# Patient Record
Sex: MALE | Race: ASIAN | NOT HISPANIC OR LATINO | URBAN - METROPOLITAN AREA
[De-identification: names, ages, dates, MRNs, and addresses within clinical notes are randomized per-mention and may not be internally consistent; named-entity substitution may affect disease eponyms.]

---

## 2018-03-01 ENCOUNTER — OUTPATIENT (OUTPATIENT)
Dept: OUTPATIENT SERVICES | Facility: HOSPITAL | Age: 58
LOS: 1 days | End: 2018-03-01
Payer: MEDICAID

## 2018-03-01 PROCEDURE — G9001: CPT

## 2018-03-29 DIAGNOSIS — R69 ILLNESS, UNSPECIFIED: ICD-10-CM

## 2018-08-10 ENCOUNTER — TRANSCRIPTION ENCOUNTER (OUTPATIENT)
Age: 58
End: 2018-08-10

## 2018-08-11 ENCOUNTER — RESULT REVIEW (OUTPATIENT)
Age: 58
End: 2018-08-11

## 2018-08-11 ENCOUNTER — INPATIENT (INPATIENT)
Facility: HOSPITAL | Age: 58
LOS: 26 days | Discharge: HOME | DRG: 347 | End: 2018-09-07
Attending: SURGERY | Admitting: SURGERY
Payer: MEDICAID

## 2018-08-11 VITALS
TEMPERATURE: 98 F | OXYGEN SATURATION: 96 % | HEART RATE: 100 BPM | SYSTOLIC BLOOD PRESSURE: 156 MMHG | DIASTOLIC BLOOD PRESSURE: 85 MMHG | RESPIRATION RATE: 16 BRPM

## 2018-08-11 DIAGNOSIS — R19.8 OTHER SPECIFIED SYMPTOMS AND SIGNS INVOLVING THE DIGESTIVE SYSTEM AND ABDOMEN: ICD-10-CM

## 2018-08-11 LAB
ALBUMIN SERPL ELPH-MCNC: 2.9 G/DL — LOW (ref 3.3–5)
ALP SERPL-CCNC: 89 U/L — SIGNIFICANT CHANGE UP (ref 40–120)
ALT FLD-CCNC: 20 U/L — SIGNIFICANT CHANGE UP (ref 10–45)
ANION GAP SERPL CALC-SCNC: 13 MMOL/L — SIGNIFICANT CHANGE UP (ref 5–17)
ANION GAP SERPL CALC-SCNC: 14 MMOL/L — SIGNIFICANT CHANGE UP (ref 5–17)
APPEARANCE UR: ABNORMAL
APTT BLD: 26.4 SEC — LOW (ref 27.5–37.4)
APTT BLD: 26.9 SEC — LOW (ref 27.5–37.4)
AST SERPL-CCNC: 20 U/L — SIGNIFICANT CHANGE UP (ref 10–40)
BACTERIA # UR AUTO: NEGATIVE /HPF — SIGNIFICANT CHANGE UP
BASOPHILS # BLD AUTO: 0 K/UL — SIGNIFICANT CHANGE UP (ref 0–0.2)
BILIRUB SERPL-MCNC: 0.4 MG/DL — SIGNIFICANT CHANGE UP (ref 0.2–1.2)
BILIRUB UR-MCNC: NEGATIVE — SIGNIFICANT CHANGE UP
BLD GP AB SCN SERPL QL: NEGATIVE — SIGNIFICANT CHANGE UP
BUN SERPL-MCNC: 13 MG/DL — SIGNIFICANT CHANGE UP (ref 7–23)
BUN SERPL-MCNC: 7 MG/DL — SIGNIFICANT CHANGE UP (ref 7–23)
CA-I BLD-SCNC: 1.1 MMOL/L — LOW (ref 1.12–1.3)
CALCIUM SERPL-MCNC: 7.4 MG/DL — LOW (ref 8.4–10.5)
CALCIUM SERPL-MCNC: 8.6 MG/DL — SIGNIFICANT CHANGE UP (ref 8.4–10.5)
CHLORIDE SERPL-SCNC: 94 MMOL/L — LOW (ref 96–108)
CHLORIDE SERPL-SCNC: 98 MMOL/L — SIGNIFICANT CHANGE UP (ref 96–108)
CO2 SERPL-SCNC: 19 MMOL/L — LOW (ref 22–31)
CO2 SERPL-SCNC: 25 MMOL/L — SIGNIFICANT CHANGE UP (ref 22–31)
COLOR SPEC: YELLOW — SIGNIFICANT CHANGE UP
COMMENT - URINE: SIGNIFICANT CHANGE UP
CREAT SERPL-MCNC: 0.51 MG/DL — SIGNIFICANT CHANGE UP (ref 0.5–1.3)
CREAT SERPL-MCNC: 0.69 MG/DL — SIGNIFICANT CHANGE UP (ref 0.5–1.3)
DIFF PNL FLD: NEGATIVE — SIGNIFICANT CHANGE UP
EOSINOPHIL # BLD AUTO: 0.3 K/UL — SIGNIFICANT CHANGE UP (ref 0–0.5)
EOSINOPHIL NFR BLD AUTO: 3 % — SIGNIFICANT CHANGE UP (ref 0–6)
EPI CELLS # UR: SIGNIFICANT CHANGE UP /HPF
GAS PNL BLDA: SIGNIFICANT CHANGE UP
GLUCOSE BLDC GLUCOMTR-MCNC: 98 MG/DL — SIGNIFICANT CHANGE UP (ref 70–99)
GLUCOSE SERPL-MCNC: 166 MG/DL — HIGH (ref 70–99)
GLUCOSE SERPL-MCNC: 71 MG/DL — SIGNIFICANT CHANGE UP (ref 70–99)
GLUCOSE UR QL: NEGATIVE — SIGNIFICANT CHANGE UP
HCT VFR BLD CALC: 31.5 % — LOW (ref 39–50)
HCT VFR BLD CALC: 34.3 % — LOW (ref 39–50)
HGB BLD-MCNC: 10.4 G/DL — LOW (ref 13–17)
HGB BLD-MCNC: 11.1 G/DL — LOW (ref 13–17)
INR BLD: 1.29 RATIO — HIGH (ref 0.88–1.16)
INR BLD: 1.33 RATIO — HIGH (ref 0.88–1.16)
KETONES UR-MCNC: NEGATIVE — SIGNIFICANT CHANGE UP
LACTATE BLDV-MCNC: 1.2 MMOL/L — SIGNIFICANT CHANGE UP (ref 0.7–2)
LACTATE BLDV-MCNC: 2.3 MMOL/L — HIGH (ref 0.7–2)
LEUKOCYTE ESTERASE UR-ACNC: ABNORMAL
LYMPHOCYTES # BLD AUTO: 2.8 K/UL — SIGNIFICANT CHANGE UP (ref 1–3.3)
LYMPHOCYTES # BLD AUTO: 8 % — LOW (ref 13–44)
MAGNESIUM SERPL-MCNC: 1.8 MG/DL — SIGNIFICANT CHANGE UP (ref 1.6–2.6)
MCHC RBC-ENTMCNC: 27.8 PG — SIGNIFICANT CHANGE UP (ref 27–34)
MCHC RBC-ENTMCNC: 27.9 PG — SIGNIFICANT CHANGE UP (ref 27–34)
MCHC RBC-ENTMCNC: 32.5 GM/DL — SIGNIFICANT CHANGE UP (ref 32–36)
MCHC RBC-ENTMCNC: 33 GM/DL — SIGNIFICANT CHANGE UP (ref 32–36)
MCV RBC AUTO: 84.4 FL — SIGNIFICANT CHANGE UP (ref 80–100)
MCV RBC AUTO: 85.7 FL — SIGNIFICANT CHANGE UP (ref 80–100)
MONOCYTES # BLD AUTO: 1.4 K/UL — HIGH (ref 0–0.9)
MONOCYTES NFR BLD AUTO: 7 % — SIGNIFICANT CHANGE UP (ref 2–14)
NEUTROPHILS # BLD AUTO: 20.1 K/UL — HIGH (ref 1.8–7.4)
NEUTROPHILS NFR BLD AUTO: 77 % — SIGNIFICANT CHANGE UP (ref 43–77)
NEUTS BAND # BLD: 5 % — SIGNIFICANT CHANGE UP (ref 0–8)
NITRITE UR-MCNC: NEGATIVE — SIGNIFICANT CHANGE UP
PH UR: 6.5 — SIGNIFICANT CHANGE UP (ref 5–8)
PHOSPHATE SERPL-MCNC: 3.4 MG/DL — SIGNIFICANT CHANGE UP (ref 2.5–4.5)
PLAT MORPH BLD: NORMAL — SIGNIFICANT CHANGE UP
PLATELET # BLD AUTO: 709 K/UL — HIGH (ref 150–400)
PLATELET # BLD AUTO: 712 K/UL — HIGH (ref 150–400)
POTASSIUM SERPL-MCNC: 3.7 MMOL/L — SIGNIFICANT CHANGE UP (ref 3.5–5.3)
POTASSIUM SERPL-MCNC: 3.8 MMOL/L — SIGNIFICANT CHANGE UP (ref 3.5–5.3)
POTASSIUM SERPL-SCNC: 3.7 MMOL/L — SIGNIFICANT CHANGE UP (ref 3.5–5.3)
POTASSIUM SERPL-SCNC: 3.8 MMOL/L — SIGNIFICANT CHANGE UP (ref 3.5–5.3)
PROT SERPL-MCNC: 7.4 G/DL — SIGNIFICANT CHANGE UP (ref 6–8.3)
PROT UR-MCNC: SIGNIFICANT CHANGE UP
PROTHROM AB SERPL-ACNC: 14 SEC — HIGH (ref 9.8–12.7)
PROTHROM AB SERPL-ACNC: 14.4 SEC — HIGH (ref 9.8–12.7)
RBC # BLD: 3.73 M/UL — LOW (ref 4.2–5.8)
RBC # BLD: 4.01 M/UL — LOW (ref 4.2–5.8)
RBC # FLD: 16.6 % — HIGH (ref 10.3–14.5)
RBC # FLD: 17.1 % — HIGH (ref 10.3–14.5)
RBC BLD AUTO: SIGNIFICANT CHANGE UP
RBC CASTS # UR COMP ASSIST: SIGNIFICANT CHANGE UP /HPF (ref 0–2)
RH IG SCN BLD-IMP: POSITIVE — SIGNIFICANT CHANGE UP
RH IG SCN BLD-IMP: POSITIVE — SIGNIFICANT CHANGE UP
SODIUM SERPL-SCNC: 131 MMOL/L — LOW (ref 135–145)
SODIUM SERPL-SCNC: 132 MMOL/L — LOW (ref 135–145)
SP GR SPEC: 1.01 — SIGNIFICANT CHANGE UP (ref 1.01–1.02)
UROBILINOGEN FLD QL: NEGATIVE — SIGNIFICANT CHANGE UP
WBC # BLD: 12.3 K/UL — HIGH (ref 3.8–10.5)
WBC # BLD: 24.7 K/UL — HIGH (ref 3.8–10.5)
WBC # FLD AUTO: 12.3 K/UL — HIGH (ref 3.8–10.5)
WBC # FLD AUTO: 24.7 K/UL — HIGH (ref 3.8–10.5)
WBC UR QL: ABNORMAL /HPF (ref 0–5)

## 2018-08-11 PROCEDURE — 73620 X-RAY EXAM OF FOOT: CPT | Mod: 26,RT

## 2018-08-11 PROCEDURE — 99285 EMERGENCY DEPT VISIT HI MDM: CPT

## 2018-08-11 PROCEDURE — 44160 REMOVAL OF COLON: CPT

## 2018-08-11 PROCEDURE — 49060 DRAIN OPEN RETROPERI ABSCESS: CPT | Mod: 59

## 2018-08-11 PROCEDURE — 88312 SPECIAL STAINS GROUP 1: CPT | Mod: 26

## 2018-08-11 PROCEDURE — 74177 CT ABD & PELVIS W/CONTRAST: CPT | Mod: 26

## 2018-08-11 PROCEDURE — 97605 NEG PRS WND THER DME<=50SQCM: CPT

## 2018-08-11 PROCEDURE — 50715 RELEASE OF URETER: CPT | Mod: 59

## 2018-08-11 PROCEDURE — 99291 CRITICAL CARE FIRST HOUR: CPT

## 2018-08-11 PROCEDURE — 88307 TISSUE EXAM BY PATHOLOGIST: CPT | Mod: 26

## 2018-08-11 PROCEDURE — 71045 X-RAY EXAM CHEST 1 VIEW: CPT | Mod: 26

## 2018-08-11 RX ORDER — DEXMEDETOMIDINE HYDROCHLORIDE IN 0.9% SODIUM CHLORIDE 4 UG/ML
0.2 INJECTION INTRAVENOUS
Qty: 200 | Refills: 0 | Status: DISCONTINUED | OUTPATIENT
Start: 2018-08-11 | End: 2018-08-11

## 2018-08-11 RX ORDER — FLUCONAZOLE 150 MG/1
400 TABLET ORAL ONCE
Qty: 0 | Refills: 0 | Status: COMPLETED | OUTPATIENT
Start: 2018-08-11 | End: 2018-08-11

## 2018-08-11 RX ORDER — SODIUM CHLORIDE 9 MG/ML
500 INJECTION INTRAMUSCULAR; INTRAVENOUS; SUBCUTANEOUS ONCE
Qty: 0 | Refills: 0 | Status: COMPLETED | OUTPATIENT
Start: 2018-08-11 | End: 2018-08-11

## 2018-08-11 RX ORDER — FLUCONAZOLE 150 MG/1
400 TABLET ORAL EVERY 24 HOURS
Qty: 0 | Refills: 0 | Status: DISCONTINUED | OUTPATIENT
Start: 2018-08-12 | End: 2018-08-12

## 2018-08-11 RX ORDER — FLUCONAZOLE 150 MG/1
TABLET ORAL
Qty: 0 | Refills: 0 | Status: DISCONTINUED | OUTPATIENT
Start: 2018-08-11 | End: 2018-08-12

## 2018-08-11 RX ORDER — INSULIN LISPRO 100/ML
VIAL (ML) SUBCUTANEOUS EVERY 6 HOURS
Qty: 0 | Refills: 0 | Status: DISCONTINUED | OUTPATIENT
Start: 2018-08-11 | End: 2018-08-12

## 2018-08-11 RX ORDER — PHENYLEPHRINE HYDROCHLORIDE 10 MG/ML
0.4 INJECTION INTRAVENOUS
Qty: 160 | Refills: 0 | Status: DISCONTINUED | OUTPATIENT
Start: 2018-08-11 | End: 2018-08-12

## 2018-08-11 RX ORDER — PIPERACILLIN AND TAZOBACTAM 4; .5 G/20ML; G/20ML
3.38 INJECTION, POWDER, LYOPHILIZED, FOR SOLUTION INTRAVENOUS EVERY 8 HOURS
Qty: 0 | Refills: 0 | Status: DISCONTINUED | OUTPATIENT
Start: 2018-08-11 | End: 2018-08-13

## 2018-08-11 RX ORDER — FENTANYL CITRATE 50 UG/ML
0.5 INJECTION INTRAVENOUS
Qty: 5000 | Refills: 0 | Status: DISCONTINUED | OUTPATIENT
Start: 2018-08-11 | End: 2018-08-12

## 2018-08-11 RX ORDER — FENTANYL CITRATE 50 UG/ML
1 INJECTION INTRAVENOUS
Qty: 2500 | Refills: 0 | Status: DISCONTINUED | OUTPATIENT
Start: 2018-08-11 | End: 2018-08-11

## 2018-08-11 RX ORDER — SODIUM CHLORIDE 9 MG/ML
1500 INJECTION INTRAMUSCULAR; INTRAVENOUS; SUBCUTANEOUS ONCE
Qty: 0 | Refills: 0 | Status: COMPLETED | OUTPATIENT
Start: 2018-08-11 | End: 2018-08-11

## 2018-08-11 RX ORDER — SODIUM CHLORIDE 9 MG/ML
1000 INJECTION INTRAMUSCULAR; INTRAVENOUS; SUBCUTANEOUS ONCE
Qty: 0 | Refills: 0 | Status: COMPLETED | OUTPATIENT
Start: 2018-08-11 | End: 2018-08-11

## 2018-08-11 RX ORDER — ENOXAPARIN SODIUM 100 MG/ML
40 INJECTION SUBCUTANEOUS DAILY
Qty: 0 | Refills: 0 | Status: DISCONTINUED | OUTPATIENT
Start: 2018-08-11 | End: 2018-08-13

## 2018-08-11 RX ORDER — SODIUM CHLORIDE 9 MG/ML
1000 INJECTION, SOLUTION INTRAVENOUS
Qty: 0 | Refills: 0 | Status: DISCONTINUED | OUTPATIENT
Start: 2018-08-11 | End: 2018-08-13

## 2018-08-11 RX ORDER — PROPOFOL 10 MG/ML
5 INJECTION, EMULSION INTRAVENOUS
Qty: 1000 | Refills: 0 | Status: DISCONTINUED | OUTPATIENT
Start: 2018-08-11 | End: 2018-08-11

## 2018-08-11 RX ORDER — FENTANYL CITRATE 50 UG/ML
50 INJECTION INTRAVENOUS ONCE
Qty: 0 | Refills: 0 | Status: DISCONTINUED | OUTPATIENT
Start: 2018-08-11 | End: 2018-08-11

## 2018-08-11 RX ORDER — VANCOMYCIN HCL 1 G
1000 VIAL (EA) INTRAVENOUS ONCE
Qty: 0 | Refills: 0 | Status: COMPLETED | OUTPATIENT
Start: 2018-08-11 | End: 2018-08-11

## 2018-08-11 RX ORDER — PROPOFOL 10 MG/ML
5 INJECTION, EMULSION INTRAVENOUS
Qty: 1000 | Refills: 0 | Status: DISCONTINUED | OUTPATIENT
Start: 2018-08-11 | End: 2018-08-13

## 2018-08-11 RX ORDER — VANCOMYCIN HCL 1 G
750 VIAL (EA) INTRAVENOUS EVERY 12 HOURS
Qty: 0 | Refills: 0 | Status: DISCONTINUED | OUTPATIENT
Start: 2018-08-11 | End: 2018-08-12

## 2018-08-11 RX ORDER — PANTOPRAZOLE SODIUM 20 MG/1
40 TABLET, DELAYED RELEASE ORAL DAILY
Qty: 0 | Refills: 0 | Status: DISCONTINUED | OUTPATIENT
Start: 2018-08-11 | End: 2018-08-13

## 2018-08-11 RX ORDER — PIPERACILLIN AND TAZOBACTAM 4; .5 G/20ML; G/20ML
3.38 INJECTION, POWDER, LYOPHILIZED, FOR SOLUTION INTRAVENOUS ONCE
Qty: 0 | Refills: 0 | Status: COMPLETED | OUTPATIENT
Start: 2018-08-11 | End: 2018-08-11

## 2018-08-11 RX ADMIN — SODIUM CHLORIDE 125 MILLILITER(S): 9 INJECTION, SOLUTION INTRAVENOUS at 22:27

## 2018-08-11 RX ADMIN — PIPERACILLIN AND TAZOBACTAM 25 GRAM(S): 4; .5 INJECTION, POWDER, LYOPHILIZED, FOR SOLUTION INTRAVENOUS at 22:35

## 2018-08-11 RX ADMIN — Medication 250 MILLIGRAM(S): at 17:37

## 2018-08-11 RX ADMIN — FENTANYL CITRATE 50 MICROGRAM(S): 50 INJECTION INTRAVENOUS at 22:25

## 2018-08-11 RX ADMIN — Medication 1 MILLIGRAM(S): at 22:15

## 2018-08-11 RX ADMIN — FENTANYL CITRATE 50 MICROGRAM(S): 50 INJECTION INTRAVENOUS at 22:40

## 2018-08-11 RX ADMIN — SODIUM CHLORIDE 1000 MILLILITER(S): 9 INJECTION INTRAMUSCULAR; INTRAVENOUS; SUBCUTANEOUS at 12:40

## 2018-08-11 RX ADMIN — SODIUM CHLORIDE 1500 MILLILITER(S): 9 INJECTION INTRAMUSCULAR; INTRAVENOUS; SUBCUTANEOUS at 15:33

## 2018-08-11 RX ADMIN — PROPOFOL 2.1 MICROGRAM(S)/KG/MIN: 10 INJECTION, EMULSION INTRAVENOUS at 22:27

## 2018-08-11 RX ADMIN — PIPERACILLIN AND TAZOBACTAM 200 GRAM(S): 4; .5 INJECTION, POWDER, LYOPHILIZED, FOR SOLUTION INTRAVENOUS at 15:33

## 2018-08-11 RX ADMIN — SODIUM CHLORIDE 500 MILLILITER(S): 9 INJECTION INTRAMUSCULAR; INTRAVENOUS; SUBCUTANEOUS at 17:38

## 2018-08-11 RX ADMIN — FLUCONAZOLE 100 MILLIGRAM(S): 150 TABLET ORAL at 22:35

## 2018-08-11 NOTE — H&P ADULT - HISTORY OF PRESENT ILLNESS
58M PMH DM, HTN, peripheral neuropathy, presenting to ED with abdominal pain and R leg weakness. Patient had recently been in [place] (on vacation, [place] the [place]), where he was admitted to a hospital there 2 weeks prior to presentation for fever, found during work up there to have a R abdominal collection 2/2 colonic drainage. Hospital there started pt on antibiotics, placed a drain into the collection which was apparently not very successful as it had very little output. According to the pt the doctors in [place] recommended surgery to remove part of the colon, which patient did not agree with. He left the hospital AMA and [place] back [place] the [place] for second opinion. The patient came to [facility] ED immediately after leaving the airport.    CT was obtained here which showed a large cecal/terminal ileum perforation with a large amount of stool extending into the R iliacus muscle. Labs significant   for WBC 24.7, Lactate 2.3. Patient also with grossly positive UTI.    Of not patient also endorsing inability to ambulate for the past 2 weeks (at around the same time as fever developed) due to weakness? in the RLE. Also endorsing dysuria.

## 2018-08-11 NOTE — ED PROVIDER NOTE - MUSCULOSKELETAL [+], MLM
R leg weakness for last 2 weeks (can not raise or extend), inability to ambulate due to leg weakness,

## 2018-08-11 NOTE — ED PROVIDER NOTE - CHPI ED SYMPTOMS POS
R leg weakness for last 2 weeks (can not raise or extend), inability to ambulate due to leg weakness, constipation, burning when urinating, difficulty urinating, weak, tired, lightheaded/WEAKNESS/FEVER R leg weakness for last 2 weeks (can not raise or extend), inability to ambulate due to leg weakness, Mass on R hip, constipation, burning when urinating, difficulty urinating, weak, tired, lightheaded/FEVER/WEAKNESS

## 2018-08-11 NOTE — ED PROVIDER NOTE - PROGRESS NOTE DETAILS
More information from family. Pt had psoas abscess secondary to diverticulitis. He was admitted to ICU in [place] on July 28th and left AMA on July 31st. Admitted due to abscess in abd on R iliac fossa. CT scan showed large abscess collected on right iliopsoas muscle communicating with ascending colon. Initially placed pigtail catheter to drain pus then discussed surgery R hemicolectomy. Family refused and was consulted several times, pt left AMA. On exam today, no sign of peritonitis, minimal tenderness in RLQ. No fever, elevated white count 02531. White count in [facility] was [address]129, Downtrended from 32,000 to 29,000 in 3 days. Discuss with surgery. CTS from radiology shows large perforation of bowel. Antibiotics started. Surgery consulted and aware to order preop labs.

## 2018-08-11 NOTE — ED PROVIDER NOTE - MUSCULOSKELETAL MINIMAL EXAM
R leg proximal muscle weakness inability to extend  lower leg but full plantar dorsiflexion sensation intact

## 2018-08-11 NOTE — ED ADULT NURSE REASSESSMENT NOTE - NS ED NURSE REASSESS COMMENT FT1
Pt had large soft bowel movement and reports relief. No blood noted to stool. hygiene needs provided

## 2018-08-11 NOTE — H&P ADULT - NSHPLABSRESULTS_GEN_ALL_CORE
Complete Blood Count + Automated Diff (08.11.18 @ 12:59)    WBC Count: 24.7 K/uL    RBC Count: 3.73 M/uL    Hemoglobin: 10.4 g/dL    Hematocrit: 31.5 %    Mean Cell Volume: 84.4 fl    Mean Cell Hemoglobin: 27.9 pg    Mean Cell Hemoglobin Conc: 33.0 gm/dL    Red Cell Distrib Width: 16.6 %    Platelet Count - Automated: 712 K/uL    Auto Neutrophil #: 20.1 K/uL    Auto Lymphocyte #: 2.8 K/uL    Auto Monocyte #: 1.4 K/uL    Auto Eosinophil #: 0.3 K/uL    Auto Basophil #: 0.0 K/uL    Auto Neutrophil %: 77.0: Differential percentages must be correlated with absolute numbers for  clinical significance. %    Auto Lymphocyte %: 8.0 %    Auto Monocyte %: 7.0 %    Auto Eosinophil %: 3.0 %    Comprehensive Metabolic Panel (08.11.18 @ 12:59)    Sodium, Serum: 132 mmol/L    Potassium, Serum: 3.8 mmol/L    Chloride, Serum: 94 mmol/L    Carbon Dioxide, Serum: 25 mmol/L    Anion Gap, Serum: 13 mmol/L    Blood Urea Nitrogen, Serum: 13 mg/dL    Creatinine, Serum: 0.69 mg/dL    Glucose, Serum: 71 mg/dL    Calcium, Total Serum: 8.6 mg/dL    Protein Total, Serum: 7.4 g/dL    Albumin, Serum: 2.9 g/dL    Bilirubin Total, Serum: 0.4 mg/dL    Alkaline Phosphatase, Serum: 89 U/L    Aspartate Aminotransferase (AST/SGOT): 20 U/L    Alanine Aminotransferase (ALT/SGPT): 20 U/L    eGFR if Non : 105: Interpretative comment  The units for eGFR are ml/min/1.73m2 (normalized body surface area). The  eGFR is calculated from a serum creatinine using the CKD-EPI equation.  Other variables required for calculation are race, age and sex. Among  patients with chronic kidney disease (CKD), the eGFR is useful in  determining the stage of disease according to KDOQI CKD classification.  All eGFR results are reported numerically with the following  interpretation.          GFR                    With                 Without     (ml/min/1.73 m2)    Kidney Damage       Kidney Damage        >= 90                    Stage 1                     Normal        60-89                    Stage 2                     Decreased GFR        30-59     Stage 3                     Stage 3        15-29                    Stage 4                     Stage 4        < 15                      Stage 5                     Stage 5  Each stage of CKD assumes that the associated GFR level has been in  effect for at least 3 months. Determination of stages one and two (with  eGFR > 59 ml/min/m2) requires estimation of kidney damage for at least 3  months as defined by structural or functional abnormalities.  Limitations: All estimates of GFR will be less accurate for patients at  extremes of muscle mass (including but not limited to frail elderly,  critically ill, or cancer patients), those with unusual diets, and those  with conditions associated with reduced secretion or extrarenal  elimination of creatinine. The eGFR equation is not recommended for use  in patients with unstable creatinine levels. mL/min/1.73M2    eGFR if African American: 121 mL/min/1.73M2      Urinalysis (08.11.18 @ 12:59)    Glucose Qualitative, Urine: Negative    Blood, Urine: Negative    pH Urine: 6.5    Color: Yellow    Urine Appearance: SL Turbid    Bilirubin: Negative    Ketone - Urine: Negative    Specific Gravity: 1.011    Protein, Urine: Trace    Urobilinogen: Negative    Nitrite: Negative    Leukocyte Esterase Concentration: Large

## 2018-08-11 NOTE — ED ADULT NURSE REASSESSMENT NOTE - NS ED NURSE REASSESS COMMENT FT1
Coles catheter placed under sterile technique with two RNS present per MD orders for urinary retention. 800cc of clear yellow urine drained, pt reports relief afterwards and states he feels a lot better. Hygiene needs provided, safety maintained

## 2018-08-11 NOTE — ED ADULT NURSE REASSESSMENT NOTE - NS ED NURSE REASSESS COMMENT FT1
Pt educated on how to obtain clean urine catch. Pt placed on bed pan because he states he needs to have a bowel movement but that he has louisa constipated. Safety maintained

## 2018-08-11 NOTE — ED ADULT NURSE NOTE - INTERVENTIONS DEFINITIONS
Instruct patient to call for assistance/Physically safe environment: no spills, clutter or unnecessary equipment/Review medications for side effects contributing to fall risk/[place] to call system/Stretcher in lowest position, wheels locked, appropriate side rails in place/Monitor gait and stability/Call bell, personal items and telephone within reach

## 2018-08-11 NOTE — ED PROVIDER NOTE - SKIN COLOR
Healing ulcer medial aspect of R foot no tenderness no changes no swelling, smaller healing ulcer on L medial foot, dressing removed in R hip clean intact
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
## 2018-08-11 NOTE — BRIEF OPERATIVE NOTE - PROCEDURE
<<-----Click on this checkbox to enter Procedure Exploratory laparotomy  08/12/2018  wiht ileo-cecal resection and abscess drainage and debridement of necrotizing pelvic abscess and ABThera wound vac placement  Active  EBIANCHI

## 2018-08-11 NOTE — ED PROVIDER NOTE - MEDICAL DECISION MAKING DETAILS
58 yr old M hx of DM, HTN, peripheral neuropathy presents with multiple complaints from recent travel to [place] post intraabdominal abscess with drainage 3 days ago, rescanned his belly. Also complaining of urinary symptoms. Does not meet sever sepsis criteria, however, initiate sepsis workup. Unsure of etiology of RLE weakness over 2 weeks. Hx of peripheral neuropathy secondary to DM. Weakness is new and was not addressed in [place]. Order MRI lumbar spine, CT abd.

## 2018-08-11 NOTE — H&P ADULT - NSHPPHYSICALEXAM_GEN_ALL_CORE
Gen: Laying in bed, uncomfortable appearing  Resp: Airway patent, no increased WOB  CV: RRR, no m/r/g  Abd: Soft. TTP over RLQ. No rebound or gaurding  Ext:   RLE: Palpable DP/PT. Small healing ulcer over medial foot. No visible purulence.   LLE: Palpable DP/PT. FROM
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
## 2018-08-11 NOTE — H&P ADULT - ASSESSMENT
58M PMH DM, HTN, peripheral neuropathy, presenting to ED with abdominal pain and R leg weakness with recent hospitalization in [place] for management of RLQ collection, found on CT here to have cecal perforation with stool extending into R iliacus muscle. 58M PMH DM, HTN, peripheral neuropathy, presenting to ED with abdominal pain and R leg weakness with recent hospitalization in [place] for management of RLQ collection, found on CT here to have cecal perforation with stool extending into R iliacus muscle.    - Admit to [facility]  - Plan for OR for R hemicolectomy  - Discussed with attending Dr. Ankur Salmeron PGY2  [facility]

## 2018-08-12 ENCOUNTER — TRANSCRIPTION ENCOUNTER (OUTPATIENT)
Age: 58
End: 2018-08-12

## 2018-08-12 LAB
ANION GAP SERPL CALC-SCNC: 10 MMOL/L — SIGNIFICANT CHANGE UP (ref 5–17)
BUN SERPL-MCNC: 7 MG/DL — SIGNIFICANT CHANGE UP (ref 7–23)
CALCIUM SERPL-MCNC: 7.4 MG/DL — LOW (ref 8.4–10.5)
CHLORIDE SERPL-SCNC: 103 MMOL/L — SIGNIFICANT CHANGE UP (ref 96–108)
CO2 SERPL-SCNC: 23 MMOL/L — SIGNIFICANT CHANGE UP (ref 22–31)
CREAT SERPL-MCNC: 0.63 MG/DL — SIGNIFICANT CHANGE UP (ref 0.5–1.3)
CULTURE RESULTS: SIGNIFICANT CHANGE UP
GAS PNL BLDA: SIGNIFICANT CHANGE UP
GLUCOSE BLDC GLUCOMTR-MCNC: 111 MG/DL — HIGH (ref 70–99)
GLUCOSE BLDC GLUCOMTR-MCNC: 122 MG/DL — HIGH (ref 70–99)
GLUCOSE BLDC GLUCOMTR-MCNC: 123 MG/DL — HIGH (ref 70–99)
GLUCOSE BLDC GLUCOMTR-MCNC: 203 MG/DL — HIGH (ref 70–99)
GLUCOSE BLDC GLUCOMTR-MCNC: 75 MG/DL — SIGNIFICANT CHANGE UP (ref 70–99)
GLUCOSE SERPL-MCNC: 82 MG/DL — SIGNIFICANT CHANGE UP (ref 70–99)
HBA1C BLD-MCNC: 7 % — HIGH (ref 4–5.6)
HCT VFR BLD CALC: 28.8 % — LOW (ref 39–50)
HGB BLD-MCNC: 9.2 G/DL — LOW (ref 13–17)
MAGNESIUM SERPL-MCNC: 1.8 MG/DL — SIGNIFICANT CHANGE UP (ref 1.6–2.6)
MCHC RBC-ENTMCNC: 27.3 PG — SIGNIFICANT CHANGE UP (ref 27–34)
MCHC RBC-ENTMCNC: 32 GM/DL — SIGNIFICANT CHANGE UP (ref 32–36)
MCV RBC AUTO: 85.4 FL — SIGNIFICANT CHANGE UP (ref 80–100)
PHOSPHATE SERPL-MCNC: 2 MG/DL — LOW (ref 2.5–4.5)
PLATELET # BLD AUTO: 583 K/UL — HIGH (ref 150–400)
POTASSIUM SERPL-MCNC: 3.4 MMOL/L — LOW (ref 3.5–5.3)
POTASSIUM SERPL-SCNC: 3.4 MMOL/L — LOW (ref 3.5–5.3)
RBC # BLD: 3.38 M/UL — LOW (ref 4.2–5.8)
RBC # FLD: 17.1 % — HIGH (ref 10.3–14.5)
SODIUM SERPL-SCNC: 136 MMOL/L — SIGNIFICANT CHANGE UP (ref 135–145)
SPECIMEN SOURCE: SIGNIFICANT CHANGE UP
WBC # BLD: 17.5 K/UL — HIGH (ref 3.8–10.5)
WBC # FLD AUTO: 17.5 K/UL — HIGH (ref 3.8–10.5)

## 2018-08-12 PROCEDURE — 71045 X-RAY EXAM CHEST 1 VIEW: CPT | Mod: 26

## 2018-08-12 RX ORDER — VANCOMYCIN HCL 1 G
1000 VIAL (EA) INTRAVENOUS EVERY 12 HOURS
Qty: 0 | Refills: 0 | Status: DISCONTINUED | OUTPATIENT
Start: 2018-08-12 | End: 2018-08-13

## 2018-08-12 RX ORDER — FENTANYL CITRATE 50 UG/ML
0.5 INJECTION INTRAVENOUS
Qty: 5000 | Refills: 0 | Status: DISCONTINUED | OUTPATIENT
Start: 2018-08-12 | End: 2018-08-13

## 2018-08-12 RX ORDER — VANCOMYCIN HCL 1 G
250 VIAL (EA) INTRAVENOUS ONCE
Qty: 0 | Refills: 0 | Status: COMPLETED | OUTPATIENT
Start: 2018-08-12 | End: 2018-08-12

## 2018-08-12 RX ORDER — INSULIN LISPRO 100/ML
VIAL (ML) SUBCUTANEOUS EVERY 4 HOURS
Qty: 0 | Refills: 0 | Status: DISCONTINUED | OUTPATIENT
Start: 2018-08-12 | End: 2018-08-13

## 2018-08-12 RX ORDER — FLUCONAZOLE 150 MG/1
200 TABLET ORAL EVERY 24 HOURS
Qty: 0 | Refills: 0 | Status: DISCONTINUED | OUTPATIENT
Start: 2018-08-12 | End: 2018-08-13

## 2018-08-12 RX ORDER — POTASSIUM CHLORIDE 20 MEQ
10 PACKET (EA) ORAL
Qty: 0 | Refills: 0 | Status: COMPLETED | OUTPATIENT
Start: 2018-08-12 | End: 2018-08-12

## 2018-08-12 RX ORDER — HYDROMORPHONE HYDROCHLORIDE 2 MG/ML
0.5 INJECTION INTRAMUSCULAR; INTRAVENOUS; SUBCUTANEOUS ONCE
Qty: 0 | Refills: 0 | Status: DISCONTINUED | OUTPATIENT
Start: 2018-08-12 | End: 2018-08-12

## 2018-08-12 RX ORDER — MAGNESIUM SULFATE 500 MG/ML
2 VIAL (ML) INJECTION ONCE
Qty: 0 | Refills: 0 | Status: COMPLETED | OUTPATIENT
Start: 2018-08-12 | End: 2018-08-12

## 2018-08-12 RX ORDER — CALCIUM GLUCONATE 100 MG/ML
2 VIAL (ML) INTRAVENOUS ONCE
Qty: 0 | Refills: 0 | Status: COMPLETED | OUTPATIENT
Start: 2018-08-12 | End: 2018-08-12

## 2018-08-12 RX ORDER — HYDROMORPHONE HYDROCHLORIDE 2 MG/ML
0.5 INJECTION INTRAMUSCULAR; INTRAVENOUS; SUBCUTANEOUS EVERY 4 HOURS
Qty: 0 | Refills: 0 | Status: DISCONTINUED | OUTPATIENT
Start: 2018-08-12 | End: 2018-08-13

## 2018-08-12 RX ADMIN — Medication 250 MILLIGRAM(S): at 17:24

## 2018-08-12 RX ADMIN — PIPERACILLIN AND TAZOBACTAM 25 GRAM(S): 4; .5 INJECTION, POWDER, LYOPHILIZED, FOR SOLUTION INTRAVENOUS at 05:25

## 2018-08-12 RX ADMIN — Medication 250 MILLIMOLE(S): at 05:41

## 2018-08-12 RX ADMIN — PIPERACILLIN AND TAZOBACTAM 25 GRAM(S): 4; .5 INJECTION, POWDER, LYOPHILIZED, FOR SOLUTION INTRAVENOUS at 13:06

## 2018-08-12 RX ADMIN — Medication 100 MILLIGRAM(S): at 10:11

## 2018-08-12 RX ADMIN — Medication 50 GRAM(S): at 04:28

## 2018-08-12 RX ADMIN — Medication 1 MILLIGRAM(S): at 00:16

## 2018-08-12 RX ADMIN — FLUCONAZOLE 100 MILLIGRAM(S): 150 TABLET ORAL at 21:21

## 2018-08-12 RX ADMIN — Medication 250 MILLIMOLE(S): at 08:19

## 2018-08-12 RX ADMIN — ENOXAPARIN SODIUM 40 MILLIGRAM(S): 100 INJECTION SUBCUTANEOUS at 11:21

## 2018-08-12 RX ADMIN — Medication 250 MILLIMOLE(S): at 06:37

## 2018-08-12 RX ADMIN — PIPERACILLIN AND TAZOBACTAM 25 GRAM(S): 4; .5 INJECTION, POWDER, LYOPHILIZED, FOR SOLUTION INTRAVENOUS at 21:21

## 2018-08-12 RX ADMIN — HYDROMORPHONE HYDROCHLORIDE 0.5 MILLIGRAM(S): 2 INJECTION INTRAMUSCULAR; INTRAVENOUS; SUBCUTANEOUS at 17:47

## 2018-08-12 RX ADMIN — Medication 2: at 11:27

## 2018-08-12 RX ADMIN — Medication 200 GRAM(S): at 10:10

## 2018-08-12 RX ADMIN — Medication 250 MILLIGRAM(S): at 05:29

## 2018-08-12 RX ADMIN — PANTOPRAZOLE SODIUM 40 MILLIGRAM(S): 20 TABLET, DELAYED RELEASE ORAL at 11:21

## 2018-08-12 RX ADMIN — HYDROMORPHONE HYDROCHLORIDE 0.5 MILLIGRAM(S): 2 INJECTION INTRAMUSCULAR; INTRAVENOUS; SUBCUTANEOUS at 17:32

## 2018-08-12 RX ADMIN — Medication 100 MILLIEQUIVALENT(S): at 05:58

## 2018-08-12 RX ADMIN — Medication 100 MILLIEQUIVALENT(S): at 08:15

## 2018-08-12 RX ADMIN — Medication 100 MILLIEQUIVALENT(S): at 04:28

## 2018-08-12 NOTE — PROGRESS NOTE ADULT - SUBJECTIVE AND OBJECTIVE BOX
s/p ileocecectomy w/ drainage of retroperitoneal abscess.  Pt w/intermittent agitation and pressors overnight.  Currently hypertensive    vac in place      Objective:    MEDICATIONS  (STANDING):  calcium gluconate IVPB 2 Gram(s) IV Intermittent once  enoxaparin Injectable 40 milliGRAM(s) SubCutaneous daily  fentaNYL   Infusion 0.5 MICROgram(s)/kG/Hr (1.75 mL/Hr) IV Continuous <Continuous>  fluconAZOLE IVPB 200 milliGRAM(s) IV Intermittent every 24 hours  insulin lispro (HumaLOG) corrective regimen sliding scale   SubCutaneous every 6 hours  lactated ringers. 1000 milliLiter(s) (150 mL/Hr) IV Continuous <Continuous>  pantoprazole  Injectable 40 milliGRAM(s) IV Push daily  piperacillin/tazobactam IVPB. 3.375 Gram(s) IV Intermittent every 8 hours  propofol Infusion 5 MICROgram(s)/kG/Min (2.1 mL/Hr) IV Continuous <Continuous>  vancomycin  IVPB 250 milliGRAM(s) IV Intermittent once  vancomycin  IVPB 1000 milliGRAM(s) IV Intermittent every 12 hours    MEDICATIONS  (PRN):  LORazepam   Injectable 1 milliGRAM(s) IV Push every 4 hours PRN Agitation      Vital Signs Last 24 Hrs  T(C): 38.3 (12 Aug 2018 07:00), Max: 38.3 (12 Aug 2018 03:00)  T(F): 100.9 (12 Aug 2018 07:00), Max: 101 (12 Aug 2018 03:00)  HR: 98 (12 Aug 2018 08:45) (93 - 144)  BP: 123/60 (12 Aug 2018 08:45) (112/56 - 169/115)  BP(mean): 86 (12 Aug 2018 08:45) (80 - 134)  RR: 18 (12 Aug 2018 08:45) (16 - 33)  SpO2: 100% (12 Aug 2018 08:45) (94% - 100%)    I&O's Detail    11 Aug 2018 07:01  -  12 Aug 2018 07:00  --------------------------------------------------------  IN:    fentaNYL  Infusion: 14.4 mL    IV PiggyBack: 450 mL    lactated ringers.: 1300 mL    phenylephrine   Infusion: 18.3 mL    propofol Infusion: 135.8 mL    propofol Infusion: 19.5 mL    Sodium Chloride 0.9% IV Bolus: 1000 mL    Solution: 100 mL    Solution: 250 mL  Total IN: 3288 mL    OUT:    Indwelling Catheter - Urethral: 1400 mL    Nasoenteral Tube: 100 mL    VAC (Vacuum Assisted Closure) System: 650 mL    Voided: 30 mL  Total OUT: 2180 mL    Total NET: 1108 mL      12 Aug 2018 07:01  -  12 Aug 2018 09:01  --------------------------------------------------------  IN:    fentaNYL  Infusion: 1.6 mL    IV PiggyBack: 250 mL    lactated ringers.: 150 mL    Solution: 100 mL  Total IN: 501.6 mL    OUT:    Indwelling Catheter - Urethral: 30 mL  Total OUT: 30 mL    Total NET: 471.6 mL          Daily Height in cm: 175.26 (11 Aug 2018 22:03)    Daily Weight in k.9 (12 Aug 2018 01:00)    LABS:                        9.2    17.5  )-----------( 583      ( 12 Aug 2018 02:52 )             28.8     -12    136  |  103  |  7   ----------------------------<  82  3.4<L>   |  23  |  0.63    Ca    7.4<L>      12 Aug 2018 02:52  Phos  2.0       Mg     1.8         TPro  7.4  /  Alb  2.9<L>  /  TBili  0.4  /  DBili  x   /  AST  20  /  ALT  20  /  AlkPhos  89  08-11    PT/INR - ( 11 Aug 2018 22:43 )   PT: 14.4 sec;   INR: 1.33 ratio         PTT - ( 11 Aug 2018 22:43 )  PTT:26.4 sec  Urinalysis Basic - ( 11 Aug 2018 13:01 )    Color: x / Appearance: x / SG: x / pH: x  Gluc: x / Ketone: x  / Bili: x / Urobili: x   Blood: x / Protein: x / Nitrite: x   Leuk Esterase: x / RBC: 0-2 /HPF / WBC 25-50 /HPF   Sq Epi: x / Non Sq Epi: Occasional /HPF / Bacteria: Negative /HPF        RADIOLOGY & ADDITIONAL STUDIES:

## 2018-08-12 NOTE — PROGRESS NOTE ADULT - SUBJECTIVE AND OBJECTIVE BOX
SURGERY PROGRESS NOTE:   9039 - ATP    Overnight Events:  Remains intubated and sedated with an open abdomen after open ileocecectomy.       Physical Exam  Vital Signs Last 24 Hrs  T(C): 37.6 (12 Aug 2018 11:00), Max: 38.3 (12 Aug 2018 03:00)  T(F): 99.7 (12 Aug 2018 11:00), Max: 101 (12 Aug 2018 03:00)  HR: 87 (12 Aug 2018 13:00) (87 - 144)  BP: 104/51 (12 Aug 2018 13:00) (104/51 - 169/115)  BP(mean): 74 (12 Aug 2018 13:00) (74 - 134)  RR: 17 (12 Aug 2018 13:00) (17 - 33)  SpO2: 100% (12 Aug 2018 13:00) (94% - 100%)    I&O's Detail    11 Aug 2018 07:01  -  12 Aug 2018 07:00  --------------------------------------------------------  IN:    fentaNYL  Infusion: 14.4 mL    IV PiggyBack: 450 mL    lactated ringers.: 1300 mL    phenylephrine   Infusion: 18.3 mL    propofol Infusion: 135.8 mL    propofol Infusion: 19.5 mL    Sodium Chloride 0.9% IV Bolus: 1000 mL    Solution: 100 mL    Solution: 250 mL  Total IN: 3288 mL    OUT:    Indwelling Catheter - Urethral: 1400 mL    Nasoenteral Tube: 100 mL    VAC (Vacuum Assisted Closure) System: 650 mL    Voided: 30 mL  Total OUT: 2180 mL    Total NET: 1108 mL      12 Aug 2018 07:01  -  12 Aug 2018 13:54  --------------------------------------------------------  IN:    fentaNYL  Infusion: 9.6 mL    IV PiggyBack: 375 mL    lactated ringers.: 850 mL    propofol Infusion: 82 mL    Solution: 200 mL  Total IN: 1516.6 mL    OUT:    Indwelling Catheter - Urethral: 755 mL  Total OUT: 755 mL    Total NET: 761.6 mL          Gen: NAD, intubated, sedated  HEENT: normocephalic, atraumatic, no scleral icterus  CV: S1, S2, RRR  Pulm: CTA B/L  Abd: Soft, ND, tender, abthera vac in place with serous output in canister  Ext: warm, no edema, palp dp/pt    Labs:                        9.2    17.5  )-----------( 583      ( 12 Aug 2018 02:52 )             28.8     08-12    136  |  103  |  7   ----------------------------<  82  3.4<L>   |  23  |  0.63    Ca    7.4<L>      12 Aug 2018 02:52  Phos  2.0     08-12  Mg     1.8     08-12    TPro  7.4  /  Alb  2.9<L>  /  TBili  0.4  /  DBili  x   /  AST  20  /  ALT  20  /  AlkPhos  89  08-11    PT/INR - ( 11 Aug 2018 22:43 )   PT: 14.4 sec;   INR: 1.33 ratio         PTT - ( 11 Aug 2018 22:43 )  PTT:26.4 sec  Urinalysis Basic - ( 11 Aug 2018 13:01 )    Color: x / Appearance: x / SG: x / pH: x  Gluc: x / Ketone: x  / Bili: x / Urobili: x   Blood: x / Protein: x / Nitrite: x   Leuk Esterase: x / RBC: 0-2 /HPF / WBC 25-50 /HPF   Sq Epi: x / Non Sq Epi: Occasional /HPF / Bacteria: Negative /HPF      ABG - ( 12 Aug 2018 02:43 )  pH, Arterial: 7.49  pH, Blood: x     /  pCO2: 35    /  pO2: 230   / HCO3: 26    / Base Excess: 3.0   /  SaO2: 98

## 2018-08-12 NOTE — CONSULT NOTE ADULT - ASSESSMENT
Assessment and Recommendation:   ASSESSMENT: Pelvic abscess in male with purulent fecal material, likely secondary to perforated appendicitis, also associated with a cecal and ileal perforation.  s/p Exploratory laparotomy with ileo-cecal resection and abscess drainage and debridement of necrotizing pelvic abscess and ABThera wound vac placement. Patient remains intubated in SICU.    PLAN:   NEURO  -Intubated and sedated does not follow commands, agitated at times  -continue fentanyl, lorazepam PRN, propofol drip to RASS -2    RESPIRATORY  -AC/ CMV RR (machine): 14, RR (patient): 18, TV (machine): 450, FiO2: 60, PEEP: 5, ITime: 1, MAP: 6, PIP: 12  -continue intubation for possible re-op tomorrow  - monitor respiratory rate and oxygen saturation; SpO2 > 90%    CARDIOVASCULAR  -continue phenylephrine, wean as tolerated  - monitor HR and BP    GI/NUTRITION  -NPO  -prophylaxis with pantoprazole      GENITOURINARY/RENAL  -LR @100  -eugene for strict I's and O's  - Monitor BUN and Cr    Hematologic:  - Trend H/H  - lovenox for VTE prophylaxis    Infectious Disease:  - Monitor WBC, temperature  - diflucan, zosyn, vanc continued  - pending cultures in OR    Endocrine:  - Monitor blood glucose on BMP  - ISS    Disposition: SICU

## 2018-08-12 NOTE — PROGRESS NOTE ADULT - ASSESSMENT
57yo M p/w cecal and terminal ileal perforation s/p R hemicolectomy, extensive washout/debridement necrotic tissue (8/11). Admitted to SICU for cardiac and respiratory support post-op.    PLAN:  -  likely return to OR for second look tomorrow  -  c/t intubation/sedation  -  c/w abthera wound vac  -  supportive management per SICU    [facility]  [id number]

## 2018-08-12 NOTE — PROGRESS NOTE ADULT - ASSESSMENT
59yo M with perforated R colon and large RP abscess/abdominal wall infection now s/p Open ileocecectomy in discontinuity and abdominal wall/RP debridement with open abdomen/abthera on 8/11. Pt remains intubated with an open abdomen, off pressors.     - NPO/NGT to suction   - Continue abx - Vanc/zosyn/diflucan, check vanc trough  - pain control/sedation while intubated  - Plan for return to OR 8/13 for washout and possible ileostomy   - PT seen and examined with Dr. Meghan Manuel   [id number]

## 2018-08-12 NOTE — PROGRESS NOTE ADULT - SUBJECTIVE AND OBJECTIVE BOX
[facility] RED SURGERY DAILY PROGRESS NOTE      SUBJECTIVE:    The patient was seen and examined at bedside this morning.  -  Sedated and intubated.  -  On/off pressors last night.  -  Overnight had emergent ex-lap with washout, ileocecal resection, abscess drainage, debridement of necrotic pelvic abscess, and Abthera wound vac placement      OBJECTIVE:    Vital Signs Last 24 Hrs  T(C): 37.6 (12 Aug 2018 11:00), Max: 38.3 (12 Aug 2018 03:00)  T(F): 99.7 (12 Aug 2018 11:00), Max: 101 (12 Aug 2018 03:00)  HR: 100 (12 Aug 2018 16:00) (86 - 144)  BP: 141/65 (12 Aug 2018 16:00) (100/51 - 169/115)  BP(mean): 93 (12 Aug 2018 16:00) (72 - 134)  RR: 20 (12 Aug 2018 16:00) (17 - 33)  SpO2: 100% (12 Aug 2018 16:00) (94% - 100%)    EXAM:  _____________________________________________    I&O's Detail    11 Aug 2018 07:01  -  12 Aug 2018 07:00  --------------------------------------------------------  IN:    fentaNYL  Infusion: 14.4 mL    IV PiggyBack: 450 mL    lactated ringers.: 1300 mL    phenylephrine   Infusion: 18.3 mL    propofol Infusion: 135.8 mL    propofol Infusion: 19.5 mL    Sodium Chloride 0.9% IV Bolus: 1000 mL    Solution: 100 mL    Solution: 250 mL  Total IN: 3288 mL    OUT:    Indwelling Catheter - Urethral: 1400 mL    Nasoenteral Tube: 100 mL    VAC (Vacuum Assisted Closure) System: 650 mL    Voided: 30 mL  Total OUT: 2180 mL    Total NET: 1108 mL      12 Aug 2018 07:01  -  12 Aug 2018 16:04  --------------------------------------------------------  IN:    fentaNYL  Infusion: 12.8 mL    IV PiggyBack: 425 mL    lactated ringers.: 1050 mL    propofol Infusion: 111 mL    Solution: 200 mL  Total IN: 1798.8 mL    OUT:    Indwelling Catheter - Urethral: 930 mL  Total OUT: 930 mL    Total NET: 868.8 mL          LABS:                        9.2    17.5  )-----------( 583      ( 12 Aug 2018 02:52 )             28.8     08-12    136  |  103  |  7   ----------------------------<  82  3.4<L>   |  23  |  0.63    Ca    7.4<L>      12 Aug 2018 02:52  Phos  2.0     08-12  Mg     1.8     08-12    TPro  7.4  /  Alb  2.9<L>  /  TBili  0.4  /  DBili  x   /  AST  20  /  ALT  20  /  AlkPhos  89  08-11    PT/INR - ( 11 Aug 2018 22:43 )   PT: 14.4 sec;   INR: 1.33 ratio         PTT - ( 11 Aug 2018 22:43 )  PTT:26.4 sec  Urinalysis Basic - ( 11 Aug 2018 13:01 )    Color: x / Appearance: x / SG: x / pH: x  Gluc: x / Ketone: x  / Bili: x / Urobili: x   Blood: x / Protein: x / Nitrite: x   Leuk Esterase: x / RBC: 0-2 /HPF / WBC 25-50 /HPF   Sq Epi: x / Non Sq Epi: Occasional /HPF / Bacteria: Negative /HPF      LIVER FUNCTIONS - ( 11 Aug 2018 12:59 )  Alb: 2.9 g/dL / Pro: 7.4 g/dL / ALK PHOS: 89 U/L / ALT: 20 U/L / AST: 20 U/L / GGT: x

## 2018-08-12 NOTE — PROGRESS NOTE ADULT - SUBJECTIVE AND OBJECTIVE BOX
Red Surgery Post Op Note    Procedure: Exploratory laparotomy with ileo-cecal resection and abscess drainage and debridement of necrotizing pelvic abscess     Surgeon: Neftali Pascual MD    Subjective:   Pt seen and examined at the bedside. Pt is intubated and sedated, was sedated with propofol and was put on pressor post-op for hypotension.    ICU Vital Signs Last 24 Hrs  T(C): 38.3 (12 Aug 2018 03:00), Max: 38.3 (12 Aug 2018 03:00)  T(F): 101 (12 Aug 2018 03:00), Max: 101 (12 Aug 2018 03:00)  HR: 107 (12 Aug 2018 04:35) (93 - 144)  BP: 125/57 (12 Aug 2018 04:00) (116/57 - 169/115)  BP(mean): 82 (12 Aug 2018 04:00) (82 - 134)  ABP: 91/75 (12 Aug 2018 04:00) (91/75 - 247/102)  ABP(mean): 82 (12 Aug 2018 04:00) (63 - 162)  RR: 20 (12 Aug 2018 04:00) (16 - 33)  SpO2: 100% (12 Aug 2018 04:35) (94% - 100%)      Physical Exam:  General: NAD, resting comfortably in bed  Neuro: A/O x 3, no focal deficits  Pulmonary: no respiratory distress, intubated  Cardiovascular: NSR  Abdominal: soft, no TTP, mildly distended, wound vac in place  Incision: C/D/I   Extremities: No edema        LABS:                        9.2    17.5  )-----------( 583      ( 12 Aug 2018 02:52 )             28.8     08-12    136  |  103  |  7   ----------------------------<  82  3.4<L>   |  23  |  0.63    Ca    7.4<L>      12 Aug 2018 02:52  Phos  2.0     08-12  Mg     1.8     08-12    TPro  7.4  /  Alb  2.9<L>  /  TBili  0.4  /  DBili  x   /  AST  20  /  ALT  20  /  AlkPhos  89  08-11    PT/INR - ( 11 Aug 2018 22:43 )   PT: 14.4 sec;   INR: 1.33 ratio         PTT - ( 11 Aug 2018 22:43 )  PTT:26.4 sec  CAPILLARY BLOOD GLUCOSE      POCT Blood Glucose.: 122 mg/dL (12 Aug 2018 00:50)  POCT Blood Glucose.: 98 mg/dL (11 Aug 2018 19:36)    Urinalysis Basic - ( 11 Aug 2018 13:01 )    Color: x / Appearance: x / SG: x / pH: x  Gluc: x / Ketone: x  / Bili: x / Urobili: x   Blood: x / Protein: x / Nitrite: x   Leuk Esterase: x / RBC: 0-2 /HPF / WBC 25-50 /HPF   Sq Epi: x / Non Sq Epi: Occasional /HPF / Bacteria: Negative /HPF      LIVER FUNCTIONS - ( 11 Aug 2018 12:59 )  Alb: 2.9 g/dL / Pro: 7.4 g/dL / ALK PHOS: 89 U/L / ALT: 20 U/L / AST: 20 U/L / GGT: x           ABO Interpretation: A (08-11 @ 17:51)

## 2018-08-12 NOTE — PROGRESS NOTE ADULT - ATTENDING COMMENTS
Ileocecectomy for perforated colon/ileum from presumed possible perforated appendicitis.  Pt also noted to have necrotizing soft tissue infection in retroperitoneum for prolonged abscess.  Ischemic muscle debrided intraop, but pt left with abd vac dressing to take second look at necrotic iliacus muscle  -vent per SICU  -broad spectrum abx  -plan for second look per ACS team likely tomorrow.  CRS will be present for the surgery and probable ileostomy creation depending on findings  -DVT ppx

## 2018-08-12 NOTE — PROGRESS NOTE ADULT - SUBJECTIVE AND OBJECTIVE BOX
Post-operative Check    SUBJECTIVE: Patient presented with large cecal/terminal ileum perforation with a large amount of stool extending into the R iliacus muscle.  Intraoperatively, saw pelvic abscess with purulent fecal material, likely secondary to perforated appendicitis, also associated with a cecal and ileal perforation.    Patient transferred to SICU postoperatively, remains intubated.  On fentanyl/propofol.  Requiring 0.5 mcg/kg/min ana cristina.  NGT on med-continuous suction.  Coles in place.     OBJECTIVE:  T(C): 37.5 (08-11-18 @ 23:00), Max: 37.5 (08-11-18 @ 23:00)  HR: 96 (08-12-18 @ 02:00) (93 - 144)  BP: 130/60 (08-12-18 @ 01:30) (116/57 - 169/115)  RR: 19 (08-12-18 @ 02:00) (16 - 33)  SpO2: 100% (08-12-18 @ 02:00) (94% - 100%)      08-11-18 @ 07:01  -  08-12-18 @ 02:03  --------------------------------------------------------  IN: 1852.7 mL / OUT: 1030 mL / NET: 822.7 mL        Physical Exam:   - Constitutional: Sedated, Intubated  - HEENT: NGT in place  - CV: normotensive, regular rate   - Respiratory: on ventilator PRVC setting  - Chest: midline abthera, wound vac w/ good suction 100mmHg  - Abdomen: soft, moderately distended      ASSESSMENT:   TIFFANY LEMOS is a 58y Male POD#0 from exploratory laparotomy   with ileo-cecal resection and abscess drainage and debridement of necrotizing pelvic abscess and ABThera wound vac placement.  Patient remains intubated, sedated in SICU.     PLAN:  - Pain management, fentanyl infusion  - Sedation: propofol; sbt as tolerated  - Phenylephrine; wean as tolerated  - IV Protonix  - C/w abthera wound vac  - Follow UOP, Coles in place  - care per SICU

## 2018-08-12 NOTE — PROGRESS NOTE ADULT - ASSESSMENT
Radiology and Additional Studies:    Assessment:58y Male s/p exploratory laparotomy with ileo-cecal resection and abscess drainage and debridement of necrotizing pelvic abscess     Plan:  -Pain control  -Monitor clinical exam  -Monitor VS and BP  -IVF  -NPO  -Vancomycin and Zosyn  -DVT ppx  -PPI

## 2018-08-13 LAB
ANION GAP SERPL CALC-SCNC: 13 MMOL/L — SIGNIFICANT CHANGE UP (ref 5–17)
ANION GAP SERPL CALC-SCNC: 9 MMOL/L — SIGNIFICANT CHANGE UP (ref 5–17)
APTT BLD: 24.3 SEC — LOW (ref 27.5–37.4)
BUN SERPL-MCNC: 6 MG/DL — LOW (ref 7–23)
BUN SERPL-MCNC: 7 MG/DL — SIGNIFICANT CHANGE UP (ref 7–23)
CALCIUM SERPL-MCNC: 7.2 MG/DL — LOW (ref 8.4–10.5)
CALCIUM SERPL-MCNC: 7.5 MG/DL — LOW (ref 8.4–10.5)
CHLORIDE SERPL-SCNC: 96 MMOL/L — SIGNIFICANT CHANGE UP (ref 96–108)
CHLORIDE SERPL-SCNC: 99 MMOL/L — SIGNIFICANT CHANGE UP (ref 96–108)
CO2 SERPL-SCNC: 21 MMOL/L — LOW (ref 22–31)
CO2 SERPL-SCNC: 24 MMOL/L — SIGNIFICANT CHANGE UP (ref 22–31)
CREAT SERPL-MCNC: 0.58 MG/DL — SIGNIFICANT CHANGE UP (ref 0.5–1.3)
CREAT SERPL-MCNC: 0.64 MG/DL — SIGNIFICANT CHANGE UP (ref 0.5–1.3)
GAS PNL BLDA: SIGNIFICANT CHANGE UP
GAS PNL BLDA: SIGNIFICANT CHANGE UP
GLUCOSE BLDC GLUCOMTR-MCNC: 102 MG/DL — HIGH (ref 70–99)
GLUCOSE BLDC GLUCOMTR-MCNC: 161 MG/DL — HIGH (ref 70–99)
GLUCOSE BLDC GLUCOMTR-MCNC: 186 MG/DL — HIGH (ref 70–99)
GLUCOSE BLDC GLUCOMTR-MCNC: 58 MG/DL — LOW (ref 70–99)
GLUCOSE SERPL-MCNC: 107 MG/DL — HIGH (ref 70–99)
GLUCOSE SERPL-MCNC: 66 MG/DL — LOW (ref 70–99)
HCT VFR BLD CALC: 25.1 % — LOW (ref 39–50)
HCT VFR BLD CALC: 28.3 % — LOW (ref 39–50)
HGB BLD-MCNC: 8 G/DL — LOW (ref 13–17)
HGB BLD-MCNC: 9 G/DL — LOW (ref 13–17)
INR BLD: 1.44 RATIO — HIGH (ref 0.88–1.16)
MAGNESIUM SERPL-MCNC: 1.8 MG/DL — SIGNIFICANT CHANGE UP (ref 1.6–2.6)
MAGNESIUM SERPL-MCNC: 1.8 MG/DL — SIGNIFICANT CHANGE UP (ref 1.6–2.6)
MCHC RBC-ENTMCNC: 27.2 PG — SIGNIFICANT CHANGE UP (ref 27–34)
MCHC RBC-ENTMCNC: 27.2 PG — SIGNIFICANT CHANGE UP (ref 27–34)
MCHC RBC-ENTMCNC: 31.8 GM/DL — LOW (ref 32–36)
MCHC RBC-ENTMCNC: 31.8 GM/DL — LOW (ref 32–36)
MCV RBC AUTO: 85.5 FL — SIGNIFICANT CHANGE UP (ref 80–100)
MCV RBC AUTO: 85.7 FL — SIGNIFICANT CHANGE UP (ref 80–100)
PHOSPHATE SERPL-MCNC: 3 MG/DL — SIGNIFICANT CHANGE UP (ref 2.5–4.5)
PHOSPHATE SERPL-MCNC: 3.1 MG/DL — SIGNIFICANT CHANGE UP (ref 2.5–4.5)
PLATELET # BLD AUTO: 426 K/UL — HIGH (ref 150–400)
PLATELET # BLD AUTO: 470 K/UL — HIGH (ref 150–400)
POTASSIUM SERPL-MCNC: 3.5 MMOL/L — SIGNIFICANT CHANGE UP (ref 3.5–5.3)
POTASSIUM SERPL-MCNC: 3.8 MMOL/L — SIGNIFICANT CHANGE UP (ref 3.5–5.3)
POTASSIUM SERPL-SCNC: 3.5 MMOL/L — SIGNIFICANT CHANGE UP (ref 3.5–5.3)
POTASSIUM SERPL-SCNC: 3.8 MMOL/L — SIGNIFICANT CHANGE UP (ref 3.5–5.3)
PROTHROM AB SERPL-ACNC: 15.8 SEC — HIGH (ref 9.8–12.7)
RBC # BLD: 2.94 M/UL — LOW (ref 4.2–5.8)
RBC # BLD: 3.3 M/UL — LOW (ref 4.2–5.8)
RBC # FLD: 16.8 % — HIGH (ref 10.3–14.5)
RBC # FLD: 16.9 % — HIGH (ref 10.3–14.5)
SODIUM SERPL-SCNC: 130 MMOL/L — LOW (ref 135–145)
SODIUM SERPL-SCNC: 132 MMOL/L — LOW (ref 135–145)
VANCOMYCIN TROUGH SERPL-MCNC: 8.2 UG/ML — LOW (ref 10–20)
WBC # BLD: 18.7 K/UL — HIGH (ref 3.8–10.5)
WBC # BLD: 29 K/UL — HIGH (ref 3.8–10.5)
WBC # FLD AUTO: 18.7 K/UL — HIGH (ref 3.8–10.5)
WBC # FLD AUTO: 29 K/UL — HIGH (ref 3.8–10.5)

## 2018-08-13 PROCEDURE — 99291 CRITICAL CARE FIRST HOUR: CPT | Mod: 25

## 2018-08-13 PROCEDURE — 71045 X-RAY EXAM CHEST 1 VIEW: CPT | Mod: 26

## 2018-08-13 PROCEDURE — 49900 REPAIR OF ABDOMINAL WALL: CPT

## 2018-08-13 PROCEDURE — 44310 ILEOSTOMY/JEJUNOSTOMY: CPT | Mod: 52,58

## 2018-08-13 RX ORDER — PIPERACILLIN AND TAZOBACTAM 4; .5 G/20ML; G/20ML
3.38 INJECTION, POWDER, LYOPHILIZED, FOR SOLUTION INTRAVENOUS EVERY 8 HOURS
Qty: 0 | Refills: 0 | Status: DISCONTINUED | OUTPATIENT
Start: 2018-08-13 | End: 2018-08-22

## 2018-08-13 RX ORDER — VANCOMYCIN HCL 1 G
1000 VIAL (EA) INTRAVENOUS EVERY 8 HOURS
Qty: 0 | Refills: 0 | Status: DISCONTINUED | OUTPATIENT
Start: 2018-08-13 | End: 2018-08-15

## 2018-08-13 RX ORDER — VANCOMYCIN HCL 1 G
VIAL (EA) INTRAVENOUS
Qty: 0 | Refills: 0 | Status: DISCONTINUED | OUTPATIENT
Start: 2018-08-13 | End: 2018-08-13

## 2018-08-13 RX ORDER — VANCOMYCIN HCL 1 G
1000 VIAL (EA) INTRAVENOUS ONCE
Qty: 0 | Refills: 0 | Status: COMPLETED | OUTPATIENT
Start: 2018-08-13 | End: 2018-08-13

## 2018-08-13 RX ORDER — VANCOMYCIN HCL 1 G
1000 VIAL (EA) INTRAVENOUS EVERY 8 HOURS
Qty: 0 | Refills: 0 | Status: DISCONTINUED | OUTPATIENT
Start: 2018-08-13 | End: 2018-08-13

## 2018-08-13 RX ORDER — MAGNESIUM SULFATE 500 MG/ML
2 VIAL (ML) INJECTION ONCE
Qty: 0 | Refills: 0 | Status: COMPLETED | OUTPATIENT
Start: 2018-08-13 | End: 2018-08-13

## 2018-08-13 RX ORDER — VANCOMYCIN HCL 1 G
1250 VIAL (EA) INTRAVENOUS EVERY 8 HOURS
Qty: 0 | Refills: 0 | Status: DISCONTINUED | OUTPATIENT
Start: 2018-08-13 | End: 2018-08-13

## 2018-08-13 RX ORDER — HYDROMORPHONE HYDROCHLORIDE 2 MG/ML
0.5 INJECTION INTRAMUSCULAR; INTRAVENOUS; SUBCUTANEOUS EVERY 4 HOURS
Qty: 0 | Refills: 0 | Status: DISCONTINUED | OUTPATIENT
Start: 2018-08-13 | End: 2018-08-13

## 2018-08-13 RX ORDER — POTASSIUM CHLORIDE 20 MEQ
10 PACKET (EA) ORAL
Qty: 0 | Refills: 0 | Status: COMPLETED | OUTPATIENT
Start: 2018-08-13 | End: 2018-08-13

## 2018-08-13 RX ORDER — CALCIUM GLUCONATE 100 MG/ML
1 VIAL (ML) INTRAVENOUS ONCE
Qty: 0 | Refills: 0 | Status: COMPLETED | OUTPATIENT
Start: 2018-08-13 | End: 2018-08-13

## 2018-08-13 RX ORDER — BENZOCAINE AND MENTHOL 5; 1 G/100ML; G/100ML
1 LIQUID ORAL EVERY 6 HOURS
Qty: 0 | Refills: 0 | Status: DISCONTINUED | OUTPATIENT
Start: 2018-08-13 | End: 2018-09-07

## 2018-08-13 RX ORDER — HYDROMORPHONE HYDROCHLORIDE 2 MG/ML
0.5 INJECTION INTRAMUSCULAR; INTRAVENOUS; SUBCUTANEOUS ONCE
Qty: 0 | Refills: 0 | Status: DISCONTINUED | OUTPATIENT
Start: 2018-08-13 | End: 2018-08-13

## 2018-08-13 RX ORDER — INSULIN LISPRO 100/ML
VIAL (ML) SUBCUTANEOUS EVERY 4 HOURS
Qty: 0 | Refills: 0 | Status: DISCONTINUED | OUTPATIENT
Start: 2018-08-13 | End: 2018-08-13

## 2018-08-13 RX ORDER — SODIUM CHLORIDE 9 MG/ML
1000 INJECTION, SOLUTION INTRAVENOUS
Qty: 0 | Refills: 0 | Status: DISCONTINUED | OUTPATIENT
Start: 2018-08-13 | End: 2018-08-13

## 2018-08-13 RX ORDER — HYDROMORPHONE HYDROCHLORIDE 2 MG/ML
1 INJECTION INTRAMUSCULAR; INTRAVENOUS; SUBCUTANEOUS
Qty: 0 | Refills: 0 | Status: DISCONTINUED | OUTPATIENT
Start: 2018-08-13 | End: 2018-08-14

## 2018-08-13 RX ORDER — ACETAMINOPHEN 500 MG
1000 TABLET ORAL ONCE
Qty: 0 | Refills: 0 | Status: COMPLETED | OUTPATIENT
Start: 2018-08-13 | End: 2018-08-13

## 2018-08-13 RX ORDER — INSULIN LISPRO 100/ML
VIAL (ML) SUBCUTANEOUS EVERY 6 HOURS
Qty: 0 | Refills: 0 | Status: DISCONTINUED | OUTPATIENT
Start: 2018-08-13 | End: 2018-08-21

## 2018-08-13 RX ORDER — ENOXAPARIN SODIUM 100 MG/ML
40 INJECTION SUBCUTANEOUS DAILY
Qty: 0 | Refills: 0 | Status: DISCONTINUED | OUTPATIENT
Start: 2018-08-13 | End: 2018-08-15

## 2018-08-13 RX ORDER — CHLORHEXIDINE GLUCONATE 213 G/1000ML
1 SOLUTION TOPICAL
Qty: 0 | Refills: 0 | Status: DISCONTINUED | OUTPATIENT
Start: 2018-08-13 | End: 2018-08-15

## 2018-08-13 RX ORDER — FLUCONAZOLE 150 MG/1
200 TABLET ORAL EVERY 24 HOURS
Qty: 0 | Refills: 0 | Status: DISCONTINUED | OUTPATIENT
Start: 2018-08-13 | End: 2018-08-15

## 2018-08-13 RX ORDER — SODIUM CHLORIDE 9 MG/ML
1000 INJECTION, SOLUTION INTRAVENOUS
Qty: 0 | Refills: 0 | Status: DISCONTINUED | OUTPATIENT
Start: 2018-08-13 | End: 2018-08-15

## 2018-08-13 RX ORDER — PANTOPRAZOLE SODIUM 20 MG/1
40 TABLET, DELAYED RELEASE ORAL DAILY
Qty: 0 | Refills: 0 | Status: DISCONTINUED | OUTPATIENT
Start: 2018-08-13 | End: 2018-08-13

## 2018-08-13 RX ADMIN — FLUCONAZOLE 100 MILLIGRAM(S): 150 TABLET ORAL at 21:22

## 2018-08-13 RX ADMIN — Medication 400 MILLIGRAM(S): at 23:44

## 2018-08-13 RX ADMIN — PROPOFOL 2.1 MICROGRAM(S)/KG/MIN: 10 INJECTION, EMULSION INTRAVENOUS at 09:01

## 2018-08-13 RX ADMIN — ENOXAPARIN SODIUM 40 MILLIGRAM(S): 100 INJECTION SUBCUTANEOUS at 13:29

## 2018-08-13 RX ADMIN — Medication 100 MILLIEQUIVALENT(S): at 18:09

## 2018-08-13 RX ADMIN — BENZOCAINE AND MENTHOL 1 LOZENGE: 5; 1 LIQUID ORAL at 19:43

## 2018-08-13 RX ADMIN — HYDROMORPHONE HYDROCHLORIDE 0.5 MILLIGRAM(S): 2 INJECTION INTRAMUSCULAR; INTRAVENOUS; SUBCUTANEOUS at 22:15

## 2018-08-13 RX ADMIN — HYDROMORPHONE HYDROCHLORIDE 0.5 MILLIGRAM(S): 2 INJECTION INTRAMUSCULAR; INTRAVENOUS; SUBCUTANEOUS at 18:20

## 2018-08-13 RX ADMIN — PIPERACILLIN AND TAZOBACTAM 25 GRAM(S): 4; .5 INJECTION, POWDER, LYOPHILIZED, FOR SOLUTION INTRAVENOUS at 05:05

## 2018-08-13 RX ADMIN — Medication 1: at 23:38

## 2018-08-13 RX ADMIN — Medication 1: at 16:43

## 2018-08-13 RX ADMIN — HYDROMORPHONE HYDROCHLORIDE 0.5 MILLIGRAM(S): 2 INJECTION INTRAMUSCULAR; INTRAVENOUS; SUBCUTANEOUS at 17:40

## 2018-08-13 RX ADMIN — PANTOPRAZOLE SODIUM 40 MILLIGRAM(S): 20 TABLET, DELAYED RELEASE ORAL at 14:14

## 2018-08-13 RX ADMIN — SODIUM CHLORIDE 100 MILLILITER(S): 9 INJECTION, SOLUTION INTRAVENOUS at 09:01

## 2018-08-13 RX ADMIN — HYDROMORPHONE HYDROCHLORIDE 0.5 MILLIGRAM(S): 2 INJECTION INTRAMUSCULAR; INTRAVENOUS; SUBCUTANEOUS at 22:00

## 2018-08-13 RX ADMIN — SODIUM CHLORIDE 100 MILLILITER(S): 9 INJECTION, SOLUTION INTRAVENOUS at 13:31

## 2018-08-13 RX ADMIN — Medication 100 MILLIEQUIVALENT(S): at 04:00

## 2018-08-13 RX ADMIN — Medication 1000 MILLIGRAM(S): at 23:59

## 2018-08-13 RX ADMIN — Medication 50 GRAM(S): at 16:34

## 2018-08-13 RX ADMIN — Medication 100 MILLIEQUIVALENT(S): at 05:02

## 2018-08-13 RX ADMIN — HYDROMORPHONE HYDROCHLORIDE 0.5 MILLIGRAM(S): 2 INJECTION INTRAMUSCULAR; INTRAVENOUS; SUBCUTANEOUS at 13:36

## 2018-08-13 RX ADMIN — PIPERACILLIN AND TAZOBACTAM 25 GRAM(S): 4; .5 INJECTION, POWDER, LYOPHILIZED, FOR SOLUTION INTRAVENOUS at 14:14

## 2018-08-13 RX ADMIN — Medication 250 MILLIGRAM(S): at 14:14

## 2018-08-13 RX ADMIN — Medication 250 MILLIGRAM(S): at 05:33

## 2018-08-13 RX ADMIN — PIPERACILLIN AND TAZOBACTAM 25 GRAM(S): 4; .5 INJECTION, POWDER, LYOPHILIZED, FOR SOLUTION INTRAVENOUS at 21:23

## 2018-08-13 RX ADMIN — Medication 50 GRAM(S): at 04:00

## 2018-08-13 RX ADMIN — HYDROMORPHONE HYDROCHLORIDE 0.5 MILLIGRAM(S): 2 INJECTION INTRAMUSCULAR; INTRAVENOUS; SUBCUTANEOUS at 18:00

## 2018-08-13 RX ADMIN — Medication 100 MILLIEQUIVALENT(S): at 16:34

## 2018-08-13 RX ADMIN — FENTANYL CITRATE 1.75 MICROGRAM(S)/KG/HR: 50 INJECTION INTRAVENOUS at 09:00

## 2018-08-13 RX ADMIN — HYDROMORPHONE HYDROCHLORIDE 0.5 MILLIGRAM(S): 2 INJECTION INTRAMUSCULAR; INTRAVENOUS; SUBCUTANEOUS at 18:40

## 2018-08-13 RX ADMIN — Medication 250 MILLIGRAM(S): at 21:22

## 2018-08-13 RX ADMIN — Medication 200 GRAM(S): at 05:02

## 2018-08-13 RX ADMIN — Medication 100 MILLIEQUIVALENT(S): at 06:12

## 2018-08-13 RX ADMIN — HYDROMORPHONE HYDROCHLORIDE 0.5 MILLIGRAM(S): 2 INJECTION INTRAMUSCULAR; INTRAVENOUS; SUBCUTANEOUS at 13:56

## 2018-08-13 NOTE — DIETITIAN INITIAL EVALUATION ADULT. - PERTINENT MEDS FT
IV D5+ NaCl, fentanyl, dilaudid prn, ativan, Humalog sliding scale, protonix, propofol (351ml infused in 24-hours, providing 276 lipid calories)

## 2018-08-13 NOTE — BRIEF OPERATIVE NOTE - OPERATION/FINDINGS
- Contained retrocecal large purulent, fecal necrotizing pelvic abscess   - Necrotic posterior cecal, ileal and appendiceal wall  - Abscess involving iliacus muscle  - Large abscess cavity  - Pt left in discontinuity
Abdominal washout  19 Jefferson drain placed in R pelvis  Distal end of ileostomy brought up to the abdominal wall  Closure of abdominal fascia and skin  End ileostomy matured

## 2018-08-13 NOTE — AIRWAY REMOVAL NOTE  ADULT & PEDS - ARTIFICAL AIRWAY REMOVAL COMMENTS
Written order for extubation verified.The patient was identified by full name and birth date compared to the identification band. Present during the procedure was Viviana Laguerre RN

## 2018-08-13 NOTE — DIETITIAN INITIAL EVALUATION ADULT. - NS AS NUTRI INTERV ENTERAL NUTRITION
If EN is warranted, recommend Vital 1.2, initiate at 10ml/hr,  increase as tolerated to 60ml/hr x 24 hours to provide 1440 ml formula, 1728cal/day, 108Gm protein/day, 1168ml free water; meets 25cal/Kg and 1.5 Gm protein/Kg per dosing wt 70Kg.

## 2018-08-13 NOTE — DIETITIAN INITIAL EVALUATION ADULT. - ADHERENCE
Unable to assess. Per chart, pt with history of DM; no home medications noted in chart. HbA1c 7% noted.

## 2018-08-13 NOTE — BRIEF OPERATIVE NOTE - PROCEDURE
<<-----Click on this checkbox to enter Procedure Wound closure  08/13/2018    Active  CLAM4  End ileostomy  08/13/2018    Active  CLAM4  Abdominal washout  08/13/2018    Active  CLAM4

## 2018-08-13 NOTE — DIETITIAN INITIAL EVALUATION ADULT. - PHYSICAL APPEARANCE
BMI 22.9Kg/m2. Unable to perform full Nutrition Focused Physical Assessment at this time./well nourished

## 2018-08-13 NOTE — DIETITIAN INITIAL EVALUATION ADULT. - FACTORS AFF FOOD INTAKE
Pt intubated, sedated S/P hemicolectomy, left in discontinuity. Per chart, plan for RTOR today. NGT to suction, 150ml output in 24-hours. No BM noted.

## 2018-08-13 NOTE — PROGRESS NOTE ADULT - SUBJECTIVE AND OBJECTIVE BOX
POST-OP NOTE / SURGERY PROGRESS NOTE:    Procedure: Abdominal washout, end ileostomy, abdominal closure.     Subjective: Pt doing well. Extubated. -Nausea/-vomiting. Minimal pain. C/o throat irritation now with that he's extubated.    Vital Signs Last 24 Hrs  T(C): 36.7 (13 Aug 2018 15:00), Max: 37.3 (12 Aug 2018 23:00)  T(F): 98 (13 Aug 2018 15:00), Max: 99.2 (12 Aug 2018 23:00)  HR: 111 (13 Aug 2018 17:00) (91 - 116)  BP: 161/77 (13 Aug 2018 17:00) (98/50 - 174/84)  BP(mean): 111 (13 Aug 2018 17:00) (70 - 120)  RR: 18 (13 Aug 2018 17:00) (13 - 23)  SpO2: 100% (13 Aug 2018 17:00) (98% - 100%)  I&O's Summary    12 Aug 2018 07:01  -  13 Aug 2018 07:00  --------------------------------------------------------  IN: 4489.4 mL / OUT: 2475 mL / NET: 2014.4 mL    13 Aug 2018 07:01  -  13 Aug 2018 17:52  --------------------------------------------------------  IN: 1233.2 mL / OUT: 860 mL / NET: 373.2 mL                            9.0    29.0  )-----------( 470      ( 13 Aug 2018 14:27 )             28.3     08-13    130<L>  |  96  |  6<L>  ----------------------------<  107<H>  3.8   |  21<L>  |  0.58    Ca    7.2<L>      13 Aug 2018 14:27  Phos  3.1     08-13  Mg     1.8     08-13     PT/INR - ( 13 Aug 2018 03:03 )   PT: 15.8 sec;   INR: 1.44 ratio    PTT - ( 13 Aug 2018 03:03 )  PTT:24.3 sec    PHYSICAL EXAM:  Gen: NAD, well-developed, NGT to LCS  Resp: breathing easily, no stridor, on 1.5L O2  CV: no peripheral edema/cyanosis  Abd: appropriately tender, softly distended, midline dressing with some minimal strikethrough, ileostomy pink and patent but with no output yet    ASSESSMENT/PLAN:  59yo M with perforated R colon and large RP abscess/abdominal wall infection s/p open ileocecectomy in discontinuity and abdominal wall/RP debridement with open abdomen/abthera on 8/11, now POD0 s/p abdominal washout, end ileostomy, abdominal closure on 8/13.    - NPO/IVF/NGT to suction.  - Continue abx - Vanc/zosyn/diflucan, check vanc trough.  - Pain control: dilaudid.  - SSI.  - Rest of care per SICU.    1747 POST-OP NOTE / SURGERY PROGRESS NOTE:    Procedure: Abdominal washout, end ileostomy, abdominal closure.     Subjective: Pt doing well. Extubated. -Nausea/-vomiting. Minimal pain. C/o throat irritation now with that he's extubated.    Vital Signs Last 24 Hrs  T(C): 36.7 (13 Aug 2018 15:00), Max: 37.3 (12 Aug 2018 23:00)  T(F): 98 (13 Aug 2018 15:00), Max: 99.2 (12 Aug 2018 23:00)  HR: 111 (13 Aug 2018 17:00) (91 - 116)  BP: 161/77 (13 Aug 2018 17:00) (98/50 - 174/84)  BP(mean): 111 (13 Aug 2018 17:00) (70 - 120)  RR: 18 (13 Aug 2018 17:00) (13 - 23)  SpO2: 100% (13 Aug 2018 17:00) (98% - 100%)  I&O's Summary    12 Aug 2018 07:01  -  13 Aug 2018 07:00  --------------------------------------------------------  IN: 4489.4 mL / OUT: 2475 mL / NET: 2014.4 mL    13 Aug 2018 07:01  -  13 Aug 2018 17:52  --------------------------------------------------------  IN: 1233.2 mL / OUT: 860 mL / NET: 373.2 mL                            9.0    29.0  )-----------( 470      ( 13 Aug 2018 14:27 )             28.3     08-13    130<L>  |  96  |  6<L>  ----------------------------<  107<H>  3.8   |  21<L>  |  0.58    Ca    7.2<L>      13 Aug 2018 14:27  Phos  3.1     08-13  Mg     1.8     08-13     PT/INR - ( 13 Aug 2018 03:03 )   PT: 15.8 sec;   INR: 1.44 ratio    PTT - ( 13 Aug 2018 03:03 )  PTT:24.3 sec    PHYSICAL EXAM:  Gen: NAD, well-developed, NGT to LCS  Resp: breathing easily, no stridor, on 1.5L O2  CV: no peripheral edema/cyanosis  Abd: appropriately tender, softly distended, midline dressing with some minimal strikethrough, ileostomy pink and patent but with no output yet, SUDHIR with SS output    ASSESSMENT/PLAN:  57yo M with perforated R colon and large RP abscess/abdominal wall infection s/p open ileocecectomy in discontinuity and abdominal wall/RP debridement with open abdomen/abthera on 8/11, now POD0 s/p abdominal washout, end ileostomy, abdominal closure on 8/13.    - NPO/IVF/NGT to suction.  - Continue abx - Vanc/zosyn/diflucan, check vanc trough.  - Pain control: dilaudid.  - SSI.  - Rest of care per SICU.    [id number]

## 2018-08-13 NOTE — PRE-OP CHECKLIST - BMI (KG/M2)
Post -operative # 2  Subjective  Pain is well controlled on oral medication. Bleeding is mild. Patient is providing Formula to her infant.  She is ambulating well,  tolerating a general diet, and is voiding spontaneously. Desires discharge       Objective    /66 mmHg  Pulse 84  Temp(Src) 97.7 °F (36.5 °C) (Oral)  Resp 16  Ht 5' 10\" (1.778 m)  Wt 304 lb 9.6 oz (138.166 kg)  BMI 43.71 kg/m2  SpO2 96%  LMP 2015  Breastfeeding? Yes          no erythema, no tenderness, no warmth and no induration    Uterine fundus firm, mildly tender.     Extremities: Non-tender, minimal edema      Recent Labs  Lab 18  0558   WBC 9.1   HGB 10.5*   HCT 31.6*       Impression/Plan    POD 2 repeat  section      Doing well.  Plan: Routine postpartum care.  Discharge home  Wound care instructions given  PP depression precautions given         22.9

## 2018-08-13 NOTE — PROGRESS NOTE ADULT - ASSESSMENT
Jimbo Kumar is a 58 y.o. man with no significant medical or surgical history who presented to the ED on 8/11 with a colonic perforation, likely secondary to perforated appendicitis. POD2 from Exploratory laparotomy with ileo-cecal resection, abscess drainage, and debridement of necrotizing pelvic abscess involving iliacus muscle and ABThera wound vac placement. Patient's bowel left in discontinuity following operation. Patient remains intubated in SICU.    PLAN:   NEURO  -Intubated and sedated does not follow commands, agitated at times  -continue fentanyl, lorazepam PRN, propofol drip to RASS -2    RESPIRATORY  -continue intubation for OR today   - monitor respiratory rate and oxygen saturation; SpO2 > 90%    CARDIOVASCULAR  - Stable off of pressor support  - monitor HR and BP    GI/NUTRITION  -NPO  -prophylaxis with pantoprazole      GENITOURINARY/RENAL  -LR @100  -eugene for strict I's and O's  - Monitor BUN and Cr    Hematologic:  - Trend H/H  - lovenox for VTE prophylaxis    Infectious Disease: candida in urine  - Monitor WBC, temperature  - diflucan, zosyn, vanc continued  - pending cultures in OR    Endocrine:  - Monitor blood glucose on BMP  - ISS    Disposition: SICU    Jabari Dunham, PGY2  [id number]

## 2018-08-13 NOTE — DIETITIAN INITIAL EVALUATION ADULT. - NS AS NUTRI INTERV MEALS SNACK
Pending extubation and tolerance to clear liquids, recommend Consistent Carbohydrate, Low Fiber diet/Carbohydrate - modified diet

## 2018-08-13 NOTE — PROGRESS NOTE ADULT - SUBJECTIVE AND OBJECTIVE BOX
HISTORY  58M PMH DM, HTN, peripheral neuropathy, presenting to ED with abdominal pain and R leg weakness. Patient had recently been in [place] (on vacation, [place] the [place]), where he was admitted to a hospital there 2 weeks prior to presentation for fever, found during work up there to have a R abdominal collection 2/2 colonic drainage. Hospital there started pt on antibiotics, placed a drain into the collection which was apparently not very successful as it had very little output. According to the patient, the doctors in [place] recommended surgery to remove part of the colon, which patient did not agree with. He left the hospital AMA and [place] the [place] opinion. The patient came to [facility] ED immediately after leaving the airport. CT was obtained here which showed a large cecal/terminal ileum perforation with a large amount of stool extending into the R iliacus muscle. Labs significant for WBC 24.7, Lactate 2.3. Patient also with grossly positive UTI. Patient also endorsing inability to ambulate for the past 2 weeks (at around the same time as fever developed) due to weakness in the RLE. Also endorsing dysuria.  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 Patient not extubated upon leaving the OR and transferred to SICU for hemodynamic monitoring.    24 HOUR EVENTS: Stable without need for pressor support. Remains intubated and sedated on minimal vent settings. No acute events overnight.     SUBJECTIVE/ROS:  [ ] A ten-point review of systems was otherwise negative except as noted.  [x] Due to altered mental status/intubation, subjective information were not able to be obtained from the patient. History was obtained, to the extent possible, from review of the chart and collateral sources of information.      NEURO  RASS:  -2/-1   GCS:     CAM ICU:  Exam: sedated, arousable, following commands   Meds: fentaNYL   Infusion 0.5 MICROgram(s)/kG/Hr IV Continuous <Continuous>  HYDROmorphone  Injectable 0.5 milliGRAM(s) IV Push every 4 hours PRN Severe Pain (7 - 10)  LORazepam   Injectable 1 milliGRAM(s) IV Push every 4 hours PRN Agitation  propofol Infusion 5 MICROgram(s)/kG/Min IV Continuous <Continuous>    [x] Adequacy of sedation and pain control has been assessed and adjusted      RESPIRATORY  RR: 14 (08-13-18 @ 00:00) (13 - 28)  SpO2: 100% (08-13-18 @ 00:00) (100% - 100%)  Exam: clear to auscultation bilaterally  Mechanical Ventilation: Mode: AC/ CMV (Assist Control/ Continuous Mandatory Ventilation), RR (machine): 14, RR (patient): 17, TV (machine): 450, FiO2: 30, PEEP: 5, ITime: 1, MAP: 7, PIP: 10  ABG - ( 12 Aug 2018 02:43 )  pH: 7.49  /  pCO2: 35    /  pO2: 230   / HCO3: 26    / Base Excess: 3.0   /  SaO2: 98      Lactate: x        [x] Extubation Readiness Assessed - will remain intubated in anticipation of OR today       CARDIOVASCULAR  HR: 91 (08-13-18 @ 00:00) (86 - 109)  BP: 123/59 (08-13-18 @ 00:00) (98/50 - 141/65)  BP(mean): 85 (08-13-18 @ 00:00) (70 - 93)  ABP: 96/70 (08-12-18 @ 08:45) (71/56 - 127/101)  ABP(mean): 82 (08-12-18 @ 08:45) (63 - 112)  VBG - ( 11 Aug 2018 18:20 )  pH: x     /  pCO2: x     /  pO2: x     / HCO3: x     / Base Excess: x     /  SaO2: x      Lactate: 1.2      Exam: regular rate and rhythm  Cardiac Rhythm: sinus  Perfusion     [x]Adequate   [ ]Inadequate  Mentation   [x]Normal       [ ]Reduced  Extremities  [x]Warm         [ ]Cool  Volume Status [ ]Hypervolemic [x]Euvolemic [ ]Hypovolemic      GI/NUTRITION  Exam: soft, nontender, nondistended, incision C/D/I  Diet: NPO   Meds: pantoprazole  Injectable 40 milliGRAM(s) IV Push daily      GENITOURINARY  I&O's Detail    08-11 @ 07:01  -  08-12 @ 07:00  --------------------------------------------------------  IN:    fentaNYL  Infusion: 14.4 mL    IV PiggyBack: 450 mL    lactated ringers.: 1300 mL    phenylephrine   Infusion: 18.3 mL    propofol Infusion: 135.8 mL    propofol Infusion: 19.5 mL    Sodium Chloride 0.9% IV Bolus: 1000 mL    Solution: 100 mL    Solution: 250 mL  Total IN: 3288 mL    OUT:    Indwelling Catheter - Urethral: 1400 mL    Nasoenteral Tube: 100 mL    VAC (Vacuum Assisted Closure) System: 650 mL    Voided: 30 mL  Total OUT: 2180 mL    Total NET: 1108 mL    08-12 @ 07:01 - 08-13 @ 00:58  --------------------------------------------------------  IN:    fentaNYL  Infusion: 27.2 mL    IV PiggyBack: 550 mL    lactated ringers.: 1950 mL    propofol Infusion: 246 mL    Solution: 200 mL    Solution: 325 mL  Total IN: 3298.2 mL    OUT:    Indwelling Catheter - Urethral: 1330 mL    Nasoenteral Tube: 100 mL    VAC (Vacuum Assisted Closure) System: 350 mL  Total OUT: 1780 mL    Total NET: 1518.2 mL    08-12    136  |  103  |  7   ----------------------------<  82  3.4<L>   |  23  |  0.63    Ca    7.4<L>      12 Aug 2018 02:52  Phos  2.0     08-12  Mg     1.8     08-12    TPro  7.4  /  Alb  2.9<L>  /  TBili  0.4  /  DBili  x   /  AST  20  /  ALT  20  /  AlkPhos  89  08-11    [x] Coles catheter, indication: monitoring in critical illness   Meds: lactated ringers. 1000 milliLiter(s) IV Continuous <Continuous>      HEMATOLOGIC  Meds: enoxaparin Injectable 40 milliGRAM(s) SubCutaneous daily    [x] VTE Prophylaxis                        9.2    17.5  )-----------( 583      ( 12 Aug 2018 02:52 )             28.8     PT/INR - ( 11 Aug 2018 22:43 )   PT: 14.4 sec;   INR: 1.33 ratio         PTT - ( 11 Aug 2018 22:43 )  PTT:26.4 sec  Transfusion     [ ] PRBC   [ ] Platelets   [ ] FFP   [ ] Cryoprecipitate      INFECTIOUS DISEASES  WBC Count: 17.5 K/uL (08-12 @ 02:52)    RECENT CULTURES:  Specimen Source: .Urine Clean Catch (Midstream)  Date/Time: 08-11 @ 16:59  Culture Results:   >100,000 CFU/ml Yeast-like cells, presumptively not Rubi albicans  Gram Stain: --  Organism: --  Specimen Source: .Blood Blood-Peripheral  Date/Time: 08-11 @ 14:27  Culture Results:   No growth to date.  Gram Stain: --  Organism: --    Meds: fluconAZOLE IVPB 200 milliGRAM(s) IV Intermittent every 24 hours  piperacillin/tazobactam IVPB. 3.375 Gram(s) IV Intermittent every 8 hours  vancomycin  IVPB 1000 milliGRAM(s) IV Intermittent every 12 hours      ENDOCRINE  POCT Blood Glucose.: 75 mg/dL (12 Aug 2018 23:18)  POCT Blood Glucose.: 111 mg/dL (12 Aug 2018 19:54)  POCT Blood Glucose.: 123 mg/dL (12 Aug 2018 15:13)  POCT Blood Glucose.: 203 mg/dL (12 Aug 2018 11:26)    Meds: insulin lispro (HumaLOG) corrective regimen sliding scale   SubCutaneous every 4 hours      ACCESS DEVICES:  [x] Peripheral IV  [ ] Central Venous Line	[ ] R	[ ] L	[ ] IJ	[ ] Fem	[ ] SC	Placed:   [ ] Arterial Line		[ ] R	[ ] L	[ ] Fem	[ ] Rad	[ ] Ax	Placed:   [ ] PICC:					[ ] Mediport  [x] Urinary Catheter, Date Placed: 8/11  [x] Necessity of urinary, arterial, and venous catheters discussed    CODE STATUS: Full       IMAGING:  < from: CT Abdomen and Pelvis w/ IV Cont (08.11.18 @ 14:38) >  LOWER CHEST: Within normal limits.    LIVER: Within normal limits.  BILE DUCTS: Normal caliber.  GALLBLADDER: Within normal limits.  SPLEEN: Within normal limits.  PANCREAS: Within normal limits.  ADRENALS: Within normal limits.  KIDNEYS/URETERS: Within normal limits.    BLADDER: Within normal limits.  REPRODUCTIVE ORGANS: Prostate mildly enlarged. Seminal vesicles normal.    BOWEL: Perforated distal ileum and cecum with extraluminal stool   extending into the right iliacus muscle. Appendix nonvisualized. No bowel   obstruction.  PERITONEUM: No ascites.  VESSELS:Within normal limits.  RETROPERITONEUM: No lymphadenopathy.    ABDOMINAL WALL: Within normal limits.  BONES: Within normal limits.    IMPRESSION: Large cecal and terminal ileal perforation with a large   amount of stool extending into the right iliacus muscle.    Findings are discussed with Doctor Curtis on 8/11/2018 2:45 PM with read   back.    < end of copied text >

## 2018-08-13 NOTE — BRIEF OPERATIVE NOTE - PRE-OP DX
Cecum perforation  08/12/2018    Active  Tamanna Ma
Cecum perforation  08/12/2018    Active  Tamanna Ma

## 2018-08-13 NOTE — BRIEF OPERATIVE NOTE - POST-OP DX
Cecum perforation  08/13/2018    Active  Salmeron, Cyrena  Pelvic abscess in male  08/12/2018  with purulent fecal material, likely secondary to perforated appendicitis, also associated with a cecal and ileal perforation.  Active  Anil Tamanna
Pelvic abscess in male  08/12/2018  with purulent fecal material, likely secondary to perforated appendicitis, also associated with a cecal and ileal perforation.  Active  Anil, Tamanna

## 2018-08-13 NOTE — PROGRESS NOTE ADULT - ATTENDING COMMENTS
S/p wash out anastalsis of bowel discontinuity, closure of abd wall  Good gas exchange post on SBT, will try to wean off vent  IVF, abx Zosyn, pain management

## 2018-08-13 NOTE — DIETITIAN INITIAL EVALUATION ADULT. - OTHER INFO
Nutrition Assessment warranted for length of stay in SICU. Per chart pt with no significant medical or surgical history who presented to the ED on 8/11 with a colonic perforation, likely secondary to perforated appendicitis. POD2 from Exploratory laparotomy with ileo-cecal resection, abscess drainage, and debridement of necrotizing pelvic abscess involving iliacus muscle and ABThera wound vac placement. Patient's bowel left in discontinuity following operation.

## 2018-08-14 LAB
ALBUMIN SERPL ELPH-MCNC: 2.2 G/DL — LOW (ref 3.3–5)
ALP SERPL-CCNC: 92 U/L — SIGNIFICANT CHANGE UP (ref 40–120)
ALT FLD-CCNC: 11 U/L — SIGNIFICANT CHANGE UP (ref 10–45)
ANION GAP SERPL CALC-SCNC: 10 MMOL/L — SIGNIFICANT CHANGE UP (ref 5–17)
ANION GAP SERPL CALC-SCNC: 12 MMOL/L — SIGNIFICANT CHANGE UP (ref 5–17)
APTT BLD: 26.9 SEC — LOW (ref 27.5–37.4)
AST SERPL-CCNC: 15 U/L — SIGNIFICANT CHANGE UP (ref 10–40)
BASOPHILS # BLD AUTO: 0 K/UL — SIGNIFICANT CHANGE UP (ref 0–0.2)
BASOPHILS NFR BLD AUTO: 0 % — SIGNIFICANT CHANGE UP (ref 0–2)
BILIRUB SERPL-MCNC: 1.2 MG/DL — SIGNIFICANT CHANGE UP (ref 0.2–1.2)
BUN SERPL-MCNC: 5 MG/DL — LOW (ref 7–23)
BUN SERPL-MCNC: 5 MG/DL — LOW (ref 7–23)
CALCIUM SERPL-MCNC: 7.2 MG/DL — LOW (ref 8.4–10.5)
CALCIUM SERPL-MCNC: 7.5 MG/DL — LOW (ref 8.4–10.5)
CHLORIDE SERPL-SCNC: 94 MMOL/L — LOW (ref 96–108)
CHLORIDE SERPL-SCNC: 98 MMOL/L — SIGNIFICANT CHANGE UP (ref 96–108)
CO2 SERPL-SCNC: 23 MMOL/L — SIGNIFICANT CHANGE UP (ref 22–31)
CO2 SERPL-SCNC: 24 MMOL/L — SIGNIFICANT CHANGE UP (ref 22–31)
CREAT SERPL-MCNC: 0.57 MG/DL — SIGNIFICANT CHANGE UP (ref 0.5–1.3)
CREAT SERPL-MCNC: 0.58 MG/DL — SIGNIFICANT CHANGE UP (ref 0.5–1.3)
EOSINOPHIL # BLD AUTO: 0.1 K/UL — SIGNIFICANT CHANGE UP (ref 0–0.5)
EOSINOPHIL NFR BLD AUTO: 0 % — SIGNIFICANT CHANGE UP (ref 0–6)
GAS PNL BLDA: SIGNIFICANT CHANGE UP
GLUCOSE BLDC GLUCOMTR-MCNC: 144 MG/DL — HIGH (ref 70–99)
GLUCOSE BLDC GLUCOMTR-MCNC: 156 MG/DL — HIGH (ref 70–99)
GLUCOSE BLDC GLUCOMTR-MCNC: 181 MG/DL — HIGH (ref 70–99)
GLUCOSE BLDC GLUCOMTR-MCNC: 194 MG/DL — HIGH (ref 70–99)
GLUCOSE SERPL-MCNC: 153 MG/DL — HIGH (ref 70–99)
GLUCOSE SERPL-MCNC: 203 MG/DL — HIGH (ref 70–99)
HCT VFR BLD CALC: 26.6 % — LOW (ref 39–50)
HCT VFR BLD CALC: 27.9 % — LOW (ref 39–50)
HGB BLD-MCNC: 8.4 G/DL — LOW (ref 13–17)
HGB BLD-MCNC: 9 G/DL — LOW (ref 13–17)
INR BLD: 1.39 RATIO — HIGH (ref 0.88–1.16)
LYMPHOCYTES # BLD AUTO: 1.2 K/UL — SIGNIFICANT CHANGE UP (ref 1–3.3)
LYMPHOCYTES # BLD AUTO: 5 % — LOW (ref 13–44)
MAGNESIUM SERPL-MCNC: 1.9 MG/DL — SIGNIFICANT CHANGE UP (ref 1.6–2.6)
MCHC RBC-ENTMCNC: 27 PG — SIGNIFICANT CHANGE UP (ref 27–34)
MCHC RBC-ENTMCNC: 27.6 PG — SIGNIFICANT CHANGE UP (ref 27–34)
MCHC RBC-ENTMCNC: 31.7 GM/DL — LOW (ref 32–36)
MCHC RBC-ENTMCNC: 32.2 GM/DL — SIGNIFICANT CHANGE UP (ref 32–36)
MCV RBC AUTO: 85.3 FL — SIGNIFICANT CHANGE UP (ref 80–100)
MCV RBC AUTO: 85.6 FL — SIGNIFICANT CHANGE UP (ref 80–100)
MONOCYTES # BLD AUTO: 1.2 K/UL — HIGH (ref 0–0.9)
MONOCYTES NFR BLD AUTO: 3 % — SIGNIFICANT CHANGE UP (ref 2–14)
NEUTROPHILS # BLD AUTO: 21.6 K/UL — HIGH (ref 1.8–7.4)
NEUTROPHILS NFR BLD AUTO: 89 % — HIGH (ref 43–77)
PHOSPHATE SERPL-MCNC: 2.6 MG/DL — SIGNIFICANT CHANGE UP (ref 2.5–4.5)
PLATELET # BLD AUTO: 459 K/UL — HIGH (ref 150–400)
PLATELET # BLD AUTO: 491 K/UL — HIGH (ref 150–400)
POTASSIUM SERPL-MCNC: 3.7 MMOL/L — SIGNIFICANT CHANGE UP (ref 3.5–5.3)
POTASSIUM SERPL-MCNC: 3.8 MMOL/L — SIGNIFICANT CHANGE UP (ref 3.5–5.3)
POTASSIUM SERPL-SCNC: 3.7 MMOL/L — SIGNIFICANT CHANGE UP (ref 3.5–5.3)
POTASSIUM SERPL-SCNC: 3.8 MMOL/L — SIGNIFICANT CHANGE UP (ref 3.5–5.3)
PROT SERPL-MCNC: 6.1 G/DL — SIGNIFICANT CHANGE UP (ref 6–8.3)
PROTHROM AB SERPL-ACNC: 15.1 SEC — HIGH (ref 9.8–12.7)
RBC # BLD: 3.12 M/UL — LOW (ref 4.2–5.8)
RBC # BLD: 3.26 M/UL — LOW (ref 4.2–5.8)
RBC # FLD: 16.8 % — HIGH (ref 10.3–14.5)
RBC # FLD: 16.9 % — HIGH (ref 10.3–14.5)
SODIUM SERPL-SCNC: 130 MMOL/L — LOW (ref 135–145)
SODIUM SERPL-SCNC: 131 MMOL/L — LOW (ref 135–145)
VANCOMYCIN TROUGH SERPL-MCNC: 12 UG/ML — SIGNIFICANT CHANGE UP (ref 10–20)
WBC # BLD: 21.3 K/UL — HIGH (ref 3.8–10.5)
WBC # BLD: 24.2 K/UL — HIGH (ref 3.8–10.5)
WBC # FLD AUTO: 21.3 K/UL — HIGH (ref 3.8–10.5)
WBC # FLD AUTO: 24.2 K/UL — HIGH (ref 3.8–10.5)

## 2018-08-14 PROCEDURE — 71045 X-RAY EXAM CHEST 1 VIEW: CPT | Mod: 26

## 2018-08-14 PROCEDURE — 93010 ELECTROCARDIOGRAM REPORT: CPT

## 2018-08-14 PROCEDURE — 71045 X-RAY EXAM CHEST 1 VIEW: CPT | Mod: 26,77,59

## 2018-08-14 PROCEDURE — 99291 CRITICAL CARE FIRST HOUR: CPT

## 2018-08-14 RX ORDER — ACETAMINOPHEN 500 MG
1000 TABLET ORAL ONCE
Qty: 0 | Refills: 0 | Status: COMPLETED | OUTPATIENT
Start: 2018-08-15 | End: 2018-08-15

## 2018-08-14 RX ORDER — ONDANSETRON 8 MG/1
4 TABLET, FILM COATED ORAL EVERY 6 HOURS
Qty: 0 | Refills: 0 | Status: DISCONTINUED | OUTPATIENT
Start: 2018-08-14 | End: 2018-09-07

## 2018-08-14 RX ORDER — ACETAMINOPHEN 500 MG
1000 TABLET ORAL ONCE
Qty: 0 | Refills: 0 | Status: COMPLETED | OUTPATIENT
Start: 2018-08-14 | End: 2018-08-14

## 2018-08-14 RX ORDER — POTASSIUM CHLORIDE 20 MEQ
10 PACKET (EA) ORAL
Qty: 0 | Refills: 0 | Status: COMPLETED | OUTPATIENT
Start: 2018-08-14 | End: 2018-08-14

## 2018-08-14 RX ORDER — SODIUM CHLORIDE 9 MG/ML
500 INJECTION INTRAMUSCULAR; INTRAVENOUS; SUBCUTANEOUS ONCE
Qty: 0 | Refills: 0 | Status: COMPLETED | OUTPATIENT
Start: 2018-08-14 | End: 2018-08-14

## 2018-08-14 RX ORDER — MAGNESIUM SULFATE 500 MG/ML
2 VIAL (ML) INJECTION ONCE
Qty: 0 | Refills: 0 | Status: COMPLETED | OUTPATIENT
Start: 2018-08-14 | End: 2018-08-14

## 2018-08-14 RX ORDER — NALOXONE HYDROCHLORIDE 4 MG/.1ML
0.1 SPRAY NASAL
Qty: 0 | Refills: 0 | Status: DISCONTINUED | OUTPATIENT
Start: 2018-08-14 | End: 2018-08-14

## 2018-08-14 RX ORDER — HYDROMORPHONE HYDROCHLORIDE 2 MG/ML
30 INJECTION INTRAMUSCULAR; INTRAVENOUS; SUBCUTANEOUS
Qty: 0 | Refills: 0 | Status: DISCONTINUED | OUTPATIENT
Start: 2018-08-14 | End: 2018-08-14

## 2018-08-14 RX ORDER — HYDROMORPHONE HYDROCHLORIDE 2 MG/ML
0.5 INJECTION INTRAMUSCULAR; INTRAVENOUS; SUBCUTANEOUS
Qty: 0 | Refills: 0 | Status: DISCONTINUED | OUTPATIENT
Start: 2018-08-14 | End: 2018-08-14

## 2018-08-14 RX ORDER — OLANZAPINE 15 MG/1
5 TABLET, FILM COATED ORAL
Qty: 0 | Refills: 0 | Status: DISCONTINUED | OUTPATIENT
Start: 2018-08-14 | End: 2018-09-07

## 2018-08-14 RX ORDER — HALOPERIDOL DECANOATE 100 MG/ML
2.5 INJECTION INTRAMUSCULAR ONCE
Qty: 0 | Refills: 0 | Status: COMPLETED | OUTPATIENT
Start: 2018-08-14 | End: 2018-08-14

## 2018-08-14 RX ORDER — CALCIUM GLUCONATE 100 MG/ML
2 VIAL (ML) INTRAVENOUS ONCE
Qty: 0 | Refills: 0 | Status: COMPLETED | OUTPATIENT
Start: 2018-08-14 | End: 2018-08-14

## 2018-08-14 RX ADMIN — SODIUM CHLORIDE 100 MILLILITER(S): 9 INJECTION, SOLUTION INTRAVENOUS at 01:09

## 2018-08-14 RX ADMIN — Medication 250 MILLIGRAM(S): at 13:24

## 2018-08-14 RX ADMIN — Medication 50 GRAM(S): at 06:47

## 2018-08-14 RX ADMIN — SODIUM CHLORIDE 1000 MILLILITER(S): 9 INJECTION INTRAMUSCULAR; INTRAVENOUS; SUBCUTANEOUS at 15:30

## 2018-08-14 RX ADMIN — HYDROMORPHONE HYDROCHLORIDE 1 MILLIGRAM(S): 2 INJECTION INTRAMUSCULAR; INTRAVENOUS; SUBCUTANEOUS at 05:30

## 2018-08-14 RX ADMIN — HYDROMORPHONE HYDROCHLORIDE 30 MILLILITER(S): 2 INJECTION INTRAMUSCULAR; INTRAVENOUS; SUBCUTANEOUS at 09:32

## 2018-08-14 RX ADMIN — PIPERACILLIN AND TAZOBACTAM 25 GRAM(S): 4; .5 INJECTION, POWDER, LYOPHILIZED, FOR SOLUTION INTRAVENOUS at 13:23

## 2018-08-14 RX ADMIN — Medication 400 MILLIGRAM(S): at 17:06

## 2018-08-14 RX ADMIN — Medication 1: at 23:34

## 2018-08-14 RX ADMIN — Medication 1000 MILLIGRAM(S): at 17:36

## 2018-08-14 RX ADMIN — ENOXAPARIN SODIUM 40 MILLIGRAM(S): 100 INJECTION SUBCUTANEOUS at 11:54

## 2018-08-14 RX ADMIN — Medication 250 MILLIGRAM(S): at 05:19

## 2018-08-14 RX ADMIN — SODIUM CHLORIDE 100 MILLILITER(S): 9 INJECTION, SOLUTION INTRAVENOUS at 11:54

## 2018-08-14 RX ADMIN — Medication 400 MILLIGRAM(S): at 06:00

## 2018-08-14 RX ADMIN — Medication 1000 MILLIGRAM(S): at 06:15

## 2018-08-14 RX ADMIN — CHLORHEXIDINE GLUCONATE 1 APPLICATION(S): 213 SOLUTION TOPICAL at 05:17

## 2018-08-14 RX ADMIN — HALOPERIDOL DECANOATE 2.5 MILLIGRAM(S): 100 INJECTION INTRAMUSCULAR at 16:59

## 2018-08-14 RX ADMIN — HYDROMORPHONE HYDROCHLORIDE 1 MILLIGRAM(S): 2 INJECTION INTRAMUSCULAR; INTRAVENOUS; SUBCUTANEOUS at 08:41

## 2018-08-14 RX ADMIN — HYDROMORPHONE HYDROCHLORIDE 1 MILLIGRAM(S): 2 INJECTION INTRAMUSCULAR; INTRAVENOUS; SUBCUTANEOUS at 08:21

## 2018-08-14 RX ADMIN — Medication 1: at 18:53

## 2018-08-14 RX ADMIN — Medication 200 GRAM(S): at 19:25

## 2018-08-14 RX ADMIN — HYDROMORPHONE HYDROCHLORIDE 1 MILLIGRAM(S): 2 INJECTION INTRAMUSCULAR; INTRAVENOUS; SUBCUTANEOUS at 05:15

## 2018-08-14 RX ADMIN — OLANZAPINE 5 MILLIGRAM(S): 15 TABLET, FILM COATED ORAL at 21:09

## 2018-08-14 RX ADMIN — Medication 400 MILLIGRAM(S): at 22:51

## 2018-08-14 RX ADMIN — HYDROMORPHONE HYDROCHLORIDE 1 MILLIGRAM(S): 2 INJECTION INTRAMUSCULAR; INTRAVENOUS; SUBCUTANEOUS at 01:21

## 2018-08-14 RX ADMIN — Medication 1000 MILLIGRAM(S): at 23:06

## 2018-08-14 RX ADMIN — PIPERACILLIN AND TAZOBACTAM 25 GRAM(S): 4; .5 INJECTION, POWDER, LYOPHILIZED, FOR SOLUTION INTRAVENOUS at 05:18

## 2018-08-14 RX ADMIN — Medication 100 MILLIEQUIVALENT(S): at 08:50

## 2018-08-14 RX ADMIN — Medication 100 MILLIEQUIVALENT(S): at 09:30

## 2018-08-14 RX ADMIN — Medication 62.5 MILLIMOLE(S): at 11:09

## 2018-08-14 RX ADMIN — Medication 1: at 05:34

## 2018-08-14 RX ADMIN — PIPERACILLIN AND TAZOBACTAM 25 GRAM(S): 4; .5 INJECTION, POWDER, LYOPHILIZED, FOR SOLUTION INTRAVENOUS at 21:10

## 2018-08-14 RX ADMIN — Medication 100 MILLIEQUIVALENT(S): at 07:57

## 2018-08-14 RX ADMIN — HYDROMORPHONE HYDROCHLORIDE 1 MILLIGRAM(S): 2 INJECTION INTRAMUSCULAR; INTRAVENOUS; SUBCUTANEOUS at 01:06

## 2018-08-14 RX ADMIN — Medication 250 MILLIGRAM(S): at 21:09

## 2018-08-14 RX ADMIN — FLUCONAZOLE 100 MILLIGRAM(S): 150 TABLET ORAL at 21:10

## 2018-08-14 NOTE — PHYSICAL THERAPY INITIAL EVALUATION ADULT - DID THE PATIENT HAVE SURGERY?
yes yes/Wound closure, End ileostomy, Abdominal washout  8/12 ileo-cecal resection and abscess drainage and debridement of necrotizing pelvic abscess and ABThera wound vac placement

## 2018-08-14 NOTE — PROGRESS NOTE ADULT - SUBJECTIVE AND OBJECTIVE BOX
HISTORY  58M PMH DM, HTN, peripheral neuropathy, presenting to ED with abdominal pain and R leg weakness. Patient had recently been in [place] (on vacation, [place]), where he was admitted to a hospital there 2 weeks prior to presentation for fever, found during work up there to have a R abdominal collection 2/2 colonic drainage. Hospital there started pt on antibiotics, placed a drain into the collection which was apparently not very successful as it had very little output. According to the patient, the doctors in [place] recommended surgery to remove part of the colon, which patient did not agree with. He left the hospital AMA and [place]. The patient came to [facility] ED immediately after leaving the airport. CT was obtained here which showed a large cecal/terminal ileum perforation with a large amount of stool extending into the R iliacus muscle. Labs significant for WBC 24.7, Lactate 2.3. Patient also with grossly positive UTI. Patient also endorsing inability to ambulate for the past 2 weeks (at around the same time as fever developed) due to weakness in the RLE. Also endorsing dysuria.  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 Patient not extubated upon leaving the OR and transferred to SICU for hemodynamic monitoring.    24 HOUR EVENTS:  - Pt returned to the OR for Abdominal washout; 19 Jefferson drain placed in R pelvis; Closure of abdominal fascia and skin; Ileostomy  - Pt extubated  - No acute events overnight HISTORY  58M PMH DM, HTN, peripheral neuropathy, presenting to ED with abdominal pain and R leg weakness. Patient had recently been in [place]on vacation, [place] where he was admitted to a hospital there 2 weeks prior to presentation for fever, found during work up there to have a R abdominal collection 2/2 colonic drainage. Hospital there started pt on antibiotics, placed a drain into the collection which was apparently not very successful as it had very little output. According to the patient, the doctors in [place] recommended surgery to remove part of the colon, which patient did not agree with. He left the hospital AMA and [place]. The patient came to [facility] ED immediately after leaving the airport. CT was obtained here which showed a large cecal/terminal ileum perforation with a large amount of stool extending into the R iliacus muscle. Labs significant for WBC 24.7, Lactate 2.3. Patient also with grossly positive UTI. Patient also endorsing inability to ambulate for the past 2 weeks (at around the same time as fever developed) due to weakness in the RLE. Also endorsing dysuria.  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 Patient not extubated upon leaving the OR and transferred to SICU for hemodynamic monitoring.    24 HOUR EVENTS:  - Pt returned to the OR for Abdominal washout; 19 Jefferson drain placed in R pelvis; Closure of abdominal fascia and skin; Ileostomy  - Pt extubated  - No acute events overnight    SUBJECTIVE/ROS:  [x] A ten-point review of systems was otherwise negative except as noted.  [ ] Due to altered mental status/intubation, subjective information were not able to be obtained from the patient. History was obtained, to the extent possible, from review of the chart and collateral sources of information.    NEURO  Exam: alert and oriented  Meds: HYDROmorphone  Injectable 1 milliGRAM(s) IV Push every 3 hours PRN Severe Pain (7 - 10)    [x] Adequacy of sedation and pain control has been assessed and adjusted      RESPIRATORY  RR: 11 (08-14-18 @ 04:00) (10 - 23)  SpO2: 100% (08-14-18 @ 04:00) (95% - 100%)  Wt(kg): --  Exam: unlabored, clear to auscultation bilaterally    ABG - ( 13 Aug 2018 14:25 )  pH: 7.44  /  pCO2: 36    /  pO2: 148   / HCO3: 24    / Base Excess: .7    /  SaO2: 98      Lactate: x        Meds:     CARDIOVASCULAR  HR: 99 (08-14-18 @ 04:00) (95 - 116)  BP: 163/78 (08-14-18 @ 04:00) (120/57 - 174/84)  BP(mean): 112 (08-14-18 @ 04:00) (82 - 120)  ABP: --  ABP(mean): --  Wt(kg): --  CVP(cm H2O): --      Exam:   Cardiac Rhythm:  Perfusion     [x]Adequate   [ ]Inadequate  Mentation   [x]Normal       [ ]Reduced  Extremities  [x]Warm         [ ]Cool  Volume Status [ ]Hypervolemic [x]Euvolemic [ ]Hypovolemic  Meds:       GI/NUTRITION  Exam: soft, appropriately tender, nondistended, dressings c/d/i  Diet: NPO  Meds:     GENITOURINARY  I&O's Detail    08-12 @ 07:01 - 08-13 @ 07:00  --------------------------------------------------------  IN:    dextrose 5% + lactated ringers.: 200 mL    dextrose 5% + sodium chloride 0.9%: 200 mL    fentaNYL  Infusion: 38.4 mL    IV PiggyBack: 550 mL    lactated ringers.: 2250 mL    propofol Infusion: 351 mL    Solution: 450 mL    Solution: 450 mL  Total IN: 4489.4 mL    OUT:    Indwelling Catheter - Urethral: 1575 mL    Nasoenteral Tube: 150 mL    VAC (Vacuum Assisted Closure) System: 750 mL  Total OUT: 2475 mL    Total NET: 2014.4 mL      08-13 @ 07:01  -  08-14 @ 04:39  --------------------------------------------------------  IN:    dextrose 5% + sodium chloride 0.9%: 200 mL    dextrose 5% + sodium chloride 0.9%.: 1475 mL    fentaNYL  Infusion: 3.2 mL    IV PiggyBack: 350 mL    propofol Infusion: 30 mL    Solution: 200 mL    Solution: 600 mL  Total IN: 2858.2 mL    OUT:    Bulb: 310 mL    Indwelling Catheter - Urethral: 1285 mL    Nasoenteral Tube: 20 mL    VAC (Vacuum Assisted Closure) System: 200 mL  Total OUT: 1815 mL    Total NET: 1043.2 mL        Weight (kg): 70 (08-14 @ 01:16)  08-14    131<L>  |  98  |  5<L>  ----------------------------<  153<H>  3.7   |  23  |  0.57    Ca    7.2<L>      14 Aug 2018 03:55  Phos  2.6     08-14  Mg     1.9     08-14      [x] Coles catheter, indication: I&O monitoring of critically ill patient  Meds: dextrose 5% + sodium chloride 0.9%. 1000 milliLiter(s) IV Continuous <Continuous>        HEMATOLOGIC  Meds: enoxaparin Injectable 40 milliGRAM(s) SubCutaneous daily    [x] VTE Prophylaxis                        8.4    21.3  )-----------( 459      ( 14 Aug 2018 03:55 )             26.6     PT/INR - ( 14 Aug 2018 03:55 )   PT: 15.1 sec;   INR: 1.39 ratio         PTT - ( 14 Aug 2018 03:55 )  PTT:26.9 sec  Transfusion     [ ] PRBC   [ ] Platelets   [ ] FFP   [ ] Cryoprecipitate      INFECTIOUS DISEASES  T(C): 36.6 (08-14-18 @ 03:00), Max: 37.2 (08-13-18 @ 07:00)  Wt(kg): --  WBC Count: 21.3 K/uL (08-14 @ 03:55)  WBC Count: 29.0 K/uL (08-13 @ 14:27)    Recent Cultures:  Specimen Source: .Blood Blood, 08-12 @ 05:22; Results   No growth to date.; Gram Stain: --; Organism: --  Specimen Source: .Urine Clean Catch (Midstream), 08-11 @ 16:59; Results   >100,000 CFU/ml Yeast-like cells, presumptively not Candida albicans; Gram Stain: --; Organism: --  Specimen Source: .Blood Blood-Peripheral, 08-11 @ 14:27; Results   No growth to date.; Gram Stain: --; Organism: --    Meds: fluconAZOLE IVPB 200 milliGRAM(s) IV Intermittent every 24 hours  piperacillin/tazobactam IVPB. 3.375 Gram(s) IV Intermittent every 8 hours  vancomycin  IVPB 1000 milliGRAM(s) IV Intermittent every 8 hours        ENDOCRINE  Capillary Blood Glucose    Meds: insulin lispro (HumaLOG) corrective regimen sliding scale   SubCutaneous every 6 hours        ACCESS DEVICES:  [x] Peripheral IV  [ ] Central Venous Line	[ ] R	[ ] L	[ ] IJ	        [ ] Fem	[ ] SC	Placed:   [ ] Arterial Line		        [ ] R	[ ] L	[ ] Fem	[ ] Rad	[ ] Ax	Placed:   [ ] PICC:					[ ] Mediport  [x] Urinary Catheter, Date Placed:   [x] Necessity of urinary, arterial, and venous catheters discussed    OTHER MEDICATIONS:  benzocaine 15 mG/menthol 3.6 mG Lozenge 1 Lozenge Oral every 6 hours PRN  chlorhexidine 4% Liquid 1 Application(s) Topical <User Schedule>      CODE STATUS: Full code

## 2018-08-14 NOTE — PHYSICAL THERAPY INITIAL EVALUATION ADULT - PERTINENT HX OF CURRENT PROBLEM, REHAB EVAL
58M PMH DM, HTN, peripheral neuropathy, a/w abdominal pain and R leg weakness. Pt recently on vactation in [place], where he was admitted to a hospital  2 weeks prior to presentation for fever, and found to have R abdominal collection 2/2 colonic drainage. Pt started pt on antibiotics, and drain placed. Sx recommended however pt left hospital AMA 58M PMH DM, HTN, peripheral neuropathy, a/w abdominal pain and R leg weakness. Pt recently on vacation in [place], where he was admitted to a hospital  2 weeks prior to presentation for fever, and found to have R abdominal collection 2/2 colonic drainage. Pt started pt on antibiotics, and drain placed. Sx recommended however pt left hospital AMA

## 2018-08-14 NOTE — PHYSICAL THERAPY INITIAL EVALUATION ADULT - BALANCE TRAINING, PT EVAL
GOAL: Pt will demonstrate improved static/dynamic standing balance to good, in order to improve stability, decrease fall risk and increase independence with transfers and ambulaton within 2 weeks.

## 2018-08-14 NOTE — PROGRESS NOTE ADULT - ASSESSMENT
59yo M with perforated R colon and large RP abscess/abdominal wall infection s/p open ileocecectomy in discontinuity and abdominal wall/RP debridement with open abdomen/abthera on 8/11, now s/p abdominal washout, end ileostomy, abdominal closure on 8/13.    - NPO/IVF/NGT to suction.  - Continue abx - Vanc/zosyn/diflucan, check vanc trough.  - Pain control: dilaudid, tylenol IV.  - SSI.  - F/u tachycardia.  - Rest of care per SICU.    [id number]

## 2018-08-14 NOTE — PROGRESS NOTE ADULT - ATTENDING COMMENTS
Pt seen and examined on 8/14, agree with above. Pt alert and awake, with mild tachycardia. Ileostomy with bowel sweat. Awaiting ostomy function. Continue antibiotics for intra-abdominal abscess.

## 2018-08-14 NOTE — PHYSICAL THERAPY INITIAL EVALUATION ADULT - REFERRING PHYSICIAN, REHAB EVAL
ileo-cecal resection and abscess drainage and debridement of necrotizing pelvic abscess and ABThera wound vac placement

## 2018-08-14 NOTE — PROGRESS NOTE ADULT - ASSESSMENT
Jimbo Kumar is a 58 y.o. man with no significant medical or surgical history who presented to the ED on 8/11 with a colonic perforation, likely secondary to perforated appendicitis. POD2 from Exploratory laparotomy with ileo-cecal resection, abscess drainage, and debridement of necrotizing pelvic abscess involving iliacus muscle and ABThera wound vac placement. Patient's bowel left in discontinuity following operation. Patient remains intubated in SICU.    PLAN:   NEURO  - alert and oriented x 3  - PRN dilaudid 1mg for severe (7-10 out of 10) pain    RESPIRATORY  - monitor respiratory rate and oxygen saturation; SpO2 > 90%    CARDIOVASCULAR  - Stable off of pressor support  - monitor HR and BP    GI/NUTRITION  - NPO  - ARBF  - Prophylaxis with pantoprazole    GENITOURINARY/RENAL  - LR @100  - Coles for strict I's and O's; consider TOV  - Monitor BUN and Cr    Hematologic:  - Trend H/H  - lovenox for VTE prophylaxis    Infectious Disease: candida in urine  - Monitor WBC, temperature  - diflucan, zosyn, vanc continued    Endocrine:  - Monitor blood glucose on BMP  - ISS    Disposition: Stable consider transferring to a surgical floor    JACKI Wade, PGY-2  [id number]

## 2018-08-14 NOTE — PHYSICAL THERAPY INITIAL EVALUATION ADULT - GAIT TRAINING, PT EVAL
GOAL: Pt will ambulate 100 feet w/contact guard assist, w/use of appropriate assistive device in 2 weeks

## 2018-08-14 NOTE — PROGRESS NOTE ADULT - SUBJECTIVE AND OBJECTIVE BOX
Surgery Progress Note    24hr events:   - Pt returned to the OR for Abdominal washout; 19 Jefferson drain placed in R pelvis; Closure of abdominal fascia and skin; Ileostomy  - Pt extubated  - No acute events overnight  - Pt originally set for transfer to floor today, but became tachy to 130s and was further treated, floor transfer cancelled.    SUBJECTIVE: Pt seen and examined at bedside. Patient comfortable and in no-apparent distress. On O2 NC, sitting up in bed. C/o throat irritation, some abdominal pain.    Vital Signs Last 24 Hrs  T(C): 36.7 (14 Aug 2018 15:00), Max: 37.3 (14 Aug 2018 11:00)  T(F): 98.1 (14 Aug 2018 15:00), Max: 99.1 (14 Aug 2018 11:00)  HR: 123 (14 Aug 2018 17:00) (90 - 131)  BP: 145/70 (14 Aug 2018 17:00) (128/60 - 178/82)  BP(mean): 100 (14 Aug 2018 17:00) (86 - 129)  RR: 25 (14 Aug 2018 17:00) (10 - 31)  SpO2: 97% (14 Aug 2018 17:00) (95% - 100%)    Physical Exam:  Gen: NAD, well-developed, NGT to LCS  Resp: breathing easily, no stridor, on O2 NC  CV: no peripheral edema/cyanosis  Abd: appropriately tender, softly distended, midline dressing with some minimal strikethrough, ileostomy pink and patent but with no output yet, SUDHIR with purulent output    LABS:                        9.0    24.2  )-----------( 491      ( 14 Aug 2018 16:26 )             27.9     08-14    130<L>  |  94<L>  |  5<L>  ----------------------------<  203<H>  3.8   |  24  |  0.58    Ca    7.5<L>      14 Aug 2018 16:26  Phos  2.6     08-14  Mg     1.9     08-14    TPro  6.1  /  Alb  2.2<L>  /  TBili  1.2  /  DBili  x   /  AST  15  /  ALT  11  /  AlkPhos  92  08-14    PT/INR - ( 14 Aug 2018 03:55 )   PT: 15.1 sec;   INR: 1.39 ratio         PTT - ( 14 Aug 2018 03:55 )  PTT:26.9 sec      INs and OUTs:    08-13-18 @ 07:01  -  08-14-18 @ 07:00  --------------------------------------------------------  IN: 3458.2 mL / OUT: 2245 mL / NET: 1213.2 mL    08-14-18 @ 07:01  -  08-14-18 @ 17:55  --------------------------------------------------------  IN: 1875 mL / OUT: 1000 mL / NET: 875 mL

## 2018-08-14 NOTE — PHYSICAL THERAPY INITIAL EVALUATION ADULT - MANUAL MUSCLE TESTING RESULTS, REHAB EVAL
grossly assessed due to/BUE grossly at least 3/5 throughout , LLE 5/5, R hip flexion 1/5, R knee ext 1/5, ankle DF/PF at least 3/5 grossly assessed due to/BUE grossly at least 3/5 throughout , LLE 5/5, R hip flexion 1/5, R knee ext 1/5, ankle DF/PF at least 3/5; RLE partially effort limited

## 2018-08-14 NOTE — PHYSICAL THERAPY INITIAL EVALUATION ADULT - DISCHARGE DISPOSITION, PT EVAL
to be determined following completion of functional eval for transfers and ambulation Subacute rehab

## 2018-08-14 NOTE — PHYSICAL THERAPY INITIAL EVALUATION ADULT - CRITERIA FOR SKILLED THERAPEUTIC INTERVENTIONS
risk reduction/prevention/rehab potential/impairments found/therapy frequency/anticipated discharge recommendation/functional limitations in following categories/predicted duration of therapy intervention

## 2018-08-14 NOTE — PHYSICAL THERAPY INITIAL EVALUATION ADULT - TRANSFER TRAINING, PT EVAL
GOAL: Pt will perform ALL transfers with contact guard assist, w/use of appropriate assistive device as needed, in 2 weeks.

## 2018-08-14 NOTE — PHYSICAL THERAPY INITIAL EVALUATION ADULT - STRENGTHENING, PT EVAL
GOAL: Pt will improve RLE strength to at least 3/5 for increased limb stability, to improve transfers and gait in 2 weeks.

## 2018-08-14 NOTE — PROGRESS NOTE ADULT - SUBJECTIVE AND OBJECTIVE BOX
Called to evaluate PCA.  S\P exploratory laparotomy 08/12/2018 with ileo-cecal resection, abscess drainage and debridement of necrotizing pelvic abscess with ABThera wound vac placement.  A+0X3. OOB in chair. Endorsing incisional pain. Medicated with Dilaudid IVP PRN with good analgesia.  Will initiate PCA Dilaudid 0.2mg/6minutes/ no basal.  Educated to PCA use. Will follow,

## 2018-08-14 NOTE — PROGRESS NOTE ADULT - ATTENDING COMMENTS
S/p extubated, tolerating well  PCA consult for better pain control  NPO awaiting return of bowel function, IVF  Abx Zosyn, and diflucan  ISS  Mobilization

## 2018-08-14 NOTE — PHYSICAL THERAPY INITIAL EVALUATION ADULT - ACTIVE RANGE OF MOTION EXAMINATION, REHAB EVAL
bilateral upper extremity Active ROM was WFL (within functional limits)/Left LE Active ROM was WFL (within functional limits)

## 2018-08-14 NOTE — PHYSICAL THERAPY INITIAL EVALUATION ADULT - DIAGNOSIS, PT EVAL
decreased functional mobility secondary to dec RLE strength, impaired balance, post-op pain and discomfort

## 2018-08-14 NOTE — PHYSICAL THERAPY INITIAL EVALUATION ADULT - PRECAUTIONS/LIMITATIONS, REHAB EVAL
fall precautions/surgical precautions [facility] ED CT showed a large cecal/terminal ileum perforation with a large amount of stool extending into the R iliacus muscle. Patient also endorsing inability to ambulate for the past 2 weeks (at around the same time as fever developed) due to weakness in the RLE./surgical precautions/fall precautions

## 2018-08-14 NOTE — PHYSICAL THERAPY INITIAL EVALUATION ADULT - LIVES WITH, PROFILE
children Pt lives in  with spouse and 3 children. Patient independent with ADls and ambulation previously, however recent RLE weakness and difficulty ambulating./children/spouse

## 2018-08-15 ENCOUNTER — TRANSCRIPTION ENCOUNTER (OUTPATIENT)
Age: 58
End: 2018-08-15

## 2018-08-15 LAB
ANION GAP SERPL CALC-SCNC: 10 MMOL/L — SIGNIFICANT CHANGE UP (ref 5–17)
BUN SERPL-MCNC: <4 MG/DL — LOW (ref 7–23)
CALCIUM SERPL-MCNC: 7.4 MG/DL — LOW (ref 8.4–10.5)
CHLORIDE SERPL-SCNC: 99 MMOL/L — SIGNIFICANT CHANGE UP (ref 96–108)
CO2 SERPL-SCNC: 25 MMOL/L — SIGNIFICANT CHANGE UP (ref 22–31)
CREAT SERPL-MCNC: 0.55 MG/DL — SIGNIFICANT CHANGE UP (ref 0.5–1.3)
CULTURE RESULTS: SIGNIFICANT CHANGE UP
GLUCOSE BLDC GLUCOMTR-MCNC: 135 MG/DL — HIGH (ref 70–99)
GLUCOSE BLDC GLUCOMTR-MCNC: 140 MG/DL — HIGH (ref 70–99)
GLUCOSE BLDC GLUCOMTR-MCNC: 182 MG/DL — HIGH (ref 70–99)
GLUCOSE SERPL-MCNC: 139 MG/DL — HIGH (ref 70–99)
HCT VFR BLD CALC: 23.7 % — LOW (ref 39–50)
HCT VFR BLD CALC: 25 % — LOW (ref 39–50)
HGB BLD-MCNC: 7.7 G/DL — LOW (ref 13–17)
HGB BLD-MCNC: 8.2 G/DL — LOW (ref 13–17)
MAGNESIUM SERPL-MCNC: 1.8 MG/DL — SIGNIFICANT CHANGE UP (ref 1.6–2.6)
MCHC RBC-ENTMCNC: 27.4 PG — SIGNIFICANT CHANGE UP (ref 27–34)
MCHC RBC-ENTMCNC: 27.9 PG — SIGNIFICANT CHANGE UP (ref 27–34)
MCHC RBC-ENTMCNC: 32.3 GM/DL — SIGNIFICANT CHANGE UP (ref 32–36)
MCHC RBC-ENTMCNC: 32.9 GM/DL — SIGNIFICANT CHANGE UP (ref 32–36)
MCV RBC AUTO: 84.8 FL — SIGNIFICANT CHANGE UP (ref 80–100)
MCV RBC AUTO: 84.9 FL — SIGNIFICANT CHANGE UP (ref 80–100)
NT-PROBNP SERPL-SCNC: 7962 PG/ML — HIGH (ref 0–300)
PHOSPHATE SERPL-MCNC: 2.5 MG/DL — SIGNIFICANT CHANGE UP (ref 2.5–4.5)
PLATELET # BLD AUTO: 398 K/UL — SIGNIFICANT CHANGE UP (ref 150–400)
PLATELET # BLD AUTO: 439 K/UL — HIGH (ref 150–400)
POTASSIUM SERPL-MCNC: 3.6 MMOL/L — SIGNIFICANT CHANGE UP (ref 3.5–5.3)
POTASSIUM SERPL-SCNC: 3.6 MMOL/L — SIGNIFICANT CHANGE UP (ref 3.5–5.3)
PROCALCITONIN SERPL-MCNC: 2.02 NG/ML — HIGH (ref 0.02–0.1)
RBC # BLD: 2.79 M/UL — LOW (ref 4.2–5.8)
RBC # BLD: 2.95 M/UL — LOW (ref 4.2–5.8)
RBC # FLD: 16.6 % — HIGH (ref 10.3–14.5)
RBC # FLD: 16.7 % — HIGH (ref 10.3–14.5)
SODIUM SERPL-SCNC: 134 MMOL/L — LOW (ref 135–145)
SPECIMEN SOURCE: SIGNIFICANT CHANGE UP
WBC # BLD: 16.5 K/UL — HIGH (ref 3.8–10.5)
WBC # BLD: 18.8 K/UL — HIGH (ref 3.8–10.5)
WBC # FLD AUTO: 16.5 K/UL — HIGH (ref 3.8–10.5)
WBC # FLD AUTO: 18.8 K/UL — HIGH (ref 3.8–10.5)

## 2018-08-15 PROCEDURE — 99291 CRITICAL CARE FIRST HOUR: CPT

## 2018-08-15 PROCEDURE — 71045 X-RAY EXAM CHEST 1 VIEW: CPT | Mod: 26

## 2018-08-15 PROCEDURE — 93970 EXTREMITY STUDY: CPT | Mod: 26

## 2018-08-15 RX ORDER — FLUCONAZOLE 150 MG/1
200 TABLET ORAL EVERY 24 HOURS
Qty: 0 | Refills: 0 | Status: COMPLETED | OUTPATIENT
Start: 2018-08-15 | End: 2018-08-17

## 2018-08-15 RX ORDER — TETRACAINE/BENZOCAINE/BUTAMBEN 2%-14%-2%
1 OINTMENT (GRAM) TOPICAL
Qty: 0 | Refills: 0 | Status: DISCONTINUED | OUTPATIENT
Start: 2018-08-15 | End: 2018-08-20

## 2018-08-15 RX ORDER — MAGNESIUM SULFATE 500 MG/ML
2 VIAL (ML) INJECTION ONCE
Qty: 0 | Refills: 0 | Status: COMPLETED | OUTPATIENT
Start: 2018-08-15 | End: 2018-08-15

## 2018-08-15 RX ORDER — POTASSIUM CHLORIDE 20 MEQ
10 PACKET (EA) ORAL
Qty: 0 | Refills: 0 | Status: COMPLETED | OUTPATIENT
Start: 2018-08-15 | End: 2018-08-15

## 2018-08-15 RX ORDER — DEXTROSE MONOHYDRATE, SODIUM CHLORIDE, AND POTASSIUM CHLORIDE 50; .745; 4.5 G/1000ML; G/1000ML; G/1000ML
1000 INJECTION, SOLUTION INTRAVENOUS
Qty: 0 | Refills: 0 | Status: DISCONTINUED | OUTPATIENT
Start: 2018-08-15 | End: 2018-08-21

## 2018-08-15 RX ORDER — ACETAMINOPHEN 500 MG
1000 TABLET ORAL ONCE
Qty: 0 | Refills: 0 | Status: COMPLETED | OUTPATIENT
Start: 2018-08-15 | End: 2018-08-15

## 2018-08-15 RX ORDER — KETOROLAC TROMETHAMINE 30 MG/ML
15 SYRINGE (ML) INJECTION EVERY 6 HOURS
Qty: 0 | Refills: 0 | Status: DISCONTINUED | OUTPATIENT
Start: 2018-08-15 | End: 2018-08-17

## 2018-08-15 RX ORDER — ENOXAPARIN SODIUM 100 MG/ML
70 INJECTION SUBCUTANEOUS EVERY 12 HOURS
Qty: 0 | Refills: 0 | Status: DISCONTINUED | OUTPATIENT
Start: 2018-08-15 | End: 2018-08-19

## 2018-08-15 RX ORDER — METOPROLOL TARTRATE 50 MG
5 TABLET ORAL EVERY 6 HOURS
Qty: 0 | Refills: 0 | Status: DISCONTINUED | OUTPATIENT
Start: 2018-08-15 | End: 2018-08-21

## 2018-08-15 RX ORDER — HYDROMORPHONE HYDROCHLORIDE 2 MG/ML
0.5 INJECTION INTRAMUSCULAR; INTRAVENOUS; SUBCUTANEOUS ONCE
Qty: 0 | Refills: 0 | Status: DISCONTINUED | OUTPATIENT
Start: 2018-08-15 | End: 2018-08-15

## 2018-08-15 RX ADMIN — Medication 400 MILLIGRAM(S): at 22:56

## 2018-08-15 RX ADMIN — Medication 100 MILLIEQUIVALENT(S): at 05:46

## 2018-08-15 RX ADMIN — Medication 62.5 MILLIMOLE(S): at 06:41

## 2018-08-15 RX ADMIN — ENOXAPARIN SODIUM 70 MILLIGRAM(S): 100 INJECTION SUBCUTANEOUS at 18:09

## 2018-08-15 RX ADMIN — Medication 400 MILLIGRAM(S): at 05:27

## 2018-08-15 RX ADMIN — Medication 1 SPRAY(S): at 22:56

## 2018-08-15 RX ADMIN — CHLORHEXIDINE GLUCONATE 1 APPLICATION(S): 213 SOLUTION TOPICAL at 05:28

## 2018-08-15 RX ADMIN — PIPERACILLIN AND TAZOBACTAM 25 GRAM(S): 4; .5 INJECTION, POWDER, LYOPHILIZED, FOR SOLUTION INTRAVENOUS at 14:24

## 2018-08-15 RX ADMIN — PIPERACILLIN AND TAZOBACTAM 25 GRAM(S): 4; .5 INJECTION, POWDER, LYOPHILIZED, FOR SOLUTION INTRAVENOUS at 05:27

## 2018-08-15 RX ADMIN — Medication 5 MILLIGRAM(S): at 11:53

## 2018-08-15 RX ADMIN — HYDROMORPHONE HYDROCHLORIDE 0.5 MILLIGRAM(S): 2 INJECTION INTRAMUSCULAR; INTRAVENOUS; SUBCUTANEOUS at 01:23

## 2018-08-15 RX ADMIN — Medication 100 MILLIEQUIVALENT(S): at 06:41

## 2018-08-15 RX ADMIN — Medication 1000 MILLIGRAM(S): at 23:30

## 2018-08-15 RX ADMIN — SODIUM CHLORIDE 100 MILLILITER(S): 9 INJECTION, SOLUTION INTRAVENOUS at 05:26

## 2018-08-15 RX ADMIN — Medication 50 GRAM(S): at 05:52

## 2018-08-15 RX ADMIN — Medication 100 MILLIEQUIVALENT(S): at 08:10

## 2018-08-15 RX ADMIN — OLANZAPINE 5 MILLIGRAM(S): 15 TABLET, FILM COATED ORAL at 22:56

## 2018-08-15 RX ADMIN — Medication 1: at 12:06

## 2018-08-15 RX ADMIN — Medication 15 MILLIGRAM(S): at 12:06

## 2018-08-15 RX ADMIN — Medication 1000 MILLIGRAM(S): at 06:10

## 2018-08-15 RX ADMIN — DEXTROSE MONOHYDRATE, SODIUM CHLORIDE, AND POTASSIUM CHLORIDE 75 MILLILITER(S): 50; .745; 4.5 INJECTION, SOLUTION INTRAVENOUS at 11:17

## 2018-08-15 RX ADMIN — HYDROMORPHONE HYDROCHLORIDE 0.5 MILLIGRAM(S): 2 INJECTION INTRAMUSCULAR; INTRAVENOUS; SUBCUTANEOUS at 01:50

## 2018-08-15 RX ADMIN — Medication 15 MILLIGRAM(S): at 18:09

## 2018-08-15 RX ADMIN — PIPERACILLIN AND TAZOBACTAM 25 GRAM(S): 4; .5 INJECTION, POWDER, LYOPHILIZED, FOR SOLUTION INTRAVENOUS at 22:28

## 2018-08-15 RX ADMIN — Medication 5 MILLIGRAM(S): at 23:03

## 2018-08-15 RX ADMIN — Medication 5 MILLIGRAM(S): at 18:09

## 2018-08-15 RX ADMIN — FLUCONAZOLE 100 MILLIGRAM(S): 150 TABLET ORAL at 11:53

## 2018-08-15 RX ADMIN — Medication 250 MILLIGRAM(S): at 05:27

## 2018-08-15 RX ADMIN — Medication 15 MILLIGRAM(S): at 12:26

## 2018-08-15 RX ADMIN — Medication 15 MILLIGRAM(S): at 18:20

## 2018-08-15 NOTE — DISCHARGE NOTE ADULT - PATIENT PORTAL LINK FT
You can access the [facility] Portal, offered by [facility] [place], by registering with the following website: [email or web address]

## 2018-08-15 NOTE — DISCHARGE NOTE ADULT - ADDITIONAL INSTRUCTIONS
Please follow up with interventional radiology (Dr. Morales), within 7-10 days of discharge.  Please call [phone] for an appointment.  Please follow up with surgery (Dr. Cardoso) within 1-2 weeks.  Please call [phone] for an appointment.  Please follow up with infectious disease (Dr. Ott) within 1-2 weeks.  Please call [phone] for an appointment. Please follow up with interventional radiology (Dr. Morales), within 7-10 days of discharge.  Please call [phone] for an appointment.  Please follow up with surgery (Dr. Cardoso) within 1-2 weeks.  Please call [phone] for an appointment.  Please follow up with infectious disease (Dr. Ott) within 1-2 weeks.  Please call [phone] for an appointment.  Please draw a biweekly CBC, CMP and amikacin trough, while on antibiotics

## 2018-08-15 NOTE — PROGRESS NOTE ADULT - ASSESSMENT
ASSESSMENT:  58 y.o. man with no significant medical or surgical history who presented to the ED on 8/11 with a colonic perforation, likely secondary to perforated appendicitis. POD2 from Exploratory laparotomy with ileo-cecal resection, abscess drainage, and debridement of necrotizing pelvic abscess involving iliacus muscle and ABThera wound vac placement. Patient's bowel left in discontinuity following operation. now s/p end ileostomy with long Quan's    NEURO  - alert and oriented x 3  - PRN dilaudid 0.5mg for severe (7-10 out of 10) pain    RESPIRATORY  - monitor respiratory rate and oxygen saturation; SpO2 > 90%    CARDIOVASCULAR  - Stable off of pressor support  - monitor HR and BP    GI/NUTRITION  - NPO    GENITOURINARY/RENAL  - LR @100  - Monitor BUN and Cr    Hematologic:  - Trend H/H  - lovenox for VTE prophylaxis    Infectious Disease: candida in urine  - Monitor WBC, temperature  - diflucan, zosyn, vanc continued    Endocrine:  - Monitor blood glucose on BMP  - ISS    Disposition: Stable consider transferring to a surgical floor

## 2018-08-15 NOTE — DISCHARGE NOTE ADULT - INSTRUCTIONS
biweekly CBC, CMP and amikacin trough You will be discharged with SUDHIR drains. You will need to empty them and record outputs accurately. This will be taught to you by the nursing staff. Please do not remove the SUDHIR drains. They will be removed in the office. Please bring to the office accurate records of output.   BATHING: Please do not submerge wound underwater. You may shower and/or sponge bathe.  ACTIVITY: No heavy lifting anything more than 10-15lbs or straining. Otherwise, you may return to your usual level of physical activity. If you are taking narcotic pain medication (such as Percocet), do NOT drive a car, operate machinery or make important decisions.  DIET: Low fiber diet  NOTIFY YOUR SURGEON IF: You have any bleeding that does not stop, any pus draining from your wound, any fever (over 100.4 F) or chills, persistent nausea/vomiting with inability to tolerate food or liquids, persistent diarrhea, or if your pain is not controlled on your discharge pain medications.  FOLLOW-UP:  1. Please call to make a follow-up appointment within one week of discharge   2. Please follow up with your primary care physician in one week regarding your hospitalization.

## 2018-08-15 NOTE — PROGRESS NOTE ADULT - SUBJECTIVE AND OBJECTIVE BOX
HISTORY  58M PMH DM, HTN, peripheral neuropathy, presenting to ED with abdominal pain and R leg weakness. Patient had recently been in [place] (on vacation, [place] where he was admitted to a hospital there 2 weeks prior to presentation for fever, found during work up there to have a R abdominal collection 2/2 colonic drainage. Hospital there started pt on antibiotics, placed a drain into the collection which was apparently not very successful as it had very little output. According to the patient, the doctors in [place] recommended surgery to remove part of the colon, which patient did not agree with. He left the hospital AMA and [place]. The patient came to [facility] ED immediately after leaving the airport. CT was obtained here which showed a large cecal/terminal ileum perforation with a large amount of stool extending into the R iliacus muscle. Labs significant for WBC 24.7, Lactate 2.3. Patient also with grossly positive UTI. Patient also endorsing inability to ambulate for the past 2 weeks (at around the same time as fever developed) due to weakness in the RLE. Also endorsing dysuria.  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 Patient not extubated upon leaving the OR and transferred to SICU for hemodynamic monitoring.    24 HOUR EVENTS:  - Was given a Dilaudid PCA for pain control but became acutely delirious and agitated so it was discontinued. Required Haldol 2.5 mg IV x1 for agitation. Started on Zyprexa 5 mg IV at bedtime.  - 500 cc bolus NS for SBP in the low 90s during the day with good response.    SUBJECTIVE/ROS:  [x] A ten-point review of systems was otherwise negative except as noted.  [ ] Due to altered mental status/intubation, subjective information were not able to be obtained from the patient. History was obtained, to the extent possible, from review of the chart and collateral sources of information.    NEURO  Exam: awake, alert, oriented x4, no acute distress, no focal deficits  Meds:  - OLANZapine Disintegrating Tablet 5 milliGRAM(s) Oral <User Schedule>  - ondansetron Injectable 4 milliGRAM(s) IV Push every 6 hours PRN Nausea  [x] Adequacy of sedation and pain control has been assessed and adjusted    RESPIRATORY  RR: 19 (08-15-18 @ 07:00) (10 - 31)  SpO2: 100% (08-15-18 @ 07:00) (97% - 100%)  Exam: clear to auscultation bilaterally  Mechanical Ventilation: no  [N/A] Extubation Readiness Assessed  Meds: none    CARDIOVASCULAR  HR: 90 (08-15-18 @ 07:00) (89 - 131)  BP: 151/69 (08-15-18 @ 07:00) (121/63 - 173/90)  BP(mean): 99 (08-15-18 @ 07:00) (87 - 129)  Exam: regular rate and rhythm, S1S2  Cardiac Rhythm: sinus  Perfusion    [x]Adequate    [ ]Inadequate  Mentation   [x]Normal       [ ]Reduced  Extremities  [x]Warm         [ ]Cool  Volume Status [ ]Hypervolemic [x]Euvolemic [ ]Hypovolemic  Meds: none    GI/NUTRITION  Exam: soft, nondistended, shasha-incisional tenderness, incision dressing C/D/I  Diet: NPO / NG tube  Meds: none    GENITOURINARY  I&O's Detail    08-14 @ 07:01  -  08-15 @ 07:00  --------------------------------------------------------  IN:    dextrose 5% + sodium chloride 0.9%.: 1975 mL    IV PiggyBack: 1137.5 mL    Sodium Chloride 0.9% IV Bolus: 500 mL    Solution: 100 mL    Solution: 325 mL    Solution: 450 mL  Total IN: 4487.5 mL    OUT:    Bulb: 120 mL    Nasoenteral Tube: 750 mL    Voided: 2500 mL  Total OUT: 3370 mL    Total NET: 1117.5 mL      134<L>  |  99  |  <4<L>  ----------------------------<  139<H>  3.6   |  25  |  0.55    Ca    7.4<L>      15 Aug 2018 02:43  Phos  2.5  Mg     1.8    [ ] Coles catheter, indication: N/A  Meds: dextrose 5% + sodium chloride 0.9% infuse at 100 mL/hr    HEMATOLOGIC  Meds: enoxaparin Injectable 40 milliGRAM(s) SubCutaneous daily  [x] VTE Prophylaxis                        7.7    16.5  )-----------( 398      ( 15 Aug 2018 02:38 )             23.7     PT/INR - ( 14 Aug 2018 03:55 )   PT: 15.1 sec;   INR: 1.39 ratio    PTT - ( 14 Aug 2018 03:55 )  PTT:26.9 sec    INFECTIOUS DISEASES  T(C): 37.1 (08-15-18 @ 03:00), Max: 37.3 (08-14-18 @ 11:00)  WBC Count:  - 16.5 K/uL (08-15 @ 02:38)  - 24.2 K/uL (08-14 @ 16:26)    Recent Cultures:  Specimen Source: .Surgical Swab retroperitoneal fluid, 08-14 @ 00:44; Results   Moderate Mixed gram negative rods "Susceptibilities not performed"  Few Lactobacillus species "Susceptibilities not performed"; Gram Stain: --; Organism: --  Specimen Source: .Blood Blood, 08-12 @ 05:22; Results   No growth to date.; Gram Stain: --; Organism: --  Specimen Source: .Urine Clean Catch (Midstream), 08-11 @ 16:59; Results   >100,000 CFU/ml Yeast-like cells, presumptively not Candida albicans; Gram Stain: --; Organism: --  Specimen Source: .Blood Blood-Peripheral, 08-11 @ 14:27; Results   No growth to date.; Gram Stain: --; Organism: --    Meds:  - fluconAZOLE IVPB 200 milliGRAM(s) IV Intermittent every 24 hours  - piperacillin/tazobactam IVPB. 3.375 Gram(s) IV Intermittent every 8 hours  - vancomycin  IVPB 1000 milliGRAM(s) IV Intermittent every 8 hours    ENDOCRINE  Capillary Blood Glucose:  POCT Blood Glucose.: 140 mg/dL (15 Aug 2018 05:25)  POCT Blood Glucose.: 181 mg/dL (14 Aug 2018 23:30)  POCT Blood Glucose.: 194 mg/dL (14 Aug 2018 18:40)  POCT Blood Glucose.: 144 mg/dL (14 Aug 2018 11:59)  Meds: insulin lispro (HumaLOG) corrective regimen sliding scale   SubCutaneous every 6 hours    ACCESS DEVICES:  [x] Peripheral IV  [ ] Central Venous Line	[ ] R	[ ] L	[ ] IJ	[ ] Fem	[ ] SC	Placed:   [ ] Arterial Line		[ ] R	[ ] L	[ ] Fem	[ ] Rad	[ ] Ax	Placed:   [ ] PICC:					[ ] Mediport  [ ] Urinary Catheter, Date Placed:   [x] Necessity of urinary, arterial, and venous catheters discussed    OTHER MEDICATIONS:  - benzocaine 15 mG/menthol 3.6 mG Lozenge 1 Lozenge Oral every 6 hours PRN  - chlorhexidine 4% Liquid 1 Application(s) Topical <User Schedule>    CODE STATUS: Full code    IMAGING:

## 2018-08-15 NOTE — DISCHARGE NOTE ADULT - CARE PROVIDER_API CALL
Kiet Cardoso), Surgery; Surgical Critical Care  [address]  [address] [address]  Phone: [phone]  Fax: [phone] Kiet Cardoso), Surgery; Surgical Critical Care  [address]  [address] [address]  Phone: [phone]  Fax: [phone]    Kristopher Morales), Radiology Intrventional  [address] [place] [address]  Phone: [phone]  Fax: [phone]    Kathia Ott), Internal Medicine  [address] [place] [address]  Phone: [phone]  Fax: [phone]

## 2018-08-15 NOTE — DISCHARGE NOTE ADULT - NS AS ACTIVITY OBS
No Heavy lifting/straining/Showering allowed/Walking-Indoors allowed/Walking-Outdoors allowed/Do not drive while taking narcotic pain medication

## 2018-08-15 NOTE — DISCHARGE NOTE ADULT - HOSPITAL COURSE
8/11- 58M PMH DM, HTN, peripheral neuropathy, presenting to ED with abdominal pain and R leg weakness. Patient had recently been in [place]on vacation, [place] where he was admitted to a hospital there 2 weeks prior to presentation for fever, found during work up there to have a R abdominal collection 2/2 colonic drainage. Hospital there started pt on antibiotics, placed a drain into the collection which was apparently not very successful as it had very little output. According to the pt the doctors in [place] recommended surgery to remove part of the colon, which patient did not agree with. He left the hospital AMA and [place]. The patient came to [facility] ED immediately after leaving the airport.  CT was obtained here which showed a large cecal/terminal ileum perforation with a large amount of stool extending into the R iliacus muscle. Labs significant   for WBC 24.7, Lactate 2.3. Patient also with grossly positive UTI.  Of not patient also endorsing inability to ambulate for the past 2 weeks (at around the same time as fever developed) due to weakness? in the RLE. Also endorsing dysuria.  The patient was admitted to [facility] and was taken emergently to the OR for Exploratory laparotomy with ileo-cecal resection and abscess drainage and debridement of necrotizing pelvic abscess and ABThera wound vac placement.  Patient not extubated upon leaving the OR and transferred to SICU for hemodynamic monitoring.  General surgery (ATP) was consulted for a second look surgery.  Patient was kept NPO/NGT and IV abx were continued.  8/13- Patient was taken back to the OR for Abdominal washout,  Jefferson drain placed in R pelvis, Distal end of ileostomy brought up to the abdominal wall, Closure of abdominal fascia and skin, End ileostomy matured.  Patient was extubated, and continued on IV abx.  8/14- PT worked with patient and recommended [facility].  8/15- Surgical swab culture, GNR, Vanco discontinued, continue Zosyn, short course diflucan for non candida albicans.  Therapeutic Lovenox started for DVTs in the mid to distal right brachial veins and proximal right radial vein, thromboses in the left cephalic vein and right cephalic and basilic veins. Pt will need outpt follow with [facility] service attending, possibly 3 months of anticoagulation.  Coles was discontinued, patient passed TOV.  Zyprexa given for agitation.  8/16- Patient transferred from SICU to surgical floor in stable condition  8/18- Repeat CT IMPRESSION: Decrease in size in the right pelvic collection that extends into the right iliopsoas muscle status post catheter drainage. NGT removed. Patient started on CLD.  8/19- SUDHIR output minimal so IR was consulted for IR for drainage of collection.  IR drained 350cc of thick yellow fluid.  8/21- ID consulted for abx regimen.  8/24- abx continued.  Salt tabs started for Patient with Patient with hyponatremia, Patient had CT-Chest/Abdomen/Pelvis which revealed a decrease in right lower quadrant fluid/gas collection s/p percutaneous drainage; however, new multiloculated collections in the left pelvis (likely infectious).   8/25- Patient underwent Successful CT guided percutaneous drainage of left abscess with IR; 8.5 Algerian catheter yielding 25 mL pus  8/29- PICC line was placed for continuation of long term abx  9/1- Dispo planning to [facility].  PODS was consulted for R. foot wound.  9/4- Neurology consulted RLE weakness.  Likely 2/2 to proximity of abscess to psoas.  9/5- On day of discharge, the patient was tolerating diet, ambulating well and pain controlled. The patient will be discharged home with outpatient follow up with IR, Dr. Cardoso, ID.. While at rehab, patient will need biweekly CBC, CMP and amikacin trough. Patient will need IV amikacin and PO flagyl through 10/14 as per ID. 8/11- 58M PMH DM, HTN, peripheral neuropathy, presenting to ED with abdominal pain and R leg weakness. Patient had recently been in [place]on vacation, [place] where he was admitted to a hospital there 2 weeks prior to presentation for fever, found during work up there to have a R abdominal collection 2/2 colonic drainage. Hospital there started pt on antibiotics, placed a drain into the collection which was apparently not very successful as it had very little output. According to the pt the doctors in [place] recommended surgery to remove part of the colon, which patient did not agree with. He left the hospital AMA and [place]. The patient came to [facility] ED immediately after leaving the airport.  CT was obtained here which showed a large cecal/terminal ileum perforation with a large amount of stool extending into the R iliacus muscle. Labs significant   for WBC 24.7, Lactate 2.3. Patient also with grossly positive UTI.  Of not patient also endorsing inability to ambulate for the past 2 weeks (at around the same time as fever developed) due to weakness? in the RLE. Also endorsing dysuria.  The patient was admitted to [facility] and was taken emergently to the OR for Exploratory laparotomy with ileo-cecal resection and abscess drainage and debridement of necrotizing pelvic abscess and ABThera wound vac placement.  Patient not extubated upon leaving the OR and transferred to SICU for hemodynamic monitoring.  General surgery (ATP) was consulted for a second look surgery.  Patient was kept NPO/NGT and IV abx were continued.  8/13- Patient was taken back to the OR for Abdominal washout,  Jefferson drain placed in R pelvis, Distal end of ileostomy brought up to the abdominal wall, Closure of abdominal fascia and skin, End ileostomy matured.  Patient was extubated, and continued on IV abx.  8/14- PT worked with patient and recommended [facility].  8/15- Surgical swab culture, GNR, Vanco discontinued, continue Zosyn, short course diflucan for non candida albicans.  Therapeutic Lovenox started for DVTs in the mid to distal right brachial veins and proximal right radial vein, thromboses in the left cephalic vein and right cephalic and basilic veins. Pt will need outpt follow with [facility] service attending, possibly 3 months of anticoagulation.  Coles was discontinued, patient passed TOV.  Zyprexa given for agitation.  8/16- Patient transferred from SICU to surgical floor in stable condition  8/18- Repeat CT IMPRESSION: Decrease in size in the right pelvic collection that extends into the right iliopsoas muscle status post catheter drainage. NGT removed. Patient started on CLD.  8/19- SUDHIR output minimal so IR was consulted for IR for drainage of collection.  IR drained 350cc of thick yellow fluid.  8/21- ID consulted for abx regimen.  8/24- abx continued.  Salt tabs started for Patient with Patient with hyponatremia, Patient had CT-Chest/Abdomen/Pelvis which revealed a decrease in right lower quadrant fluid/gas collection s/p percutaneous drainage; however, new multiloculated collections in the left pelvis (likely infectious).   8/25- Patient underwent Successful CT guided percutaneous drainage of left abscess with IR; 8.5 Lithuanian catheter yielding 25 mL pus  8/29- PICC line was placed for continuation of long term abx  9/1- Dispo planning to [facility].  PODS was consulted for R. foot wound.  9/4- Neurology consulted RLE weakness.  Likely 2/2 to proximity of abscess to psoas.  9/5- On day of discharge, the patient was tolerating diet, ambulating well and pain controlled. The patient will be discharged home with outpatient follow up with IR, Dr. Cardoso, ID. While at rehab, patient will need biweekly CBC, CMP and amikacin trough. Patient will need IV amikacin and PO flagyl through 10/14 as per ID.    At the time of discharge, the patient was hemodynamically stable, was tolerating PO diet, was voiding urine and passing stool via ostomy, was ambulating, and was comfortable with adequate pain control. The patient felt comfortable with discharge.

## 2018-08-15 NOTE — PROGRESS NOTE ADULT - ATTENDING COMMENTS
Not delirious  Pain control with intermittent opoid, add Toradol, off PCA  Tachycardia hypertensive, will add BB  NPO IVF, stoma has some stool but has high NGT outpt  Surgical swab culture, GNR, can DC Vanco, continue Zosyn, short course diflucan for non candida albicans  PT

## 2018-08-15 NOTE — DISCHARGE NOTE ADULT - MEDICATION SUMMARY - MEDICATIONS TO TAKE
I will START or STAY ON the medications listed below when I get home from the hospital:    metroNIDAZOLE 500 mg/100 mL intravenous solution  -- 500 milligram(s) intravenous every 8 hours     END DATE: 10/14/2018  -- Indication: For antibiotic    amikacin  -- 1050 milligram(s) intravenously once a day       END DATE: 10/14  -- Indication: For antibiotic    acetaminophen 325 mg oral tablet  -- 2 tab(s) by mouth every 6 hours  -- Indication: For pain    apixaban 5 mg oral tablet  -- 2 tab(s) by mouth every 12 hours  -- Indication: For DVT (deep venous thrombosis)    OLANZapine 5 mg oral tablet, disintegrating  -- 1 tab(s) by mouth   -- Indication: For agitation    metoprolol tartrate 25 mg oral tablet  -- 1 tab(s) by mouth 2 times a day  -- Indication: For High blood pressure    silver sulfADIAZINE 1% topical cream  -- 1 application on skin once a day  -- Indication: For topical cream    sodium chloride 1 g oral tablet  -- 1 tab(s) by mouth 3 times a day  -- Indication: For mineral    melatonin 5 mg oral tablet  -- 1 tab(s) by mouth once a day (at bedtime)  -- Indication: For sleep I will START or STAY ON the medications listed below when I get home from the hospital:    Flagyl 500 mg oral tablet  -- 1 tab(s) by mouth 3 times a day till 10/14/18  -- Indication: For Osteomyelitis    amikacin  -- 1050 milligram(s) intravenously once a day       END DATE: 10/14  -- Indication: For antibiotic    acetaminophen 325 mg oral tablet  -- 2 tab(s) by mouth every 6 hours  -- Indication: For pain    apixaban 5 mg oral tablet  -- 2 tab(s) by mouth every 12 hours  -- Indication: For DVT (deep venous thrombosis)    OLANZapine 5 mg oral tablet, disintegrating  -- 1 tab(s) by mouth   -- Indication: For agitation    metoprolol tartrate 25 mg oral tablet  -- 1 tab(s) by mouth 2 times a day  -- Indication: For High blood pressure    silver sulfADIAZINE 1% topical cream  -- 1 application on skin once a day  -- Indication: For topical cream    sodium chloride 1 g oral tablet  -- 1 tab(s) by mouth 3 times a day  -- Indication: For mineral    melatonin 5 mg oral tablet  -- 1 tab(s) by mouth once a day (at bedtime)  -- Indication: For sleep

## 2018-08-15 NOTE — PROGRESS NOTE ADULT - SUBJECTIVE AND OBJECTIVE BOX
Surgery Progress Note / TRANSFER NOTE    HISTORY  58M PMH DM, HTN, peripheral neuropathy, presenting to ED with abdominal pain and R leg weakness. Patient had recently been in [place] (on vacation, [place] the [place]), where he was admitted to a hospital there 2 weeks prior to presentation for fever, found during work up there to have a R abdominal collection 2/2 colonic drainage. Hospital there started pt on antibiotics, placed a drain into the collection which was apparently not very successful as it had very little output. According to the patient, the doctors in [place] recommended surgery to remove part of the colon, which patient did not agree with. He left the hospital AMA and flew back to the [place] for second opinion. The patient came to [facility] ED immediately after leaving the airport. CT was obtained here which showed a large cecal/terminal ileum perforation with a large amount of stool extending into the R iliacus muscle. Labs significant for WBC 24.7, Lactate 2.3. Patient also with grossly positive UTI. Patient also endorsing inability to ambulate for the past 2 weeks (at around the same time as fever developed) due to weakness in the RLE. Also endorsing dysuria.  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 Patient not extubated upon leaving the OR and transferred to SICU for hemodynamic monitoring.    Interval events:  8/13:  - Stable without need for pressor support. Remains intubated and sedated on minimal vent settings. No acute events overnight.   8/14:  - Pt returned to the OR for Abdominal washout; 19 Jefferson drain placed in R pelvis; Closure of abdominal fascia and skin; Ileostomy  - Pt extubated  - No acute events overnight  8/15:  - Was given a Dilaudid PCA for pain control but became acutely delirious and agitated so it was discontinued. Required Haldol 2.5 mg IV x1 for agitation. Started on Zyprexa 5 mg IV at bedtime.  - 500 cc bolus NS for SBP in the low 90s during the day with good response.  - Pt now stable for floor transfer.    SUBJECTIVE: Pt seen and examined at bedside. Patient comfortable and in no-apparent distress. No nausea, vomiting, diarrhea. Pain is controlled.     Vital Signs Last 24 Hrs  T(C): 36.7 (15 Aug 2018 18:02), Max: 37.1 (15 Aug 2018 03:00)  T(F): 98 (15 Aug 2018 18:02), Max: 98.8 (15 Aug 2018 03:00)  HR: 90 (15 Aug 2018 18:57) (86 - 116)  BP: 142/74 (15 Aug 2018 18:57) (142/74 - 179/84)  BP(mean): 117 (15 Aug 2018 17:00) (98 - 121)  RR: 21 (15 Aug 2018 18:02) (14 - 30)  SpO2: 96% (15 Aug 2018 18:02) (96% - 100%)    Physical Exam:  Gen: NAD, well-developed, NGT to LCS  Resp: breathing easily, no stridor, on room air  CV: no peripheral edema/cyanosis  Abd: appropriately tender, softly distended, midline dressing with some minimal strikethrough, ileostomy pink and patent but with no output yet, SUDHIR with purulent output    LABS:                        8.2    18.8  )-----------( 439      ( 15 Aug 2018 09:37 )             25.0     08-15    134<L>  |  99  |  <4<L>  ----------------------------<  139<H>  3.6   |  25  |  0.55    Ca    7.4<L>      15 Aug 2018 02:43  Phos  2.5     08-15  Mg     1.8     08-15    TPro  6.1  /  Alb  2.2<L>  /  TBili  1.2  /  DBili  x   /  AST  15  /  ALT  11  /  AlkPhos  92  08-14    PT/INR - ( 14 Aug 2018 03:55 )   PT: 15.1 sec;   INR: 1.39 ratio         PTT - ( 14 Aug 2018 03:55 )  PTT:26.9 sec      INs and OUTs:    08-14-18 @ 07:01  -  08-15-18 @ 07:00  --------------------------------------------------------  IN: 4487.5 mL / OUT: 3370 mL / NET: 1117.5 mL    08-15-18 @ 07:01  -  08-15-18 @ 19:04  --------------------------------------------------------  IN: 1112.5 mL / OUT: 2170 mL / NET: -1057.5 mL

## 2018-08-15 NOTE — DISCHARGE NOTE ADULT - CARE PROVIDERS DIRECT ADDRESSES
[email or web address]

## 2018-08-15 NOTE — DISCHARGE NOTE ADULT - CARE PLAN
Principal Discharge DX:	DVT (deep venous thrombosis)  Goal:	Right Brachial Vein DVT  Assessment and plan of treatment:	- Follow up with Dr. Cardoso 3 months after dischage to discuss anticoagulation. Principal Discharge DX:	Colon perforation  Goal:	Recover from surgery  Assessment and plan of treatment:	Please follow up with Dr. Cardoso in 7-10 days  Secondary Diagnosis:	DVT (deep venous thrombosis)  Goal:	Right Brachial Vein DVT  Assessment and plan of treatment:	- Follow up with Dr. Cardoso 3 months after dischage to discuss anticoagulation. Principal Discharge DX:	Colon perforation  Goal:	Recover from surgery  Assessment and plan of treatment:	Please follow up with Dr. Cardoso in 7-10 days  Secondary Diagnosis:	DVT (deep venous thrombosis)  Goal:	Right Brachial Vein DVT  Assessment and plan of treatment:	- Follow up with Dr. Cardoso 3 months after discharge to discuss anticoagulation.

## 2018-08-15 NOTE — DISCHARGE NOTE ADULT - PLAN OF CARE
Right Brachial Vein DVT - Follow up with Dr. Cardoso 3 months after dischage to discuss anticoagulation. Recover from surgery Please follow up with Dr. Cardoso in 7-10 days - Follow up with Dr. Cardoso 3 months after discharge to discuss anticoagulation.

## 2018-08-15 NOTE — PROGRESS NOTE ADULT - ASSESSMENT
57yo M with perforated R colon and large RP abscess/abdominal wall infection s/p open ileocecectomy in discontinuity and abdominal wall/RP debridement with open abdomen/abthera on 8/11, now s/p abdominal washout, end ileostomy, abdominal closure on 8/13. Stable for transfer to floor on 8/15.    - NPO/IVF/NGT to suction.  - Continue abx - Zosyn/diflucan; vanc d/c'd today. Will keep on diflucan for 3 days (end date: 8/15).  - Pain control: toradol.  - Metoprolol 5 mg IV q6h.  - Therapeutic Lovenox for DVTs in the mid to distal right brachial veins and proximal right radial vein, thromboses in the left cephalic vein and right cephalic and basilic veins. Pt will need outpt follow with ATP service attending, possibly 3 months of anticoagulation.  - Coles d/c'd today, pt passed TOV.  - Zyprexa 5 mg IV qhs for agitation.  - SSI    [id number]

## 2018-08-16 LAB
ALBUMIN SERPL ELPH-MCNC: 2.3 G/DL — LOW (ref 3.3–5)
ALP SERPL-CCNC: 95 U/L — SIGNIFICANT CHANGE UP (ref 40–120)
ALT FLD-CCNC: 9 U/L — LOW (ref 10–45)
ANION GAP SERPL CALC-SCNC: 12 MMOL/L — SIGNIFICANT CHANGE UP (ref 5–17)
AST SERPL-CCNC: 14 U/L — SIGNIFICANT CHANGE UP (ref 10–40)
BILIRUB SERPL-MCNC: 0.9 MG/DL — SIGNIFICANT CHANGE UP (ref 0.2–1.2)
BUN SERPL-MCNC: 4 MG/DL — LOW (ref 7–23)
CA-I BLD-SCNC: 1.1 MMOL/L — LOW (ref 1.12–1.3)
CALCIUM SERPL-MCNC: 7.7 MG/DL — LOW (ref 8.4–10.5)
CHLORIDE SERPL-SCNC: 97 MMOL/L — SIGNIFICANT CHANGE UP (ref 96–108)
CO2 SERPL-SCNC: 25 MMOL/L — SIGNIFICANT CHANGE UP (ref 22–31)
CREAT SERPL-MCNC: 0.66 MG/DL — SIGNIFICANT CHANGE UP (ref 0.5–1.3)
CULTURE RESULTS: SIGNIFICANT CHANGE UP
CULTURE RESULTS: SIGNIFICANT CHANGE UP
GLUCOSE BLDC GLUCOMTR-MCNC: 103 MG/DL — HIGH (ref 70–99)
GLUCOSE BLDC GLUCOMTR-MCNC: 111 MG/DL — HIGH (ref 70–99)
GLUCOSE BLDC GLUCOMTR-MCNC: 117 MG/DL — HIGH (ref 70–99)
GLUCOSE BLDC GLUCOMTR-MCNC: 122 MG/DL — HIGH (ref 70–99)
GLUCOSE BLDC GLUCOMTR-MCNC: 146 MG/DL — HIGH (ref 70–99)
GLUCOSE SERPL-MCNC: 107 MG/DL — HIGH (ref 70–99)
HCT VFR BLD CALC: 25.7 % — LOW (ref 39–50)
HGB BLD-MCNC: 8.4 G/DL — LOW (ref 13–17)
MAGNESIUM SERPL-MCNC: 2 MG/DL — SIGNIFICANT CHANGE UP (ref 1.6–2.6)
MCHC RBC-ENTMCNC: 27.1 PG — SIGNIFICANT CHANGE UP (ref 27–34)
MCHC RBC-ENTMCNC: 32.7 GM/DL — SIGNIFICANT CHANGE UP (ref 32–36)
MCV RBC AUTO: 82.9 FL — SIGNIFICANT CHANGE UP (ref 80–100)
PHOSPHATE SERPL-MCNC: 2.2 MG/DL — LOW (ref 2.5–4.5)
PLATELET # BLD AUTO: 472 K/UL — HIGH (ref 150–400)
POTASSIUM SERPL-MCNC: 4 MMOL/L — SIGNIFICANT CHANGE UP (ref 3.5–5.3)
POTASSIUM SERPL-SCNC: 4 MMOL/L — SIGNIFICANT CHANGE UP (ref 3.5–5.3)
PROT SERPL-MCNC: 6.5 G/DL — SIGNIFICANT CHANGE UP (ref 6–8.3)
RBC # BLD: 3.1 M/UL — LOW (ref 4.2–5.8)
RBC # FLD: 18.6 % — HIGH (ref 10.3–14.5)
SODIUM SERPL-SCNC: 134 MMOL/L — LOW (ref 135–145)
SPECIMEN SOURCE: SIGNIFICANT CHANGE UP
SPECIMEN SOURCE: SIGNIFICANT CHANGE UP
WBC # BLD: 20.92 K/UL — HIGH (ref 3.8–10.5)
WBC # FLD AUTO: 20.92 K/UL — HIGH (ref 3.8–10.5)

## 2018-08-16 PROCEDURE — 71045 X-RAY EXAM CHEST 1 VIEW: CPT | Mod: 26

## 2018-08-16 RX ORDER — ACETAMINOPHEN 500 MG
1000 TABLET ORAL ONCE
Qty: 0 | Refills: 0 | Status: COMPLETED | OUTPATIENT
Start: 2018-08-16 | End: 2018-08-16

## 2018-08-16 RX ORDER — ACETAMINOPHEN 500 MG
1000 TABLET ORAL ONCE
Qty: 0 | Refills: 0 | Status: COMPLETED | OUTPATIENT
Start: 2018-08-17 | End: 2018-08-17

## 2018-08-16 RX ADMIN — Medication 15 MILLIGRAM(S): at 05:59

## 2018-08-16 RX ADMIN — Medication 15 MILLIGRAM(S): at 06:30

## 2018-08-16 RX ADMIN — Medication 5 MILLIGRAM(S): at 12:00

## 2018-08-16 RX ADMIN — Medication 1000 MILLIGRAM(S): at 17:45

## 2018-08-16 RX ADMIN — Medication 15 MILLIGRAM(S): at 21:57

## 2018-08-16 RX ADMIN — ENOXAPARIN SODIUM 70 MILLIGRAM(S): 100 INJECTION SUBCUTANEOUS at 05:57

## 2018-08-16 RX ADMIN — Medication 15 MILLIGRAM(S): at 16:20

## 2018-08-16 RX ADMIN — PIPERACILLIN AND TAZOBACTAM 25 GRAM(S): 4; .5 INJECTION, POWDER, LYOPHILIZED, FOR SOLUTION INTRAVENOUS at 21:57

## 2018-08-16 RX ADMIN — Medication 83.33 MILLIMOLE(S): at 15:54

## 2018-08-16 RX ADMIN — Medication 400 MILLIGRAM(S): at 11:11

## 2018-08-16 RX ADMIN — PIPERACILLIN AND TAZOBACTAM 25 GRAM(S): 4; .5 INJECTION, POWDER, LYOPHILIZED, FOR SOLUTION INTRAVENOUS at 05:58

## 2018-08-16 RX ADMIN — Medication 15 MILLIGRAM(S): at 15:58

## 2018-08-16 RX ADMIN — PIPERACILLIN AND TAZOBACTAM 25 GRAM(S): 4; .5 INJECTION, POWDER, LYOPHILIZED, FOR SOLUTION INTRAVENOUS at 15:54

## 2018-08-16 RX ADMIN — ENOXAPARIN SODIUM 70 MILLIGRAM(S): 100 INJECTION SUBCUTANEOUS at 17:05

## 2018-08-16 RX ADMIN — Medication 15 MILLIGRAM(S): at 22:30

## 2018-08-16 RX ADMIN — Medication 15 MILLIGRAM(S): at 00:40

## 2018-08-16 RX ADMIN — Medication 1000 MILLIGRAM(S): at 11:30

## 2018-08-16 RX ADMIN — FLUCONAZOLE 100 MILLIGRAM(S): 150 TABLET ORAL at 10:59

## 2018-08-16 RX ADMIN — Medication 5 MILLIGRAM(S): at 05:57

## 2018-08-16 RX ADMIN — OLANZAPINE 5 MILLIGRAM(S): 15 TABLET, FILM COATED ORAL at 21:57

## 2018-08-16 RX ADMIN — Medication 400 MILLIGRAM(S): at 17:19

## 2018-08-16 RX ADMIN — Medication 15 MILLIGRAM(S): at 01:10

## 2018-08-16 RX ADMIN — Medication 5 MILLIGRAM(S): at 17:05

## 2018-08-16 NOTE — PROGRESS NOTE ADULT - SUBJECTIVE AND OBJECTIVE BOX
ACS DAILY PROGRESS NOTE:       SUBJECTIVE/ROS: Patient appears uncomfortable in bed- ostomy leaked overnight- bag changed, césar removed  Denies nausea, vomiting, chest pain, shortness of breath         MEDICATIONS  (STANDING):  acetaminophen  IVPB. 1000 milliGRAM(s) IV Intermittent once  acetaminophen  IVPB. 1000 milliGRAM(s) IV Intermittent once  dextrose 5% + sodium chloride 0.9% with potassium chloride 20 mEq/L 1000 milliLiter(s) (75 mL/Hr) IV Continuous <Continuous>  enoxaparin Injectable 70 milliGRAM(s) SubCutaneous every 12 hours  fluconAZOLE IVPB 200 milliGRAM(s) IV Intermittent every 24 hours  insulin lispro (HumaLOG) corrective regimen sliding scale   SubCutaneous every 6 hours  metoprolol tartrate Injectable 5 milliGRAM(s) IV Push every 6 hours  OLANZapine Disintegrating Tablet 5 milliGRAM(s) Oral <User Schedule>  piperacillin/tazobactam IVPB. 3.375 Gram(s) IV Intermittent every 8 hours  sodium phosphate IVPB 30 milliMole(s) IV Intermittent once  tetracaine/benzocaine/butamben Spray 1 Spray(s) Topical five times a day    MEDICATIONS  (PRN):  benzocaine 15 mG/menthol 3.6 mG Lozenge 1 Lozenge Oral every 6 hours PRN Sore Throat  ketorolac   Injectable 15 milliGRAM(s) IV Push every 6 hours PRN Mild Pain (1 - 3)  ondansetron Injectable 4 milliGRAM(s) IV Push every 6 hours PRN Nausea      OBJECTIVE:    Vital Signs Last 24 Hrs  T(C): 37.6 (16 Aug 2018 14:20), Max: 37.6 (16 Aug 2018 14:20)  T(F): 99.6 (16 Aug 2018 14:20), Max: 99.6 (16 Aug 2018 14:20)  HR: 89 (16 Aug 2018 14:20) (84 - 116)  BP: 161/79 (16 Aug 2018 14:20) (128/68 - 169/92)  BP(mean): 117 (15 Aug 2018 17:00) (98 - 117)  RR: 18 (16 Aug 2018 14:20) (18 - 29)  SpO2: 97% (16 Aug 2018 14:20) (92% - 100%)        I&O's Detail    15 Aug 2018 07:01  -  16 Aug 2018 07:00  --------------------------------------------------------  IN:    dextrose 5% + sodium chloride 0.9%: 200 mL    dextrose 5% + sodium chloride 0.9% with potassium chloride 20 mEq/L: 1350 mL    Solution: 125 mL    Solution: 262.5 mL    Solution: 100 mL  Total IN: 2037.5 mL    OUT:    Bulb: 85 mL    Ileostomy: 275 mL    Nasoenteral Tube: 1060 mL    Voided: 2325 mL  Total OUT: 3745 mL    Total NET: -1707.5 mL      16 Aug 2018 07:01  -  16 Aug 2018 14:39  --------------------------------------------------------  IN:  Total IN: 0 mL    OUT:    Bulb: 5 mL    Ileostomy: 50 mL    Voided: 700 mL  Total OUT: 755 mL    Total NET: -755 mL          Daily     Daily     LABS:                        8.4    20.92 )-----------( 472      ( 16 Aug 2018 08:23 )             25.7     08-16    134<L>  |  97  |  4<L>  ----------------------------<  107<H>  4.0   |  25  |  0.66    Ca    7.7<L>      16 Aug 2018 07:45  Phos  2.2     08-16  Mg     2.0     08-16    TPro  6.5  /  Alb  2.3<L>  /  TBili  0.9  /  DBili  x   /  AST  14  /  ALT  9<L>  /  AlkPhos  95  08-16              Physical Exam:  Gen: NAD, well-developed, NGT to LCS  Resp: breathing easily, no stridor, on room air  CV: no peripheral edema/cyanosis  Abd: appropriately tender, softly distended, midline dressing with some minimal strikethrough, ileostomy pink and patent with stool, SUDHIR with purulent

## 2018-08-16 NOTE — PROGRESS NOTE ADULT - ASSESSMENT
57yo M with perforated R colon and large RP abscess/abdominal wall infection s/p open ileocecectomy in discontinuity and abdominal wall/RP debridement with open abdomen/abthera on 8/11, now s/p abdominal washout, end ileostomy, abdominal closure on 8/13.     - NPO/IVF/NGT to suction.  - Continue abx - Zosyn/diflucan;. Will keep on diflucan for 3 days (end date: 8/18).  - Pain control: toradol.  - Metoprolol 5 mg IV q6h.  - Therapeutic Lovenox for DVTs in the mid to distal right brachial veins and proximal right radial vein, thromboses in the left cephalic vein and right cephalic and basilic veins. Pt will need outpt follow with ATP service attending, possibly 3 months of anticoagulation.  - Zyprexa 5 mg qhs for agitation.  - SSI    Louisa Garcia PA-C [id number]

## 2018-08-17 LAB
ALBUMIN SERPL ELPH-MCNC: 2 G/DL — LOW (ref 3.3–5)
ALP SERPL-CCNC: 104 U/L — SIGNIFICANT CHANGE UP (ref 40–120)
ALT FLD-CCNC: 8 U/L — LOW (ref 10–45)
ANION GAP SERPL CALC-SCNC: 11 MMOL/L — SIGNIFICANT CHANGE UP (ref 5–17)
AST SERPL-CCNC: 12 U/L — SIGNIFICANT CHANGE UP (ref 10–40)
BILIRUB SERPL-MCNC: 0.8 MG/DL — SIGNIFICANT CHANGE UP (ref 0.2–1.2)
BUN SERPL-MCNC: 5 MG/DL — LOW (ref 7–23)
CA-I BLD-SCNC: 1.15 MMOL/L — SIGNIFICANT CHANGE UP (ref 1.12–1.3)
CALCIUM SERPL-MCNC: 7.8 MG/DL — LOW (ref 8.4–10.5)
CHLORIDE SERPL-SCNC: 101 MMOL/L — SIGNIFICANT CHANGE UP (ref 96–108)
CO2 SERPL-SCNC: 25 MMOL/L — SIGNIFICANT CHANGE UP (ref 22–31)
CREAT SERPL-MCNC: 0.67 MG/DL — SIGNIFICANT CHANGE UP (ref 0.5–1.3)
CULTURE RESULTS: SIGNIFICANT CHANGE UP
CULTURE RESULTS: SIGNIFICANT CHANGE UP
GLUCOSE BLDC GLUCOMTR-MCNC: 116 MG/DL — HIGH (ref 70–99)
GLUCOSE BLDC GLUCOMTR-MCNC: 121 MG/DL — HIGH (ref 70–99)
GLUCOSE BLDC GLUCOMTR-MCNC: 132 MG/DL — HIGH (ref 70–99)
GLUCOSE SERPL-MCNC: 96 MG/DL — SIGNIFICANT CHANGE UP (ref 70–99)
HCT VFR BLD CALC: 25.6 % — LOW (ref 39–50)
HGB BLD-MCNC: 8 G/DL — LOW (ref 13–17)
MAGNESIUM SERPL-MCNC: 2 MG/DL — SIGNIFICANT CHANGE UP (ref 1.6–2.6)
MCHC RBC-ENTMCNC: 25.9 PG — LOW (ref 27–34)
MCHC RBC-ENTMCNC: 31.3 GM/DL — LOW (ref 32–36)
MCV RBC AUTO: 82.8 FL — SIGNIFICANT CHANGE UP (ref 80–100)
PHOSPHATE SERPL-MCNC: 3.4 MG/DL — SIGNIFICANT CHANGE UP (ref 2.5–4.5)
PLATELET # BLD AUTO: 469 K/UL — HIGH (ref 150–400)
POTASSIUM SERPL-MCNC: 3.9 MMOL/L — SIGNIFICANT CHANGE UP (ref 3.5–5.3)
POTASSIUM SERPL-SCNC: 3.9 MMOL/L — SIGNIFICANT CHANGE UP (ref 3.5–5.3)
PROT SERPL-MCNC: 6.4 G/DL — SIGNIFICANT CHANGE UP (ref 6–8.3)
RBC # BLD: 3.09 M/UL — LOW (ref 4.2–5.8)
RBC # FLD: 18.6 % — HIGH (ref 10.3–14.5)
SODIUM SERPL-SCNC: 137 MMOL/L — SIGNIFICANT CHANGE UP (ref 135–145)
SPECIMEN SOURCE: SIGNIFICANT CHANGE UP
SPECIMEN SOURCE: SIGNIFICANT CHANGE UP
WBC # BLD: 22.16 K/UL — HIGH (ref 3.8–10.5)
WBC # FLD AUTO: 22.16 K/UL — HIGH (ref 3.8–10.5)

## 2018-08-17 PROCEDURE — 71045 X-RAY EXAM CHEST 1 VIEW: CPT | Mod: 26

## 2018-08-17 RX ORDER — ACETAMINOPHEN 500 MG
1000 TABLET ORAL ONCE
Qty: 0 | Refills: 0 | Status: COMPLETED | OUTPATIENT
Start: 2018-08-17 | End: 2018-08-17

## 2018-08-17 RX ORDER — HYDROMORPHONE HYDROCHLORIDE 2 MG/ML
0.25 INJECTION INTRAMUSCULAR; INTRAVENOUS; SUBCUTANEOUS EVERY 6 HOURS
Qty: 0 | Refills: 0 | Status: DISCONTINUED | OUTPATIENT
Start: 2018-08-17 | End: 2018-08-21

## 2018-08-17 RX ORDER — HYDROMORPHONE HYDROCHLORIDE 2 MG/ML
0.5 INJECTION INTRAMUSCULAR; INTRAVENOUS; SUBCUTANEOUS EVERY 6 HOURS
Qty: 0 | Refills: 0 | Status: DISCONTINUED | OUTPATIENT
Start: 2018-08-17 | End: 2018-08-17

## 2018-08-17 RX ADMIN — PIPERACILLIN AND TAZOBACTAM 25 GRAM(S): 4; .5 INJECTION, POWDER, LYOPHILIZED, FOR SOLUTION INTRAVENOUS at 05:56

## 2018-08-17 RX ADMIN — Medication 400 MILLIGRAM(S): at 18:30

## 2018-08-17 RX ADMIN — ENOXAPARIN SODIUM 70 MILLIGRAM(S): 100 INJECTION SUBCUTANEOUS at 05:56

## 2018-08-17 RX ADMIN — HYDROMORPHONE HYDROCHLORIDE 0.25 MILLIGRAM(S): 2 INJECTION INTRAMUSCULAR; INTRAVENOUS; SUBCUTANEOUS at 21:19

## 2018-08-17 RX ADMIN — PIPERACILLIN AND TAZOBACTAM 25 GRAM(S): 4; .5 INJECTION, POWDER, LYOPHILIZED, FOR SOLUTION INTRAVENOUS at 13:48

## 2018-08-17 RX ADMIN — Medication 1000 MILLIGRAM(S): at 06:45

## 2018-08-17 RX ADMIN — Medication 5 MILLIGRAM(S): at 18:29

## 2018-08-17 RX ADMIN — Medication 400 MILLIGRAM(S): at 12:38

## 2018-08-17 RX ADMIN — Medication 5 MILLIGRAM(S): at 05:56

## 2018-08-17 RX ADMIN — ENOXAPARIN SODIUM 70 MILLIGRAM(S): 100 INJECTION SUBCUTANEOUS at 18:29

## 2018-08-17 RX ADMIN — Medication 15 MILLIGRAM(S): at 10:23

## 2018-08-17 RX ADMIN — Medication 5 MILLIGRAM(S): at 00:23

## 2018-08-17 RX ADMIN — Medication 15 MILLIGRAM(S): at 09:53

## 2018-08-17 RX ADMIN — PIPERACILLIN AND TAZOBACTAM 25 GRAM(S): 4; .5 INJECTION, POWDER, LYOPHILIZED, FOR SOLUTION INTRAVENOUS at 21:20

## 2018-08-17 RX ADMIN — BENZOCAINE AND MENTHOL 1 LOZENGE: 5; 1 LIQUID ORAL at 08:42

## 2018-08-17 RX ADMIN — FLUCONAZOLE 100 MILLIGRAM(S): 150 TABLET ORAL at 12:39

## 2018-08-17 RX ADMIN — Medication 1000 MILLIGRAM(S): at 19:00

## 2018-08-17 RX ADMIN — Medication 1000 MILLIGRAM(S): at 01:00

## 2018-08-17 RX ADMIN — Medication 400 MILLIGRAM(S): at 00:23

## 2018-08-17 RX ADMIN — HYDROMORPHONE HYDROCHLORIDE 0.25 MILLIGRAM(S): 2 INJECTION INTRAMUSCULAR; INTRAVENOUS; SUBCUTANEOUS at 21:50

## 2018-08-17 RX ADMIN — Medication 400 MILLIGRAM(S): at 05:56

## 2018-08-17 RX ADMIN — Medication 1000 MILLIGRAM(S): at 13:08

## 2018-08-17 RX ADMIN — HYDROMORPHONE HYDROCHLORIDE 0.25 MILLIGRAM(S): 2 INJECTION INTRAMUSCULAR; INTRAVENOUS; SUBCUTANEOUS at 16:15

## 2018-08-17 RX ADMIN — HYDROMORPHONE HYDROCHLORIDE 0.25 MILLIGRAM(S): 2 INJECTION INTRAMUSCULAR; INTRAVENOUS; SUBCUTANEOUS at 15:45

## 2018-08-17 RX ADMIN — OLANZAPINE 5 MILLIGRAM(S): 15 TABLET, FILM COATED ORAL at 21:19

## 2018-08-17 RX ADMIN — Medication 5 MILLIGRAM(S): at 12:39

## 2018-08-17 NOTE — PROGRESS NOTE ADULT - SUBJECTIVE AND OBJECTIVE BOX
ACS DAILY PROGRESS NOTE:       SUBJECTIVE/ROS: Patient seen and examined at bedside.  Still endorses difficulty speaking d/t previous ETT and NGT.    Dressings changed at bedside, NGT flushed without resistance/minimal output.   Denies CP/SOB/N/V.         Vital Signs Last 24 Hrs  T(C): 36.3 (17 Aug 2018 09:13), Max: 37.6 (16 Aug 2018 14:20)  T(F): 97.4 (17 Aug 2018 09:13), Max: 99.6 (16 Aug 2018 14:20)  HR: 91 (17 Aug 2018 09:13) (86 - 98)  BP: 155/76 (17 Aug 2018 09:13) (150/77 - 167/83)  BP(mean): --  RR: 18 (17 Aug 2018 09:13) (18 - 18)  SpO2: 97% (17 Aug 2018 09:13) (96% - 97%)    08-16-18 @ 07:01  -  08-17-18 @ 07:00  --------------------------------------------------------  IN: 1300 mL / OUT: 2850 mL / NET: -1550 mL    08-17-18 @ 07:01  -  08-17-18 @ 11:54  --------------------------------------------------------  IN: 0 mL / OUT: 200 mL / NET: -200 mL      MEDICATIONS  (STANDING):  acetaminophen  IVPB. 1000 milliGRAM(s) IV Intermittent once  dextrose 5% + sodium chloride 0.9% with potassium chloride 20 mEq/L 1000 milliLiter(s) (75 mL/Hr) IV Continuous <Continuous>  enoxaparin Injectable 70 milliGRAM(s) SubCutaneous every 12 hours  fluconAZOLE IVPB 200 milliGRAM(s) IV Intermittent every 24 hours  insulin lispro (HumaLOG) corrective regimen sliding scale   SubCutaneous every 6 hours  metoprolol tartrate Injectable 5 milliGRAM(s) IV Push every 6 hours  OLANZapine Disintegrating Tablet 5 milliGRAM(s) Oral <User Schedule>  piperacillin/tazobactam IVPB. 3.375 Gram(s) IV Intermittent every 8 hours  tetracaine/benzocaine/butamben Spray 1 Spray(s) Topical five times a day    MEDICATIONS  (PRN):  benzocaine 15 mG/menthol 3.6 mG Lozenge 1 Lozenge Oral every 6 hours PRN Sore Throat  ketorolac   Injectable 15 milliGRAM(s) IV Push every 6 hours PRN Mild Pain (1 - 3)  ondansetron Injectable 4 milliGRAM(s) IV Push every 6 hours PRN Nausea    CBC (08-17 @ 07:46)                              8.0<L>                         22.16<H>  )----------------(  469<H>     --    % Neutrophils, --    % Lymphocytes, ANC: --                                  25.6<L>              CBC (08-16 @ 08:23)                              8.4<L>                         20.92<H>  )----------------(  472<H>     --    % Neutrophils, --    % Lymphocytes, ANC: --                                  25.7<L>                BMP (08-17 @ 06:52)             --      |  --      |  --    		Ca++ 1.15    Ca --                 ---------------------------------( --    		Mg --                 --      |  --      |  --    			Ph --      BMP (08-17 @ 06:51)             137     |  101     |  5<L>  		Ca++ --      Ca 7.8<L>             ---------------------------------( 96    		Mg 2.0                3.9     |  25      |  0.67  			Ph 3.4       LFTs (08-17 @ 06:51)      TPro 6.4 / Alb 2.0<L> / TBili 0.8 / DBili -- / AST 12 / ALT 8<L> / AlkPhos 104  LFTs (08-16 @ 07:45)      TPro 6.5 / Alb 2.3<L> / TBili 0.9 / DBili -- / AST 14 / ALT 9<L> / AlkPhos 95          -> .Surgical Swab retroperitoneal fluid Culture (08-14 @ 00:44)     NG    NG    Moderate Mixed gram negative rods "Susceptibilities not performed"  Few Lactobacillus species "Susceptibilities not performed"  Unable to evaluate further due to Proteus overgrowth    -> .Blood Blood Culture (08-12 @ 05:22)     NG    NG    No growth at 5 days.    -> .Urine Clean Catch (Midstream) Culture (08-11 @ 16:59)     NG    NG    >100,000 CFU/ml Yeast-like cells, presumptively not Candida albicans    -> .Blood Blood-Peripheral Culture (08-11 @ 14:27)     NG    NG    No growth at 5 days.          Physical Exam:  Gen: NAD, well-developed, NGT to LCS  Resp: breathing easily, no stridor, on room air  CV: no peripheral edema/cyanosis  Abd: appropriately tender, softly distended, midline dressing with some minimal strikethrough, ileostomy pink and patent with stool, SUDHIR with mucopurulent output

## 2018-08-17 NOTE — PROGRESS NOTE ADULT - ASSESSMENT
57yo M with perforated R colon and large RP abscess/abdominal wall infection s/p open ileocecectomy in discontinuity and abdominal wall/RP debridement with open abdomen/abthera on 8/11, now s/p abdominal washout, end ileostomy, abdominal closure on 8/13.     - NPO/IVF/NGT to suction.  - Continue abx - Zosyn/diflucan;. Will keep on diflucan for 3 days (end date: 8/18).  - Pain control: toradol.  - Metoprolol 5 mg IV q6h.  - Therapeutic Lovenox for DVTs in the mid to distal right brachial veins and proximal right radial vein, thromboses in the left cephalic vein and right cephalic and basilic veins. Pt will need outpt follow with ATP service attending, possibly 3 months of anticoagulation.  - Zyprexa 5 mg qhs for agitation.  - SSI    [id number]

## 2018-08-17 NOTE — PROGRESS NOTE ADULT - ATTENDING COMMENTS
Pt seen and examined on 8/17. Plan to repeat CT tomorrow given increasing leukocytosis. Ileostomy has started functioning, but NGT output remains high.

## 2018-08-18 LAB
ANION GAP SERPL CALC-SCNC: 11 MMOL/L — SIGNIFICANT CHANGE UP (ref 5–17)
APTT BLD: 29.5 SEC — SIGNIFICANT CHANGE UP (ref 27.5–37.4)
BASOPHILS # BLD AUTO: 0.08 K/UL — SIGNIFICANT CHANGE UP (ref 0–0.2)
BASOPHILS NFR BLD AUTO: 0.4 % — SIGNIFICANT CHANGE UP (ref 0–2)
BUN SERPL-MCNC: 5 MG/DL — LOW (ref 7–23)
CALCIUM SERPL-MCNC: 8 MG/DL — LOW (ref 8.4–10.5)
CHLORIDE SERPL-SCNC: 101 MMOL/L — SIGNIFICANT CHANGE UP (ref 96–108)
CO2 SERPL-SCNC: 23 MMOL/L — SIGNIFICANT CHANGE UP (ref 22–31)
CREAT SERPL-MCNC: 0.62 MG/DL — SIGNIFICANT CHANGE UP (ref 0.5–1.3)
EOSINOPHIL # BLD AUTO: 0.18 K/UL — SIGNIFICANT CHANGE UP (ref 0–0.5)
EOSINOPHIL NFR BLD AUTO: 0.8 % — SIGNIFICANT CHANGE UP (ref 0–6)
GLUCOSE BLDC GLUCOMTR-MCNC: 111 MG/DL — HIGH (ref 70–99)
GLUCOSE BLDC GLUCOMTR-MCNC: 116 MG/DL — HIGH (ref 70–99)
GLUCOSE BLDC GLUCOMTR-MCNC: 133 MG/DL — HIGH (ref 70–99)
GLUCOSE BLDC GLUCOMTR-MCNC: 134 MG/DL — HIGH (ref 70–99)
GLUCOSE SERPL-MCNC: 125 MG/DL — HIGH (ref 70–99)
HCT VFR BLD CALC: 26.4 % — LOW (ref 39–50)
HGB BLD-MCNC: 8.7 G/DL — LOW (ref 13–17)
IMM GRANULOCYTES NFR BLD AUTO: 0.8 % — SIGNIFICANT CHANGE UP (ref 0–1.5)
INR BLD: 1.24 RATIO — HIGH (ref 0.88–1.16)
LYMPHOCYTES # BLD AUTO: 10 % — LOW (ref 13–44)
LYMPHOCYTES # BLD AUTO: 2.25 K/UL — SIGNIFICANT CHANGE UP (ref 1–3.3)
MAGNESIUM SERPL-MCNC: 1.8 MG/DL — SIGNIFICANT CHANGE UP (ref 1.6–2.6)
MCHC RBC-ENTMCNC: 27.1 PG — SIGNIFICANT CHANGE UP (ref 27–34)
MCHC RBC-ENTMCNC: 33 GM/DL — SIGNIFICANT CHANGE UP (ref 32–36)
MCV RBC AUTO: 82.2 FL — SIGNIFICANT CHANGE UP (ref 80–100)
MONOCYTES # BLD AUTO: 1.06 K/UL — HIGH (ref 0–0.9)
MONOCYTES NFR BLD AUTO: 4.7 % — SIGNIFICANT CHANGE UP (ref 2–14)
NEUTROPHILS # BLD AUTO: 18.78 K/UL — HIGH (ref 1.8–7.4)
NEUTROPHILS NFR BLD AUTO: 83.3 % — HIGH (ref 43–77)
PHOSPHATE SERPL-MCNC: 2.8 MG/DL — SIGNIFICANT CHANGE UP (ref 2.5–4.5)
PLATELET # BLD AUTO: 458 K/UL — HIGH (ref 150–400)
POTASSIUM SERPL-MCNC: 3.9 MMOL/L — SIGNIFICANT CHANGE UP (ref 3.5–5.3)
POTASSIUM SERPL-SCNC: 3.9 MMOL/L — SIGNIFICANT CHANGE UP (ref 3.5–5.3)
PROTHROM AB SERPL-ACNC: 14.1 SEC — HIGH (ref 10–13.1)
RBC # BLD: 3.21 M/UL — LOW (ref 4.2–5.8)
RBC # FLD: 19.2 % — HIGH (ref 10.3–14.5)
SODIUM SERPL-SCNC: 135 MMOL/L — SIGNIFICANT CHANGE UP (ref 135–145)
WBC # BLD: 22.53 K/UL — HIGH (ref 3.8–10.5)
WBC # FLD AUTO: 22.53 K/UL — HIGH (ref 3.8–10.5)

## 2018-08-18 PROCEDURE — 74177 CT ABD & PELVIS W/CONTRAST: CPT | Mod: 26

## 2018-08-18 RX ORDER — ACETAMINOPHEN 500 MG
1000 TABLET ORAL ONCE
Qty: 0 | Refills: 0 | Status: COMPLETED | OUTPATIENT
Start: 2018-08-18 | End: 2018-08-18

## 2018-08-18 RX ADMIN — Medication 1000 MILLIGRAM(S): at 01:00

## 2018-08-18 RX ADMIN — Medication 400 MILLIGRAM(S): at 00:27

## 2018-08-18 RX ADMIN — ENOXAPARIN SODIUM 70 MILLIGRAM(S): 100 INJECTION SUBCUTANEOUS at 06:24

## 2018-08-18 RX ADMIN — HYDROMORPHONE HYDROCHLORIDE 0.25 MILLIGRAM(S): 2 INJECTION INTRAMUSCULAR; INTRAVENOUS; SUBCUTANEOUS at 07:00

## 2018-08-18 RX ADMIN — Medication 5 MILLIGRAM(S): at 06:24

## 2018-08-18 RX ADMIN — HYDROMORPHONE HYDROCHLORIDE 0.25 MILLIGRAM(S): 2 INJECTION INTRAMUSCULAR; INTRAVENOUS; SUBCUTANEOUS at 06:25

## 2018-08-18 RX ADMIN — PIPERACILLIN AND TAZOBACTAM 25 GRAM(S): 4; .5 INJECTION, POWDER, LYOPHILIZED, FOR SOLUTION INTRAVENOUS at 14:42

## 2018-08-18 RX ADMIN — ENOXAPARIN SODIUM 70 MILLIGRAM(S): 100 INJECTION SUBCUTANEOUS at 17:24

## 2018-08-18 RX ADMIN — Medication 5 MILLIGRAM(S): at 12:00

## 2018-08-18 RX ADMIN — OLANZAPINE 5 MILLIGRAM(S): 15 TABLET, FILM COATED ORAL at 21:54

## 2018-08-18 RX ADMIN — Medication 5 MILLIGRAM(S): at 00:26

## 2018-08-18 RX ADMIN — HYDROMORPHONE HYDROCHLORIDE 0.25 MILLIGRAM(S): 2 INJECTION INTRAMUSCULAR; INTRAVENOUS; SUBCUTANEOUS at 14:08

## 2018-08-18 RX ADMIN — Medication 5 MILLIGRAM(S): at 17:24

## 2018-08-18 RX ADMIN — BENZOCAINE AND MENTHOL 1 LOZENGE: 5; 1 LIQUID ORAL at 14:42

## 2018-08-18 RX ADMIN — HYDROMORPHONE HYDROCHLORIDE 0.25 MILLIGRAM(S): 2 INJECTION INTRAMUSCULAR; INTRAVENOUS; SUBCUTANEOUS at 13:38

## 2018-08-18 RX ADMIN — PIPERACILLIN AND TAZOBACTAM 25 GRAM(S): 4; .5 INJECTION, POWDER, LYOPHILIZED, FOR SOLUTION INTRAVENOUS at 21:54

## 2018-08-18 RX ADMIN — BENZOCAINE AND MENTHOL 1 LOZENGE: 5; 1 LIQUID ORAL at 07:31

## 2018-08-18 RX ADMIN — PIPERACILLIN AND TAZOBACTAM 25 GRAM(S): 4; .5 INJECTION, POWDER, LYOPHILIZED, FOR SOLUTION INTRAVENOUS at 06:24

## 2018-08-18 NOTE — PROGRESS NOTE ADULT - ATTENDING COMMENTS
Pt seen and examined on 8/18, agree with above. CT demonstrated decreased size of RP collection, with drain in place. Will flush drain daily.

## 2018-08-18 NOTE — PROGRESS NOTE ADULT - SUBJECTIVE AND OBJECTIVE BOX
ACS DAILY PROGRESS NOTE:       SUBJECTIVE/ROS: Patient seen and examined at bedside.  Still endorses difficulty speaking d/t previous ETT and NGT.    Dressings changed at bedside, NGT flushed without resistance/minimal output.   Denies CP/SOB/N/V.         Vital Signs Last 24 Hrs  T(C): 37.3 (18 Aug 2018 14:33), Max: 37.3 (18 Aug 2018 00:30)  T(F): 99.1 (18 Aug 2018 14:33), Max: 99.1 (18 Aug 2018 00:30)  HR: 110 (18 Aug 2018 14:33) (94 - 111)  BP: 159/91 (18 Aug 2018 14:33) (156/76 - 168/96)  BP(mean): --  RR: 18 (18 Aug 2018 14:33) (18 - 18)  SpO2: 93% (18 Aug 2018 14:33) (93% - 96%)    08-17-18 @ 07:01  -  08-18-18 @ 07:00  --------------------------------------------------------  IN: 2725 mL / OUT: 1955 mL / NET: 770 mL    08-18-18 @ 07:01  -  08-18-18 @ 17:17  --------------------------------------------------------  IN: 0 mL / OUT: 600 mL / NET: -600 mL      MEDICATIONS  (STANDING):  dextrose 5% + sodium chloride 0.9% with potassium chloride 20 mEq/L 1000 milliLiter(s) (75 mL/Hr) IV Continuous <Continuous>  enoxaparin Injectable 70 milliGRAM(s) SubCutaneous every 12 hours  insulin lispro (HumaLOG) corrective regimen sliding scale   SubCutaneous every 6 hours  metoprolol tartrate Injectable 5 milliGRAM(s) IV Push every 6 hours  OLANZapine Disintegrating Tablet 5 milliGRAM(s) Oral <User Schedule>  piperacillin/tazobactam IVPB. 3.375 Gram(s) IV Intermittent every 8 hours  tetracaine/benzocaine/butamben Spray 1 Spray(s) Topical five times a day    MEDICATIONS  (PRN):  benzocaine 15 mG/menthol 3.6 mG Lozenge 1 Lozenge Oral every 6 hours PRN Sore Throat  HYDROmorphone  Injectable 0.25 milliGRAM(s) IV Push every 6 hours PRN Severe Pain (7 - 10)  ondansetron Injectable 4 milliGRAM(s) IV Push every 6 hours PRN Nausea    CBC (08-18 @ 08:28)                              8.7<L>                         22.53<H>  )----------------(  458<H>     83.3<H>% Neutrophils, 10.0<L>% Lymphocytes, ANC: 18.78<H>                              26.4<L>              CBC (08-17 @ 07:46)                              8.0<L>                         22.16<H>  )----------------(  469<H>     --    % Neutrophils, --    % Lymphocytes, ANC: --                                  25.6<L>                BMP (08-18 @ 06:32)             135     |  101     |  5<L>  		Ca++ --      Ca 8.0<L>             ---------------------------------( 125<H>		Mg 1.8                3.9     |  23      |  0.62  			Ph 2.8     BMP (08-17 @ 06:52)             --      |  --      |  --    		Ca++ 1.15    Ca --                 ---------------------------------( --    		Mg --                 --      |  --      |  --    			Ph --        LFTs (08-17 @ 06:51)      TPro 6.4 / Alb 2.0<L> / TBili 0.8 / DBili -- / AST 12 / ALT 8<L> / AlkPhos 104    Coags (08-18 @ 08:28)  aPTT 29.5 / INR 1.24<H> / PT 14.1<H>        -> .Surgical Swab retroperitoneal fluid Culture (08-14 @ 00:44)     NG    NG    Moderate Mixed gram negative rods "Susceptibilities not performed"  Few Lactobacillus species "Susceptibilities not performed"  Unable to evaluate further due to Proteus overgrowth    -> .Blood Blood Culture (08-12 @ 05:22)     NG    NG    No growth at 5 days.    -> .Urine Clean Catch (Midstream) Culture (08-11 @ 16:59)     NG    NG    >100,000 CFU/ml Yeast-like cells, presumptively not Candida albicans    -> .Blood Blood-Peripheral Culture (08-11 @ 14:27)     NG    NG    No growth at 5 days.          Physical Exam:  Gen: NAD, well-developed, NGT to LCS  Resp: breathing easily, no stridor, on room air  CV: no peripheral edema/cyanosis  Abd: appropriately tender, softly distended, midline dressing c/d/i, ileostomy pink and patent with loose stool/gas, SUDHIR with mucopurulent output

## 2018-08-19 LAB
ANION GAP SERPL CALC-SCNC: 11 MMOL/L — SIGNIFICANT CHANGE UP (ref 5–17)
APTT BLD: 31.4 SEC — SIGNIFICANT CHANGE UP (ref 27.5–37.4)
BASOPHILS # BLD AUTO: 0.04 K/UL — SIGNIFICANT CHANGE UP (ref 0–0.2)
BASOPHILS NFR BLD AUTO: 0.2 % — SIGNIFICANT CHANGE UP (ref 0–2)
BUN SERPL-MCNC: <4 MG/DL — LOW (ref 7–23)
CALCIUM SERPL-MCNC: 7.8 MG/DL — LOW (ref 8.4–10.5)
CHLORIDE SERPL-SCNC: 100 MMOL/L — SIGNIFICANT CHANGE UP (ref 96–108)
CO2 SERPL-SCNC: 23 MMOL/L — SIGNIFICANT CHANGE UP (ref 22–31)
CREAT SERPL-MCNC: 0.6 MG/DL — SIGNIFICANT CHANGE UP (ref 0.5–1.3)
EOSINOPHIL # BLD AUTO: 0.11 K/UL — SIGNIFICANT CHANGE UP (ref 0–0.5)
EOSINOPHIL NFR BLD AUTO: 0.5 % — SIGNIFICANT CHANGE UP (ref 0–6)
GLUCOSE BLDC GLUCOMTR-MCNC: 152 MG/DL — HIGH (ref 70–99)
GLUCOSE BLDC GLUCOMTR-MCNC: 170 MG/DL — HIGH (ref 70–99)
GLUCOSE BLDC GLUCOMTR-MCNC: 181 MG/DL — HIGH (ref 70–99)
GLUCOSE BLDC GLUCOMTR-MCNC: 207 MG/DL — HIGH (ref 70–99)
GLUCOSE SERPL-MCNC: 173 MG/DL — HIGH (ref 70–99)
HCT VFR BLD CALC: 25.9 % — LOW (ref 39–50)
HGB BLD-MCNC: 8.3 G/DL — LOW (ref 13–17)
IMM GRANULOCYTES NFR BLD AUTO: 0.5 % — SIGNIFICANT CHANGE UP (ref 0–1.5)
INR BLD: 1.3 RATIO — HIGH (ref 0.88–1.16)
LYMPHOCYTES # BLD AUTO: 2.11 K/UL — SIGNIFICANT CHANGE UP (ref 1–3.3)
LYMPHOCYTES # BLD AUTO: 9.6 % — LOW (ref 13–44)
MAGNESIUM SERPL-MCNC: 1.8 MG/DL — SIGNIFICANT CHANGE UP (ref 1.6–2.6)
MCHC RBC-ENTMCNC: 26.7 PG — LOW (ref 27–34)
MCHC RBC-ENTMCNC: 32 GM/DL — SIGNIFICANT CHANGE UP (ref 32–36)
MCV RBC AUTO: 83.3 FL — SIGNIFICANT CHANGE UP (ref 80–100)
MONOCYTES # BLD AUTO: 1.16 K/UL — HIGH (ref 0–0.9)
MONOCYTES NFR BLD AUTO: 5.3 % — SIGNIFICANT CHANGE UP (ref 2–14)
NEUTROPHILS # BLD AUTO: 18.35 K/UL — HIGH (ref 1.8–7.4)
NEUTROPHILS NFR BLD AUTO: 83.9 % — HIGH (ref 43–77)
PHOSPHATE SERPL-MCNC: 2.4 MG/DL — LOW (ref 2.5–4.5)
PLATELET # BLD AUTO: 502 K/UL — HIGH (ref 150–400)
POTASSIUM SERPL-MCNC: 3.7 MMOL/L — SIGNIFICANT CHANGE UP (ref 3.5–5.3)
POTASSIUM SERPL-SCNC: 3.7 MMOL/L — SIGNIFICANT CHANGE UP (ref 3.5–5.3)
PROTHROM AB SERPL-ACNC: 14.8 SEC — HIGH (ref 10–13.1)
RBC # BLD: 3.11 M/UL — LOW (ref 4.2–5.8)
RBC # FLD: 19.1 % — HIGH (ref 10.3–14.5)
SODIUM SERPL-SCNC: 134 MMOL/L — LOW (ref 135–145)
WBC # BLD: 21.89 K/UL — HIGH (ref 3.8–10.5)
WBC # FLD AUTO: 21.89 K/UL — HIGH (ref 3.8–10.5)

## 2018-08-19 RX ADMIN — HYDROMORPHONE HYDROCHLORIDE 0.25 MILLIGRAM(S): 2 INJECTION INTRAMUSCULAR; INTRAVENOUS; SUBCUTANEOUS at 06:28

## 2018-08-19 RX ADMIN — ENOXAPARIN SODIUM 70 MILLIGRAM(S): 100 INJECTION SUBCUTANEOUS at 05:52

## 2018-08-19 RX ADMIN — Medication 1: at 00:11

## 2018-08-19 RX ADMIN — Medication 5 MILLIGRAM(S): at 17:02

## 2018-08-19 RX ADMIN — Medication 1 SPRAY(S): at 00:12

## 2018-08-19 RX ADMIN — Medication 1: at 17:02

## 2018-08-19 RX ADMIN — Medication 5 MILLIGRAM(S): at 00:11

## 2018-08-19 RX ADMIN — Medication 5 MILLIGRAM(S): at 05:57

## 2018-08-19 RX ADMIN — OLANZAPINE 5 MILLIGRAM(S): 15 TABLET, FILM COATED ORAL at 21:37

## 2018-08-19 RX ADMIN — DEXTROSE MONOHYDRATE, SODIUM CHLORIDE, AND POTASSIUM CHLORIDE 75 MILLILITER(S): 50; .745; 4.5 INJECTION, SOLUTION INTRAVENOUS at 01:03

## 2018-08-19 RX ADMIN — Medication 2: at 12:41

## 2018-08-19 RX ADMIN — Medication 62.5 MILLIMOLE(S): at 17:01

## 2018-08-19 RX ADMIN — PIPERACILLIN AND TAZOBACTAM 25 GRAM(S): 4; .5 INJECTION, POWDER, LYOPHILIZED, FOR SOLUTION INTRAVENOUS at 14:02

## 2018-08-19 RX ADMIN — PIPERACILLIN AND TAZOBACTAM 25 GRAM(S): 4; .5 INJECTION, POWDER, LYOPHILIZED, FOR SOLUTION INTRAVENOUS at 21:37

## 2018-08-19 RX ADMIN — Medication 5 MILLIGRAM(S): at 12:41

## 2018-08-19 RX ADMIN — ENOXAPARIN SODIUM 70 MILLIGRAM(S): 100 INJECTION SUBCUTANEOUS at 17:01

## 2018-08-19 RX ADMIN — HYDROMORPHONE HYDROCHLORIDE 0.25 MILLIGRAM(S): 2 INJECTION INTRAMUSCULAR; INTRAVENOUS; SUBCUTANEOUS at 17:00

## 2018-08-19 RX ADMIN — HYDROMORPHONE HYDROCHLORIDE 0.25 MILLIGRAM(S): 2 INJECTION INTRAMUSCULAR; INTRAVENOUS; SUBCUTANEOUS at 05:58

## 2018-08-19 RX ADMIN — PIPERACILLIN AND TAZOBACTAM 25 GRAM(S): 4; .5 INJECTION, POWDER, LYOPHILIZED, FOR SOLUTION INTRAVENOUS at 05:57

## 2018-08-19 NOTE — PROGRESS NOTE ADULT - ASSESSMENT
59yo M with perforated R colon and large RP abscess/abdominal wall infection s/p open ileocecectomy in discontinuity and abdominal wall/RP debridement with open abdomen/abthera on 8/11, now s/p abdominal washout, end ileostomy, abdominal closure on 8/13. CT A/P showed a 10x5cm fluid/gas collection in right hemipelvis.  SUDHIR drain with minimal output.  NGT clamp trial passed, removed tube on 8/19.     - Plan for IR drainage of abscess tomorrow. NPO @ midnight, will hold therapeutic lovenox.   - Continue abx - Zosyn  - Pain control: toradol.  - Metoprolol 5 mg IV q6h.  - Therapeutic Lovenox for DVTs in the mid to distal right brachial veins and proximal right radial vein, thromboses in the left cephalic vein and right cephalic and basilic veins. Pt will need outpt follow with ATP service attending, possibly 3 months of anticoagulation.  - Zyprexa 5 mg qhs for agitation.  - SSI    [id number]

## 2018-08-19 NOTE — PROGRESS NOTE ADULT - SUBJECTIVE AND OBJECTIVE BOX
ACS DAILY PROGRESS NOTE:       SUBJECTIVE/ROS: Patient seen and examined at bedside.  Dressings changed, SUDHIR flushed with minimal return of abscess.  Passed NGT clamp trial, removed NGT.  Pain well controlled. Denies n/v.      Vital Signs Last 24 Hrs  T(C): 36.9 (19 Aug 2018 16:58), Max: 37.9 (19 Aug 2018 00:17)  T(F): 98.4 (19 Aug 2018 16:58), Max: 100.2 (19 Aug 2018 00:17)  HR: 107 (19 Aug 2018 16:58) (80 - 117)  BP: 134/71 (19 Aug 2018 16:58) (133/83 - 162/88)  BP(mean): --  RR: 18 (19 Aug 2018 16:58) (18 - 18)  SpO2: 97% (19 Aug 2018 16:58) (93% - 97%)    08-18-18 @ 07:01  -  08-19-18 @ 07:00  --------------------------------------------------------  IN: 2100 mL / OUT: 3020 mL / NET: -920 mL    08-19-18 @ 07:01  -  08-19-18 @ 18:40  --------------------------------------------------------  IN: 1500 mL / OUT: 1100 mL / NET: 400 mL      MEDICATIONS  (STANDING):  dextrose 5% + sodium chloride 0.9% with potassium chloride 20 mEq/L 1000 milliLiter(s) (75 mL/Hr) IV Continuous <Continuous>  enoxaparin Injectable 70 milliGRAM(s) SubCutaneous every 12 hours  insulin lispro (HumaLOG) corrective regimen sliding scale   SubCutaneous every 6 hours  metoprolol tartrate Injectable 5 milliGRAM(s) IV Push every 6 hours  OLANZapine Disintegrating Tablet 5 milliGRAM(s) Oral <User Schedule>  piperacillin/tazobactam IVPB. 3.375 Gram(s) IV Intermittent every 8 hours  tetracaine/benzocaine/butamben Spray 1 Spray(s) Topical five times a day    MEDICATIONS  (PRN):  benzocaine 15 mG/menthol 3.6 mG Lozenge 1 Lozenge Oral every 6 hours PRN Sore Throat  HYDROmorphone  Injectable 0.25 milliGRAM(s) IV Push every 6 hours PRN Severe Pain (7 - 10)  ondansetron Injectable 4 milliGRAM(s) IV Push every 6 hours PRN Nausea    CBC (08-19 @ 09:28)                              8.3<L>                         21.89<H>  )----------------(  502<H>     83.9<H>% Neutrophils, 9.6<L>% Lymphocytes, ANC: 18.35<H>                              25.9<L>              CBC (08-18 @ 08:28)                              8.7<L>                         22.53<H>  )----------------(  458<H>     83.3<H>% Neutrophils, 10.0<L>% Lymphocytes, ANC: 18.78<H>                              26.4<L>                BMP (08-19 @ 07:23)             134<L>  |  100     |  <4<L> 		Ca++ --      Ca 7.8<L>             ---------------------------------( 173<H>		Mg 1.8                3.7     |  23      |  0.60  			Ph 2.4<L>      Coags (08-19 @ 09:28)  aPTT 31.4 / INR 1.30<H> / PT 14.8<H>  Coags (08-18 @ 08:28)  aPTT 29.5 / INR 1.24<H> / PT 14.1<H>        -> .Surgical Swab retroperitoneal fluid Culture (08-14 @ 00:44)     NG    NG    Moderate Mixed gram negative rods "Susceptibilities not performed"  Few Lactobacillus species "Susceptibilities not performed"  Unable to evaluate further due to Proteus overgrowth    -> .Blood Blood Culture (08-12 @ 05:22)     NG    NG    No growth at 5 days.    -> .Urine Clean Catch (Midstream) Culture (08-11 @ 16:59)     NG    NG    >100,000 CFU/ml Yeast-like cells, presumptively not Candida albicans    -> .Blood Blood-Peripheral Culture (08-11 @ 14:27)     NG    NG    No growth at 5 days.      Physical Exam:  Gen: NAD, well-developed  Resp: breathing easily, no stridor, on room air  CV: no peripheral edema/cyanosis  Abd: appropriately tender, softly distended, midline dressing c/d/i, ileostomy pink and patent with loose stool/gas, SUDHIR with minimal mucopurulent output

## 2018-08-20 LAB
ALBUMIN SERPL ELPH-MCNC: 2.2 G/DL — LOW (ref 3.3–5)
ALP SERPL-CCNC: 101 U/L — SIGNIFICANT CHANGE UP (ref 40–120)
ALT FLD-CCNC: 6 U/L — LOW (ref 10–45)
ANION GAP SERPL CALC-SCNC: 11 MMOL/L — SIGNIFICANT CHANGE UP (ref 5–17)
APTT BLD: 29.2 SEC — SIGNIFICANT CHANGE UP (ref 27.5–37.4)
AST SERPL-CCNC: 13 U/L — SIGNIFICANT CHANGE UP (ref 10–40)
BILIRUB SERPL-MCNC: 0.8 MG/DL — SIGNIFICANT CHANGE UP (ref 0.2–1.2)
BLD GP AB SCN SERPL QL: NEGATIVE — SIGNIFICANT CHANGE UP
BUN SERPL-MCNC: 5 MG/DL — LOW (ref 7–23)
CALCIUM SERPL-MCNC: 8.1 MG/DL — LOW (ref 8.4–10.5)
CHLORIDE SERPL-SCNC: 105 MMOL/L — SIGNIFICANT CHANGE UP (ref 96–108)
CO2 SERPL-SCNC: 24 MMOL/L — SIGNIFICANT CHANGE UP (ref 22–31)
CREAT SERPL-MCNC: 0.72 MG/DL — SIGNIFICANT CHANGE UP (ref 0.5–1.3)
GLUCOSE BLDC GLUCOMTR-MCNC: 118 MG/DL — HIGH (ref 70–99)
GLUCOSE BLDC GLUCOMTR-MCNC: 123 MG/DL — HIGH (ref 70–99)
GLUCOSE BLDC GLUCOMTR-MCNC: 133 MG/DL — HIGH (ref 70–99)
GLUCOSE BLDC GLUCOMTR-MCNC: 160 MG/DL — HIGH (ref 70–99)
GLUCOSE SERPL-MCNC: 105 MG/DL — HIGH (ref 70–99)
HCT VFR BLD CALC: 26 % — LOW (ref 39–50)
HGB BLD-MCNC: 8.3 G/DL — LOW (ref 13–17)
INR BLD: 1.28 RATIO — HIGH (ref 0.88–1.16)
MAGNESIUM SERPL-MCNC: 1.9 MG/DL — SIGNIFICANT CHANGE UP (ref 1.6–2.6)
MCHC RBC-ENTMCNC: 27.4 PG — SIGNIFICANT CHANGE UP (ref 27–34)
MCHC RBC-ENTMCNC: 31.9 GM/DL — LOW (ref 32–36)
MCV RBC AUTO: 85.8 FL — SIGNIFICANT CHANGE UP (ref 80–100)
PHOSPHATE SERPL-MCNC: 3 MG/DL — SIGNIFICANT CHANGE UP (ref 2.5–4.5)
PLATELET # BLD AUTO: 438 K/UL — HIGH (ref 150–400)
POTASSIUM SERPL-MCNC: 3.7 MMOL/L — SIGNIFICANT CHANGE UP (ref 3.5–5.3)
POTASSIUM SERPL-SCNC: 3.7 MMOL/L — SIGNIFICANT CHANGE UP (ref 3.5–5.3)
PROT SERPL-MCNC: 6.4 G/DL — SIGNIFICANT CHANGE UP (ref 6–8.3)
PROTHROM AB SERPL-ACNC: 14.5 SEC — HIGH (ref 10–13.1)
RBC # BLD: 3.03 M/UL — LOW (ref 4.2–5.8)
RBC # FLD: 19.9 % — HIGH (ref 10.3–14.5)
RH IG SCN BLD-IMP: POSITIVE — SIGNIFICANT CHANGE UP
SODIUM SERPL-SCNC: 140 MMOL/L — SIGNIFICANT CHANGE UP (ref 135–145)
SURGICAL PATHOLOGY STUDY: SIGNIFICANT CHANGE UP
WBC # BLD: 16.93 K/UL — HIGH (ref 3.8–10.5)
WBC # FLD AUTO: 16.93 K/UL — HIGH (ref 3.8–10.5)

## 2018-08-20 PROCEDURE — 93010 ELECTROCARDIOGRAM REPORT: CPT

## 2018-08-20 PROCEDURE — 49406 IMAGE CATH FLUID PERI/RETRO: CPT

## 2018-08-20 RX ORDER — HYDROMORPHONE HYDROCHLORIDE 2 MG/ML
0.25 INJECTION INTRAMUSCULAR; INTRAVENOUS; SUBCUTANEOUS ONCE
Qty: 0 | Refills: 0 | Status: DISCONTINUED | OUTPATIENT
Start: 2018-08-20 | End: 2018-08-20

## 2018-08-20 RX ORDER — HYDROMORPHONE HYDROCHLORIDE 2 MG/ML
0.5 INJECTION INTRAMUSCULAR; INTRAVENOUS; SUBCUTANEOUS ONCE
Qty: 0 | Refills: 0 | Status: DISCONTINUED | OUTPATIENT
Start: 2018-08-20 | End: 2018-08-20

## 2018-08-20 RX ORDER — ACETAMINOPHEN 500 MG
1000 TABLET ORAL ONCE
Qty: 0 | Refills: 0 | Status: COMPLETED | OUTPATIENT
Start: 2018-08-20 | End: 2018-08-20

## 2018-08-20 RX ORDER — ACETAMINOPHEN 500 MG
1000 TABLET ORAL ONCE
Qty: 0 | Refills: 0 | Status: COMPLETED | OUTPATIENT
Start: 2018-08-21 | End: 2018-08-21

## 2018-08-20 RX ADMIN — HYDROMORPHONE HYDROCHLORIDE 0.25 MILLIGRAM(S): 2 INJECTION INTRAMUSCULAR; INTRAVENOUS; SUBCUTANEOUS at 20:15

## 2018-08-20 RX ADMIN — PIPERACILLIN AND TAZOBACTAM 25 GRAM(S): 4; .5 INJECTION, POWDER, LYOPHILIZED, FOR SOLUTION INTRAVENOUS at 13:06

## 2018-08-20 RX ADMIN — DEXTROSE MONOHYDRATE, SODIUM CHLORIDE, AND POTASSIUM CHLORIDE 75 MILLILITER(S): 50; .745; 4.5 INJECTION, SOLUTION INTRAVENOUS at 06:26

## 2018-08-20 RX ADMIN — PIPERACILLIN AND TAZOBACTAM 25 GRAM(S): 4; .5 INJECTION, POWDER, LYOPHILIZED, FOR SOLUTION INTRAVENOUS at 06:26

## 2018-08-20 RX ADMIN — Medication 400 MILLIGRAM(S): at 00:51

## 2018-08-20 RX ADMIN — HYDROMORPHONE HYDROCHLORIDE 0.25 MILLIGRAM(S): 2 INJECTION INTRAMUSCULAR; INTRAVENOUS; SUBCUTANEOUS at 19:59

## 2018-08-20 RX ADMIN — Medication 400 MILLIGRAM(S): at 21:53

## 2018-08-20 RX ADMIN — DEXTROSE MONOHYDRATE, SODIUM CHLORIDE, AND POTASSIUM CHLORIDE 100 MILLILITER(S): 50; .745; 4.5 INJECTION, SOLUTION INTRAVENOUS at 18:56

## 2018-08-20 RX ADMIN — Medication 5 MILLIGRAM(S): at 06:26

## 2018-08-20 RX ADMIN — HYDROMORPHONE HYDROCHLORIDE 0.25 MILLIGRAM(S): 2 INJECTION INTRAMUSCULAR; INTRAVENOUS; SUBCUTANEOUS at 07:34

## 2018-08-20 RX ADMIN — Medication 5 MILLIGRAM(S): at 00:52

## 2018-08-20 RX ADMIN — PIPERACILLIN AND TAZOBACTAM 25 GRAM(S): 4; .5 INJECTION, POWDER, LYOPHILIZED, FOR SOLUTION INTRAVENOUS at 21:54

## 2018-08-20 RX ADMIN — Medication 5 MILLIGRAM(S): at 12:03

## 2018-08-20 RX ADMIN — Medication 1: at 00:51

## 2018-08-20 RX ADMIN — HYDROMORPHONE HYDROCHLORIDE 0.25 MILLIGRAM(S): 2 INJECTION INTRAMUSCULAR; INTRAVENOUS; SUBCUTANEOUS at 01:22

## 2018-08-20 RX ADMIN — HYDROMORPHONE HYDROCHLORIDE 0.25 MILLIGRAM(S): 2 INJECTION INTRAMUSCULAR; INTRAVENOUS; SUBCUTANEOUS at 16:00

## 2018-08-20 RX ADMIN — HYDROMORPHONE HYDROCHLORIDE 0.25 MILLIGRAM(S): 2 INJECTION INTRAMUSCULAR; INTRAVENOUS; SUBCUTANEOUS at 07:41

## 2018-08-20 RX ADMIN — HYDROMORPHONE HYDROCHLORIDE 0.25 MILLIGRAM(S): 2 INJECTION INTRAMUSCULAR; INTRAVENOUS; SUBCUTANEOUS at 23:24

## 2018-08-20 RX ADMIN — Medication 5 MILLIGRAM(S): at 18:56

## 2018-08-20 RX ADMIN — OLANZAPINE 5 MILLIGRAM(S): 15 TABLET, FILM COATED ORAL at 20:06

## 2018-08-20 RX ADMIN — HYDROMORPHONE HYDROCHLORIDE 0.25 MILLIGRAM(S): 2 INJECTION INTRAMUSCULAR; INTRAVENOUS; SUBCUTANEOUS at 00:52

## 2018-08-20 RX ADMIN — Medication 1000 MILLIGRAM(S): at 22:23

## 2018-08-20 RX ADMIN — HYDROMORPHONE HYDROCHLORIDE 0.25 MILLIGRAM(S): 2 INJECTION INTRAMUSCULAR; INTRAVENOUS; SUBCUTANEOUS at 15:29

## 2018-08-20 RX ADMIN — HYDROMORPHONE HYDROCHLORIDE 0.25 MILLIGRAM(S): 2 INJECTION INTRAMUSCULAR; INTRAVENOUS; SUBCUTANEOUS at 23:09

## 2018-08-20 NOTE — PROGRESS NOTE ADULT - ATTENDING COMMENTS
Pt seen and examined.  Chart reviewed.  Resident note confirmed.    I agree with the above note.  Pt is a 58 year old male with a medical history signficant for CAD/HTN/DM2 with neuropathy who presented to [facility] with abdominal pain.  Work up revealed a cecal perforation, suspicious for a missed appendicitis.  An ileocecectomy  was performed and the pt was left in discontinuity.  He underwent abdominal washout and eventual ileostomy/abd wall closure.  Pt went on to develop a pelvic abscess.  VIR drainage is planned for today.  Continue pain control  Continue metoprolol for CAD/HTN  Continue ISP for atelectasis  NPO for now  Improving VICKIE noted  Monitor and replace lytes  Continue therapeutic lovenox for RUE DVT  Continue ABX  PT evaluation  Continue supportive care  Discharge planning

## 2018-08-20 NOTE — PROGRESS NOTE ADULT - SUBJECTIVE AND OBJECTIVE BOX
Interventional Radiology Pre-Procedure Note    This is a 58y Male with abdominal collection s/p colectomy, ileostomy, and washout.     Procedure: US guided abdominal collection drainage    Diagnosis/Indication: Patient is a 58y old  Male who presents with a chief complaint of Colon perforation (15 Aug 2018 11:40)      PAST MEDICAL & SURGICAL HISTORY:  Diabetes  Hypertension  No significant past surgical history       Allergies: No Known Allergies      LABS:  CBC Full  -  ( 20 Aug 2018 07:19 )  WBC Count : 16.93 K/uL  Hemoglobin : 8.3 g/dL  Hematocrit : 26.0 %  Platelet Count - Automated : 438 K/uL  Mean Cell Volume : 85.8 fl  Mean Cell Hemoglobin : 27.4 pg  Mean Cell Hemoglobin Concentration : 31.9 gm/dL  Auto Neutrophil # : x  Auto Lymphocyte # : x  Auto Monocyte # : x  Auto Eosinophil # : x  Auto Basophil # : x  Auto Neutrophil % : x  Auto Lymphocyte % : x  Auto Monocyte % : x  Auto Eosinophil % : x  Auto Basophil % : x    08-20    140  |  105  |  5<L>  ----------------------------<  105<H>  3.7   |  24  |  0.72    Ca    8.1<L>      20 Aug 2018 06:34  Phos  3.0     08-20  Mg     1.9     08-20    TPro  6.4  /  Alb  2.2<L>  /  TBili  0.8  /  DBili  x   /  AST  13  /  ALT  6<L>  /  AlkPhos  101  08-20    PT/INR - ( 20 Aug 2018 07:14 )   PT: 14.5 sec;   INR: 1.28 ratio         PTT - ( 20 Aug 2018 07:14 )  PTT:29.2 sec    Procedure/ risks/ benefits/ alternatives were explained, informed consent obtained from patient, verbalizes understanding.

## 2018-08-20 NOTE — CHART NOTE - NSCHARTNOTEFT_GEN_A_CORE
Surgery Post Procedure Note    Procedure: US guided drainage of abdominal collection    Subjective:  Notified via RN that patient c/o severe pain s/p IR procedure and tachy to 114.  Pt seen and examined at bedside with family present.  EKG ordered and iv tylenol.  Patient c/o abdominal pain at IR drainage site, states he had chills that have resolved now.  Denies chest pain, SOB, palpitations, HA, fevers, N/V.  He states that he doesn't feel his pain is controlled with medication.  Family states that he gets confused, was altered in SICU as well, cannot tell what time of day it is with his current location (227m D).    Objective:  Vital Signs Last 24 Hrs  T(C): 36.5 (20 Aug 2018 21:30), Max: 38.1 (20 Aug 2018 00:36)  T(F): 97.7 (20 Aug 2018 21:30), Max: 100.5 (20 Aug 2018 00:36)  HR: 114 (20 Aug 2018 21:30) (79 - 114)  BP: 147/78 (20 Aug 2018 21:30) (134/72 - 154/80)  BP(mean): --  RR: 18 (20 Aug 2018 21:30) (18 - 18)  SpO2: 99% (20 Aug 2018 21:30) (95% - 99%)    Physical Exam:  Gen: NAD, A&O x 2-3  Abd: softly distended, TTP RLQ, ostomy pink, viable, L SUDHIR- serous, dressings c/d/i      I&O's Detail    19 Aug 2018 07:01  -  20 Aug 2018 07:00  --------------------------------------------------------  IN:    dextrose 5% + sodium chloride 0.9% with potassium chloride 20 mEq/L: 1800 mL    Solution: 300 mL    Solution: 500 mL  Total IN: 2600 mL    OUT:    Bulb: 5 mL    Ileostomy: 400 mL    Voided: 1050 mL  Total OUT: 1455 mL    Total NET: 1145 mL      20 Aug 2018 07:01  -  20 Aug 2018 22:28  --------------------------------------------------------  IN:    dextrose 5% + sodium chloride 0.9% with potassium chloride 20 mEq/L: 1200 mL    Solution: 100 mL  Total IN: 1300 mL    OUT:    Ileostomy: 75 mL    Voided: 500 mL  Total OUT: 575 mL    Total NET: 725 mL        A/P: 58M s/p US guided drainage of abdominal collection  - cont abx  - Irrigate IR drain with 10cc NS QD as per IR  - monitor drain output  - NPO/ivf  - cont dilaudid .25 q6 and iv tylenol q6  - AM labs  - will cont to monitor    Isabella Natalie, SAGAR  [id number]

## 2018-08-20 NOTE — PROGRESS NOTE ADULT - SUBJECTIVE AND OBJECTIVE BOX
Interventional Radiology Brief- Operative Note    Procedure: US guided drainage of abdominal collection    Operators: Dr. Omer, Dr. Morales    Anesthesia (type): Local    Contrast: 8cc    EBL: minimal    Findings/Follow up Plan of Care: 250cc of thick yellow fluid, placed on gravity drainage. Flush catheter Q24hrs with 10cc NS.    Specimens Removed: 250cc of thick yellow fluid    Implants: 14F pigtail drainage catheter    Complications: none    Condition/Disposition: back to room.     Please call Interventional Radiology x [phone] with any questions, concerns, or issues.

## 2018-08-20 NOTE — PROGRESS NOTE ADULT - SUBJECTIVE AND OBJECTIVE BOX
ACS DAILY PROGRESS NOTE:       SUBJECTIVE/ROS: Patient feels well- Tmax 38.1 overnight- no cultures sent-  no other events  Denies nausea, vomiting, chest pain, shortness of breath         MEDICATIONS  (STANDING):  dextrose 5% + sodium chloride 0.9% with potassium chloride 20 mEq/L 1000 milliLiter(s) (75 mL/Hr) IV Continuous <Continuous>  insulin lispro (HumaLOG) corrective regimen sliding scale   SubCutaneous every 6 hours  metoprolol tartrate Injectable 5 milliGRAM(s) IV Push every 6 hours  OLANZapine Disintegrating Tablet 5 milliGRAM(s) Oral <User Schedule>  piperacillin/tazobactam IVPB. 3.375 Gram(s) IV Intermittent every 8 hours   tetracaine/benzocaine/butamben Spray 1 Spray(s) Topical five times a day    MEDICATIONS  (PRN):  benzocaine 15 mG/menthol 3.6 mG Lozenge 1 Lozenge Oral every 6 hours PRN Sore Throat  HYDROmorphone  Injectable 0.25 milliGRAM(s) IV Push every 6 hours PRN Severe Pain (7 - 10)  ondansetron Injectable 4 milliGRAM(s) IV Push every 6 hours PRN Nausea      OBJECTIVE:    Vital Signs Last 24 Hrs  T(C): 36.5 (20 Aug 2018 06:25), Max: 38.1 (20 Aug 2018 00:36)  T(F): 97.7 (20 Aug 2018 06:25), Max: 100.5 (20 Aug 2018 00:36)  HR: 79 (20 Aug 2018 06:25) (79 - 107)  BP: 146/78 (20 Aug 2018 06:25) (122/66 - 152/72)  BP(mean): --  RR: 18 (20 Aug 2018 06:25) (18 - 18)  SpO2: 98% (20 Aug 2018 06:25) (95% - 98%)        I&O's Detail    19 Aug 2018 07:01  -  20 Aug 2018 07:00  --------------------------------------------------------  IN:    dextrose 5% + sodium chloride 0.9% with potassium chloride 20 mEq/L: 1800 mL    Solution: 500 mL    Solution: 300 mL  Total IN: 2600 mL    OUT:    Bulb: 5 mL    Ileostomy: 400 mL    Voided: 1050 mL  Total OUT: 1455 mL    Total NET: 1145 mL          Daily     Daily     LABS:                        8.3    16.93 )-----------( 438      ( 20 Aug 2018 07:19 )             26.0     08-20    140  |  105  |  5<L>  ----------------------------<  105<H>  3.7   |  24  |  0.72    Ca    8.1<L>      20 Aug 2018 06:34  Phos  3.0     08-20  Mg     1.9     08-20    TPro  6.4  /  Alb  2.2<L>  /  TBili  0.8  /  DBili  x   /  AST  13  /  ALT  6<L>  /  AlkPhos  101  08-20    PT/INR - ( 20 Aug 2018 07:14 )   PT: 14.5 sec;   INR: 1.28 ratio         PTT - ( 20 Aug 2018 07:14 )  PTT:29.2 sec        < from: CT Abdomen and Pelvis w/ Oral Cont and w/ IV Cont (08.18.18 @ 14:05) >  EXAM:  CT ABDOMEN AND PELVIS OC IC                            PROCEDURE DATE:  08/18/2018            INTERPRETATION:  CLINICAL INFORMATION: 58-year-old status post right   hemicolectomy and ileostomy with abdominal pain for one day. Assess for   abscess or bowel obstruction    COMPARISON: None.    PROCEDURE:   Serial 3 mm sections were obtained through the abdomen and pelvis. 90    mls of Omnipaque 350 was administered intravenously without complication   and 10 mls were discarded.     FINDINGS:    LOWER CHEST: Small to moderate bilateral pleural effusions with adjacent   bilateral lower lobe subsegmental atelectasis.    LIVER: Within normal limits.  BILE DUCTS: Normal caliber.  GALLBLADDER: Within normal limits.  SPLEEN: Within normal limits.  PANCREAS: Within normal limits.  ADRENALS: Within normal limits.  KIDNEYS/URETERS: Within normal limits.    BLADDER: Within normal limits.  REPRODUCTIVE ORGANS: Mildly enlarged prostate.    BOWEL: No bowel obstruction. NG tube is present within the stomach. A   fluid and gas containing collection is again identified in the right   hemipelvis, decreased in size status post catheter drainage. The   collection currently measures approximately 9.9 x 5.0 cm. The collection   is again seen to extend into the right iliopsoas muscle.  PERITONEUM: Small volume ascites.      VESSELS:  Atherosclerotic changes.  RETROPERITONEUM: No lymphadenopathy.    ABDOMINAL WALL: A new right ileostomy is seen. An anterior abdominal soft   tissue wound is present with associated anterior abdominal wall defect.  BONES: Within normal limits.      IMPRESSION: Decrease in size in the right pelvic collection that extends   into the right iliopsoas muscle status post catheter drainage.                  Physical Exam:  Gen: NAD, well-developed  Resp: breathing easily, no stridor, on room air  CV: no peripheral edema/cyanosis  Abd: appropriately tender, softly distended, midline dressing c/d/i, packing changed with Aquacel ileostomy pink and patent with loose stool/gas, SUDHIR with minimal mucopurulent output

## 2018-08-20 NOTE — PROGRESS NOTE ADULT - ASSESSMENT
59yo M with perforated R colon and large RP abscess/abdominal wall infection s/p open ileocecectomy in discontinuity and abdominal wall/RP debridement with open abdomen/abthera on 8/11, now s/p abdominal washout, end ileostomy, abdominal closure on 8/13. CT A/P showed a 10x5cm fluid/gas collection in right hemipelvis.  SUDHIR drain with minimal output.  NGT clamp trial passed, removed tube on 8/19.     - will consult IR for drainage collection of  NPO @ midnight, will hold therapeutic lovenox.   - Continue abx - Zosyn  - Pain control: toradol.  - Metoprolol 5 mg IV q6h.  - Therapeutic Lovenox for DVTs in the mid to distal right brachial veins and proximal right radial vein, thromboses in the left cephalic vein and right cephalic and basilic veins. Pt will need outpt follow with [employer] service attending, possibly 3 months of anticoagulation.  - Zyprexa 5 mg qhs for agitation.  - SSI  - PT/OOB  - Ostomy care    Louisa Garcia PA-C [phone]

## 2018-08-20 NOTE — PROVIDER CONTACT NOTE (OTHER) - ASSESSMENT
Patient missed appointment in anticoagulation clinic. Patient phoned and message left to call back and reschedule appointment.   
Pt HR elevated (114 bpm), all other VSS. Pt denies any chest pain, SOB, headache. Pt does c/o severe abdominal pain (10 out of 10) not improved w/ pain meds given an hour ago. Patient also reports feeling very frustrated about his pain and does not feel it has been getting better with pain meds admin for last few days
A+Ox4 following commands

## 2018-08-21 LAB
ALBUMIN SERPL ELPH-MCNC: 2.1 G/DL — LOW (ref 3.3–5)
ALP SERPL-CCNC: 98 U/L — SIGNIFICANT CHANGE UP (ref 40–120)
ALT FLD-CCNC: 6 U/L — LOW (ref 10–45)
ANION GAP SERPL CALC-SCNC: 12 MMOL/L — SIGNIFICANT CHANGE UP (ref 5–17)
ANION GAP SERPL CALC-SCNC: 6 MMOL/L — SIGNIFICANT CHANGE UP (ref 5–17)
ANION GAP SERPL CALC-SCNC: 9 MMOL/L — SIGNIFICANT CHANGE UP (ref 5–17)
AST SERPL-CCNC: 10 U/L — SIGNIFICANT CHANGE UP (ref 10–40)
BILIRUB SERPL-MCNC: 0.7 MG/DL — SIGNIFICANT CHANGE UP (ref 0.2–1.2)
BUN SERPL-MCNC: 4 MG/DL — LOW (ref 7–23)
BUN SERPL-MCNC: 5 MG/DL — LOW (ref 7–23)
BUN SERPL-MCNC: 6 MG/DL — LOW (ref 7–23)
CALCIUM SERPL-MCNC: 6.5 MG/DL — CRITICAL LOW (ref 8.4–10.5)
CALCIUM SERPL-MCNC: 7.7 MG/DL — LOW (ref 8.4–10.5)
CALCIUM SERPL-MCNC: 8 MG/DL — LOW (ref 8.4–10.5)
CHLORIDE SERPL-SCNC: 100 MMOL/L — SIGNIFICANT CHANGE UP (ref 96–108)
CHLORIDE SERPL-SCNC: 104 MMOL/L — SIGNIFICANT CHANGE UP (ref 96–108)
CHLORIDE SERPL-SCNC: 108 MMOL/L — SIGNIFICANT CHANGE UP (ref 96–108)
CO2 SERPL-SCNC: 20 MMOL/L — LOW (ref 22–31)
CO2 SERPL-SCNC: 22 MMOL/L — SIGNIFICANT CHANGE UP (ref 22–31)
CO2 SERPL-SCNC: 24 MMOL/L — SIGNIFICANT CHANGE UP (ref 22–31)
CREAT SERPL-MCNC: 0.61 MG/DL — SIGNIFICANT CHANGE UP (ref 0.5–1.3)
CREAT SERPL-MCNC: 0.66 MG/DL — SIGNIFICANT CHANGE UP (ref 0.5–1.3)
CREAT SERPL-MCNC: 0.68 MG/DL — SIGNIFICANT CHANGE UP (ref 0.5–1.3)
GLUCOSE BLDC GLUCOMTR-MCNC: 142 MG/DL — HIGH (ref 70–99)
GLUCOSE BLDC GLUCOMTR-MCNC: 181 MG/DL — HIGH (ref 70–99)
GLUCOSE BLDC GLUCOMTR-MCNC: 189 MG/DL — HIGH (ref 70–99)
GLUCOSE BLDC GLUCOMTR-MCNC: 198 MG/DL — HIGH (ref 70–99)
GLUCOSE BLDC GLUCOMTR-MCNC: 205 MG/DL — HIGH (ref 70–99)
GLUCOSE BLDC GLUCOMTR-MCNC: 480 MG/DL — CRITICAL HIGH (ref 70–99)
GLUCOSE SERPL-MCNC: 148 MG/DL — HIGH (ref 70–99)
GLUCOSE SERPL-MCNC: 203 MG/DL — HIGH (ref 70–99)
GLUCOSE SERPL-MCNC: 749 MG/DL — CRITICAL HIGH (ref 70–99)
HCT VFR BLD CALC: 25.9 % — LOW (ref 39–50)
HCT VFR BLD CALC: 26.8 % — LOW (ref 39–50)
HGB BLD-MCNC: 8.2 G/DL — LOW (ref 13–17)
HGB BLD-MCNC: 8.4 G/DL — LOW (ref 13–17)
MAGNESIUM SERPL-MCNC: 1.5 MG/DL — LOW (ref 1.6–2.6)
MAGNESIUM SERPL-MCNC: 1.8 MG/DL — SIGNIFICANT CHANGE UP (ref 1.6–2.6)
MCHC RBC-ENTMCNC: 25.8 PG — LOW (ref 27–34)
MCHC RBC-ENTMCNC: 28.1 PG — SIGNIFICANT CHANGE UP (ref 27–34)
MCHC RBC-ENTMCNC: 30.6 GM/DL — LOW (ref 32–36)
MCHC RBC-ENTMCNC: 32.3 GM/DL — SIGNIFICANT CHANGE UP (ref 32–36)
MCV RBC AUTO: 84.3 FL — SIGNIFICANT CHANGE UP (ref 80–100)
MCV RBC AUTO: 87 FL — SIGNIFICANT CHANGE UP (ref 80–100)
PHOSPHATE SERPL-MCNC: 2.4 MG/DL — LOW (ref 2.5–4.5)
PHOSPHATE SERPL-MCNC: 3.2 MG/DL — SIGNIFICANT CHANGE UP (ref 2.5–4.5)
PLATELET # BLD AUTO: 406 K/UL — HIGH (ref 150–400)
PLATELET # BLD AUTO: 429 K/UL — HIGH (ref 150–400)
POTASSIUM SERPL-MCNC: 4 MMOL/L — SIGNIFICANT CHANGE UP (ref 3.5–5.3)
POTASSIUM SERPL-MCNC: 4 MMOL/L — SIGNIFICANT CHANGE UP (ref 3.5–5.3)
POTASSIUM SERPL-MCNC: 6 MMOL/L — HIGH (ref 3.5–5.3)
POTASSIUM SERPL-SCNC: 4 MMOL/L — SIGNIFICANT CHANGE UP (ref 3.5–5.3)
POTASSIUM SERPL-SCNC: 4 MMOL/L — SIGNIFICANT CHANGE UP (ref 3.5–5.3)
POTASSIUM SERPL-SCNC: 6 MMOL/L — HIGH (ref 3.5–5.3)
PROT SERPL-MCNC: 6.1 G/DL — SIGNIFICANT CHANGE UP (ref 6–8.3)
RBC # BLD: 2.98 M/UL — LOW (ref 4.2–5.8)
RBC # BLD: 3.18 M/UL — LOW (ref 4.2–5.8)
RBC # FLD: 18.1 % — HIGH (ref 10.3–14.5)
RBC # FLD: 20.9 % — HIGH (ref 10.3–14.5)
SODIUM SERPL-SCNC: 134 MMOL/L — LOW (ref 135–145)
SODIUM SERPL-SCNC: 134 MMOL/L — LOW (ref 135–145)
SODIUM SERPL-SCNC: 137 MMOL/L — SIGNIFICANT CHANGE UP (ref 135–145)
TROPONIN T, HIGH SENSITIVITY RESULT: 19 NG/L — SIGNIFICANT CHANGE UP (ref 0–51)
WBC # BLD: 17.24 K/UL — HIGH (ref 3.8–10.5)
WBC # BLD: 18.3 K/UL — HIGH (ref 3.8–10.5)
WBC # FLD AUTO: 17.24 K/UL — HIGH (ref 3.8–10.5)
WBC # FLD AUTO: 18.3 K/UL — HIGH (ref 3.8–10.5)

## 2018-08-21 PROCEDURE — 99222 1ST HOSP IP/OBS MODERATE 55: CPT

## 2018-08-21 RX ORDER — KETOROLAC TROMETHAMINE 30 MG/ML
15 SYRINGE (ML) INJECTION ONCE
Qty: 0 | Refills: 0 | Status: DISCONTINUED | OUTPATIENT
Start: 2018-08-21 | End: 2018-08-21

## 2018-08-21 RX ORDER — MAGNESIUM SULFATE 500 MG/ML
2 VIAL (ML) INJECTION
Qty: 0 | Refills: 0 | Status: COMPLETED | OUTPATIENT
Start: 2018-08-21 | End: 2018-08-21

## 2018-08-21 RX ORDER — OXYCODONE HYDROCHLORIDE 5 MG/1
5 TABLET ORAL EVERY 4 HOURS
Qty: 0 | Refills: 0 | Status: DISCONTINUED | OUTPATIENT
Start: 2018-08-21 | End: 2018-08-21

## 2018-08-21 RX ORDER — INSULIN LISPRO 100/ML
VIAL (ML) SUBCUTANEOUS
Qty: 0 | Refills: 0 | Status: DISCONTINUED | OUTPATIENT
Start: 2018-08-21 | End: 2018-08-24

## 2018-08-21 RX ORDER — METOPROLOL TARTRATE 50 MG
25 TABLET ORAL
Qty: 0 | Refills: 0 | Status: DISCONTINUED | OUTPATIENT
Start: 2018-08-21 | End: 2018-09-07

## 2018-08-21 RX ORDER — ACETAMINOPHEN 500 MG
650 TABLET ORAL EVERY 6 HOURS
Qty: 0 | Refills: 0 | Status: DISCONTINUED | OUTPATIENT
Start: 2018-08-21 | End: 2018-09-07

## 2018-08-21 RX ORDER — ENOXAPARIN SODIUM 100 MG/ML
40 INJECTION SUBCUTANEOUS DAILY
Qty: 0 | Refills: 0 | Status: DISCONTINUED | OUTPATIENT
Start: 2018-08-21 | End: 2018-08-21

## 2018-08-21 RX ORDER — ENOXAPARIN SODIUM 100 MG/ML
70 INJECTION SUBCUTANEOUS EVERY 12 HOURS
Qty: 0 | Refills: 0 | Status: DISCONTINUED | OUTPATIENT
Start: 2018-08-21 | End: 2018-08-24

## 2018-08-21 RX ORDER — OXYCODONE HYDROCHLORIDE 5 MG/1
10 TABLET ORAL EVERY 4 HOURS
Qty: 0 | Refills: 0 | Status: DISCONTINUED | OUTPATIENT
Start: 2018-08-21 | End: 2018-08-26

## 2018-08-21 RX ORDER — MAGNESIUM SULFATE 500 MG/ML
2 VIAL (ML) INJECTION ONCE
Qty: 0 | Refills: 0 | Status: COMPLETED | OUTPATIENT
Start: 2018-08-21 | End: 2018-08-21

## 2018-08-21 RX ADMIN — Medication 5 MILLIGRAM(S): at 01:27

## 2018-08-21 RX ADMIN — Medication 400 MILLIGRAM(S): at 08:30

## 2018-08-21 RX ADMIN — Medication 650 MILLIGRAM(S): at 18:16

## 2018-08-21 RX ADMIN — Medication 1: at 22:31

## 2018-08-21 RX ADMIN — ENOXAPARIN SODIUM 40 MILLIGRAM(S): 100 INJECTION SUBCUTANEOUS at 12:21

## 2018-08-21 RX ADMIN — Medication 2: at 01:27

## 2018-08-21 RX ADMIN — Medication 25 MILLIGRAM(S): at 18:15

## 2018-08-21 RX ADMIN — OLANZAPINE 5 MILLIGRAM(S): 15 TABLET, FILM COATED ORAL at 22:30

## 2018-08-21 RX ADMIN — Medication 5 MILLIGRAM(S): at 12:21

## 2018-08-21 RX ADMIN — Medication 62.5 MILLIMOLE(S): at 18:17

## 2018-08-21 RX ADMIN — OXYCODONE HYDROCHLORIDE 10 MILLIGRAM(S): 5 TABLET ORAL at 22:30

## 2018-08-21 RX ADMIN — HYDROMORPHONE HYDROCHLORIDE 0.25 MILLIGRAM(S): 2 INJECTION INTRAMUSCULAR; INTRAVENOUS; SUBCUTANEOUS at 07:45

## 2018-08-21 RX ADMIN — Medication 50 GRAM(S): at 14:09

## 2018-08-21 RX ADMIN — Medication 5 MILLIGRAM(S): at 06:24

## 2018-08-21 RX ADMIN — Medication 15 MILLIGRAM(S): at 02:25

## 2018-08-21 RX ADMIN — PIPERACILLIN AND TAZOBACTAM 25 GRAM(S): 4; .5 INJECTION, POWDER, LYOPHILIZED, FOR SOLUTION INTRAVENOUS at 22:30

## 2018-08-21 RX ADMIN — PIPERACILLIN AND TAZOBACTAM 25 GRAM(S): 4; .5 INJECTION, POWDER, LYOPHILIZED, FOR SOLUTION INTRAVENOUS at 06:22

## 2018-08-21 RX ADMIN — Medication 50 GRAM(S): at 12:17

## 2018-08-21 RX ADMIN — Medication 1: at 12:22

## 2018-08-21 RX ADMIN — Medication 50 GRAM(S): at 06:21

## 2018-08-21 RX ADMIN — HYDROMORPHONE HYDROCHLORIDE 0.25 MILLIGRAM(S): 2 INJECTION INTRAMUSCULAR; INTRAVENOUS; SUBCUTANEOUS at 13:57

## 2018-08-21 RX ADMIN — Medication 15 MILLIGRAM(S): at 02:10

## 2018-08-21 RX ADMIN — Medication 1: at 20:09

## 2018-08-21 RX ADMIN — OXYCODONE HYDROCHLORIDE 10 MILLIGRAM(S): 5 TABLET ORAL at 23:00

## 2018-08-21 RX ADMIN — DEXTROSE MONOHYDRATE, SODIUM CHLORIDE, AND POTASSIUM CHLORIDE 75 MILLILITER(S): 50; .745; 4.5 INJECTION, SOLUTION INTRAVENOUS at 13:59

## 2018-08-21 RX ADMIN — PIPERACILLIN AND TAZOBACTAM 25 GRAM(S): 4; .5 INJECTION, POWDER, LYOPHILIZED, FOR SOLUTION INTRAVENOUS at 14:09

## 2018-08-21 NOTE — PROVIDER CONTACT NOTE (CRITICAL VALUE NOTIFICATION) - SITUATION
Pt serum blood glucose was 749 and calcium 6.5 Pt serum blood glucose was 749 and calcium 6.5  First fingerstick result 480, RN recheck resulted 205 coverage given as ordered

## 2018-08-21 NOTE — CONSULT NOTE ADULT - ASSESSMENT
58 M with DM, HTN, peripheral neuropathy, was visiting [facility] that developed fever, weakness and abd pain, was diagnosed with bowel perf and abd abscess, refused surgery there and came here with fever, leukocytosis to 24, CT with ileal and cecal perf, stool extending to the iliacus muscle with R pelvis fluid and gas collection  s/p open ileocecectomy in discontinuity and abdominal wall/RP debridement with open abdomen/abthera on 8/11, and then abdominal washout, end ileostomy, abdominal closure on 8/13. surgical cx with mixed gram negs, lactobacillus and overgrowth of proteus, received vanco 8/11-8/15, fluconazole 8/11-8/17 and zosyn 8/11 now day 11 but was still febrile with leukocytosis and repeat CT 8/18 showed a 10x5cm fluid/gas collection in right hemipelvis, decreased in size.  s/p IR drain 8/20 and 250 cc of yellow purulent drainage now afebrile, WBC slightly improving to 17  The IR drainage was not sent for culture yesterday    sepsis due to ileocecal perf and large R hemipelvis abscess s/p laparotomy, washout, ileostomy, OR cx with mixed GNR, lactobacillus and proteus overgrowth but persistent abscess s/p IR drainage 8/20 but no culture     * no culture from IR yesterday  * can send the culture from the drain now but will be contamianted   * c/w zosyn for now day 11, started 8/11  * s/p vanco 8/11-8/15, fluconazole 8/11-8/17  * will have to repeat the CT in a week

## 2018-08-21 NOTE — PROGRESS NOTE ADULT - ATTENDING COMMENTS
Pt seen and examined.  Chart reviewed.  Resident note confirmed.    I agree with the above note.    Pt is a 58 year old male with a medical history significant for CAD/HTN/DM2 with neuropathy who presented to [facility] with abdominal pain.  Work up revealed a cecal perforation, suspicious for a missed appendicitis.  An ileocecectomy was performed and the pt was left in discontinuity.  He underwent abdominal washout and eventual ileostomy/abd wall closure.  Pt went on to develop a pelvic abscess.  VIR drainage was performed yesterday.  Pt looks better today.    Continue pain control  Continue metoprolol for CAD/HTN  Continue ISP for atelectasis  Adv to regular diet  Improving VICKIE noted  Monitor and replace lytes  Continue therapeutic lovenox for RUE DVT  Continue ABX  PT evaluation  Continue supportive care  Discharge planning.

## 2018-08-21 NOTE — PROGRESS NOTE ADULT - ASSESSMENT
57yo M with perforated R colon and large RP abscess/abdominal wall infection s/p open ileocecectomy in discontinuity and abdominal wall/RP debridement with open abdomen/abthera on 8/11, now s/p abdominal washout, end ileostomy, abdominal closure on 8/13. CT A/P showed a 10x5cm fluid/gas collection in right hemipelvis.  SUDHIR drain with minimal output.  NGT clamp trial passed, removed tube on 8/19. IR drain placed 8/20.     - IR drainage of abscess, 250cc of purulent fluid; 80cc/24 hour afterwards  - No cultures sent during procedure; ID c/s rec c/w Zosyn  - Pain control: tylenol, dilaudid  - Metoprolol 5 mg IV q6h.  - Therapeutic Lovenox for DVTs in the mid to distal right brachial veins and proximal right radial vein, thromboses in the left cephalic vein and right cephalic and basilic veins. Pt will need outpt follow with ATP service attending, possibly 3 months of anticoagulation.  - Zyprexa 5 mg qhs for agitation.  - SSI  - PT/OOB  - CLD  - Ostomy care

## 2018-08-21 NOTE — PROGRESS NOTE ADULT - SUBJECTIVE AND OBJECTIVE BOX
Interventional Radiology Follow- Up Note      This is a 58y Male with PMH of perforated R colon and large RP abscess/abdominal wall infection s/p colectomy, ileostomy, and washout. Pt found to have abdominal collection now s/p drainage in IR on 8/20 with Dr. Morales.     PAST MEDICAL & SURGICAL HISTORY:  Diabetes  Hypertension  No significant past surgical history      Allergies: No Known Allergies      LABS:                        8.2    17.24 )-----------( 429      ( 21 Aug 2018 07:50 )             26.8     08-21    137  |  104  |  6<L>  ----------------------------<  148<H>  4.0   |  24  |  0.66    Ca    7.7<L>      21 Aug 2018 07:09  Phos  3.2     08-21  Mg     1.8     08-21    TPro  6.1  /  Alb  2.1<L>  /  TBili  0.7  /  DBili  x   /  AST  10  /  ALT  6<L>  /  AlkPhos  98  08-21    PT/INR - ( 20 Aug 2018 07:14 )   PT: 14.5 sec;   INR: 1.28 ratio         PTT - ( 20 Aug 2018 07:14 )  PTT:29.2 sec       Vitals: T(F): 98.4 (08-21-18 @ 10:01), Max: 99 (08-20-18 @ 14:21)  HR: 82 (08-21-18 @ 10:01) (77 - 114)  BP: 124/68 (08-21-18 @ 10:01) (115/66 - 156/79)  RR: 18 (08-21-18 @ 10:01) (18 - 18)  SpO2: 97% (08-21-18 @ 10:01) (96% - 99%)      PHYSICAL EXAM:  Gen:  Abd: 80cc/ 24hr per chart record    A/P: 58y Male with h/o perforated colon s/p colectomy, ileostomy, and washout, found to have abdominal collection now s/p drainage in IR on 8/20 with Dr. Moralse.  -  -continue to monitor output    -flush drain per doctor orders, 10cc NS forward flush only do not aspirate  -trend vitals, labs       Please call IR at extension [phone] with any questions, concerns, or issues regarding above. Interventional Radiology Follow- Up Note      This is a 58y Male with PMH of perforated R colon and large RP abscess/abdominal wall infection s/p colectomy, ileostomy, and washout. Pt found to have abdominal collection now s/p drainage in IR on 8/20 with Dr. Morales.     Pt seen and examined at bedside. Reports pain at drain site which improves with pain medication.       PAST MEDICAL & SURGICAL HISTORY:  Diabetes  Hypertension  No significant past surgical history      Allergies: No Known Allergies      LABS:                        8.2    17.24 )-----------( 429      ( 21 Aug 2018 07:50 )             26.8     08-21    137  |  104  |  6<L>  ----------------------------<  148<H>  4.0   |  24  |  0.66    Ca    7.7<L>      21 Aug 2018 07:09  Phos  3.2     08-21  Mg     1.8     08-21    TPro  6.1  /  Alb  2.1<L>  /  TBili  0.7  /  DBili  x   /  AST  10  /  ALT  6<L>  /  AlkPhos  98  08-21    PT/INR - ( 20 Aug 2018 07:14 )   PT: 14.5 sec;   INR: 1.28 ratio         PTT - ( 20 Aug 2018 07:14 )  PTT:29.2 sec       Vitals: T(F): 98.4 (08-21-18 @ 10:01), Max: 99 (08-20-18 @ 14:21)  HR: 82 (08-21-18 @ 10:01) (77 - 114)  BP: 124/68 (08-21-18 @ 10:01) (115/66 - 156/79)  RR: 18 (08-21-18 @ 10:01) (18 - 18)  SpO2: 97% (08-21-18 @ 10:01) (96% - 99%)      PHYSICAL EXAM:  Gen: NAD  Abd: ND, soft, Abscess drain intact to drainage bag with 80cc/ 24hr per chart record    A/P: 58y Male with h/o perforated colon s/p colectomy, ileostomy, and washout, found to have abdominal collection now s/p drainage in IR on 8/20 with Dr. Morales.  -continue pain crontrol  -continue to monitor output    -flush drain per doctor orders, 10cc NS forward flush only do not aspirate  -trend vitals, labs       Please call IR at extension [phone] with any questions, concerns, or issues regarding above.

## 2018-08-22 LAB
ALBUMIN SERPL ELPH-MCNC: 2.4 G/DL — LOW (ref 3.3–5)
ALP SERPL-CCNC: 89 U/L — SIGNIFICANT CHANGE UP (ref 40–120)
ALT FLD-CCNC: 7 U/L — LOW (ref 10–45)
ANION GAP SERPL CALC-SCNC: 11 MMOL/L — SIGNIFICANT CHANGE UP (ref 5–17)
AST SERPL-CCNC: 13 U/L — SIGNIFICANT CHANGE UP (ref 10–40)
BILIRUB SERPL-MCNC: 0.7 MG/DL — SIGNIFICANT CHANGE UP (ref 0.2–1.2)
BUN SERPL-MCNC: 5 MG/DL — LOW (ref 7–23)
CALCIUM SERPL-MCNC: 7.9 MG/DL — LOW (ref 8.4–10.5)
CHLORIDE SERPL-SCNC: 96 MMOL/L — SIGNIFICANT CHANGE UP (ref 96–108)
CO2 SERPL-SCNC: 22 MMOL/L — SIGNIFICANT CHANGE UP (ref 22–31)
CREAT SERPL-MCNC: 0.67 MG/DL — SIGNIFICANT CHANGE UP (ref 0.5–1.3)
GLUCOSE BLDC GLUCOMTR-MCNC: 112 MG/DL — HIGH (ref 70–99)
GLUCOSE BLDC GLUCOMTR-MCNC: 176 MG/DL — HIGH (ref 70–99)
GLUCOSE BLDC GLUCOMTR-MCNC: 182 MG/DL — HIGH (ref 70–99)
GLUCOSE BLDC GLUCOMTR-MCNC: 81 MG/DL — SIGNIFICANT CHANGE UP (ref 70–99)
GLUCOSE SERPL-MCNC: 85 MG/DL — SIGNIFICANT CHANGE UP (ref 70–99)
GRAM STN FLD: SIGNIFICANT CHANGE UP
HCT VFR BLD CALC: 28.6 % — LOW (ref 39–50)
HGB BLD-MCNC: 8.8 G/DL — LOW (ref 13–17)
MAGNESIUM SERPL-MCNC: 2.2 MG/DL — SIGNIFICANT CHANGE UP (ref 1.6–2.6)
MCHC RBC-ENTMCNC: 26.5 PG — LOW (ref 27–34)
MCHC RBC-ENTMCNC: 30.8 GM/DL — LOW (ref 32–36)
MCV RBC AUTO: 86.2 FL — SIGNIFICANT CHANGE UP (ref 80–100)
PHOSPHATE SERPL-MCNC: 2.8 MG/DL — SIGNIFICANT CHANGE UP (ref 2.5–4.5)
PLATELET # BLD AUTO: 471 K/UL — HIGH (ref 150–400)
POTASSIUM SERPL-MCNC: 3.8 MMOL/L — SIGNIFICANT CHANGE UP (ref 3.5–5.3)
POTASSIUM SERPL-SCNC: 3.8 MMOL/L — SIGNIFICANT CHANGE UP (ref 3.5–5.3)
PROT SERPL-MCNC: 6.4 G/DL — SIGNIFICANT CHANGE UP (ref 6–8.3)
RBC # BLD: 3.32 M/UL — LOW (ref 4.2–5.8)
RBC # FLD: 18.7 % — HIGH (ref 10.3–14.5)
SODIUM SERPL-SCNC: 129 MMOL/L — LOW (ref 135–145)
SPECIMEN SOURCE: SIGNIFICANT CHANGE UP
WBC # BLD: 20 K/UL — HIGH (ref 3.8–10.5)
WBC # FLD AUTO: 20 K/UL — HIGH (ref 3.8–10.5)

## 2018-08-22 PROCEDURE — 99233 SBSQ HOSP IP/OBS HIGH 50: CPT

## 2018-08-22 RX ORDER — MEROPENEM 1 G/30ML
INJECTION INTRAVENOUS
Qty: 0 | Refills: 0 | Status: DISCONTINUED | OUTPATIENT
Start: 2018-08-22 | End: 2018-08-28

## 2018-08-22 RX ORDER — MICAFUNGIN SODIUM 100 MG/1
150 INJECTION, POWDER, LYOPHILIZED, FOR SOLUTION INTRAVENOUS EVERY 24 HOURS
Qty: 0 | Refills: 0 | Status: DISCONTINUED | OUTPATIENT
Start: 2018-08-23 | End: 2018-08-28

## 2018-08-22 RX ORDER — MEROPENEM 1 G/30ML
1000 INJECTION INTRAVENOUS EVERY 8 HOURS
Qty: 0 | Refills: 0 | Status: DISCONTINUED | OUTPATIENT
Start: 2018-08-22 | End: 2018-08-28

## 2018-08-22 RX ORDER — SODIUM CHLORIDE 9 MG/ML
1 INJECTION INTRAMUSCULAR; INTRAVENOUS; SUBCUTANEOUS
Qty: 0 | Refills: 0 | Status: DISCONTINUED | OUTPATIENT
Start: 2018-08-22 | End: 2018-08-27

## 2018-08-22 RX ORDER — MICAFUNGIN SODIUM 100 MG/1
INJECTION, POWDER, LYOPHILIZED, FOR SOLUTION INTRAVENOUS
Qty: 0 | Refills: 0 | Status: DISCONTINUED | OUTPATIENT
Start: 2018-08-22 | End: 2018-08-28

## 2018-08-22 RX ORDER — MICAFUNGIN SODIUM 100 MG/1
150 INJECTION, POWDER, LYOPHILIZED, FOR SOLUTION INTRAVENOUS ONCE
Qty: 0 | Refills: 0 | Status: COMPLETED | OUTPATIENT
Start: 2018-08-22 | End: 2018-08-22

## 2018-08-22 RX ORDER — MEROPENEM 1 G/30ML
1000 INJECTION INTRAVENOUS ONCE
Qty: 0 | Refills: 0 | Status: COMPLETED | OUTPATIENT
Start: 2018-08-22 | End: 2018-08-22

## 2018-08-22 RX ADMIN — Medication 25 MILLIGRAM(S): at 17:14

## 2018-08-22 RX ADMIN — OXYCODONE HYDROCHLORIDE 10 MILLIGRAM(S): 5 TABLET ORAL at 03:00

## 2018-08-22 RX ADMIN — SODIUM CHLORIDE 1 GRAM(S): 9 INJECTION INTRAMUSCULAR; INTRAVENOUS; SUBCUTANEOUS at 18:58

## 2018-08-22 RX ADMIN — OXYCODONE HYDROCHLORIDE 10 MILLIGRAM(S): 5 TABLET ORAL at 20:16

## 2018-08-22 RX ADMIN — Medication 650 MILLIGRAM(S): at 00:30

## 2018-08-22 RX ADMIN — MICAFUNGIN SODIUM 107.5 MILLIGRAM(S): 100 INJECTION, POWDER, LYOPHILIZED, FOR SOLUTION INTRAVENOUS at 17:10

## 2018-08-22 RX ADMIN — ENOXAPARIN SODIUM 70 MILLIGRAM(S): 100 INJECTION SUBCUTANEOUS at 06:17

## 2018-08-22 RX ADMIN — Medication 25 MILLIGRAM(S): at 06:17

## 2018-08-22 RX ADMIN — Medication 1: at 22:41

## 2018-08-22 RX ADMIN — ENOXAPARIN SODIUM 70 MILLIGRAM(S): 100 INJECTION SUBCUTANEOUS at 17:14

## 2018-08-22 RX ADMIN — Medication 650 MILLIGRAM(S): at 12:15

## 2018-08-22 RX ADMIN — MEROPENEM 100 MILLIGRAM(S): 1 INJECTION INTRAVENOUS at 22:41

## 2018-08-22 RX ADMIN — Medication 650 MILLIGRAM(S): at 01:00

## 2018-08-22 RX ADMIN — OXYCODONE HYDROCHLORIDE 10 MILLIGRAM(S): 5 TABLET ORAL at 20:46

## 2018-08-22 RX ADMIN — Medication 650 MILLIGRAM(S): at 06:38

## 2018-08-22 RX ADMIN — OLANZAPINE 5 MILLIGRAM(S): 15 TABLET, FILM COATED ORAL at 20:17

## 2018-08-22 RX ADMIN — OXYCODONE HYDROCHLORIDE 10 MILLIGRAM(S): 5 TABLET ORAL at 02:24

## 2018-08-22 RX ADMIN — Medication 650 MILLIGRAM(S): at 17:14

## 2018-08-22 RX ADMIN — Medication 83.33 MILLIMOLE(S): at 17:10

## 2018-08-22 RX ADMIN — MEROPENEM 100 MILLIGRAM(S): 1 INJECTION INTRAVENOUS at 12:15

## 2018-08-22 RX ADMIN — PIPERACILLIN AND TAZOBACTAM 25 GRAM(S): 4; .5 INJECTION, POWDER, LYOPHILIZED, FOR SOLUTION INTRAVENOUS at 06:16

## 2018-08-22 RX ADMIN — Medication 650 MILLIGRAM(S): at 06:17

## 2018-08-22 NOTE — PROGRESS NOTE ADULT - ASSESSMENT
58 M with DM, HTN, peripheral neuropathy, was visiting [facility] that developed fever, weakness and abd pain, was diagnosed with bowel perf and abd abscess, refused surgery there and came here with fever, leukocytosis to 24, CT with ileal and cecal perf, stool extending to the iliacus muscle with R pelvis fluid and gas collection  s/p open ileocecectomy in discontinuity and abdominal wall/RP debridement with open abdomen/abthera on 8/11, and then abdominal washout, end ileostomy, abdominal closure on 8/13. surgical cx with mixed gram negs, lactobacillus and overgrowth of proteus, received vanco 8/11-8/15, fluconazole 8/11-8/17 and zosyn 8/11 now day 11 but was still febrile with leukocytosis and repeat CT 8/18 showed a 10x5cm fluid/gas collection in right hemipelvis, decreased in size.  s/p IR drain 8/20 and 250 cc of yellow purulent drainage now afebrile, but WBC worsened to 20  The IR drainage was not sent for culture yesterday    sepsis due to ileocecal perf and large R hemipelvis abscess s/p laparotomy, washout, ileostomy, OR cx with mixed GNR, lactobacillus and proteus overgrowth but persistent abscess s/p IR drainage 8/20 but no culture   now worsening leukocytosis    * no culture from IR yesterday  * can send the culture from the drain   * on zosyn 8/11-8/21 but not improved  * switch to meropenem 1 g q 8  * add micafungin 150 qd for now  * if leukocytosis does not improved, will have to repeat the CT with contrast  * s/p vanco 8/11-8/15, fluconazole 8/11-8/17

## 2018-08-22 NOTE — CHART NOTE - NSCHARTNOTEFT_GEN_A_CORE
Spoke with Dr. Ott, ID about rising WBC count.  Will change pt to meropenem 1g q8 hours and will check cx from IR drain.    SAÚL James PA-C , pager # [phone]

## 2018-08-22 NOTE — PROGRESS NOTE ADULT - ATTENDING COMMENTS
Pt seen and examined.  Chart reviewed.  Resident note confirmed.    I agree with the above note.    Pt is a 58 year old male with a medical history significant for CAD/HTN/DM2 with neuropathy who presented to [facility] with abdominal pain.  Work up revealed a cecal perforation, suspicious for a missed appendicitis.  An ileocecectomy was performed and the pt was left in discontinuity.  He underwent abdominal washout and eventual ileostomy/abd wall closure.  Pt went on to develop a pelvic abscess.  VIR drainage was performed on 8/22.  Pt continues to improve.    Continue pain control  Continue metoprolol for CAD/HTN  Continue ISP for atelectasis  Continue regular diet  Improving VICKIE noted  Monitor and replace lytes  Continue therapeutic lovenox for RUE DVT  Worsening leukocytosis noted  Abx changed to meropenem  Repeat CT abd on Sunday  PT evaluation  Continue supportive care  Discharge planning.

## 2018-08-22 NOTE — PROGRESS NOTE ADULT - ASSESSMENT
59yo M with perforated R colon and large RP abscess/abdominal wall infection s/p open ileocecectomy in discontinuity and abdominal wall/RP debridement with open abdomen/abthera on 8/11, now s/p abdominal washout, end ileostomy, abdominal closure on 8/13. CT A/P showed a 10x5cm fluid/gas collection in right hemipelvis.  SUDHIR drain with minimal output.  NGT clamp trial passed, removed tube on 8/19. IR drain placed 8/20.     - IR drainage of abscess, 250cc of purulent fluid drained initially. Midline incision, as well as the SUDHIR & IR drains, continue to have purulent output. WBC rising to 20 from 17 yesterday. Broadened to ceci q8 and micafungin per ID today.  - No cultures sent during IR procedure. Will check cx from IR drain. See ID other recs above.  - Pain control: tylenol q6h, oxy PRN.  - Metoprolol 5 mg IV q6h.  - Therapeutic Lovenox for DVTs in the mid to distal right brachial veins and proximal right radial vein, thromboses in the left cephalic vein and right cephalic and basilic veins. Pt will need outpt follow with ATP service attending, possibly 3 months of anticoagulation.  - Zyprexa 5 mg qhs for agitation.  - SSI, diabetic diet.  - PT/OOB.  - Ostomy care    [phone]

## 2018-08-22 NOTE — PROGRESS NOTE ADULT - SUBJECTIVE AND OBJECTIVE BOX
Surgery Progress Note    24hr events: ID c/s, appreciate recs. NAEO. Pt afebrile but WBC continued to rise to 20 from 17.24 despite being on Zosyn.    SUBJECTIVE: Pt seen and examined at bedside. Patient comfortable and in no-apparent distress. No nausea, vomiting, diarrhea. Pain is controlled.     Vital Signs Last 24 Hrs  T(C): 37.2 (22 Aug 2018 09:18), Max: 37.8 (22 Aug 2018 00:55)  T(F): 99 (22 Aug 2018 09:18), Max: 100 (22 Aug 2018 00:55)  HR: 87 (22 Aug 2018 09:18) (70 - 103)  BP: 147/77 (22 Aug 2018 09:18) (104/63 - 158/89)  BP(mean): --  RR: 18 (22 Aug 2018 09:18) (18 - 18)  SpO2: 97% (22 Aug 2018 09:18) (94% - 99%)    Physical Exam:  General Appearance: Appears well, NAD  Respiratory: No labored breathing  CV: Pulse regularly present  Abdomen: Soft, nontender, midline incision with purulent drainage, changed packing and gauze. +SUDHIR, IR drains with purulent output.    LABS:                        8.8    20.0  )-----------( 471      ( 22 Aug 2018 09:46 )             28.6     08-22    129<L>  |  96  |  5<L>  ----------------------------<  85  3.8   |  22  |  0.67    Ca    7.9<L>      22 Aug 2018 09:25  Phos  2.8     08-22  Mg     2.2     08-22    TPro  6.4  /  Alb  2.4<L>  /  TBili  0.7  /  DBili  x   /  AST  13  /  ALT  7<L>  /  AlkPhos  89  08-22          INs and OUTs:    08-21-18 @ 07:01  -  08-22-18 @ 07:00  --------------------------------------------------------  IN: 1210 mL / OUT: 1995 mL / NET: -785 mL    08-22-18 @ 07:01 - 08-22-18 @ 12:41  --------------------------------------------------------  IN: 240 mL / OUT: 1060 mL / NET: -820 mL

## 2018-08-22 NOTE — PROGRESS NOTE ADULT - SUBJECTIVE AND OBJECTIVE BOX
Follow Up:  abd abscess    Interval History: pt still febrile with worsening leukocytosis even after drainage of abdominal abscess    ROS:      All other systems negative    Constitutional: no fever, + chills, + sweat  Eye: no eye pain, no redness, no vision changes  ENT:  no sore throat, no rhinorrhea  Cardiovascular:  no chest pain, no palpitation  Respiratory:  no SOB, no cough  GI:  + abd pain, no vomiting, no diarrhea  urinary: resolved dysuria, no hematuria, no flank pain  : no penile discharge or bleeding  musculoskeletal:  no joint pain, no joint swelling  skin:  no rash  neurology:  no headache, no seizure, no change in mental status  psych: no anxiety, no depression         Allergies  No Known Allergies        ANTIMICROBIALS:  meropenem  IVPB 1000 once  meropenem  IVPB 1000 every 8 hours  meropenem  IVPB    micafungin IVPB        OTHER MEDS:  acetaminophen   Tablet. 650 milliGRAM(s) Oral every 6 hours  benzocaine 15 mG/menthol 3.6 mG Lozenge 1 Lozenge Oral every 6 hours PRN  enoxaparin Injectable 70 milliGRAM(s) SubCutaneous every 12 hours  insulin lispro (HumaLOG) corrective regimen sliding scale   SubCutaneous Before meals and at bedtime  metoprolol tartrate 25 milliGRAM(s) Oral two times a day  OLANZapine Disintegrating Tablet 5 milliGRAM(s) Oral <User Schedule>  ondansetron Injectable 4 milliGRAM(s) IV Push every 6 hours PRN  oxyCODONE    IR 5 milliGRAM(s) Oral every 4 hours PRN  oxyCODONE    IR 10 milliGRAM(s) Oral every 4 hours PRN  sodium phosphate IVPB 30 milliMole(s) IV Intermittent once      Vital Signs Last 24 Hrs  T(C): 37.2 (22 Aug 2018 09:18), Max: 37.8 (22 Aug 2018 00:55)  T(F): 99 (22 Aug 2018 09:18), Max: 100 (22 Aug 2018 00:55)  HR: 87 (22 Aug 2018 09:18) (70 - 103)  BP: 147/77 (22 Aug 2018 09:18) (104/63 - 158/89)  BP(mean): --  RR: 18 (22 Aug 2018 09:18) (18 - 18)  SpO2: 97% (22 Aug 2018 09:18) (94% - 99%)    Physical Exam:    General:    NAD, non toxic  Head: atraumatic, normocephalic  Eyes: normal sclera and conjunctiva  ENT:   no oropharyngeal lesions, no LAD, neck supple  Cardio:    regular S1,S2, no murmur  Respiratory:   clear b/l, no wheezing  abd:   soft, BS +, incision clean, L SUDHIR drain, RLQ IR drain with yellow purulent drainage  :     no CVAT, no suprapubic tenderness, no eugene  Musculoskeletal : no joint swelling, no edema  Skin:    no rash  vascular: no phlebitis, normal pulses  Neurologic:     no focal deficits  psych: normal affect, no suicidal ideation                        8.8    20.0  )-----------( 471      ( 22 Aug 2018 09:46 )             28.6       08-22    129<L>  |  96  |  5<L>  ----------------------------<  85  3.8   |  22  |  0.67    Ca    7.9<L>      22 Aug 2018 09:25  Phos  2.8     08-22  Mg     2.2     08-22    TPro  6.4  /  Alb  2.4<L>  /  TBili  0.7  /  DBili  x   /  AST  13  /  ALT  7<L>  /  AlkPhos  89  08-22          MICROBIOLOGY:  v  .Surgical Swab retroperitoneal fluid  08-14-18   Moderate Mixed gram negative rods "Susceptibilities not performed"  Few Lactobacillus species "Susceptibilities not performed"  Unable to evaluate further due to Proteus overgrowth  --  --      .Blood Blood  08-12-18   No growth at 5 days.  --  --      .Urine Clean Catch (Midstream)  08-11-18   >100,000 CFU/ml Yeast-like cells, presumptively not Candida albicans  --  --      .Blood Blood-Peripheral  08-11-18   No growth at 5 days.  --  --                RADIOLOGY:  Images below reviewed personally  < from: CT Abdomen and Pelvis w/ Oral Cont and w/ IV Cont (08.18.18 @ 14:05) >  BOWEL: No bowel obstruction. NG tube is present within the stomach. A   fluid and gas containing collection is again identified in the right   hemipelvis, decreased in size status post catheter drainage. The   collection currently measures approximately 9.9 x 5.0 cm. The collection   is again seen to extend into the right iliopsoas muscle.  PERITONEUM: Small volume ascites.      VESSELS:  Atherosclerotic changes.  RETROPERITONEUM: No lymphadenopathy.    ABDOMINAL WALL: A new right ileostomy is seen. An anterior abdominal soft   tissue wound is present with associated anterior abdominal wall defect.  BONES: Within normal limits.    IMPRESSION: Decrease in size in the right pelvic collection that extends   into the right iliopsoas muscle status post catheter drainage.      < from: IR Procedure (08.20.18 @ 18:09) >  Under ultrasound guidance, a 5 Wallisian [employer] needle was advanced   percutaneously into the right lower quadrant fluid collection.A 0.035   wire was inserted into the collection under fluoroscopic guidance. The   needle was removed and the tract was serially dilated until a 14 Wallisian   locking pigtail catheter was able to be advanced into the collection   under fluoroscopic guidance. Aspiration of the catheter yielded 250cc of   thick yellow fluid. Contrast was injected confirming placement into the   cavity. The catheter was secured to the skin and a dry, sterile gauze was   applied. The catheter was placed to suction bulb.

## 2018-08-23 LAB
ANION GAP SERPL CALC-SCNC: 12 MMOL/L — SIGNIFICANT CHANGE UP (ref 5–17)
BASOPHILS # BLD AUTO: 0 K/UL — SIGNIFICANT CHANGE UP (ref 0–0.2)
BASOPHILS NFR BLD AUTO: 0 % — SIGNIFICANT CHANGE UP (ref 0–2)
BUN SERPL-MCNC: 4 MG/DL — LOW (ref 7–23)
CALCIUM SERPL-MCNC: 7.8 MG/DL — LOW (ref 8.4–10.5)
CHLORIDE SERPL-SCNC: 98 MMOL/L — SIGNIFICANT CHANGE UP (ref 96–108)
CO2 SERPL-SCNC: 23 MMOL/L — SIGNIFICANT CHANGE UP (ref 22–31)
CREAT SERPL-MCNC: 0.57 MG/DL — SIGNIFICANT CHANGE UP (ref 0.5–1.3)
EOSINOPHIL # BLD AUTO: 0.1 K/UL — SIGNIFICANT CHANGE UP (ref 0–0.5)
EOSINOPHIL NFR BLD AUTO: 3 % — SIGNIFICANT CHANGE UP (ref 0–6)
GLUCOSE BLDC GLUCOMTR-MCNC: 199 MG/DL — HIGH (ref 70–99)
GLUCOSE BLDC GLUCOMTR-MCNC: 220 MG/DL — HIGH (ref 70–99)
GLUCOSE BLDC GLUCOMTR-MCNC: 261 MG/DL — HIGH (ref 70–99)
GLUCOSE BLDC GLUCOMTR-MCNC: 97 MG/DL — SIGNIFICANT CHANGE UP (ref 70–99)
GLUCOSE SERPL-MCNC: 218 MG/DL — HIGH (ref 70–99)
HCT VFR BLD CALC: 29 % — LOW (ref 39–50)
HGB BLD-MCNC: 9 G/DL — LOW (ref 13–17)
LYMPHOCYTES # BLD AUTO: 1.9 K/UL — SIGNIFICANT CHANGE UP (ref 1–3.3)
LYMPHOCYTES # BLD AUTO: 7 % — LOW (ref 13–44)
MAGNESIUM SERPL-MCNC: 1.9 MG/DL — SIGNIFICANT CHANGE UP (ref 1.6–2.6)
MCHC RBC-ENTMCNC: 26.7 PG — LOW (ref 27–34)
MCHC RBC-ENTMCNC: 31.1 GM/DL — LOW (ref 32–36)
MCV RBC AUTO: 85.9 FL — SIGNIFICANT CHANGE UP (ref 80–100)
MONOCYTES # BLD AUTO: 0.8 K/UL — SIGNIFICANT CHANGE UP (ref 0–0.9)
MONOCYTES NFR BLD AUTO: 10 % — SIGNIFICANT CHANGE UP (ref 2–14)
NEUTROPHILS # BLD AUTO: 16.3 K/UL — HIGH (ref 1.8–7.4)
NEUTROPHILS NFR BLD AUTO: 79 % — HIGH (ref 43–77)
NEUTS BAND # BLD: 1 % — SIGNIFICANT CHANGE UP (ref 0–8)
NRBC # BLD: 1 /100 — HIGH (ref 0–0)
PHOSPHATE SERPL-MCNC: 2.4 MG/DL — LOW (ref 2.5–4.5)
PLAT MORPH BLD: NORMAL — SIGNIFICANT CHANGE UP
PLATELET # BLD AUTO: 472 K/UL — HIGH (ref 150–400)
POTASSIUM SERPL-MCNC: 4.1 MMOL/L — SIGNIFICANT CHANGE UP (ref 3.5–5.3)
POTASSIUM SERPL-SCNC: 4.1 MMOL/L — SIGNIFICANT CHANGE UP (ref 3.5–5.3)
RBC # BLD: 3.38 M/UL — LOW (ref 4.2–5.8)
RBC # FLD: 18.5 % — HIGH (ref 10.3–14.5)
RBC BLD AUTO: SIGNIFICANT CHANGE UP
SODIUM SERPL-SCNC: 133 MMOL/L — LOW (ref 135–145)
WBC # BLD: 19.1 K/UL — HIGH (ref 3.8–10.5)
WBC # FLD AUTO: 19.1 K/UL — HIGH (ref 3.8–10.5)

## 2018-08-23 PROCEDURE — 99232 SBSQ HOSP IP/OBS MODERATE 35: CPT | Mod: GC

## 2018-08-23 RX ORDER — LANOLIN ALCOHOL/MO/W.PET/CERES
5 CREAM (GRAM) TOPICAL AT BEDTIME
Qty: 0 | Refills: 0 | Status: DISCONTINUED | OUTPATIENT
Start: 2018-08-23 | End: 2018-09-07

## 2018-08-23 RX ORDER — DIPHENHYDRAMINE HCL 50 MG
25 CAPSULE ORAL AT BEDTIME
Qty: 0 | Refills: 0 | Status: DISCONTINUED | OUTPATIENT
Start: 2018-08-23 | End: 2018-08-23

## 2018-08-23 RX ADMIN — Medication 650 MILLIGRAM(S): at 00:53

## 2018-08-23 RX ADMIN — Medication 25 MILLIGRAM(S): at 17:56

## 2018-08-23 RX ADMIN — OXYCODONE HYDROCHLORIDE 10 MILLIGRAM(S): 5 TABLET ORAL at 12:26

## 2018-08-23 RX ADMIN — Medication 1: at 17:56

## 2018-08-23 RX ADMIN — Medication 650 MILLIGRAM(S): at 05:49

## 2018-08-23 RX ADMIN — MEROPENEM 100 MILLIGRAM(S): 1 INJECTION INTRAVENOUS at 23:08

## 2018-08-23 RX ADMIN — Medication 62.5 MILLIMOLE(S): at 17:55

## 2018-08-23 RX ADMIN — Medication 650 MILLIGRAM(S): at 18:26

## 2018-08-23 RX ADMIN — ENOXAPARIN SODIUM 70 MILLIGRAM(S): 100 INJECTION SUBCUTANEOUS at 05:49

## 2018-08-23 RX ADMIN — Medication 650 MILLIGRAM(S): at 11:30

## 2018-08-23 RX ADMIN — Medication 650 MILLIGRAM(S): at 23:38

## 2018-08-23 RX ADMIN — OXYCODONE HYDROCHLORIDE 10 MILLIGRAM(S): 5 TABLET ORAL at 01:46

## 2018-08-23 RX ADMIN — MICAFUNGIN SODIUM 107.5 MILLIGRAM(S): 100 INJECTION, POWDER, LYOPHILIZED, FOR SOLUTION INTRAVENOUS at 12:27

## 2018-08-23 RX ADMIN — Medication 25 MILLIGRAM(S): at 05:49

## 2018-08-23 RX ADMIN — SODIUM CHLORIDE 1 GRAM(S): 9 INJECTION INTRAMUSCULAR; INTRAVENOUS; SUBCUTANEOUS at 05:49

## 2018-08-23 RX ADMIN — Medication 650 MILLIGRAM(S): at 06:19

## 2018-08-23 RX ADMIN — MEROPENEM 100 MILLIGRAM(S): 1 INJECTION INTRAVENOUS at 13:56

## 2018-08-23 RX ADMIN — Medication 5 MILLIGRAM(S): at 23:09

## 2018-08-23 RX ADMIN — Medication 650 MILLIGRAM(S): at 01:23

## 2018-08-23 RX ADMIN — OXYCODONE HYDROCHLORIDE 10 MILLIGRAM(S): 5 TABLET ORAL at 12:00

## 2018-08-23 RX ADMIN — OXYCODONE HYDROCHLORIDE 10 MILLIGRAM(S): 5 TABLET ORAL at 02:16

## 2018-08-23 RX ADMIN — MEROPENEM 100 MILLIGRAM(S): 1 INJECTION INTRAVENOUS at 05:49

## 2018-08-23 RX ADMIN — Medication 650 MILLIGRAM(S): at 23:08

## 2018-08-23 RX ADMIN — OXYCODONE HYDROCHLORIDE 10 MILLIGRAM(S): 5 TABLET ORAL at 20:56

## 2018-08-23 RX ADMIN — Medication 3: at 23:07

## 2018-08-23 RX ADMIN — OXYCODONE HYDROCHLORIDE 10 MILLIGRAM(S): 5 TABLET ORAL at 21:26

## 2018-08-23 RX ADMIN — SODIUM CHLORIDE 1 GRAM(S): 9 INJECTION INTRAMUSCULAR; INTRAVENOUS; SUBCUTANEOUS at 17:56

## 2018-08-23 RX ADMIN — OLANZAPINE 5 MILLIGRAM(S): 15 TABLET, FILM COATED ORAL at 20:56

## 2018-08-23 RX ADMIN — Medication 650 MILLIGRAM(S): at 12:00

## 2018-08-23 RX ADMIN — Medication 650 MILLIGRAM(S): at 17:56

## 2018-08-23 RX ADMIN — ENOXAPARIN SODIUM 70 MILLIGRAM(S): 100 INJECTION SUBCUTANEOUS at 17:56

## 2018-08-23 RX ADMIN — Medication 2: at 14:06

## 2018-08-23 NOTE — PROGRESS NOTE ADULT - ASSESSMENT
58 M with DM, HTN, peripheral neuropathy, was visiting [facility] that developed fever, weakness and abd pain, was diagnosed with bowel perf and abd abscess, refused surgery there and came here with fever, leukocytosis to 24, CT with ileal and cecal perf, stool extending to the iliacus muscle with R pelvis fluid and gas collection  s/p open ileocecectomy in discontinuity and abdominal wall/RP debridement with open abdomen/abthera on 8/11, and then abdominal washout, end ileostomy, abdominal closure on 8/13. surgical cx with mixed gram negs, lactobacillus and overgrowth of proteus, received vanco 8/11-8/15, fluconazole 8/11-8/17 and zosyn 8/11 now day 11 but was still febrile with leukocytosis and repeat CT 8/18 showed a 10x5cm fluid/gas collection in right hemipelvis, decreased in size.  s/p IR drain 8/20 and 250 cc of yellow purulent drainage now afebrile, but WBC worsened to 20  The IR drainage was not sent for culture yesterday    sepsis due to ileocecal perf and large R hemipelvis abscess s/p laparotomy, washout, ileostomy, OR cx with mixed GNR, lactobacillus and proteus overgrowth but persistent abscess s/p IR drainage 8/20 but no culture   now worsening leukocytosis  RLE weakness, started with the abd pain, related to nerve damage due to abscess    * no culture from IR 8/20  * f/u the culture from the drain   * s/p zosyn 8/11-8/22,  vanco 8/11-8/15,  fluconazole 8/11-8/17  * c/w meropenem 1 g q 8, started 8/22, day 2  * c/w micafungin 150 qd for now, started 8/22, day 2  * if leukocytosis does not improved, will have to repeat the CT with contrast

## 2018-08-23 NOTE — PROGRESS NOTE ADULT - SUBJECTIVE AND OBJECTIVE BOX
Surgery Progress Note    24hr events: pt continues to be afebrile (but also on scheduled tylenol q6h), but WBC bora to 20 ysterday morning from 17 on 8/21. Pending WBC today. Prelim drain gram stain with mod PMNs w/ rare gram - rods.    SUBJECTIVE: Pt seen and examined at bedside. Patient comfortable and in no-apparent distress. No nausea, vomiting, diarrhea. Pain is controlled. C/o not being able to sleep, discussed adding melatonin.     Vital Signs Last 24 Hrs  T(C): 37.1 (23 Aug 2018 09:27), Max: 37.7 (22 Aug 2018 21:30)  T(F): 98.7 (23 Aug 2018 09:27), Max: 99.8 (22 Aug 2018 21:30)  HR: 100 (23 Aug 2018 09:27) (90 - 100)  BP: 105/62 (23 Aug 2018 09:27) (105/62 - 157/80)  BP(mean): --  RR: 18 (23 Aug 2018 09:27) (16 - 18)  SpO2: 97% (23 Aug 2018 09:27) (97% - 99%)    Physical Exam:  General Appearance: Appears well, NAD  Respiratory: No labored breathing  CV: Pulse regularly present  Abdomen: Soft, nontender, midline incision with purulent drainage, changed packing and gauze. +SUDHIR, IR drains with purulent output.    LABS:                        8.8    20.0  )-----------( 471      ( 22 Aug 2018 09:46 )             28.6     08-22    129<L>  |  96  |  5<L>  ----------------------------<  85  3.8   |  22  |  0.67    Ca    7.9<L>      22 Aug 2018 09:25  Phos  2.8     08-22  Mg     2.2     08-22    TPro  6.4  /  Alb  2.4<L>  /  TBili  0.7  /  DBili  x   /  AST  13  /  ALT  7<L>  /  AlkPhos  89  08-22          INs and OUTs:    08-22-18 @ 07:01  -  08-23-18 @ 07:00  --------------------------------------------------------  IN: 1600 mL / OUT: 3900 mL / NET: -2300 mL    08-23-18 @ 07:01  -  08-23-18 @ 12:14  --------------------------------------------------------  IN: 320 mL / OUT: 650 mL / NET: -330 mL

## 2018-08-23 NOTE — PROGRESS NOTE ADULT - ATTENDING COMMENTS
Pt seen and examined.  Chart reviewed.  Resident note confirmed.    I agree with the above note.    Pt is a 58 year old male with a medical history significant for CAD/HTN/DM2 with neuropathy who presented to [facility] with abdominal pain.  Work up revealed a cecal perforation, suspicious for a missed appendicitis.  An ileocecectomy was performed and the pt was left in discontinuity.  He underwent abdominal washout and eventual ileostomy/abd wall closure.  Pt went on to develop a pelvic abscess.  VIR drainage was performed on 8/22.  Pt continues to improve.  No acute events overnight.    Continue pain control  Continue metoprolol for CAD/HTN  Continue ISP for atelectasis  Continue regular diet  Improving VICKIE noted  Monitor and replace lytes  Resolving hyponatremia  Continue therapeutic lovenox for RUE DVT  leukocytosis improved  cont. Abx & antifungals  Repeat CT abd on Sunday  PT evaluation  Continue supportive care  Discharge planning.    Pt will require MONTSERRAT

## 2018-08-23 NOTE — PROGRESS NOTE ADULT - SUBJECTIVE AND OBJECTIVE BOX
Follow Up:  abd abscess    Interval History: pt afebrile but with active rigors and body pain    ROS:      All other systems negative    Constitutional: no fever, + chills, + sweat  Eye: no eye pain, no redness, no vision changes  ENT:  no sore throat, no rhinorrhea  Cardiovascular:  no chest pain, no palpitation  Respiratory:  no SOB, no cough  GI:  + abd pain, no vomiting, no diarrhea  urinary: resolved dysuria, no hematuria, no flank pain  : no penile discharge or bleeding  musculoskeletal:  no joint pain, no joint swelling,   skin:  no rash  neurology:  no headache, no seizure, + RLE weakness  psych: no anxiety, no depression       Allergies  No Known Allergies        ANTIMICROBIALS:  meropenem  IVPB 1000 every 8 hours  meropenem  IVPB    micafungin IVPB    micafungin IVPB 150 every 24 hours      OTHER MEDS:  acetaminophen   Tablet. 650 milliGRAM(s) Oral every 6 hours  benzocaine 15 mG/menthol 3.6 mG Lozenge 1 Lozenge Oral every 6 hours PRN  enoxaparin Injectable 70 milliGRAM(s) SubCutaneous every 12 hours  insulin lispro (HumaLOG) corrective regimen sliding scale   SubCutaneous Before meals and at bedtime  metoprolol tartrate 25 milliGRAM(s) Oral two times a day  OLANZapine Disintegrating Tablet 5 milliGRAM(s) Oral <User Schedule>  ondansetron Injectable 4 milliGRAM(s) IV Push every 6 hours PRN  oxyCODONE    IR 5 milliGRAM(s) Oral every 4 hours PRN  oxyCODONE    IR 10 milliGRAM(s) Oral every 4 hours PRN  sodium chloride 1 Gram(s) Oral two times a day      Vital Signs Last 24 Hrs  T(C): 37.1 (23 Aug 2018 09:27), Max: 37.7 (22 Aug 2018 21:30)  T(F): 98.7 (23 Aug 2018 09:27), Max: 99.8 (22 Aug 2018 21:30)  HR: 100 (23 Aug 2018 09:27) (90 - 100)  BP: 105/62 (23 Aug 2018 09:27) (105/62 - 157/80)  BP(mean): --  RR: 18 (23 Aug 2018 09:27) (16 - 18)  SpO2: 97% (23 Aug 2018 09:27) (97% - 99%)    Physical Exam:    General:    NAD, non toxic  Head: atraumatic, normocephalic  Eyes: normal sclera and conjunctiva  ENT:   no oropharyngeal lesions, no LAD, neck supple  Cardio:    regular S1,S2, no murmur  Respiratory:   clear b/l, no wheezing  abd:   soft, BS +, incision clean, L SUDHIR drain, RLQ IR drain with yellow purulent drainage  :     no CVAT, no suprapubic tenderness, no eugene  Musculoskeletal : no joint swelling, no edema  Skin:    no rash  vascular: no phlebitis, normal pulses  Neurologic:   RLE weakness 3/5  psych: normal affect, no suicidal ideation                        8.8    20.0  )-----------( 471      ( 22 Aug 2018 09:46 )             28.6       08-22    129<L>  |  96  |  5<L>  ----------------------------<  85  3.8   |  22  |  0.67    Ca    7.9<L>      22 Aug 2018 09:25  Phos  2.8     08-22  Mg     2.2     08-22    TPro  6.4  /  Alb  2.4<L>  /  TBili  0.7  /  DBili  x   /  AST  13  /  ALT  7<L>  /  AlkPhos  89  08-22          MICROBIOLOGY:  v  .Body Fluid IR drain (skin)  08-22-18 --  --    Moderate polymorphonuclear leukocytes per low power field  Rare Gram Negative Rods per oil power field      .Surgical Swab retroperitoneal fluid  08-14-18   Moderate Mixed gram negative rods "Susceptibilities not performed"  Few Lactobacillus species "Susceptibilities not performed"  Unable to evaluate further due to Proteus overgrowth  --  --      .Blood Blood  08-12-18   No growth at 5 days.  --  --      .Urine Clean Catch (Midstream)  08-11-18   >100,000 CFU/ml Yeast-like cells, presumptively not Candida albicans  --  --      .Blood Blood-Peripheral  08-11-18   No growth at 5 days.  --  --                RADIOLOGY:  Images below reviewed personally  < from: CT Abdomen and Pelvis w/ Oral Cont and w/ IV Cont (08.18.18 @ 14:05) >    IMPRESSION: Decrease in size in the right pelvic collection that extends   into the right iliopsoas muscle status post catheter drainage.

## 2018-08-23 NOTE — PROGRESS NOTE ADULT - ASSESSMENT
59yo M with perforated R colon and large RP abscess/abdominal wall infection s/p open ileocecectomy in discontinuity and abdominal wall/RP debridement with open abdomen/abthera on 8/11, now s/p abdominal washout, end ileostomy, abdominal closure on 8/13. CT A/P showed a 10x5cm fluid/gas collection in right hemipelvis.  SUDHIR drain with minimal output.  NGT clamp trial passed, removed tube on 8/19. IR drain placed 8/20.     - IR drainage of abscess, 250cc of purulent fluid drained initially. Midline incision, as well as the SUDHIR & IR drains, continue to have purulent output. WBC 20 > 17, awaiting today's WBC. Broadened to ceci q8 and micafungin on 8/22.  - No cultures sent during IR procedure. Prelim drain gram stain with mod PMNs w/ rare gram - rods. See ID other recs above.  - Pain control: tylenol q6h, oxy PRN.  - Metoprolol 5 mg IV q6h.  - Therapeutic Lovenox for DVTs in the mid to distal right brachial veins and proximal right radial vein, thromboses in the left cephalic vein and right cephalic and basilic veins. Pt will need outpt follow with ATP service attending, possibly 3 months of anticoagulation.  - Zyprexa 5 mg qhs for agitation.  - Adding melatonin qhs. Cannot add benadryl qhs d/t drug interactions with Zyprexa.  - SSI, diabetic diet.  - PT/OOB.  - Ostomy care.    [phone]

## 2018-08-24 LAB
ANION GAP SERPL CALC-SCNC: 10 MMOL/L — SIGNIFICANT CHANGE UP (ref 5–17)
ANION GAP SERPL CALC-SCNC: 11 MMOL/L — SIGNIFICANT CHANGE UP (ref 5–17)
APTT BLD: 34.7 SEC — SIGNIFICANT CHANGE UP (ref 27.5–37.4)
BLD GP AB SCN SERPL QL: NEGATIVE — SIGNIFICANT CHANGE UP
BUN SERPL-MCNC: 4 MG/DL — LOW (ref 7–23)
BUN SERPL-MCNC: <4 MG/DL — LOW (ref 7–23)
CALCIUM SERPL-MCNC: 7.6 MG/DL — LOW (ref 8.4–10.5)
CALCIUM SERPL-MCNC: 8.6 MG/DL — SIGNIFICANT CHANGE UP (ref 8.4–10.5)
CHLORIDE SERPL-SCNC: 94 MMOL/L — LOW (ref 96–108)
CHLORIDE SERPL-SCNC: 95 MMOL/L — LOW (ref 96–108)
CO2 SERPL-SCNC: 22 MMOL/L — SIGNIFICANT CHANGE UP (ref 22–31)
CO2 SERPL-SCNC: 23 MMOL/L — SIGNIFICANT CHANGE UP (ref 22–31)
CREAT ?TM UR-MCNC: 13 MG/DL — SIGNIFICANT CHANGE UP
CREAT SERPL-MCNC: 0.53 MG/DL — SIGNIFICANT CHANGE UP (ref 0.5–1.3)
CREAT SERPL-MCNC: 0.57 MG/DL — SIGNIFICANT CHANGE UP (ref 0.5–1.3)
GLUCOSE BLDC GLUCOMTR-MCNC: 112 MG/DL — HIGH (ref 70–99)
GLUCOSE BLDC GLUCOMTR-MCNC: 171 MG/DL — HIGH (ref 70–99)
GLUCOSE BLDC GLUCOMTR-MCNC: 176 MG/DL — HIGH (ref 70–99)
GLUCOSE BLDC GLUCOMTR-MCNC: 213 MG/DL — HIGH (ref 70–99)
GLUCOSE SERPL-MCNC: 151 MG/DL — HIGH (ref 70–99)
GLUCOSE SERPL-MCNC: 377 MG/DL — HIGH (ref 70–99)
HCT VFR BLD CALC: 32.2 % — LOW (ref 39–50)
HGB BLD-MCNC: 9.8 G/DL — LOW (ref 13–17)
INR BLD: 1.3 RATIO — HIGH (ref 0.88–1.16)
MAGNESIUM SERPL-MCNC: 1.9 MG/DL — SIGNIFICANT CHANGE UP (ref 1.6–2.6)
MCHC RBC-ENTMCNC: 26.5 PG — LOW (ref 27–34)
MCHC RBC-ENTMCNC: 30.4 GM/DL — LOW (ref 32–36)
MCV RBC AUTO: 86.9 FL — SIGNIFICANT CHANGE UP (ref 80–100)
OSMOLALITY UR: 270 MOS/KG — LOW (ref 300–900)
PHOSPHATE SERPL-MCNC: 2.3 MG/DL — LOW (ref 2.5–4.5)
PLATELET # BLD AUTO: 620 K/UL — HIGH (ref 150–400)
POTASSIUM SERPL-MCNC: 4.2 MMOL/L — SIGNIFICANT CHANGE UP (ref 3.5–5.3)
POTASSIUM SERPL-MCNC: 5 MMOL/L — SIGNIFICANT CHANGE UP (ref 3.5–5.3)
POTASSIUM SERPL-SCNC: 4.2 MMOL/L — SIGNIFICANT CHANGE UP (ref 3.5–5.3)
POTASSIUM SERPL-SCNC: 5 MMOL/L — SIGNIFICANT CHANGE UP (ref 3.5–5.3)
POTASSIUM UR-SCNC: 12 MMOL/L — SIGNIFICANT CHANGE UP
PROTHROM AB SERPL-ACNC: 14.2 SEC — HIGH (ref 9.8–12.7)
RBC # BLD: 3.71 M/UL — LOW (ref 4.2–5.8)
RBC # FLD: 18.7 % — HIGH (ref 10.3–14.5)
RH IG SCN BLD-IMP: POSITIVE — SIGNIFICANT CHANGE UP
SODIUM SERPL-SCNC: 127 MMOL/L — LOW (ref 135–145)
SODIUM SERPL-SCNC: 128 MMOL/L — LOW (ref 135–145)
SODIUM UR-SCNC: 88 MMOL/L — SIGNIFICANT CHANGE UP
WBC # BLD: 18 K/UL — HIGH (ref 3.8–10.5)
WBC # FLD AUTO: 18 K/UL — HIGH (ref 3.8–10.5)

## 2018-08-24 PROCEDURE — 74177 CT ABD & PELVIS W/CONTRAST: CPT | Mod: 26

## 2018-08-24 PROCEDURE — 71260 CT THORAX DX C+: CPT | Mod: 26

## 2018-08-24 PROCEDURE — 99232 SBSQ HOSP IP/OBS MODERATE 35: CPT

## 2018-08-24 RX ORDER — HYDROMORPHONE HYDROCHLORIDE 2 MG/ML
0.25 INJECTION INTRAMUSCULAR; INTRAVENOUS; SUBCUTANEOUS ONCE
Qty: 0 | Refills: 0 | Status: DISCONTINUED | OUTPATIENT
Start: 2018-08-24 | End: 2018-08-24

## 2018-08-24 RX ORDER — INSULIN LISPRO 100/ML
VIAL (ML) SUBCUTANEOUS EVERY 6 HOURS
Qty: 0 | Refills: 0 | Status: DISCONTINUED | OUTPATIENT
Start: 2018-08-24 | End: 2018-08-25

## 2018-08-24 RX ORDER — DEXTROSE MONOHYDRATE, SODIUM CHLORIDE, AND POTASSIUM CHLORIDE 50; .745; 4.5 G/1000ML; G/1000ML; G/1000ML
1000 INJECTION, SOLUTION INTRAVENOUS
Qty: 0 | Refills: 0 | Status: DISCONTINUED | OUTPATIENT
Start: 2018-08-24 | End: 2018-08-24

## 2018-08-24 RX ORDER — SODIUM CHLORIDE 9 MG/ML
1000 INJECTION INTRAMUSCULAR; INTRAVENOUS; SUBCUTANEOUS
Qty: 0 | Refills: 0 | Status: DISCONTINUED | OUTPATIENT
Start: 2018-08-24 | End: 2018-08-26

## 2018-08-24 RX ADMIN — OXYCODONE HYDROCHLORIDE 10 MILLIGRAM(S): 5 TABLET ORAL at 21:06

## 2018-08-24 RX ADMIN — Medication 1: at 14:25

## 2018-08-24 RX ADMIN — SODIUM CHLORIDE 100 MILLILITER(S): 9 INJECTION INTRAMUSCULAR; INTRAVENOUS; SUBCUTANEOUS at 19:27

## 2018-08-24 RX ADMIN — MEROPENEM 100 MILLIGRAM(S): 1 INJECTION INTRAVENOUS at 14:24

## 2018-08-24 RX ADMIN — OLANZAPINE 5 MILLIGRAM(S): 15 TABLET, FILM COATED ORAL at 20:35

## 2018-08-24 RX ADMIN — MICAFUNGIN SODIUM 107.5 MILLIGRAM(S): 100 INJECTION, POWDER, LYOPHILIZED, FOR SOLUTION INTRAVENOUS at 15:43

## 2018-08-24 RX ADMIN — Medication 83.33 MILLIMOLE(S): at 15:35

## 2018-08-24 RX ADMIN — MEROPENEM 100 MILLIGRAM(S): 1 INJECTION INTRAVENOUS at 05:40

## 2018-08-24 RX ADMIN — Medication 1: at 19:25

## 2018-08-24 RX ADMIN — Medication 25 MILLIGRAM(S): at 05:40

## 2018-08-24 RX ADMIN — ENOXAPARIN SODIUM 70 MILLIGRAM(S): 100 INJECTION SUBCUTANEOUS at 05:40

## 2018-08-24 RX ADMIN — HYDROMORPHONE HYDROCHLORIDE 0.25 MILLIGRAM(S): 2 INJECTION INTRAMUSCULAR; INTRAVENOUS; SUBCUTANEOUS at 17:10

## 2018-08-24 RX ADMIN — Medication 650 MILLIGRAM(S): at 05:40

## 2018-08-24 RX ADMIN — MEROPENEM 100 MILLIGRAM(S): 1 INJECTION INTRAVENOUS at 22:39

## 2018-08-24 RX ADMIN — Medication 5 MILLIGRAM(S): at 22:39

## 2018-08-24 RX ADMIN — OXYCODONE HYDROCHLORIDE 10 MILLIGRAM(S): 5 TABLET ORAL at 20:36

## 2018-08-24 RX ADMIN — DEXTROSE MONOHYDRATE, SODIUM CHLORIDE, AND POTASSIUM CHLORIDE 100 MILLILITER(S): 50; .745; 4.5 INJECTION, SOLUTION INTRAVENOUS at 10:05

## 2018-08-24 RX ADMIN — ENOXAPARIN SODIUM 70 MILLIGRAM(S): 100 INJECTION SUBCUTANEOUS at 14:22

## 2018-08-24 RX ADMIN — Medication 650 MILLIGRAM(S): at 06:10

## 2018-08-24 RX ADMIN — SODIUM CHLORIDE 1 GRAM(S): 9 INJECTION INTRAMUSCULAR; INTRAVENOUS; SUBCUTANEOUS at 05:40

## 2018-08-24 RX ADMIN — HYDROMORPHONE HYDROCHLORIDE 0.25 MILLIGRAM(S): 2 INJECTION INTRAMUSCULAR; INTRAVENOUS; SUBCUTANEOUS at 16:36

## 2018-08-24 NOTE — PROGRESS NOTE ADULT - ASSESSMENT
57yo M with perforated R colon and large RP abscess/abdominal wall infection s/p open ileocecectomy in discontinuity and abdominal wall/RP debridement with open abdomen/abthera on 8/11, now s/p abdominal washout, end ileostomy, abdominal closure on 8/13. CT A/P showed a 10x5cm fluid/gas collection in right hemipelvis.  SUDHIR drain with minimal output.  NGT clamp trial passed, removed tube on 8/19. IR drain placed 8/20.     - IR drainage of abscess, 250cc of purulent fluid drained initially. Midline incision, as well as the SUDHIR & IR drains, continue to have purulent output- continue ceci q8 and micafungin per ID  f/u cx  - Pain control: tylenol q6h, oxy PRN.  - Therapeutic Lovenox for DVTs in the mid to distal right brachial veins and proximal right radial vein, thromboses in the left cephalic vein and right cephalic and basilic veins. Pt will need outpt follow with ATP service attending, possibly 3 months of anticoagulation.  - Zyprexa 5 mg qhs for agitation.  - SSI, diabetic diet.  - PT/OOB.  - Ostomy care    Louisa Garcia PA-C [id number] 57yo M with perforated R colon and large RP abscess/abdominal wall infection s/p open ileocecectomy in discontinuity and abdominal wall/RP debridement with open abdomen/abthera on 8/11, now s/p abdominal washout, end ileostomy, abdominal closure on 8/13. CT A/P showed a 10x5cm fluid/gas collection in right hemipelvis.  SUDHIR drain with minimal output.  NGT clamp trial passed, removed tube on 8/19. IR drain placed 8/20.     - IR drainage of abscess, 250cc of purulent fluid drained initially. Midline incision, as well as the SUDHIR & IR drains, continue to have purulent output- continue ceci q8 and micafungin per ID  f/u cx  - Pain control: tylenol q6h, oxy PRN.  - Therapeutic Lovenox for DVTs in the mid to distal right brachial veins and proximal right radial vein, thromboses in the left cephalic vein and right cephalic and basilic veins. Pt will need outpt follow with ATP service attending, possibly 3 months of anticoagulation.  - Zyprexa 5 mg qhs for agitation.  - SSI, diabetic diet.  - Salt tabs for hyponatremia  - CT scan a/p 8/26  - PT/OOB.  - Ostomy care    Louisa Garcia PA-C [id number]

## 2018-08-24 NOTE — PROGRESS NOTE ADULT - ASSESSMENT
58 M with DM, HTN, peripheral neuropathy, was visiting [facility] that developed fever, weakness and abd pain, was diagnosed with bowel perf and abd abscess, refused surgery there and came here with fever, leukocytosis to 24, CT with ileal and cecal perf, stool extending to the iliacus muscle with R pelvis fluid and gas collection  s/p open ileocecectomy in discontinuity and abdominal wall/RP debridement with open abdomen/abthera on 8/11, and then abdominal washout, end ileostomy, abdominal closure on 8/13. surgical cx with mixed gram negs, lactobacillus and overgrowth of proteus, received vanco 8/11-8/15, fluconazole 8/11-8/17 and zosyn 8/11 now day 11 but was still febrile with leukocytosis and repeat CT 8/18 showed a 10x5cm fluid/gas collection in right hemipelvis, decreased in size.  s/p IR drain 8/20 and 250 cc of yellow purulent drainage now afebrile, but WBC worsened to 20  The IR drainage was not sent for culture yesterday    sepsis due to ileocecal perf and large R hemipelvis abscess s/p laparotomy, washout, ileostomy, OR cx with mixed GNR, lactobacillus and proteus overgrowth but persistent abscess s/p IR drainage 8/20 but no culture    leukocytosis slightly better to 18  RLE weakness, started with the abd pain, related to nerve damage due to abscess    * no culture from IR 8/20  * f/u the culture from the drain, its GNR for now  * s/p zosyn 8/11-8/22,  vanco 8/11-8/15,  fluconazole 8/11-8/17  * c/w meropenem 1 g q 8, started 8/22, day 3  * c/w micafungin 150 qd for now, started 8/22, day 3  * if no improvement, will have to repeat the CT with contrast  * neurology eval for the RLE weakness 58 M with DM, HTN, peripheral neuropathy, was visiting [facility] that developed fever, weakness and abd pain, was diagnosed with bowel perf and abd abscess, refused surgery there and came here with fever, leukocytosis to 24, CT with ileal and cecal perf, stool extending to the iliacus muscle with R pelvis fluid and gas collection  s/p open ileocecectomy in discontinuity and abdominal wall/RP debridement with open abdomen/abthera on 8/11, and then abdominal washout, end ileostomy, abdominal closure on 8/13. surgical cx with mixed gram negs, lactobacillus and overgrowth of proteus, received vanco 8/11-8/15, fluconazole 8/11-8/17 and zosyn 8/11 now day 11 but was still febrile with leukocytosis and repeat CT 8/18 showed a 10x5cm fluid/gas collection in right hemipelvis, decreased in size.  s/p IR drain 8/20 and 250 cc of yellow purulent drainage now afebrile, but WBC worsened to 20  The IR drainage was not sent for culture yesterday    sepsis due to ileocecal perf and large R hemipelvis abscess s/p laparotomy, washout, ileostomy, OR cx with mixed GNR, lactobacillus and proteus overgrowth but persistent abscess s/p IR drainage 8/20 but no culture    leukocytosis slightly better to 18  RLE weakness, started with the abd pain, related to nerve damage due to abscess    * no culture from IR 8/20  * f/u the culture from the drain, its GNR for now  * s/p zosyn 8/11-8/22,  vanco 8/11-8/15,  fluconazole 8/11-8/17  * c/w meropenem 1 g q 8, started 8/22, day 3  * c/w micafungin 150 qd for now, started 8/22, day 3  * f/u the chest/abd/pelvis CT with contrast  * neurology eval for the RLE weakness

## 2018-08-24 NOTE — CHART NOTE - NSCHARTNOTEFT_GEN_A_CORE
Source: Patient [ X]    Family [ ]     other [X ] medical record    Diet : Consistent CHO    Pt seen for nutrition follow up. Medical chart reviewed/events noted. Per chart, S/P abdominal washout, end ileostomy, abdominal closure on 8/13. Pt reports fair appetite and PO intake, consuming 75% of meals. Pt denies GI distress, ostomy output: (8/23) 520ml x24 hours. Pt amenable to ostomy nutrition education review.     PO intake:  < 50% [ ] 50-75% [ ]   % [x ]  other :     Source for PO intake [x ] Patient [ ] family [ ] chart [ ] staff [ ] other    Current Weight: (8/15) 154.3 pounds     Pertinent Medications: MEDICATIONS  (STANDING):  acetaminophen   Tablet. 650 milliGRAM(s) Oral every 6 hours  enoxaparin Injectable 70 milliGRAM(s) SubCutaneous every 12 hours  insulin lispro (HumaLOG) corrective regimen sliding scale   SubCutaneous Before meals and at bedtime  melatonin 5 milliGRAM(s) Oral at bedtime  meropenem  IVPB 1000 milliGRAM(s) IV Intermittent every 8 hours  meropenem  IVPB      metoprolol tartrate 25 milliGRAM(s) Oral two times a day  micafungin IVPB      micafungin IVPB 150 milliGRAM(s) IV Intermittent every 24 hours  OLANZapine Disintegrating Tablet 5 milliGRAM(s) Oral <User Schedule>  sodium chloride 1 Gram(s) Oral two times a day  sodium chloride 0.9%. 1000 milliLiter(s) (100 mL/Hr) IV Continuous <Continuous>    MEDICATIONS  (PRN):  benzocaine 15 mG/menthol 3.6 mG Lozenge 1 Lozenge Oral every 6 hours PRN Sore Throat  ondansetron Injectable 4 milliGRAM(s) IV Push every 6 hours PRN Nausea  oxyCODONE    IR 5 milliGRAM(s) Oral every 4 hours PRN Moderate Pain (4 - 6)  oxyCODONE    IR 10 milliGRAM(s) Oral every 4 hours PRN Severe Pain (7 - 10)    Pertinent Labs:  08-24 Na127 mmol/L<L> Glu 151 mg/dL<H> K+ 4.2 mmol/L Cr  0.57 mg/dL BUN 4 mg/dL<L> 08-24 Phos 2.3 mg/dL<L> 08-22 Alb 2.4 g/dL<L> 08-12 [id number] 7.0 %<H>      Skin: +1 dependent edema, no pressure ulcers.     Estimated Needs:   [X ] no change since previous assessment  [ ] recalculated:       Previous Nutrition Diagnosis:     [ X] Inadequate Protein Energy Intake          Nutrition Diagnosis is [X ] ongoing  [ ] resolved [ ] not applicable          New Nutrition Diagnosis: [X ] not applicable     Interventions:   Nutrition education: Reviewed Ileostomy nutrition therapy/food list handout (provided by ostomy RN). Discussed eating low-fiber foods, chewing foods well, small frequent meals high in protein & energy. Reviewed foods that may cause odors &/or gas, discussed foods that bulk stool and thin stool, and importance of adequate hydration.   Recommend    1) Change diet to consistent CHO low fiber      Monitoring and Evaluation:     1.Continue to monitor weight, po intake, labs, and GI tolerance  2. Encourage adequate po intake   3. RD remains available.     Marci Perez RD pager #[phone]

## 2018-08-24 NOTE — PROGRESS NOTE ADULT - SUBJECTIVE AND OBJECTIVE BOX
Follow Up:  abd abscess    Interval History: pt afebrile but with active rigors and body pain    ROS:      All other systems negative    Constitutional: no fever, + chills, + sweat  Eye: no eye pain, no redness, no vision changes  ENT:  no sore throat, no rhinorrhea  Cardiovascular:  no chest pain, no palpitation  Respiratory:  no SOB, no cough  GI:  + abd pain, no vomiting, no diarrhea  urinary: resolved dysuria, no hematuria, no flank pain  : no penile discharge or bleeding  musculoskeletal:  no joint pain, no joint swelling,   skin:  no rash  neurology:  no headache, no seizure, + RLE weakness  psych: no anxiety, no depression         Allergies  No Known Allergies        ANTIMICROBIALS:  meropenem  IVPB 1000 every 8 hours  meropenem  IVPB    micafungin IVPB    micafungin IVPB 150 every 24 hours      OTHER MEDS:  acetaminophen   Tablet. 650 milliGRAM(s) Oral every 6 hours  benzocaine 15 mG/menthol 3.6 mG Lozenge 1 Lozenge Oral every 6 hours PRN  enoxaparin Injectable 70 milliGRAM(s) SubCutaneous every 12 hours  insulin lispro (HumaLOG) corrective regimen sliding scale   SubCutaneous Before meals and at bedtime  melatonin 5 milliGRAM(s) Oral at bedtime  metoprolol tartrate 25 milliGRAM(s) Oral two times a day  OLANZapine Disintegrating Tablet 5 milliGRAM(s) Oral <User Schedule>  ondansetron Injectable 4 milliGRAM(s) IV Push every 6 hours PRN  oxyCODONE    IR 5 milliGRAM(s) Oral every 4 hours PRN  oxyCODONE    IR 10 milliGRAM(s) Oral every 4 hours PRN  sodium chloride 1 Gram(s) Oral two times a day  sodium chloride 0.9%. 1000 milliLiter(s) IV Continuous <Continuous>      Vital Signs Last 24 Hrs  T(C): 36.7 (24 Aug 2018 09:06), Max: 37.1 (24 Aug 2018 05:35)  T(F): 98 (24 Aug 2018 09:06), Max: 98.8 (24 Aug 2018 05:35)  HR: 88 (24 Aug 2018 09:06) (87 - 98)  BP: 137/78 (24 Aug 2018 09:06) (127/72 - 152/71)  BP(mean): --  RR: 18 (24 Aug 2018 09:06) (18 - 18)  SpO2: 98% (24 Aug 2018 09:06) (97% - 98%)    Physical Exam:    General:    NAD, non toxic  Head: atraumatic, normocephalic  Eyes: normal sclera and conjunctiva  ENT:   no oropharyngeal lesions, no LAD, neck supple  Cardio:    regular S1,S2, no murmur  Respiratory:   clear b/l, no wheezing  abd:   soft, BS +, incision clean, L SUDHIR drain, RLQ IR drain with yellow purulent drainage  :     no CVAT, no suprapubic tenderness, no eugene  Musculoskeletal : no joint swelling, no edema  Skin:    no rash  vascular: no phlebitis, normal pulses  Neurologic:   RLE weakness 3/5  psych: normal affect, no suicidal ideation                          9.8    18.0  )-----------( 620      ( 24 Aug 2018 09:16 )             32.2       08-24    127<L>  |  94<L>  |  4<L>  ----------------------------<  151<H>  4.2   |  22  |  0.57    Ca    8.6      24 Aug 2018 09:16  Phos  2.3     08-24  Mg     1.9     08-24            MICROBIOLOGY:  v  .Body Fluid IR drain (skin)  08-22-18   Culture in progress  --    Moderate polymorphonuclear leukocytes per low power field  Rare Gram Negative Rods per oil power field      .Surgical Swab retroperitoneal fluid  08-14-18   Moderate Mixed gram negative rods "Susceptibilities not performed"  Few Lactobacillus species "Susceptibilities not performed"  Unable to evaluate further due to Proteus overgrowth  --  --      .Blood Blood  08-12-18   No growth at 5 days.  --  --      .Urine Clean Catch (Midstream)  08-11-18   >100,000 CFU/ml Yeast-like cells, presumptively not Candida albicans  --  --      .Blood Blood-Peripheral  08-11-18   No growth at 5 days.  --  --                RADIOLOGY:  Images below reviewed personally  < from: CT Abdomen and Pelvis w/ Oral Cont and w/ IV Cont (08.18.18 @ 14:05) >  IMPRESSION: Decrease in size in the right pelvic collection that extends   into the right iliopsoas muscle status post catheter drainage.

## 2018-08-24 NOTE — CHART NOTE - NSCHARTNOTEFT_GEN_A_CORE
Patient had CT-Chest/Abdomen/Pelvis which revealed a decrease in right lower quadrant fluid/gas collection s/p percutaneous drainage; however, new multiloculated collections in the left pelvis (likely infectious).  On exam, patient reports additional pain and reports lethargy, changed from yesterday.      Team spoke with Interventional Radiology about possible drainage.  Patient is receiving therapeutic lovenox (last @ 1420 8/24) for upper extremity DVT.  Additionally, patient received PO contrast for CT scan this afternoon.  Due to the high risk nature of procedure and difficult approach, will hold lovenox, make NPO at midnight, and schedule for IR drainage tomorrow with Dr. Jade.      ATP  [phone]

## 2018-08-24 NOTE — PROGRESS NOTE ADULT - ATTENDING COMMENTS
Pt seen and examined.  Chart reviewed.  Resident note confirmed.    I agree with the above note.    Pt is a 58 year old male with a medical history significant for CAD/HTN/DM2 with neuropathy who presented to [facility] with abdominal pain.  Work up revealed a cecal perforation, suspicious for a missed appendicitis.  An ileocecectomy was performed and the pt was left in discontinuity.  He underwent abdominal washout and eventual ileostomy/abd wall closure.  Pt went on to develop a pelvic abscess.  VIR drainage was performed on 8/22.  Pt with N/v overnight.  Repeat CT reveals a new collection in the left pelvis.  Abx continue and VIR consult is pending.    Continue pain control  Continue metoprolol for CAD/HTN  Continue ISP for atelectasis  Continue regular diet  Improving VICKIE noted  Hyponatremia  Adjust IVF  Monitor and replace lytes  Continue therapeutic lovenox for RUE DVT  Worsening leukocytosis noted  Continue meropenem and micafungin  PT evaluation  Continue supportive care  Discharge planning.

## 2018-08-24 NOTE — PROGRESS NOTE ADULT - SUBJECTIVE AND OBJECTIVE BOX
ACS DAILY PROGRESS NOTE:       SUBJECTIVE/ROS: Patient has a headache, feels abdominal incisional pain  Denies nausea, vomiting, chest pain, shortness of breath         MEDICATIONS  (STANDING):  acetaminophen   Tablet. 650 milliGRAM(s) Oral every 6 hours  enoxaparin Injectable 70 milliGRAM(s) SubCutaneous every 12 hours  insulin lispro (HumaLOG) corrective regimen sliding scale   SubCutaneous Before meals and at bedtime  melatonin 5 milliGRAM(s) Oral at bedtime  meropenem  IVPB 1000 milliGRAM(s) IV Intermittent every 8 hours  meropenem  IVPB      metoprolol tartrate 25 milliGRAM(s) Oral two times a day  micafungin IVPB      micafungin IVPB 150 milliGRAM(s) IV Intermittent every 24 hours  OLANZapine Disintegrating Tablet 5 milliGRAM(s) Oral <User Schedule>  sodium chloride 1 Gram(s) Oral two times a day    MEDICATIONS  (PRN):  benzocaine 15 mG/menthol 3.6 mG Lozenge 1 Lozenge Oral every 6 hours PRN Sore Throat  ondansetron Injectable 4 milliGRAM(s) IV Push every 6 hours PRN Nausea  oxyCODONE    IR 5 milliGRAM(s) Oral every 4 hours PRN Moderate Pain (4 - 6)  oxyCODONE    IR 10 milliGRAM(s) Oral every 4 hours PRN Severe Pain (7 - 10)      OBJECTIVE:    Vital Signs Last 24 Hrs  T(C): 37.1 (24 Aug 2018 05:35), Max: 37.1 (23 Aug 2018 09:27)  T(F): 98.8 (24 Aug 2018 05:35), Max: 98.8 (24 Aug 2018 05:35)  HR: 98 (24 Aug 2018 05:35) (87 - 100)  BP: 152/71 (24 Aug 2018 05:35) (105/62 - 152/71)  BP(mean): --  RR: 18 (24 Aug 2018 05:35) (18 - 18)  SpO2: 98% (24 Aug 2018 05:35) (97% - 98%)        I&O's Detail    23 Aug 2018 07:01  -  24 Aug 2018 07:00  --------------------------------------------------------  IN:    Oral Fluid: 1100 mL    Solution: 100 mL    Solution: 100 mL    Solution: 250 mL  Total IN: 1550 mL    OUT:    Bulb: 5 mL    Drain: 50 mL    Ileostomy: 470 mL    Voided: 3050 mL  Total OUT: 3575 mL    Total NET: -2025 mL          Daily     Daily     LABS:                        9.0    19.1  )-----------( 472      ( 23 Aug 2018 12:37 )             29.0     08-23    133<L>  |  98  |  4<L>  ----------------------------<  218<H>  4.1   |  23  |  0.57    Ca    7.8<L>      23 Aug 2018 12:37  Phos  2.4     08-23  Mg     1.9     08-23    TPro  6.4  /  Alb  2.4<L>  /  TBili  0.7  /  DBili  x   /  AST  13  /  ALT  7<L>  /  AlkPhos  89  08-22              Physical Exam:  General Appearance: Appears well, NAD  Respiratory: No labored breathing  CV: Pulse regularly present  Abdomen: Soft, nontender, midline incision with purulent drainage, changed packing and gauze. +SUDHIR, IR drains with purulent output.

## 2018-08-25 LAB
ANION GAP SERPL CALC-SCNC: 11 MMOL/L — SIGNIFICANT CHANGE UP (ref 5–17)
ANISOCYTOSIS BLD QL: SIGNIFICANT CHANGE UP
APTT BLD: 31.3 SEC — SIGNIFICANT CHANGE UP (ref 27.5–37.4)
BASOPHILS # BLD AUTO: 0.03 K/UL — SIGNIFICANT CHANGE UP (ref 0–0.2)
BASOPHILS NFR BLD AUTO: 0.2 % — SIGNIFICANT CHANGE UP (ref 0–2)
BLD GP AB SCN SERPL QL: NEGATIVE — SIGNIFICANT CHANGE UP
BUN SERPL-MCNC: <4 MG/DL — LOW (ref 7–23)
CALCIUM SERPL-MCNC: 8 MG/DL — LOW (ref 8.4–10.5)
CHLORIDE SERPL-SCNC: 100 MMOL/L — SIGNIFICANT CHANGE UP (ref 96–108)
CO2 SERPL-SCNC: 24 MMOL/L — SIGNIFICANT CHANGE UP (ref 22–31)
CREAT SERPL-MCNC: 0.55 MG/DL — SIGNIFICANT CHANGE UP (ref 0.5–1.3)
EOSINOPHIL # BLD AUTO: 0.44 K/UL — SIGNIFICANT CHANGE UP (ref 0–0.5)
EOSINOPHIL NFR BLD AUTO: 3.6 % — SIGNIFICANT CHANGE UP (ref 0–6)
GLUCOSE BLDC GLUCOMTR-MCNC: 108 MG/DL — HIGH (ref 70–99)
GLUCOSE BLDC GLUCOMTR-MCNC: 122 MG/DL — HIGH (ref 70–99)
GLUCOSE BLDC GLUCOMTR-MCNC: 146 MG/DL — HIGH (ref 70–99)
GLUCOSE BLDC GLUCOMTR-MCNC: 240 MG/DL — HIGH (ref 70–99)
GLUCOSE BLDC GLUCOMTR-MCNC: 255 MG/DL — HIGH (ref 70–99)
GLUCOSE SERPL-MCNC: 117 MG/DL — HIGH (ref 70–99)
GRAM STN FLD: SIGNIFICANT CHANGE UP
HCT VFR BLD CALC: 25.2 % — LOW (ref 39–50)
HGB BLD-MCNC: 7.9 G/DL — LOW (ref 13–17)
IMM GRANULOCYTES NFR BLD AUTO: 0.4 % — SIGNIFICANT CHANGE UP (ref 0–1.5)
INR BLD: 1.23 RATIO — HIGH (ref 0.88–1.16)
LYMPHOCYTES # BLD AUTO: 1.73 K/UL — SIGNIFICANT CHANGE UP (ref 1–3.3)
LYMPHOCYTES # BLD AUTO: 14.1 % — SIGNIFICANT CHANGE UP (ref 13–44)
MACROCYTES BLD QL: SIGNIFICANT CHANGE UP
MAGNESIUM SERPL-MCNC: 1.7 MG/DL — SIGNIFICANT CHANGE UP (ref 1.6–2.6)
MANUAL SMEAR VERIFICATION: SIGNIFICANT CHANGE UP
MCHC RBC-ENTMCNC: 26.6 PG — LOW (ref 27–34)
MCHC RBC-ENTMCNC: 31.3 GM/DL — LOW (ref 32–36)
MCV RBC AUTO: 84.8 FL — SIGNIFICANT CHANGE UP (ref 80–100)
MICROCYTES BLD QL: SLIGHT — SIGNIFICANT CHANGE UP
MONOCYTES # BLD AUTO: 0.89 K/UL — SIGNIFICANT CHANGE UP (ref 0–0.9)
MONOCYTES NFR BLD AUTO: 7.3 % — SIGNIFICANT CHANGE UP (ref 2–14)
NEUTROPHILS # BLD AUTO: 9.09 K/UL — HIGH (ref 1.8–7.4)
NEUTROPHILS NFR BLD AUTO: 74.4 % — SIGNIFICANT CHANGE UP (ref 43–77)
PHOSPHATE SERPL-MCNC: 2 MG/DL — LOW (ref 2.5–4.5)
PLAT MORPH BLD: NORMAL — SIGNIFICANT CHANGE UP
PLATELET # BLD AUTO: 480 K/UL — HIGH (ref 150–400)
PLATELET COUNT - ESTIMATE: SIGNIFICANT CHANGE UP
POIKILOCYTOSIS BLD QL AUTO: SLIGHT — SIGNIFICANT CHANGE UP
POTASSIUM SERPL-MCNC: 3.8 MMOL/L — SIGNIFICANT CHANGE UP (ref 3.5–5.3)
POTASSIUM SERPL-SCNC: 3.8 MMOL/L — SIGNIFICANT CHANGE UP (ref 3.5–5.3)
PROTHROM AB SERPL-ACNC: 14 SEC — HIGH (ref 10–13.1)
RBC # BLD: 2.97 M/UL — LOW (ref 4.2–5.8)
RBC # FLD: 21.2 % — HIGH (ref 10.3–14.5)
RBC BLD AUTO: ABNORMAL
RH IG SCN BLD-IMP: POSITIVE — SIGNIFICANT CHANGE UP
SODIUM SERPL-SCNC: 135 MMOL/L — SIGNIFICANT CHANGE UP (ref 135–145)
SPECIMEN SOURCE: SIGNIFICANT CHANGE UP
WBC # BLD: 12.23 K/UL — HIGH (ref 3.8–10.5)
WBC # FLD AUTO: 12.23 K/UL — HIGH (ref 3.8–10.5)

## 2018-08-25 RX ORDER — POTASSIUM PHOSPHATE, MONOBASIC POTASSIUM PHOSPHATE, DIBASIC 236; 224 MG/ML; MG/ML
30 INJECTION, SOLUTION INTRAVENOUS ONCE
Qty: 0 | Refills: 0 | Status: DISCONTINUED | OUTPATIENT
Start: 2018-08-25 | End: 2018-08-25

## 2018-08-25 RX ORDER — MAGNESIUM SULFATE 500 MG/ML
2 VIAL (ML) INJECTION ONCE
Qty: 0 | Refills: 0 | Status: COMPLETED | OUTPATIENT
Start: 2018-08-25 | End: 2018-08-25

## 2018-08-25 RX ORDER — HYDROMORPHONE HYDROCHLORIDE 2 MG/ML
0.25 INJECTION INTRAMUSCULAR; INTRAVENOUS; SUBCUTANEOUS ONCE
Qty: 0 | Refills: 0 | Status: DISCONTINUED | OUTPATIENT
Start: 2018-08-25 | End: 2018-08-26

## 2018-08-25 RX ORDER — SODIUM CHLORIDE 9 MG/ML
1 INJECTION INTRAMUSCULAR; INTRAVENOUS; SUBCUTANEOUS THREE TIMES A DAY
Qty: 0 | Refills: 0 | Status: DISCONTINUED | OUTPATIENT
Start: 2018-08-25 | End: 2018-09-07

## 2018-08-25 RX ORDER — INSULIN LISPRO 100/ML
VIAL (ML) SUBCUTANEOUS
Qty: 0 | Refills: 0 | Status: DISCONTINUED | OUTPATIENT
Start: 2018-08-25 | End: 2018-08-27

## 2018-08-25 RX ORDER — HYDROMORPHONE HYDROCHLORIDE 2 MG/ML
0.25 INJECTION INTRAMUSCULAR; INTRAVENOUS; SUBCUTANEOUS ONCE
Qty: 0 | Refills: 0 | Status: DISCONTINUED | OUTPATIENT
Start: 2018-08-25 | End: 2018-08-25

## 2018-08-25 RX ORDER — SODIUM,POTASSIUM PHOSPHATES 278-250MG
1 POWDER IN PACKET (EA) ORAL
Qty: 0 | Refills: 0 | Status: COMPLETED | OUTPATIENT
Start: 2018-08-25 | End: 2018-08-25

## 2018-08-25 RX ADMIN — SODIUM CHLORIDE 1 GRAM(S): 9 INJECTION INTRAMUSCULAR; INTRAVENOUS; SUBCUTANEOUS at 21:15

## 2018-08-25 RX ADMIN — Medication 25 MILLIGRAM(S): at 05:11

## 2018-08-25 RX ADMIN — Medication 25 MILLIGRAM(S): at 16:41

## 2018-08-25 RX ADMIN — Medication 1 PACKET(S): at 16:41

## 2018-08-25 RX ADMIN — Medication 50 GRAM(S): at 15:05

## 2018-08-25 RX ADMIN — Medication 650 MILLIGRAM(S): at 05:41

## 2018-08-25 RX ADMIN — Medication 650 MILLIGRAM(S): at 15:02

## 2018-08-25 RX ADMIN — Medication 650 MILLIGRAM(S): at 21:15

## 2018-08-25 RX ADMIN — HYDROMORPHONE HYDROCHLORIDE 0.25 MILLIGRAM(S): 2 INJECTION INTRAMUSCULAR; INTRAVENOUS; SUBCUTANEOUS at 08:45

## 2018-08-25 RX ADMIN — OLANZAPINE 5 MILLIGRAM(S): 15 TABLET, FILM COATED ORAL at 21:15

## 2018-08-25 RX ADMIN — OXYCODONE HYDROCHLORIDE 10 MILLIGRAM(S): 5 TABLET ORAL at 15:32

## 2018-08-25 RX ADMIN — Medication 650 MILLIGRAM(S): at 15:32

## 2018-08-25 RX ADMIN — SODIUM CHLORIDE 100 MILLILITER(S): 9 INJECTION INTRAMUSCULAR; INTRAVENOUS; SUBCUTANEOUS at 18:36

## 2018-08-25 RX ADMIN — Medication 650 MILLIGRAM(S): at 05:11

## 2018-08-25 RX ADMIN — MEROPENEM 100 MILLIGRAM(S): 1 INJECTION INTRAVENOUS at 15:05

## 2018-08-25 RX ADMIN — SODIUM CHLORIDE 1 GRAM(S): 9 INJECTION INTRAMUSCULAR; INTRAVENOUS; SUBCUTANEOUS at 05:11

## 2018-08-25 RX ADMIN — MEROPENEM 100 MILLIGRAM(S): 1 INJECTION INTRAVENOUS at 21:15

## 2018-08-25 RX ADMIN — OXYCODONE HYDROCHLORIDE 10 MILLIGRAM(S): 5 TABLET ORAL at 15:02

## 2018-08-25 RX ADMIN — Medication 5 MILLIGRAM(S): at 21:15

## 2018-08-25 RX ADMIN — Medication 3: at 19:40

## 2018-08-25 RX ADMIN — MICAFUNGIN SODIUM 107.5 MILLIGRAM(S): 100 INJECTION, POWDER, LYOPHILIZED, FOR SOLUTION INTRAVENOUS at 16:40

## 2018-08-25 RX ADMIN — Medication 1 PACKET(S): at 15:06

## 2018-08-25 RX ADMIN — HYDROMORPHONE HYDROCHLORIDE 0.25 MILLIGRAM(S): 2 INJECTION INTRAMUSCULAR; INTRAVENOUS; SUBCUTANEOUS at 08:15

## 2018-08-25 RX ADMIN — Medication 1 PACKET(S): at 18:36

## 2018-08-25 RX ADMIN — Medication 2: at 21:39

## 2018-08-25 RX ADMIN — MEROPENEM 100 MILLIGRAM(S): 1 INJECTION INTRAVENOUS at 05:10

## 2018-08-25 RX ADMIN — SODIUM CHLORIDE 1 GRAM(S): 9 INJECTION INTRAMUSCULAR; INTRAVENOUS; SUBCUTANEOUS at 15:06

## 2018-08-25 RX ADMIN — Medication 83.33 MILLIMOLE(S): at 15:06

## 2018-08-25 RX ADMIN — SODIUM CHLORIDE 1 GRAM(S): 9 INJECTION INTRAMUSCULAR; INTRAVENOUS; SUBCUTANEOUS at 16:41

## 2018-08-25 NOTE — PROGRESS NOTE ADULT - ATTENDING COMMENTS
Pt seen and examined.  Chart reviewed.  Resident note confirmed.    I agree with the above note.    Pt is a 58 year old male with a medical history significant for CAD/HTN/DM2 with neuropathy who presented to [facility] with abdominal pain.  Work up revealed a cecal perforation, suspicious for a missed appendicitis.  An ileocecectomy was performed and the pt was left in discontinuity.  He underwent abdominal washout and eventual ileostomy/abd wall closure.  Pt went on to develop a pelvic abscess.  VIR drainage was performed on 8/22.  Pt with N/v overnight.  Repeat CT reveals a new collection in the left pelvis.  Abx continue and pt underwent VIR drainage today.    Continue pain control  Continue metoprolol for CAD/HTN  Continue ISP for atelectasis  Continue regular diet  Improving VICKIE noted  Hyponatremia/hypophos  Adjust IVF  Monitor and replace lytes  Continue therapeutic lovenox for RUE DVT  Worsening leukocytosis noted  Continue meropenem and micafungin  PT evaluation  Continue supportive care  Discharge planning.

## 2018-08-25 NOTE — PROGRESS NOTE ADULT - ASSESSMENT
59yo M with perforated R colon and large RP abscess/abdominal wall infection s/p open ileocecectomy in discontinuity and abdominal wall/RP debridement with open abdomen/abthera on 8/11, now s/p abdominal washout, end ileostomy, abdominal closure on 8/13. CT A/P showed a 10x5cm fluid/gas collection in right hemipelvis.  SUDHIR drain with minimal output.  NGT clamp trial passed, removed tube on 8/19. IR drain placed 8/20. Now new IR drain placed on 8/25.    - IR drainage of abscess of R pelvis on 8/20, 250cc of purulent fluid drained initially. Midline incision, as well as this R pelvis IR drain, continue to have purulent output- continue ceci q8 and micafungin per ID. Will  f/u cx.  - IR performed successful CT guided percutaneous drainage of left pelvis abscess yielding 25cc of purulent fluid. Drain itself now with yellow clear fluid. Will f/u ID recs now s/p new CT scan and new IR drainage.  - Pain control: tylenol q6h, oxy PRN.  - Therapeutic Lovenox for DVTs in the mid to distal right brachial veins and proximal right radial vein, thromboses in the left cephalic vein and right cephalic and basilic veins. Pt will need outpt follow with ATP service attending, possibly 3 months of anticoagulation.  - Zyprexa 5 mg qhs for agitation.  - SSI, diabetic diet.  - Salt tabs for hyponatremia.  - PT/OOB.  - Ostomy care    [id number]

## 2018-08-25 NOTE — PROCEDURE NOTE - GENERAL PROCEDURE DETAILS
Successful CT guided percutaneous drainage of left abscess with 8.5 [place] catheter yielding 25 mL pus

## 2018-08-25 NOTE — PROGRESS NOTE ADULT - SUBJECTIVE AND OBJECTIVE BOX
Surgery Progress Note / POST-PROCEDURE CHECK    Procedure: IR performed successful CT guided percutaneous drainage of left abscess with 8.5 Scottish catheter yielding 25 mL pus.    SUBJECTIVE: Pt seen and examined at bedside. Patient comfortable and in no-apparent distress. C/o some pain, similar to his prior surgeries. -Nausea/-vomiting    Vital Signs Last 24 Hrs  T(C): 37.1 (25 Aug 2018 17:04), Max: 37.1 (25 Aug 2018 17:04)  T(F): 98.8 (25 Aug 2018 17:04), Max: 98.8 (25 Aug 2018 17:04)  HR: 83 (25 Aug 2018 17:04) (75 - 105)  BP: 136/77 (25 Aug 2018 17:04) (100/62 - 151/76)  BP(mean): --  RR: 18 (25 Aug 2018 17:04) (18 - 18)  SpO2: 99% (25 Aug 2018 17:04) (95% - 100%)    Physical Exam:  General Appearance: Appears somewhat uncomfortable  Respiratory: No labored breathing  CV: Pulse regularly present  Abdomen: Soft, nontender; midline incision with purulent drainage, changed packing and gauze. +8/20 IR drain on R abdomen with purulent output. Surgical drain on L with clear yellow output, new 8/25 IR drain on L with clear yellow output.    LABS:                        7.9    12.23 )-----------( 480      ( 25 Aug 2018 12:15 )             25.2     08-25    135  |  100  |  <4<L>  ----------------------------<  117<H>  3.8   |  24  |  0.55    Ca    8.0<L>      25 Aug 2018 08:17  Phos  2.0     08-25  Mg     1.7     08-25      PT/INR - ( 25 Aug 2018 12:15 )   PT: 14.0 sec;   INR: 1.23 ratio         PTT - ( 25 Aug 2018 12:15 )  PTT:31.3 sec      INs and OUTs:    08-24-18 @ 07:01  -  08-25-18 @ 07:00  --------------------------------------------------------  IN: 3050 mL / OUT: 3532 mL / NET: -482 mL    08-25-18 @ 07:01  -  08-25-18 @ 18:04  --------------------------------------------------------  IN: 600 mL / OUT: 675 mL / NET: -75 mL

## 2018-08-26 LAB
ANION GAP SERPL CALC-SCNC: 10 MMOL/L — SIGNIFICANT CHANGE UP (ref 5–17)
BUN SERPL-MCNC: 6 MG/DL — LOW (ref 7–23)
CALCIUM SERPL-MCNC: 8.3 MG/DL — LOW (ref 8.4–10.5)
CHLORIDE SERPL-SCNC: 98 MMOL/L — SIGNIFICANT CHANGE UP (ref 96–108)
CO2 SERPL-SCNC: 22 MMOL/L — SIGNIFICANT CHANGE UP (ref 22–31)
CREAT SERPL-MCNC: 0.74 MG/DL — SIGNIFICANT CHANGE UP (ref 0.5–1.3)
CULTURE RESULTS: SIGNIFICANT CHANGE UP
GLUCOSE BLDC GLUCOMTR-MCNC: 127 MG/DL — HIGH (ref 70–99)
GLUCOSE BLDC GLUCOMTR-MCNC: 152 MG/DL — HIGH (ref 70–99)
GLUCOSE BLDC GLUCOMTR-MCNC: 194 MG/DL — HIGH (ref 70–99)
GLUCOSE BLDC GLUCOMTR-MCNC: 197 MG/DL — HIGH (ref 70–99)
GLUCOSE SERPL-MCNC: 160 MG/DL — HIGH (ref 70–99)
HCT VFR BLD CALC: 27.3 % — LOW (ref 39–50)
HGB BLD-MCNC: 8.5 G/DL — LOW (ref 13–17)
MAGNESIUM SERPL-MCNC: 2.1 MG/DL — SIGNIFICANT CHANGE UP (ref 1.6–2.6)
MCHC RBC-ENTMCNC: 26.4 PG — LOW (ref 27–34)
MCHC RBC-ENTMCNC: 31.1 GM/DL — LOW (ref 32–36)
MCV RBC AUTO: 84.8 FL — SIGNIFICANT CHANGE UP (ref 80–100)
PHOSPHATE SERPL-MCNC: 2 MG/DL — LOW (ref 2.5–4.5)
PLATELET # BLD AUTO: 593 K/UL — HIGH (ref 150–400)
POTASSIUM SERPL-MCNC: 4.5 MMOL/L — SIGNIFICANT CHANGE UP (ref 3.5–5.3)
POTASSIUM SERPL-SCNC: 4.5 MMOL/L — SIGNIFICANT CHANGE UP (ref 3.5–5.3)
RBC # BLD: 3.22 M/UL — LOW (ref 4.2–5.8)
RBC # FLD: 21.3 % — HIGH (ref 10.3–14.5)
SODIUM SERPL-SCNC: 130 MMOL/L — LOW (ref 135–145)
SPECIMEN SOURCE: SIGNIFICANT CHANGE UP
WBC # BLD: 13.59 K/UL — HIGH (ref 3.8–10.5)
WBC # FLD AUTO: 13.59 K/UL — HIGH (ref 3.8–10.5)

## 2018-08-26 RX ORDER — ENOXAPARIN SODIUM 100 MG/ML
70 INJECTION SUBCUTANEOUS EVERY 12 HOURS
Qty: 0 | Refills: 0 | Status: DISCONTINUED | OUTPATIENT
Start: 2018-08-26 | End: 2018-08-28

## 2018-08-26 RX ORDER — SODIUM,POTASSIUM PHOSPHATES 278-250MG
1 POWDER IN PACKET (EA) ORAL
Qty: 0 | Refills: 0 | Status: COMPLETED | OUTPATIENT
Start: 2018-08-26 | End: 2018-08-26

## 2018-08-26 RX ADMIN — Medication 1: at 14:21

## 2018-08-26 RX ADMIN — MEROPENEM 100 MILLIGRAM(S): 1 INJECTION INTRAVENOUS at 05:25

## 2018-08-26 RX ADMIN — Medication 1 PACKET(S): at 11:40

## 2018-08-26 RX ADMIN — Medication 650 MILLIGRAM(S): at 22:05

## 2018-08-26 RX ADMIN — Medication 650 MILLIGRAM(S): at 08:34

## 2018-08-26 RX ADMIN — OXYCODONE HYDROCHLORIDE 10 MILLIGRAM(S): 5 TABLET ORAL at 05:55

## 2018-08-26 RX ADMIN — HYDROMORPHONE HYDROCHLORIDE 0.25 MILLIGRAM(S): 2 INJECTION INTRAMUSCULAR; INTRAVENOUS; SUBCUTANEOUS at 09:44

## 2018-08-26 RX ADMIN — Medication 25 MILLIGRAM(S): at 05:25

## 2018-08-26 RX ADMIN — Medication 1: at 08:34

## 2018-08-26 RX ADMIN — SODIUM CHLORIDE 1 GRAM(S): 9 INJECTION INTRAMUSCULAR; INTRAVENOUS; SUBCUTANEOUS at 17:49

## 2018-08-26 RX ADMIN — MICAFUNGIN SODIUM 107.5 MILLIGRAM(S): 100 INJECTION, POWDER, LYOPHILIZED, FOR SOLUTION INTRAVENOUS at 14:11

## 2018-08-26 RX ADMIN — Medication 1: at 21:45

## 2018-08-26 RX ADMIN — SODIUM CHLORIDE 1 GRAM(S): 9 INJECTION INTRAMUSCULAR; INTRAVENOUS; SUBCUTANEOUS at 21:38

## 2018-08-26 RX ADMIN — MEROPENEM 100 MILLIGRAM(S): 1 INJECTION INTRAVENOUS at 21:39

## 2018-08-26 RX ADMIN — Medication 650 MILLIGRAM(S): at 14:11

## 2018-08-26 RX ADMIN — OXYCODONE HYDROCHLORIDE 10 MILLIGRAM(S): 5 TABLET ORAL at 16:20

## 2018-08-26 RX ADMIN — OXYCODONE HYDROCHLORIDE 10 MILLIGRAM(S): 5 TABLET ORAL at 12:25

## 2018-08-26 RX ADMIN — Medication 650 MILLIGRAM(S): at 21:39

## 2018-08-26 RX ADMIN — Medication 1 PACKET(S): at 14:11

## 2018-08-26 RX ADMIN — Medication 650 MILLIGRAM(S): at 09:10

## 2018-08-26 RX ADMIN — OXYCODONE HYDROCHLORIDE 10 MILLIGRAM(S): 5 TABLET ORAL at 21:39

## 2018-08-26 RX ADMIN — OXYCODONE HYDROCHLORIDE 10 MILLIGRAM(S): 5 TABLET ORAL at 05:27

## 2018-08-26 RX ADMIN — OXYCODONE HYDROCHLORIDE 10 MILLIGRAM(S): 5 TABLET ORAL at 11:47

## 2018-08-26 RX ADMIN — HYDROMORPHONE HYDROCHLORIDE 0.25 MILLIGRAM(S): 2 INJECTION INTRAMUSCULAR; INTRAVENOUS; SUBCUTANEOUS at 10:15

## 2018-08-26 RX ADMIN — MEROPENEM 100 MILLIGRAM(S): 1 INJECTION INTRAVENOUS at 14:11

## 2018-08-26 RX ADMIN — Medication 650 MILLIGRAM(S): at 14:45

## 2018-08-26 RX ADMIN — Medication 1 PACKET(S): at 15:36

## 2018-08-26 RX ADMIN — SODIUM CHLORIDE 1 GRAM(S): 9 INJECTION INTRAMUSCULAR; INTRAVENOUS; SUBCUTANEOUS at 05:25

## 2018-08-26 RX ADMIN — ENOXAPARIN SODIUM 70 MILLIGRAM(S): 100 INJECTION SUBCUTANEOUS at 17:49

## 2018-08-26 RX ADMIN — Medication 25 MILLIGRAM(S): at 17:49

## 2018-08-26 RX ADMIN — OLANZAPINE 5 MILLIGRAM(S): 15 TABLET, FILM COATED ORAL at 21:38

## 2018-08-26 RX ADMIN — Medication 83.33 MILLIMOLE(S): at 11:41

## 2018-08-26 RX ADMIN — OXYCODONE HYDROCHLORIDE 10 MILLIGRAM(S): 5 TABLET ORAL at 15:45

## 2018-08-26 RX ADMIN — Medication 5 MILLIGRAM(S): at 21:38

## 2018-08-26 RX ADMIN — SODIUM CHLORIDE 1 GRAM(S): 9 INJECTION INTRAMUSCULAR; INTRAVENOUS; SUBCUTANEOUS at 14:12

## 2018-08-26 RX ADMIN — OXYCODONE HYDROCHLORIDE 10 MILLIGRAM(S): 5 TABLET ORAL at 22:05

## 2018-08-26 NOTE — PROGRESS NOTE ADULT - ATTENDING COMMENTS
Pt seen and examined.  Chart reviewed.  Resident note confirmed.    I agree with the above note.    Pt is a 58 year old male with a medical history significant for CAD/HTN/DM2 with neuropathy who presented to [facility] with abdominal pain.  Work up revealed a cecal perforation, suspicious for a missed appendicitis.  An ileocecectomy was performed and the pt was left in discontinuity.  He underwent abdominal washout and eventual ileostomy/abd wall closure.  Pt went on to develop a pelvic abscess.  VIR drainage was performed on 8/22 and 8/25.  No acute events overnight.  Diet resumed.    Continue pain control  Continue metoprolol for CAD/HTN  Continue ISP for atelectasis  Continue regular diet  Improving VICKIE noted  Hyponatremia/hypophos  Monitor and replace lytes  Continue therapeutic lovenox for RUE DVT  Worsening leukocytosis noted  Continue meropenem and micafungin  ID follow up  PT evaluation  Continue supportive care  Discharge planning.   Pt will require MONTSERRAT

## 2018-08-26 NOTE — PROGRESS NOTE ADULT - SUBJECTIVE AND OBJECTIVE BOX
Surgery Progress Note / POST-PROCEDURE CHECK    SUBJECTIVE: Pt. seen and examined at bedside.  Patient resting, tolerating PO this morning, denies n/v.  Pain improved from yesterday.  IR performed successful CT guided percutaneous drainage of left abscess with 8.5 [place] catheter yielding 25 mL pus.  L. SUDHIR drain with serous, biliary output. R. drain purulent output; set to gravity    Vital Signs Last 24 Hrs  T(C): 37 (26 Aug 2018 06:36), Max: 37.1 (25 Aug 2018 17:04)  T(F): 98.6 (26 Aug 2018 06:36), Max: 98.8 (25 Aug 2018 17:04)  HR: 83 (26 Aug 2018 06:36) (83 - 95)  BP: 145/75 (26 Aug 2018 06:36) (118/71 - 151/76)  BP(mean): --  RR: 18 (26 Aug 2018 06:36) (18 - 18)  SpO2: 100% (26 Aug 2018 06:36) (99% - 100%)    08-25-18 @ 07:01  -  08-26-18 @ 07:00  --------------------------------------------------------  IN: 2300 mL / OUT: 3130 mL / NET: -830 mL    08-26-18 @ 07:01  -  08-26-18 @ 09:22  --------------------------------------------------------  IN: 360 mL / OUT: 0 mL / NET: 360 mL      MEDICATIONS  (STANDING):  acetaminophen   Tablet. 650 milliGRAM(s) Oral every 6 hours  HYDROmorphone  Injectable 0.25 milliGRAM(s) IV Push once  insulin lispro (HumaLOG) corrective regimen sliding scale   SubCutaneous Before meals and at bedtime  melatonin 5 milliGRAM(s) Oral at bedtime  meropenem  IVPB 1000 milliGRAM(s) IV Intermittent every 8 hours  meropenem  IVPB      metoprolol tartrate 25 milliGRAM(s) Oral two times a day  micafungin IVPB      micafungin IVPB 150 milliGRAM(s) IV Intermittent every 24 hours  OLANZapine Disintegrating Tablet 5 milliGRAM(s) Oral <User Schedule>  sodium chloride 1 Gram(s) Oral three times a day  sodium chloride 1 Gram(s) Oral two times a day  sodium chloride 0.9%. 1000 milliLiter(s) (100 mL/Hr) IV Continuous <Continuous>    MEDICATIONS  (PRN):  benzocaine 15 mG/menthol 3.6 mG Lozenge 1 Lozenge Oral every 6 hours PRN Sore Throat  ondansetron Injectable 4 milliGRAM(s) IV Push every 6 hours PRN Nausea  oxyCODONE    IR 5 milliGRAM(s) Oral every 4 hours PRN Moderate Pain (4 - 6)  oxyCODONE    IR 10 milliGRAM(s) Oral every 4 hours PRN Severe Pain (7 - 10)    Physical Exam:  General Appearance: NAD, resting comfortably  Respiratory: No labored breathing  CV: Pulse regularly present  Abdomen: Soft, nontender; midline incision with purulent drainage, changed packing and gauze. +8/20 IR drain on R abdomen with purulent output. Surgical drain on L with clear yellow output, new 8/25 IR drain on L with clear yellow output.    LABS:           CBC (08-25 @ 12:15)                              7.9<L>                         12.23<H>  )----------------(  480<H>     74.4  % Neutrophils, 14.1  % Lymphocytes, ANC: 9.09<H>                              25.2<L>                BMP (08-25 @ 08:17)             135     |  100     |  <4<L> 		Ca++ --      Ca 8.0<L>             ---------------------------------( 117<H>		Mg 1.7                3.8     |  24      |  0.55  			Ph 2.0<L>      Coags (08-25 @ 12:15)  aPTT 31.3 / INR 1.23<H> / PT 14.0<H>        -> Abdominal Fl Abdominal Fluid Culture (08-25 @ 16:50)       polymorphonuclear leukocytes seen  No organisms seen by cytocentrifuge    NG  NG    -> .Body Fluid IR drain (skin) Culture (08-22 @ 17:05)       Moderate polymorphonuclear leukocytes per low power field  Rare Gram Negative Rods per oil power field    NG    Moderate Mixed gram negative rods "Susceptibilities not performed"

## 2018-08-26 NOTE — PROGRESS NOTE ADULT - ASSESSMENT
57yo M with perforated R colon and large RP abscess/abdominal wall infection s/p open ileocecectomy in discontinuity and abdominal wall/RP debridement with open abdomen/abthera on 8/11, now s/p abdominal washout, end ileostomy, abdominal closure on 8/13. CT A/P showed a 10x5cm fluid/gas collection in right hemipelvis.  SUDHIR drain with minimal output.  NGT clamp trial passed, removed tube on 8/19. IR drain placed 8/20. Now new IR drain for left pelvic fluid collection; placed on 8/25.    - Downtrending leukocytosis. Continue ceci q8 and micafungin per ID. Will  f/u cx.  - Pain control: tylenol q6h, oxy PRN.  - Therapeutic Lovenox for DVTs in the mid to distal right brachial veins and proximal right radial vein, thromboses in the left cephalic vein and right cephalic and basilic veins. Pt will need outpt follow with ATP service attending, possibly 3 months of anticoagulation.  - Zyprexa 5 mg qhs for agitation.  - SSI, diabetic diet.  - Salt tabs for hyponatremia.  - PT/OOB.  - Ostomy care    [id number]

## 2018-08-27 LAB
-  AMIKACIN: SIGNIFICANT CHANGE UP
-  AMOXICILLIN/CLAVULANIC ACID: SIGNIFICANT CHANGE UP
-  AMPICILLIN/SULBACTAM: SIGNIFICANT CHANGE UP
-  AMPICILLIN: SIGNIFICANT CHANGE UP
-  AZTREONAM: SIGNIFICANT CHANGE UP
-  CEFAZOLIN: SIGNIFICANT CHANGE UP
-  CEFEPIME: SIGNIFICANT CHANGE UP
-  CEFOXITIN: SIGNIFICANT CHANGE UP
-  CEFTRIAXONE: SIGNIFICANT CHANGE UP
-  CIPROFLOXACIN: SIGNIFICANT CHANGE UP
-  ERTAPENEM: SIGNIFICANT CHANGE UP
-  GENTAMICIN: SIGNIFICANT CHANGE UP
-  IMIPENEM: SIGNIFICANT CHANGE UP
-  LEVOFLOXACIN: SIGNIFICANT CHANGE UP
-  MEROPENEM: SIGNIFICANT CHANGE UP
-  PIPERACILLIN/TAZOBACTAM: SIGNIFICANT CHANGE UP
-  TOBRAMYCIN: SIGNIFICANT CHANGE UP
-  TRIMETHOPRIM/SULFAMETHOXAZOLE: SIGNIFICANT CHANGE UP
ALBUMIN SERPL ELPH-MCNC: 2.6 G/DL — LOW (ref 3.3–5)
ALP SERPL-CCNC: 67 U/L — SIGNIFICANT CHANGE UP (ref 40–120)
ALT FLD-CCNC: 5 U/L — LOW (ref 10–45)
ANION GAP SERPL CALC-SCNC: 12 MMOL/L — SIGNIFICANT CHANGE UP (ref 5–17)
AST SERPL-CCNC: 17 U/L — SIGNIFICANT CHANGE UP (ref 10–40)
BILIRUB SERPL-MCNC: 0.3 MG/DL — SIGNIFICANT CHANGE UP (ref 0.2–1.2)
BUN SERPL-MCNC: <4 MG/DL — LOW (ref 7–23)
CALCIUM SERPL-MCNC: 8.8 MG/DL — SIGNIFICANT CHANGE UP (ref 8.4–10.5)
CHLORIDE SERPL-SCNC: 99 MMOL/L — SIGNIFICANT CHANGE UP (ref 96–108)
CO2 SERPL-SCNC: 25 MMOL/L — SIGNIFICANT CHANGE UP (ref 22–31)
CREAT SERPL-MCNC: 0.66 MG/DL — SIGNIFICANT CHANGE UP (ref 0.5–1.3)
GLUCOSE BLDC GLUCOMTR-MCNC: 123 MG/DL — HIGH (ref 70–99)
GLUCOSE BLDC GLUCOMTR-MCNC: 183 MG/DL — HIGH (ref 70–99)
GLUCOSE BLDC GLUCOMTR-MCNC: 193 MG/DL — HIGH (ref 70–99)
GLUCOSE BLDC GLUCOMTR-MCNC: 259 MG/DL — HIGH (ref 70–99)
GLUCOSE SERPL-MCNC: 107 MG/DL — HIGH (ref 70–99)
HCT VFR BLD CALC: 32 % — LOW (ref 39–50)
HGB BLD-MCNC: 9.9 G/DL — LOW (ref 13–17)
MAGNESIUM SERPL-MCNC: 2.1 MG/DL — SIGNIFICANT CHANGE UP (ref 1.6–2.6)
MCHC RBC-ENTMCNC: 27.1 PG — SIGNIFICANT CHANGE UP (ref 27–34)
MCHC RBC-ENTMCNC: 30.8 GM/DL — LOW (ref 32–36)
MCV RBC AUTO: 87.9 FL — SIGNIFICANT CHANGE UP (ref 80–100)
METHOD TYPE: SIGNIFICANT CHANGE UP
PHOSPHATE SERPL-MCNC: 2.3 MG/DL — LOW (ref 2.5–4.5)
PLATELET # BLD AUTO: 634 K/UL — HIGH (ref 150–400)
POTASSIUM SERPL-MCNC: 4.3 MMOL/L — SIGNIFICANT CHANGE UP (ref 3.5–5.3)
POTASSIUM SERPL-SCNC: 4.3 MMOL/L — SIGNIFICANT CHANGE UP (ref 3.5–5.3)
PROT SERPL-MCNC: 7 G/DL — SIGNIFICANT CHANGE UP (ref 6–8.3)
RBC # BLD: 3.64 M/UL — LOW (ref 4.2–5.8)
RBC # FLD: 19.1 % — HIGH (ref 10.3–14.5)
SODIUM SERPL-SCNC: 136 MMOL/L — SIGNIFICANT CHANGE UP (ref 135–145)
WBC # BLD: 11.6 K/UL — HIGH (ref 3.8–10.5)
WBC # FLD AUTO: 11.6 K/UL — HIGH (ref 3.8–10.5)

## 2018-08-27 PROCEDURE — 99024 POSTOP FOLLOW-UP VISIT: CPT

## 2018-08-27 PROCEDURE — 49406 IMAGE CATH FLUID PERI/RETRO: CPT

## 2018-08-27 PROCEDURE — 99233 SBSQ HOSP IP/OBS HIGH 50: CPT

## 2018-08-27 RX ORDER — INSULIN LISPRO 100/ML
VIAL (ML) SUBCUTANEOUS AT BEDTIME
Qty: 0 | Refills: 0 | Status: DISCONTINUED | OUTPATIENT
Start: 2018-08-27 | End: 2018-09-07

## 2018-08-27 RX ORDER — INSULIN LISPRO 100/ML
VIAL (ML) SUBCUTANEOUS
Qty: 0 | Refills: 0 | Status: DISCONTINUED | OUTPATIENT
Start: 2018-08-27 | End: 2018-09-07

## 2018-08-27 RX ADMIN — SODIUM CHLORIDE 1 GRAM(S): 9 INJECTION INTRAMUSCULAR; INTRAVENOUS; SUBCUTANEOUS at 14:05

## 2018-08-27 RX ADMIN — Medication 650 MILLIGRAM(S): at 20:52

## 2018-08-27 RX ADMIN — Medication 83.33 MILLIMOLE(S): at 17:39

## 2018-08-27 RX ADMIN — Medication 3: at 14:04

## 2018-08-27 RX ADMIN — OLANZAPINE 5 MILLIGRAM(S): 15 TABLET, FILM COATED ORAL at 20:51

## 2018-08-27 RX ADMIN — SODIUM CHLORIDE 1 GRAM(S): 9 INJECTION INTRAMUSCULAR; INTRAVENOUS; SUBCUTANEOUS at 05:50

## 2018-08-27 RX ADMIN — MICAFUNGIN SODIUM 107.5 MILLIGRAM(S): 100 INJECTION, POWDER, LYOPHILIZED, FOR SOLUTION INTRAVENOUS at 12:43

## 2018-08-27 RX ADMIN — Medication 650 MILLIGRAM(S): at 21:22

## 2018-08-27 RX ADMIN — Medication 25 MILLIGRAM(S): at 17:40

## 2018-08-27 RX ADMIN — Medication 650 MILLIGRAM(S): at 14:05

## 2018-08-27 RX ADMIN — Medication 25 MILLIGRAM(S): at 05:50

## 2018-08-27 RX ADMIN — MEROPENEM 100 MILLIGRAM(S): 1 INJECTION INTRAVENOUS at 22:01

## 2018-08-27 RX ADMIN — Medication 5 MILLIGRAM(S): at 22:01

## 2018-08-27 RX ADMIN — SODIUM CHLORIDE 1 GRAM(S): 9 INJECTION INTRAMUSCULAR; INTRAVENOUS; SUBCUTANEOUS at 22:01

## 2018-08-27 RX ADMIN — Medication 2: at 17:39

## 2018-08-27 RX ADMIN — MEROPENEM 100 MILLIGRAM(S): 1 INJECTION INTRAVENOUS at 14:05

## 2018-08-27 RX ADMIN — ENOXAPARIN SODIUM 70 MILLIGRAM(S): 100 INJECTION SUBCUTANEOUS at 05:50

## 2018-08-27 RX ADMIN — ENOXAPARIN SODIUM 70 MILLIGRAM(S): 100 INJECTION SUBCUTANEOUS at 17:40

## 2018-08-27 RX ADMIN — Medication 650 MILLIGRAM(S): at 14:35

## 2018-08-27 RX ADMIN — Medication 650 MILLIGRAM(S): at 09:12

## 2018-08-27 RX ADMIN — Medication 650 MILLIGRAM(S): at 08:51

## 2018-08-27 RX ADMIN — MEROPENEM 100 MILLIGRAM(S): 1 INJECTION INTRAVENOUS at 05:50

## 2018-08-27 NOTE — PROGRESS NOTE ADULT - ASSESSMENT
57yo M with perforated R colon and large RP abscess/abdominal wall infection s/p open ileocecectomy in discontinuity and abdominal wall/RP debridement with open abdomen/abthera on 8/11, s/p abdominal washout, end ileostomy, abdominal closure on 8/13. CT A/P showed a 10x5cm fluid/gas collection in right hemipelvis. SUDHIR drain with minimal output.  NGT removed tube on 8/19. IR drain placed 8/20. Now s/p IR drainage of a L abdominal abscess 8/23.     - monitor drain output  - continue ceci q8 and micafungin per ID f/u cx  - Pain control: tylenol q6h, oxy PRN.  - Therapeutic Lovenox for DVTs in the mid to distal right brachial veins and proximal right radial vein, thromboses in the left cephalic vein and right cephalic and basilic veins. Pt will need outpt follow with ATP service attending, possibly 3 months of anticoagulation.  - Zyprexa 5 mg qhs for agitation.  - SSI, diabetic diet.  - Salt tabs for hyponatremia  - CT scan a/p 8/26  - PT/OOB.  - Ostomy care

## 2018-08-27 NOTE — PROGRESS NOTE ADULT - SUBJECTIVE AND OBJECTIVE BOX
TRAUMA SURGERY DAILY PROGRESS NOTE    Subjective:  Patient seen and examined on morning rounds.   -c/o incisional pain   -denies N/V      MEDICATIONS  (STANDING):  acetaminophen   Tablet. 650 milliGRAM(s) Oral every 6 hours  enoxaparin Injectable 70 milliGRAM(s) SubCutaneous every 12 hours  insulin lispro (HumaLOG) corrective regimen sliding scale   SubCutaneous Before meals and at bedtime  melatonin 5 milliGRAM(s) Oral at bedtime  meropenem  IVPB 1000 milliGRAM(s) IV Intermittent every 8 hours  meropenem  IVPB      metoprolol tartrate 25 milliGRAM(s) Oral two times a day  micafungin IVPB      micafungin IVPB 150 milliGRAM(s) IV Intermittent every 24 hours  OLANZapine Disintegrating Tablet 5 milliGRAM(s) Oral <User Schedule>  sodium chloride 1 Gram(s) Oral three times a day    MEDICATIONS  (PRN):  benzocaine 15 mG/menthol 3.6 mG Lozenge 1 Lozenge Oral every 6 hours PRN Sore Throat  ondansetron Injectable 4 milliGRAM(s) IV Push every 6 hours PRN Nausea  oxyCODONE    IR 5 milliGRAM(s) Oral every 4 hours PRN Moderate Pain (4 - 6)  oxyCODONE    IR 10 milliGRAM(s) Oral every 4 hours PRN Severe Pain (7 - 10)    Vital Signs Last 24 Hrs  T(C): 37 (27 Aug 2018 13:10), Max: 37.2 (27 Aug 2018 09:13)  T(F): 98.6 (27 Aug 2018 13:10), Max: 99 (27 Aug 2018 09:13)  HR: 82 (27 Aug 2018 13:10) (77 - 99)  BP: 118/73 (27 Aug 2018 13:10) (118/73 - 150/93)  BP(mean): --  RR: 18 (27 Aug 2018 13:10) (18 - 18)  SpO2: 100% (27 Aug 2018 13:10) (98% - 100%)  I&O's Detail    26 Aug 2018 07:01  -  27 Aug 2018 07:00  --------------------------------------------------------  IN:    Oral Fluid: 1320 mL    sodium chloride 0.9%: 500 mL    Solution: 100 mL    Solution: 300 mL  Total IN: 2220 mL    OUT:    Bulb: 7 mL    Bulb: 5 mL    Drain: 20 mL    Ileostomy: 800 mL    Voided: 3975 mL  Total OUT: 4807 mL    Total NET: -2587 mL      27 Aug 2018 07:01  -  27 Aug 2018 13:51  --------------------------------------------------------  IN:    Oral Fluid: 480 mL  Total IN: 480 mL    OUT:    Bulb: 5 mL    Bulb: 5 mL    Drain: 15 mL  Total OUT: 25 mL    Total NET: 455 mL        Daily     Daily     LABS:                        9.9    11.6  )-----------( 634      ( 27 Aug 2018 09:25 )             32.0     08-27    136  |  99  |  <4<L>  ----------------------------<  107<H>  4.3   |  25  |  0.66    Ca    8.8      27 Aug 2018 09:21  Phos  2.3     08-27  Mg     2.1     08-27    TPro  7.0  /  Alb  2.6<L>  /  TBili  0.3  /  DBili  x   /  AST  17  /  ALT  5<L>  /  AlkPhos  67  08-27      Physical Exam:  General Appearance: Appears well, NAD  Respiratory: No labored breathing  CV: Pulse regularly present  Abdomen: Soft, nontender, midline incision with purulent drainage, changed packing and gauze. +SUDHIR, IR drains with purulent output.

## 2018-08-27 NOTE — PROVIDER CONTACT NOTE (CRITICAL VALUE NOTIFICATION) - ACTION/TREATMENT ORDERED:
md notifed, no further interventions patient on IV antibiotics.
MARJORIE Estrada aware. Will order for STAT BMP to be redrawn. Will continue to monitor
Pt is on currently on Meropenem. Will continue to monitor

## 2018-08-27 NOTE — PROGRESS NOTE ADULT - ATTENDING COMMENTS
seen and examined 08-27-18 @ 1115    8/11 - right hemicolectomy / ABThera for cecal perforation with purulent peritonitis  8/13 - end ileostomy creation  8/20 - U/S guided RLQ abscess drainage  8/27 - CT guided left pelvic abscess drainage    afeb  AVSS  abd soft / NT / ND    WBC = 12      purulent peritonitis  -meropenem / micafungin    MONTSERRAT placement seen and examined 08-27-18 @ 1115    8/11 - right hemicolectomy / ABThera for cecal perforation with purulent peritonitis  8/13 - end ileostomy creation  8/20 - U/S guided RLQ abscess drainage  8/27 - CT guided left pelvic abscess drainage    afeb  AVSS  abd soft / NT / ND    WBC = 12      purulent peritonitis  -meropenem / micafungin    DVT in distal right brachial / proximal right radial veins  -therapeutic Lovenox    MONTSERRAT placement

## 2018-08-27 NOTE — PROGRESS NOTE ADULT - SUBJECTIVE AND OBJECTIVE BOX
Follow Up:  abd abscess    Interval History: repeat CT with another abscess s/p IR drainage and is growing CRE E-coli    ROS:      All other systems negative    Constitutional: no fever,  chills,  sweat  Eye: no eye pain, no redness, no vision changes  ENT:  no sore throat, no rhinorrhea  Cardiovascular:  no chest pain, no palpitation  Respiratory:  no SOB, no cough  GI:  + abd pain, no vomiting, no diarrhea  urinary: resolved dysuria, no hematuria, no flank pain  : no penile discharge or bleeding  musculoskeletal:  no joint pain, no joint swelling,   skin:  no rash  neurology:  no headache, no seizure, + RLE weakness  psych: no anxiety, no depression           Allergies  No Known Allergies        ANTIMICROBIALS:  cefTAZidime/avibactam IVPB    meropenem  IVPB 1000 every 8 hours  meropenem  IVPB    micafungin IVPB    micafungin IVPB 150 every 24 hours      OTHER MEDS:  acetaminophen   Tablet. 650 milliGRAM(s) Oral every 6 hours  benzocaine 15 mG/menthol 3.6 mG Lozenge 1 Lozenge Oral every 6 hours PRN  enoxaparin Injectable 70 milliGRAM(s) SubCutaneous every 12 hours  insulin lispro (HumaLOG) corrective regimen sliding scale   SubCutaneous three times a day before meals  melatonin 5 milliGRAM(s) Oral at bedtime  metoprolol tartrate 25 milliGRAM(s) Oral two times a day  OLANZapine Disintegrating Tablet 5 milliGRAM(s) Oral <User Schedule>  ondansetron Injectable 4 milliGRAM(s) IV Push every 6 hours PRN  oxyCODONE    IR 5 milliGRAM(s) Oral every 4 hours PRN  oxyCODONE    IR 10 milliGRAM(s) Oral every 4 hours PRN  sodium chloride 1 Gram(s) Oral three times a day      Vital Signs Last 24 Hrs  T(C): 36.4 (27 Aug 2018 16:27), Max: 37.2 (27 Aug 2018 09:13)  T(F): 97.6 (27 Aug 2018 16:27), Max: 99 (27 Aug 2018 09:13)  HR: 87 (27 Aug 2018 16:27) (77 - 99)  BP: 133/71 (27 Aug 2018 16:27) (118/73 - 150/93)  BP(mean): --  RR: 18 (27 Aug 2018 16:27) (18 - 18)  SpO2: 99% (27 Aug 2018 16:27) (98% - 100%)    Physical Exam:    General:    NAD, non toxic  Head: atraumatic, normocephalic  Eyes: normal sclera and conjunctiva  ENT:   no oropharyngeal lesions, no LAD, neck supple  Cardio:    regular S1,S2, no murmur  Respiratory:   clear b/l, no wheezing  abd:   soft, BS +, incision clean, L SUDHIR drain, RLQ IR drain with yellow purulent drainage  :     no CVAT, no suprapubic tenderness, no eugene  Musculoskeletal : no joint swelling, no edema  Skin:    no rash  vascular: no phlebitis, normal pulses  Neurologic:   RLE weakness 3/5  psych: normal affect, no suicidal ideation                          9.9    11.6  )-----------( 634      ( 27 Aug 2018 09:25 )             32.0       08-27    136  |  99  |  <4<L>  ----------------------------<  107<H>  4.3   |  25  |  0.66    Ca    8.8      27 Aug 2018 09:21  Phos  2.3     08-27  Mg     2.1     08-27    TPro  7.0  /  Alb  2.6<L>  /  TBili  0.3  /  DBili  x   /  AST  17  /  ALT  5<L>  /  AlkPhos  67  08-27          MICROBIOLOGY:  v  Abdominal Fl Abdominal Fluid  08-25-18   Rare Escherichia coli (Carbapenem Resistant)  --  Escherichia coli (Carbapenem Resistant)      .Body Fluid IR drain (skin)  08-22-18   Moderate Mixed gram negative rods "Susceptibilities not performed"  --    Moderate polymorphonuclear leukocytes per low power field  Rare Gram Negative Rods per oil power field      .Surgical Swab retroperitoneal fluid  08-14-18   Moderate Mixed gram negative rods "Susceptibilities not performed"  Few Lactobacillus species "Susceptibilities not performed"  Unable to evaluate further due to Proteus overgrowth  --  --      .Blood Blood  08-12-18   No growth at 5 days.  --  --      .Urine Clean Catch (Midstream)  08-11-18   >100,000 CFU/ml Yeast-like cells, presumptively not Candida albicans  --  --      .Blood Blood-Peripheral  08-11-18   No growth at 5 days.  --  --                RADIOLOGY:  Images below reviewed personally  < from: CT Chest w/ IV Cont (08.24.18 @ 13:00) >  IMPRESSION: Small bilateral pleural effusions, decreased in size since   8/18/2018. There is associated compressive atelectasis at the lung bases   bilaterally.    Indeterminate 0.4 cm right upper lobe nodule. According to Fleischner   criteria, if the patient has high risk, follow-up chest CT may be   obtained in 12 months to assess for stability.    Status post interval percutaneous drainage of a right lower quadrant   fluid and gas collection, with decrease in size of the collection.    New multiloculated collections in the left pelvis. Infected collections   are considered.    Droplets of gas within the right iliac bone adjacent to the right lower   quadrant collection, concerning for extension of infection to bone.

## 2018-08-27 NOTE — PROVIDER CONTACT NOTE (CRITICAL VALUE NOTIFICATION) - TEST AND RESULT REPORTED:
body fluid culture
body fluid culture 8/22 moderate polymorphonuclear leukocytes per low field, rare gram negative rods per oild power field
Pt serum blood glucose was 749 and calcium 6.5

## 2018-08-27 NOTE — PROGRESS NOTE ADULT - ASSESSMENT
58 M with DM, HTN, peripheral neuropathy, was visiting [facility] that developed fever, weakness and abd pain, was diagnosed with bowel perf and abd abscess, refused surgery there and came here with fever, leukocytosis to 24, CT with ileal and cecal perf, stool extending to the iliacus muscle with R pelvis fluid and gas collection  s/p open ileocecectomy in discontinuity and abdominal wall/RP debridement with open abdomen/abthera on 8/11, and then abdominal washout, end ileostomy, abdominal closure on 8/13. surgical cx with mixed gram negs, lactobacillus and overgrowth of proteus, received vanco 8/11-8/15, fluconazole 8/11-8/17 and zosyn 8/11 now day 11 but was still febrile with leukocytosis and repeat CT 8/18 showed a 10x5cm fluid/gas collection in right hemipelvis, decreased in size.  s/p IR drain 8/20 and 250 cc of yellow purulent drainage now afebrile, but WBC worsened to 20  The IR drainage was not sent for culture yesterday    sepsis due to ileocecal perf and large R hemipelvis abscess s/p laparotomy, washout, ileostomy, OR cx with mixed GNR, lactobacillus and proteus overgrowth but persistent abscess s/p IR drainage 8/20 but no culture, repeat CT with new left pelvis collection and a droplet of gas in the bone s/o extension to the bone   leukocytosis slightly better to 11  RLE weakness, started with the abd pain, related to nerve damage due to abscess    * no culture from IR 8/20  * IR culture with CRE E-coli  * s/p zosyn 8/11-8/22,  vanco 8/11-8/15,  fluconazole 8/11-8/17, micafungin and meropenem 8/22-8/27  * DC meropenem and micafungin  * start avycaz 2.5 q 8  * will need a PICC line  * neurology eval for the RLE weakness

## 2018-08-28 LAB
ALBUMIN SERPL ELPH-MCNC: 2.7 G/DL — LOW (ref 3.3–5)
ALP SERPL-CCNC: 64 U/L — SIGNIFICANT CHANGE UP (ref 40–120)
ALT FLD-CCNC: 8 U/L — LOW (ref 10–45)
ANION GAP SERPL CALC-SCNC: 10 MMOL/L — SIGNIFICANT CHANGE UP (ref 5–17)
APTT BLD: 38 SEC — HIGH (ref 27.5–37.4)
AST SERPL-CCNC: 14 U/L — SIGNIFICANT CHANGE UP (ref 10–40)
BILIRUB SERPL-MCNC: 0.3 MG/DL — SIGNIFICANT CHANGE UP (ref 0.2–1.2)
BLD GP AB SCN SERPL QL: NEGATIVE — SIGNIFICANT CHANGE UP
BUN SERPL-MCNC: 5 MG/DL — LOW (ref 7–23)
CALCIUM SERPL-MCNC: 8.7 MG/DL — SIGNIFICANT CHANGE UP (ref 8.4–10.5)
CHLORIDE SERPL-SCNC: 99 MMOL/L — SIGNIFICANT CHANGE UP (ref 96–108)
CO2 SERPL-SCNC: 25 MMOL/L — SIGNIFICANT CHANGE UP (ref 22–31)
CREAT SERPL-MCNC: 0.58 MG/DL — SIGNIFICANT CHANGE UP (ref 0.5–1.3)
GLUCOSE BLDC GLUCOMTR-MCNC: 125 MG/DL — HIGH (ref 70–99)
GLUCOSE BLDC GLUCOMTR-MCNC: 136 MG/DL — HIGH (ref 70–99)
GLUCOSE BLDC GLUCOMTR-MCNC: 207 MG/DL — HIGH (ref 70–99)
GLUCOSE BLDC GLUCOMTR-MCNC: 342 MG/DL — HIGH (ref 70–99)
GLUCOSE SERPL-MCNC: 138 MG/DL — HIGH (ref 70–99)
HCT VFR BLD CALC: 32.2 % — LOW (ref 39–50)
HGB BLD-MCNC: 10 G/DL — LOW (ref 13–17)
INR BLD: 1.22 RATIO — HIGH (ref 0.88–1.16)
MAGNESIUM SERPL-MCNC: 2 MG/DL — SIGNIFICANT CHANGE UP (ref 1.6–2.6)
MCHC RBC-ENTMCNC: 27.2 PG — SIGNIFICANT CHANGE UP (ref 27–34)
MCHC RBC-ENTMCNC: 31.1 GM/DL — LOW (ref 32–36)
MCV RBC AUTO: 87.6 FL — SIGNIFICANT CHANGE UP (ref 80–100)
PHOSPHATE SERPL-MCNC: 2.5 MG/DL — SIGNIFICANT CHANGE UP (ref 2.5–4.5)
PLATELET # BLD AUTO: 611 K/UL — HIGH (ref 150–400)
POTASSIUM SERPL-MCNC: 3.9 MMOL/L — SIGNIFICANT CHANGE UP (ref 3.5–5.3)
POTASSIUM SERPL-SCNC: 3.9 MMOL/L — SIGNIFICANT CHANGE UP (ref 3.5–5.3)
PROT SERPL-MCNC: 6.8 G/DL — SIGNIFICANT CHANGE UP (ref 6–8.3)
PROTHROM AB SERPL-ACNC: 13.3 SEC — HIGH (ref 9.8–12.7)
RBC # BLD: 3.67 M/UL — LOW (ref 4.2–5.8)
RBC # FLD: 18.8 % — HIGH (ref 10.3–14.5)
RH IG SCN BLD-IMP: POSITIVE — SIGNIFICANT CHANGE UP
SODIUM SERPL-SCNC: 134 MMOL/L — LOW (ref 135–145)
WBC # BLD: 10.9 K/UL — HIGH (ref 3.8–10.5)
WBC # FLD AUTO: 10.9 K/UL — HIGH (ref 3.8–10.5)

## 2018-08-28 PROCEDURE — 99233 SBSQ HOSP IP/OBS HIGH 50: CPT | Mod: GC

## 2018-08-28 PROCEDURE — 99024 POSTOP FOLLOW-UP VISIT: CPT

## 2018-08-28 RX ORDER — ENOXAPARIN SODIUM 100 MG/ML
70 INJECTION SUBCUTANEOUS ONCE
Qty: 0 | Refills: 0 | Status: DISCONTINUED | OUTPATIENT
Start: 2018-08-28 | End: 2018-08-28

## 2018-08-28 RX ORDER — ENOXAPARIN SODIUM 100 MG/ML
70 INJECTION SUBCUTANEOUS EVERY 12 HOURS
Qty: 0 | Refills: 0 | Status: DISCONTINUED | OUTPATIENT
Start: 2018-08-28 | End: 2018-08-28

## 2018-08-28 RX ADMIN — Medication 650 MILLIGRAM(S): at 01:21

## 2018-08-28 RX ADMIN — Medication 650 MILLIGRAM(S): at 01:51

## 2018-08-28 RX ADMIN — SODIUM CHLORIDE 1 GRAM(S): 9 INJECTION INTRAMUSCULAR; INTRAVENOUS; SUBCUTANEOUS at 13:39

## 2018-08-28 RX ADMIN — Medication 8: at 13:36

## 2018-08-28 RX ADMIN — Medication 5 MILLIGRAM(S): at 21:39

## 2018-08-28 RX ADMIN — ENOXAPARIN SODIUM 70 MILLIGRAM(S): 100 INJECTION SUBCUTANEOUS at 06:13

## 2018-08-28 RX ADMIN — Medication 650 MILLIGRAM(S): at 08:30

## 2018-08-28 RX ADMIN — Medication 25 MILLIGRAM(S): at 06:14

## 2018-08-28 RX ADMIN — Medication 650 MILLIGRAM(S): at 09:01

## 2018-08-28 RX ADMIN — Medication 83.33 MILLIMOLE(S): at 12:36

## 2018-08-28 RX ADMIN — SODIUM CHLORIDE 1 GRAM(S): 9 INJECTION INTRAMUSCULAR; INTRAVENOUS; SUBCUTANEOUS at 21:39

## 2018-08-28 RX ADMIN — SODIUM CHLORIDE 1 GRAM(S): 9 INJECTION INTRAMUSCULAR; INTRAVENOUS; SUBCUTANEOUS at 06:14

## 2018-08-28 RX ADMIN — Medication 25 MILLIGRAM(S): at 17:37

## 2018-08-28 RX ADMIN — OLANZAPINE 5 MILLIGRAM(S): 15 TABLET, FILM COATED ORAL at 21:39

## 2018-08-28 RX ADMIN — Medication 650 MILLIGRAM(S): at 19:29

## 2018-08-28 RX ADMIN — Medication 650 MILLIGRAM(S): at 19:59

## 2018-08-28 RX ADMIN — Medication 650 MILLIGRAM(S): at 13:39

## 2018-08-28 RX ADMIN — Medication 650 MILLIGRAM(S): at 14:12

## 2018-08-28 RX ADMIN — ONDANSETRON 4 MILLIGRAM(S): 8 TABLET, FILM COATED ORAL at 06:25

## 2018-08-28 NOTE — PROGRESS NOTE ADULT - ASSESSMENT
A/P 59yo M with perforated R colon and large RP abscess/abdominal wall infection s/p open ileocecectomy in discontinuity and abdominal wall/RP debridement with open abdomen/abthera on 8/11, now s/p abdominal washout, end ileostomy, abdominal closure on 8/13. CT A/P showed a 10x5cm fluid/gas collection in right hemipelvis.  SUDHIR drain with minimal output.  NGT clamp trial passed, removed tube on 8/19. IR drain placed 8/20. Now new IR drain placed on 8/25, requiring IV abx upon discharge  -PICC for IV antibiotics  -hold lovenox for PICC  -cont LRD  -restart T Lovenox after PICC placed  -d/c planning to [facility] James PA-C , pager # [phone]

## 2018-08-28 NOTE — PROGRESS NOTE ADULT - SUBJECTIVE AND OBJECTIVE BOX
GENERAL SURGERY DAILY PROGRESS NOTE:       Subjective:  feels ok, tolerating diet  pain controlled with meds  denies N/V      Objective:  Abd soft, NTND  incision C/D/I with staples, packing replaced  +gas and stool in ostomy  IR drains with min purulent output  SUDHIR serous    MEDICATIONS  (STANDING):  acetaminophen   Tablet. 650 milliGRAM(s) Oral every 6 hours  cefTAZidime/avibactam IVPB      cefTAZidime/avibactam IVPB 2.5 Gram(s) IV Intermittent every 8 hours  insulin lispro (HumaLOG) corrective regimen sliding scale   SubCutaneous at bedtime  insulin lispro (HumaLOG) corrective regimen sliding scale   SubCutaneous three times a day before meals  melatonin 5 milliGRAM(s) Oral at bedtime  metoprolol tartrate 25 milliGRAM(s) Oral two times a day  OLANZapine Disintegrating Tablet 5 milliGRAM(s) Oral <User Schedule>  sodium chloride 1 Gram(s) Oral three times a day  sodium phosphate IVPB 30 milliMole(s) IV Intermittent once    MEDICATIONS  (PRN):  benzocaine 15 mG/menthol 3.6 mG Lozenge 1 Lozenge Oral every 6 hours PRN Sore Throat  ondansetron Injectable 4 milliGRAM(s) IV Push every 6 hours PRN Nausea  oxyCODONE    IR 5 milliGRAM(s) Oral every 4 hours PRN Moderate Pain (4 - 6)  oxyCODONE    IR 10 milliGRAM(s) Oral every 4 hours PRN Severe Pain (7 - 10)      Vital Signs Last 24 Hrs  T(C): 36.8 (28 Aug 2018 10:14), Max: 37 (27 Aug 2018 13:10)  T(F): 98.3 (28 Aug 2018 10:14), Max: 98.6 (27 Aug 2018 13:10)  HR: 78 (28 Aug 2018 10:14) (77 - 91)  BP: 134/76 (28 Aug 2018 10:14) (118/73 - 168/83)  BP(mean): --  RR: 18 (28 Aug 2018 10:14) (18 - 18)  SpO2: 96% (28 Aug 2018 10:14) (96% - 100%)    I&O's Detail    27 Aug 2018 07:01  -  28 Aug 2018 07:00  --------------------------------------------------------  IN:    Oral Fluid: 800 mL    Solution: 200 mL    Solution: 100 mL    Solution: 250 mL  Total IN: 1350 mL    OUT:    Bulb: 15 mL    Bulb: 10 mL    Drain: 40 mL    Ileostomy: 725 mL    Voided: 3650 mL  Total OUT: 4440 mL    Total NET: -3090 mL      28 Aug 2018 07:01  -  28 Aug 2018 10:57  --------------------------------------------------------  IN:  Total IN: 0 mL    OUT:    Voided: 300 mL  Total OUT: 300 mL    Total NET: -300 mL          Daily     Daily     LABS:                        10.0   10.9  )-----------( 611      ( 28 Aug 2018 09:18 )             32.2     08-28    134<L>  |  99  |  5<L>  ----------------------------<  138<H>  3.9   |  25  |  0.58    Ca    8.7      28 Aug 2018 09:16  Phos  2.5     08-28  Mg     2.0     08-28    TPro  6.8  /  Alb  2.7<L>  /  TBili  0.3  /  DBili  x   /  AST  14  /  ALT  8<L>  /  AlkPhos  64  08-28    PT/INR - ( 28 Aug 2018 09:16 )   PT: 13.3 sec;   INR: 1.22 ratio         PTT - ( 28 Aug 2018 09:16 )  PTT:38.0 sec

## 2018-08-28 NOTE — PROGRESS NOTE ADULT - ATTENDING COMMENTS
seen and examined 08-28-18 @ 1355    8/11 - right hemicolectomy / ABThera for cecal perforation with purulent peritonitis  8/13 - end ileostomy creation  8/20 - U/S guided RLQ abscess drainage  8/27 - CT guided left pelvic abscess drainage    afeb  AVSS  abd soft / NT / ND  RLQ percutaneous drain purulent  SUDHIR (from OR) and LLQ percutaneous drain serous    WBC = 11    purulent peritonitis (carbapenem resistant E coli, further sensitivities pending to assure not [place] metallo-beta-lactamase 1)  -ceftazidime/avibactam (8/27 - 9/10)    DVT in distal right brachial / proximal right radial veins  -therapeutic Lovenox    MONTSERRAT placement seen and examined 08-28-18 @ 135    8/11 - right hemicolectomy / ABThera for cecal perforation with purulent peritonitis  8/13 - end ileostomy creation  8/20 - U/S guided RLQ abscess drainage  8/27 - CT guided left pelvic abscess drainage    afeb  AVSS  abd soft / NT / ND  I removed vertical mattress nylon sutures from the wound.  not yet granulating between skin bridges -> continue packing.    RLQ percutaneous drain purulent  SUDHIR (from OR) and LLQ percutaneous drain serous    WBC = 11    purulent peritonitis (carbapenem resistant E coli, further sensitivities pending to assure not [place] metallo-beta-lactamase 1)  -ceftazidime/avibactam (8/27 - 9/10)    DVT in distal right brachial / proximal right radial veins  -therapeutic Lovenox    MONTSERRAT placement

## 2018-08-28 NOTE — PROGRESS NOTE ADULT - SUBJECTIVE AND OBJECTIVE BOX
Follow Up:  abd abscess    Interval History: repeat CT with another abscess s/p IR drainage and is growing CRE E-coli    ROS:      All other systems negative    Constitutional: no fever,  chills,  sweat  Eye: no eye pain, no redness, no vision changes  ENT:  no sore throat, no rhinorrhea  Cardiovascular:  no chest pain, no palpitation  Respiratory:  no SOB, no cough  GI:  improved abd pain, no vomiting, no diarrhea  urinary: resolved dysuria, no hematuria, no flank pain  : no penile discharge or bleeding  musculoskeletal:  no joint pain, no joint swelling,   skin:  no rash  neurology:  no headache, no seizure, + RLE weakness  psych: no anxiety, no depression         Allergies  No Known Allergies        ANTIMICROBIALS:  cefTAZidime/avibactam IVPB    cefTAZidime/avibactam IVPB 2.5 every 8 hours      OTHER MEDS:  acetaminophen   Tablet. 650 milliGRAM(s) Oral every 6 hours  benzocaine 15 mG/menthol 3.6 mG Lozenge 1 Lozenge Oral every 6 hours PRN  insulin lispro (HumaLOG) corrective regimen sliding scale   SubCutaneous at bedtime  insulin lispro (HumaLOG) corrective regimen sliding scale   SubCutaneous three times a day before meals  melatonin 5 milliGRAM(s) Oral at bedtime  metoprolol tartrate 25 milliGRAM(s) Oral two times a day  OLANZapine Disintegrating Tablet 5 milliGRAM(s) Oral <User Schedule>  ondansetron Injectable 4 milliGRAM(s) IV Push every 6 hours PRN  oxyCODONE    IR 5 milliGRAM(s) Oral every 4 hours PRN  oxyCODONE    IR 10 milliGRAM(s) Oral every 4 hours PRN  sodium chloride 1 Gram(s) Oral three times a day      Vital Signs Last 24 Hrs  T(C): 36.8 (28 Aug 2018 10:14), Max: 37 (27 Aug 2018 13:10)  T(F): 98.3 (28 Aug 2018 10:14), Max: 98.6 (27 Aug 2018 13:10)  HR: 82 (28 Aug 2018 12:38) (77 - 91)  BP: 111/60 (28 Aug 2018 12:38) (111/60 - 168/83)  BP(mean): --  RR: 18 (28 Aug 2018 12:38) (18 - 18)  SpO2: 96% (28 Aug 2018 12:38) (96% - 100%)    Physical Exam:    General:    NAD, non toxic  Head: atraumatic, normocephalic  Eyes: normal sclera and conjunctiva  ENT:   no oropharyngeal lesions, no LAD, neck supple  Cardio:    regular S1,S2, no murmur  Respiratory:   clear b/l, no wheezing  abd:   soft, BS +, incision clean, L SUDHIR drain, RLQ IR drain with yellow purulent drainage  :     no CVAT, no suprapubic tenderness, no eugene  Musculoskeletal : no joint swelling, no edema  Skin:    no rash  vascular: no phlebitis, normal pulses  Neurologic:   RLE weakness 3/5  psych: normal affect, no suicidal ideation                          10.0   10.9  )-----------( 611      ( 28 Aug 2018 09:18 )             32.2       08-28    134<L>  |  99  |  5<L>  ----------------------------<  138<H>  3.9   |  25  |  0.58    Ca    8.7      28 Aug 2018 09:16  Phos  2.5     08-28  Mg     2.0     08-28    TPro  6.8  /  Alb  2.7<L>  /  TBili  0.3  /  DBili  x   /  AST  14  /  ALT  8<L>  /  AlkPhos  64  08-28          MICROBIOLOGY:  v  Abdominal Fl Abdominal Fluid  08-25-18   Rare Escherichia coli (Carbapenem Resistant)  --  Escherichia coli (Carbapenem Resistant)      .Body Fluid IR drain (skin)  08-22-18   Moderate Mixed gram negative rods "Susceptibilities not performed"  --    Moderate polymorphonuclear leukocytes per low power field  Rare Gram Negative Rods per oil power field      .Surgical Swab retroperitoneal fluid  08-14-18   Moderate Mixed gram negative rods "Susceptibilities not performed"  Few Lactobacillus species "Susceptibilities not performed"  Unable to evaluate further due to Proteus overgrowth  --  --      .Blood Blood  08-12-18   No growth at 5 days.  --  --      .Urine Clean Catch (Midstream)  08-11-18   >100,000 CFU/ml Yeast-like cells, presumptively not Candida albicans  --  --      .Blood Blood-Peripheral  08-11-18   No growth at 5 days.  --  --                RADIOLOGY:  Images below reviewed personally  < from: CT Chest w/ IV Cont (08.24.18 @ 13:00) >    IMPRESSION: Small bilateral pleural effusions, decreased in size since   8/18/2018. There is associated compressive atelectasis at the lung bases   bilaterally.    Indeterminate 0.4 cm right upper lobe nodule. According to Fleischner   criteria, if the patient has high risk, follow-up chest CT may be   obtained in 12 months to assess for stability.    Status post interval percutaneous drainage of a right lower quadrant   fluid and gas collection, with decrease in size of the collection.    New multiloculated collections in the left pelvis. Infected collections   are considered.    Droplets of gas within the right iliac bone adjacent to the right lower   quadrant collection, concerning for extension of infection to bone.

## 2018-08-28 NOTE — PROGRESS NOTE ADULT - ASSESSMENT
58 M with DM, HTN, peripheral neuropathy, was visiting [facility] that developed fever, weakness and abd pain, was diagnosed with bowel perf and abd abscess, refused surgery there and came here with fever, leukocytosis to 24, CT with ileal and cecal perf, stool extending to the iliacus muscle with R pelvis fluid and gas collection  s/p open ileocecectomy in discontinuity and abdominal wall/RP debridement with open abdomen/abthera on 8/11, and then abdominal washout, end ileostomy, abdominal closure on 8/13. surgical cx with mixed gram negs, lactobacillus and overgrowth of proteus, received vanco 8/11-8/15, fluconazole 8/11-8/17 and zosyn 8/11 now day 11 but was still febrile with leukocytosis and repeat CT 8/18 showed a 10x5cm fluid/gas collection in right hemipelvis, decreased in size.  s/p IR drain 8/20 and 250 cc of yellow purulent drainage now afebrile, but WBC worsened to 20  The IR drainage was not sent for culture yesterday    sepsis due to ileocecal perf and large R hemipelvis abscess s/p laparotomy, washout, ileostomy, OR cx with mixed GNR, lactobacillus and proteus overgrowth but persistent abscess s/p IR drainage 8/20 but no culture, repeat CT with new left pelvis collection and a droplet of gas in the bone s/o extension to the bone   leukocytosis slightly better to 11  RLE weakness, started with the abd pain, related to nerve damage due to abscess    * no culture from IR 8/20  * IR culture with CRE E-coli  * s/p zosyn 8/11-8/22,  vanco 8/11-8/15,  fluconazole 8/11-8/17, micafungin and meropenem 8/22-8/27  * I called the lab and added sensitivities to avycaz and polymixin  * c/w  avycaz 2.5 q 8, started 8/27, day 2  * will need a PICC line but pt refused for now, will discuss again, as will need 2 weeks and repeat CT for resolution  * neurology eval for the RLE weakness 58 M with DM, HTN, peripheral neuropathy, was visiting [facility] that developed fever, weakness and abd pain, was diagnosed with bowel perf and abd abscess, refused surgery there and came here with fever, leukocytosis to 24, CT with ileal and cecal perf, stool extending to the iliacus muscle with R pelvis fluid and gas collection  s/p open ileocecectomy in discontinuity and abdominal wall/RP debridement with open abdomen/abthera on 8/11, and then abdominal washout, end ileostomy, abdominal closure on 8/13. surgical cx with mixed gram negs, lactobacillus and overgrowth of proteus, received vanco 8/11-8/15, fluconazole 8/11-8/17 and zosyn 8/11 now day 11 but was still febrile with leukocytosis and repeat CT 8/18 showed a 10x5cm fluid/gas collection in right hemipelvis, decreased in size.  s/p IR drain 8/20 and 250 cc of yellow purulent drainage now afebrile, but WBC worsened to 20  The IR drainage was not sent for culture yesterday    sepsis due to ileocecal perf and large R hemipelvis abscess s/p laparotomy, washout, ileostomy, OR cx with mixed GNR, lactobacillus and proteus overgrowth but persistent abscess s/p IR drainage 8/20 but no culture, repeat CT with new left pelvis collection and a droplet of gas in the bone s/o extension to the bone  I reviewed the CT with radiology and the imaging is s/o osteomyelitis   leukocytosis slightly better to 10.9  RLE weakness, started with the abd pain, related to nerve damage due to abscess    * no culture from IR 8/20  * IR culture with CRE E-coli  * s/p zosyn 8/11-8/22,  vanco 8/11-8/15,  fluconazole 8/11-8/17, micafungin and meropenem 8/22-8/27  * I called the lab and added sensitivities to avycaz and polymixin  * c/w  avycaz 2.5 q 8, started 8/27, day 2  * will need a PICC line but pt refused for now, will discuss again, as will need a 6 weeks course in view of osteomyelitis   * neurology eval for the RLE weakness

## 2018-08-29 LAB
-  TIGECYCLINE: SIGNIFICANT CHANGE UP
ANION GAP SERPL CALC-SCNC: 8 MMOL/L — SIGNIFICANT CHANGE UP (ref 5–17)
BUN SERPL-MCNC: 6 MG/DL — LOW (ref 7–23)
CALCIUM SERPL-MCNC: 8.5 MG/DL — SIGNIFICANT CHANGE UP (ref 8.4–10.5)
CHLORIDE SERPL-SCNC: 96 MMOL/L — SIGNIFICANT CHANGE UP (ref 96–108)
CO2 SERPL-SCNC: 26 MMOL/L — SIGNIFICANT CHANGE UP (ref 22–31)
CREAT SERPL-MCNC: 0.76 MG/DL — SIGNIFICANT CHANGE UP (ref 0.5–1.3)
GLUCOSE BLDC GLUCOMTR-MCNC: 133 MG/DL — HIGH (ref 70–99)
GLUCOSE BLDC GLUCOMTR-MCNC: 184 MG/DL — HIGH (ref 70–99)
GLUCOSE BLDC GLUCOMTR-MCNC: 203 MG/DL — HIGH (ref 70–99)
GLUCOSE BLDC GLUCOMTR-MCNC: 220 MG/DL — HIGH (ref 70–99)
GLUCOSE SERPL-MCNC: 233 MG/DL — HIGH (ref 70–99)
HCT VFR BLD CALC: 31 % — LOW (ref 39–50)
HGB BLD-MCNC: 9.6 G/DL — LOW (ref 13–17)
MAGNESIUM SERPL-MCNC: 1.7 MG/DL — SIGNIFICANT CHANGE UP (ref 1.6–2.6)
MCHC RBC-ENTMCNC: 27.1 PG — SIGNIFICANT CHANGE UP (ref 27–34)
MCHC RBC-ENTMCNC: 30.9 GM/DL — LOW (ref 32–36)
MCV RBC AUTO: 87.7 FL — SIGNIFICANT CHANGE UP (ref 80–100)
PHOSPHATE SERPL-MCNC: 2.4 MG/DL — LOW (ref 2.5–4.5)
PLATELET # BLD AUTO: 604 K/UL — HIGH (ref 150–400)
POTASSIUM SERPL-MCNC: 4.7 MMOL/L — SIGNIFICANT CHANGE UP (ref 3.5–5.3)
POTASSIUM SERPL-SCNC: 4.7 MMOL/L — SIGNIFICANT CHANGE UP (ref 3.5–5.3)
RBC # BLD: 3.53 M/UL — LOW (ref 4.2–5.8)
RBC # FLD: 18.7 % — HIGH (ref 10.3–14.5)
SODIUM SERPL-SCNC: 130 MMOL/L — LOW (ref 135–145)
WBC # BLD: 11.3 K/UL — HIGH (ref 3.8–10.5)
WBC # FLD AUTO: 11.3 K/UL — HIGH (ref 3.8–10.5)

## 2018-08-29 PROCEDURE — 99232 SBSQ HOSP IP/OBS MODERATE 35: CPT

## 2018-08-29 PROCEDURE — 99024 POSTOP FOLLOW-UP VISIT: CPT

## 2018-08-29 PROCEDURE — 71045 X-RAY EXAM CHEST 1 VIEW: CPT | Mod: 26

## 2018-08-29 RX ORDER — HYDROMORPHONE HYDROCHLORIDE 2 MG/ML
0.5 INJECTION INTRAMUSCULAR; INTRAVENOUS; SUBCUTANEOUS DAILY
Qty: 0 | Refills: 0 | Status: DISCONTINUED | OUTPATIENT
Start: 2018-08-29 | End: 2018-09-05

## 2018-08-29 RX ORDER — ENOXAPARIN SODIUM 100 MG/ML
70 INJECTION SUBCUTANEOUS EVERY 12 HOURS
Qty: 0 | Refills: 0 | Status: DISCONTINUED | OUTPATIENT
Start: 2018-08-29 | End: 2018-08-30

## 2018-08-29 RX ORDER — MAGNESIUM SULFATE 500 MG/ML
2 VIAL (ML) INJECTION ONCE
Qty: 0 | Refills: 0 | Status: COMPLETED | OUTPATIENT
Start: 2018-08-29 | End: 2018-08-29

## 2018-08-29 RX ADMIN — Medication 4: at 12:16

## 2018-08-29 RX ADMIN — Medication 5 MILLIGRAM(S): at 21:20

## 2018-08-29 RX ADMIN — Medication 50 GRAM(S): at 14:41

## 2018-08-29 RX ADMIN — SODIUM CHLORIDE 1 GRAM(S): 9 INJECTION INTRAMUSCULAR; INTRAVENOUS; SUBCUTANEOUS at 05:59

## 2018-08-29 RX ADMIN — ENOXAPARIN SODIUM 70 MILLIGRAM(S): 100 INJECTION SUBCUTANEOUS at 18:38

## 2018-08-29 RX ADMIN — Medication 25 MILLIGRAM(S): at 05:59

## 2018-08-29 RX ADMIN — Medication 650 MILLIGRAM(S): at 21:21

## 2018-08-29 RX ADMIN — OLANZAPINE 5 MILLIGRAM(S): 15 TABLET, FILM COATED ORAL at 21:20

## 2018-08-29 RX ADMIN — Medication 62.5 MILLIMOLE(S): at 14:41

## 2018-08-29 RX ADMIN — Medication 650 MILLIGRAM(S): at 01:38

## 2018-08-29 RX ADMIN — Medication 25 MILLIGRAM(S): at 18:15

## 2018-08-29 RX ADMIN — Medication 650 MILLIGRAM(S): at 14:42

## 2018-08-29 RX ADMIN — Medication 0: at 22:50

## 2018-08-29 RX ADMIN — Medication 650 MILLIGRAM(S): at 08:32

## 2018-08-29 RX ADMIN — SODIUM CHLORIDE 1 GRAM(S): 9 INJECTION INTRAMUSCULAR; INTRAVENOUS; SUBCUTANEOUS at 21:20

## 2018-08-29 RX ADMIN — Medication 650 MILLIGRAM(S): at 01:08

## 2018-08-29 RX ADMIN — SODIUM CHLORIDE 1 GRAM(S): 9 INJECTION INTRAMUSCULAR; INTRAVENOUS; SUBCUTANEOUS at 14:42

## 2018-08-29 RX ADMIN — Medication 650 MILLIGRAM(S): at 21:51

## 2018-08-29 RX ADMIN — Medication 4: at 18:14

## 2018-08-29 RX ADMIN — Medication 650 MILLIGRAM(S): at 08:02

## 2018-08-29 NOTE — PROGRESS NOTE ADULT - SUBJECTIVE AND OBJECTIVE BOX
ACS DAILY PROGRESS NOTE:       SUBJECTIVE/ROS: Patient with no events overnight  Denies nausea, vomiting, chest pain, shortness of breath         MEDICATIONS  (STANDING):  acetaminophen   Tablet. 650 milliGRAM(s) Oral every 6 hours  cefTAZidime/avibactam IVPB      cefTAZidime/avibactam IVPB 2.5 Gram(s) IV Intermittent every 8 hours  insulin lispro (HumaLOG) corrective regimen sliding scale   SubCutaneous three times a day before meals  insulin lispro (HumaLOG) corrective regimen sliding scale   SubCutaneous at bedtime  melatonin 5 milliGRAM(s) Oral at bedtime  metoprolol tartrate 25 milliGRAM(s) Oral two times a day  OLANZapine Disintegrating Tablet 5 milliGRAM(s) Oral <User Schedule>  sodium chloride 1 Gram(s) Oral three times a day    MEDICATIONS  (PRN):  benzocaine 15 mG/menthol 3.6 mG Lozenge 1 Lozenge Oral every 6 hours PRN Sore Throat  ondansetron Injectable 4 milliGRAM(s) IV Push every 6 hours PRN Nausea      OBJECTIVE:    Vital Signs Last 24 Hrs  T(C): 36.9 (29 Aug 2018 05:52), Max: 37.2 (28 Aug 2018 21:26)  T(F): 98.4 (29 Aug 2018 05:52), Max: 98.9 (28 Aug 2018 21:26)  HR: 81 (29 Aug 2018 05:52) (78 - 87)  BP: 149/84 (29 Aug 2018 05:52) (111/60 - 165/80)  BP(mean): --  RR: 16 (29 Aug 2018 05:52) (16 - 18)  SpO2: 97% (29 Aug 2018 05:52) (96% - 100%)        I&O's Detail    28 Aug 2018 07:01  -  29 Aug 2018 07:00  --------------------------------------------------------  IN:    Oral Fluid: 440 mL    Solution: 100 mL    Solution: 500 mL    Solution: 200 mL  Total IN: 1240 mL    OUT:    Bulb: 5 mL    Bulb: 10 mL    Drain: 80 mL    Ileostomy: 900 mL    Voided: 1500 mL  Total OUT: 2495 mL    Total NET: -1255 mL          Daily     Daily     LABS:                        10.0   10.9  )-----------( 611      ( 28 Aug 2018 09:18 )             32.2     08-28    134<L>  |  99  |  5<L>  ----------------------------<  138<H>  3.9   |  25  |  0.58    Ca    8.7      28 Aug 2018 09:16  Phos  2.5     08-28  Mg     2.0     08-28    TPro  6.8  /  Alb  2.7<L>  /  TBili  0.3  /  DBili  x   /  AST  14  /  ALT  8<L>  /  AlkPhos  64  08-28    PT/INR - ( 28 Aug 2018 09:16 )   PT: 13.3 sec;   INR: 1.22 ratio         PTT - ( 28 Aug 2018 09:16 )  PTT:38.0 sec              Physical Exam:  General Appearance: Appears well, NAD  Respiratory: No labored breathing  CV: Pulse regularly present  Abdomen: Soft, nontender, midline incision with purulent drainage, changed packing and gauze. +SUDHIR, IR drains with purulent output.

## 2018-08-29 NOTE — PROGRESS NOTE ADULT - SUBJECTIVE AND OBJECTIVE BOX
Follow Up:  abd abscess    Interval History: repeat CT with another abscess s/p IR drainage and is growing CRE E-coli    ROS:      All other systems negative    Constitutional: no fever,  chills,  sweat  Eye: no eye pain, no redness, no vision changes  ENT:  no sore throat, no rhinorrhea  Cardiovascular:  no chest pain, no palpitation  Respiratory:  no SOB, no cough  GI:  improved abd pain, no vomiting, no diarrhea  urinary: resolved dysuria, no hematuria, no flank pain  : no penile discharge or bleeding  musculoskeletal:  no joint pain, no joint swelling,   skin:  no rash  neurology:  no headache, no seizure, + RLE weakness  psych: no anxiety, no depression       Allergies  No Known Allergies        ANTIMICROBIALS:  cefTAZidime/avibactam IVPB    cefTAZidime/avibactam IVPB 2.5 every 8 hours      OTHER MEDS:  acetaminophen   Tablet. 650 milliGRAM(s) Oral every 6 hours  benzocaine 15 mG/menthol 3.6 mG Lozenge 1 Lozenge Oral every 6 hours PRN  HYDROmorphone  Injectable 0.5 milliGRAM(s) IV Push daily  insulin lispro (HumaLOG) corrective regimen sliding scale   SubCutaneous three times a day before meals  insulin lispro (HumaLOG) corrective regimen sliding scale   SubCutaneous at bedtime  melatonin 5 milliGRAM(s) Oral at bedtime  metoprolol tartrate 25 milliGRAM(s) Oral two times a day  OLANZapine Disintegrating Tablet 5 milliGRAM(s) Oral <User Schedule>  ondansetron Injectable 4 milliGRAM(s) IV Push every 6 hours PRN  sodium chloride 1 Gram(s) Oral three times a day      Vital Signs Last 24 Hrs  T(C): 36.8 (29 Aug 2018 13:28), Max: 37.2 (28 Aug 2018 21:26)  T(F): 98.3 (29 Aug 2018 13:28), Max: 98.9 (28 Aug 2018 21:26)  HR: 86 (29 Aug 2018 13:28) (80 - 99)  BP: 112/64 (29 Aug 2018 13:28) (103/62 - 165/80)  BP(mean): --  RR: 18 (29 Aug 2018 13:28) (16 - 18)  SpO2: 98% (29 Aug 2018 13:28) (97% - 100%)    Physical Exam:    General:    NAD, non toxic  Head: atraumatic, normocephalic  Eyes: normal sclera and conjunctiva  ENT:   no oropharyngeal lesions, no LAD, neck supple  Cardio:    regular S1,S2, no murmur  Respiratory:   clear b/l, no wheezing  abd:   soft, BS +, incision clean, L SUDHIR drain, RLQ IR drain with yellow purulent drainage  :     no CVAT, no suprapubic tenderness, no eugene  Musculoskeletal : no joint swelling, no edema  Skin:    no rash  vascular: no phlebitis, normal pulses  Neurologic:   RLE weakness 3/5  psych: normal affect, no suicidal ideation                            9.6    11.3  )-----------( 604      ( 29 Aug 2018 11:32 )             31.0       08-29    130<L>  |  96  |  6<L>  ----------------------------<  233<H>  4.7   |  26  |  0.76    Ca    8.5      29 Aug 2018 11:32  Phos  2.4     08-29  Mg     1.7     08-29    TPro  6.8  /  Alb  2.7<L>  /  TBili  0.3  /  DBili  x   /  AST  14  /  ALT  8<L>  /  AlkPhos  64  08-28          MICROBIOLOGY:  v  Abdominal Fl Abdominal Fluid  08-25-18   Rare Escherichia coli (Carbapenem Resistant)  --  Escherichia coli (Carbapenem Resistant)      .Body Fluid IR drain (skin)  08-22-18   Moderate Mixed gram negative rods "Susceptibilities not performed"  --    Moderate polymorphonuclear leukocytes per low power field  Rare Gram Negative Rods per oil power field      .Surgical Swab retroperitoneal fluid  08-14-18   Moderate Mixed gram negative rods "Susceptibilities not performed"  Few Lactobacillus species "Susceptibilities not performed"  Unable to evaluate further due to Proteus overgrowth  --  --      .Blood Blood  08-12-18   No growth at 5 days.  --  --      .Urine Clean Catch (Midstream)  08-11-18   >100,000 CFU/ml Yeast-like cells, presumptively not Candida albicans  --  --      .Blood Blood-Peripheral  08-11-18   No growth at 5 days.  --  --                RADIOLOGY:  Images below reviewed personally  < from: CT Chest w/ IV Cont (08.24.18 @ 13:00) >  IMPRESSION: Small bilateral pleural effusions, decreased in size since   8/18/2018. There is associated compressive atelectasis at the lung bases   bilaterally.    Indeterminate 0.4 cm right upper lobe nodule. According to Fleischner   criteria, if the patient has high risk, follow-up chest CT may be   obtained in 12 months to assess for stability.    Status post interval percutaneous drainage of a right lower quadrant   fluid and gas collection, with decrease in size of the collection.    New multiloculated collections in the left pelvis. Infected collections   are considered.    Droplets of gas within the right iliac bone adjacent to the right lower   quadrant collection, concerning for extension of infection to bone.

## 2018-08-29 NOTE — PROGRESS NOTE ADULT - ASSESSMENT
57yo M with perforated R colon and large RP abscess/abdominal wall infection s/p open ileocecectomy in discontinuity and abdominal wall/RP debridement with open abdomen/abthera on 8/11, s/p abdominal washout, end ileostomy, abdominal closure on 8/13. CT A/P showed a 10x5cm fluid/gas collection in right hemipelvis. SUDHIR drain with minimal output.  NGT removed tube on 8/19. IR drain placed 8/20. Now s/p IR drainage of a L abdominal abscess 8/23.     - monitor drain output  - continue avycaz   - PICC for IV antibiotics  - Pain control: tylenol q6h, oxy PRN.  - Therapeutic Lovenox for DVTs in the mid to distal right brachial veins and proximal right radial vein, thromboses in the left cephalic vein and right cephalic and basilic veins. Pt will need outpt follow with ATP service attending, possibly 3 months of anticoagulation- will hold in setting of placement of PICC  - Zyprexa 5 mg qhs for agitation.  - SSI, diabetic diet.  - Salt tabs for hyponatremia  - CT scan a/p 8/26  - PT/OOB.  - Ostomy care      Louisa Garcia PA-C [id number]

## 2018-08-29 NOTE — PROGRESS NOTE ADULT - ATTENDING COMMENTS
seen and examined 08-29-18 @ 1035    8/11 - right hemicolectomy / ABThera for cecal perforation with purulent peritonitis  8/13 - end ileostomy creation  8/20 - U/S guided RLQ abscess drainage  8/27 - CT guided left pelvic abscess drainage    RLQ percutaneous drain remains purulent    purulent peritonitis (carbapenem resistant E coli, further sensitivities pending to assure not [place] metallo-beta-lactamase 1)  -ceftazidime/avibactam (8/27 - 9/10)  -awaiting PICC today    DVT in distal right brachial / proximal right radial veins  -therapeutic Lovenox    MONTSERRAT placement

## 2018-08-29 NOTE — PROGRESS NOTE ADULT - ASSESSMENT
58 M with DM, HTN, peripheral neuropathy, was visiting [facility] that developed fever, weakness and abd pain, was diagnosed with bowel perf and abd abscess, refused surgery there and came here with fever, leukocytosis to 24, CT with ileal and cecal perf, stool extending to the iliacus muscle with R pelvis fluid and gas collection  s/p open ileocecectomy in discontinuity and abdominal wall/RP debridement with open abdomen/abthera on 8/11, and then abdominal washout, end ileostomy, abdominal closure on 8/13. surgical cx with mixed gram negs, lactobacillus and overgrowth of proteus, received vanco 8/11-8/15, fluconazole 8/11-8/17 and zosyn 8/11 now day 11 but was still febrile with leukocytosis and repeat CT 8/18 showed a 10x5cm fluid/gas collection in right hemipelvis, decreased in size.  s/p IR drain 8/20 and 250 cc of yellow purulent drainage now afebrile, but WBC worsened to 20  The IR drainage was not sent for culture yesterday    sepsis due to ileocecal perf and large R hemipelvis abscess s/p laparotomy, washout, ileostomy, OR cx with mixed GNR, lactobacillus and proteus overgrowth but persistent abscess s/p IR drainage 8/20 but no culture, repeat CT with new left pelvis collection and a droplet of gas in the bone s/o extension to the bone  I reviewed the CT with radiology and the imaging is s/o osteomyelitis   leukocytosis slightly better to 10.9  RLE weakness, started with the abd pain, related to nerve damage due to abscess    * no culture from IR 8/20  * IR culture with CRE E-coli  * s/p zosyn 8/11-8/22,  vanco 8/11-8/15,  fluconazole 8/11-8/17, micafungin and meropenem 8/22-8/27  * I called the lab and added sensitivities to avycaz and polymixin, still pending  * c/w  avycaz 2.5 q 8, started 8/27, day 3  * will need a PICC with a 6 weeks course in view of osteomyelitis   * neurology eval for the RLE weakness

## 2018-08-30 LAB
-  CEFTAZIDIME/AVIBACTAM: 18 — SIGNIFICANT CHANGE UP
-  CEFTAZIDIME/AVIBACTAM: SIGNIFICANT CHANGE UP
ALBUMIN SERPL ELPH-MCNC: 2.8 G/DL — LOW (ref 3.3–5)
ALP SERPL-CCNC: 66 U/L — SIGNIFICANT CHANGE UP (ref 40–120)
ALT FLD-CCNC: 8 U/L — LOW (ref 10–45)
ANION GAP SERPL CALC-SCNC: 12 MMOL/L — SIGNIFICANT CHANGE UP (ref 5–17)
AST SERPL-CCNC: 15 U/L — SIGNIFICANT CHANGE UP (ref 10–40)
BILIRUB SERPL-MCNC: 0.2 MG/DL — SIGNIFICANT CHANGE UP (ref 0.2–1.2)
BUN SERPL-MCNC: 9 MG/DL — SIGNIFICANT CHANGE UP (ref 7–23)
CALCIUM SERPL-MCNC: 8.7 MG/DL — SIGNIFICANT CHANGE UP (ref 8.4–10.5)
CHLORIDE SERPL-SCNC: 99 MMOL/L — SIGNIFICANT CHANGE UP (ref 96–108)
CO2 SERPL-SCNC: 24 MMOL/L — SIGNIFICANT CHANGE UP (ref 22–31)
CREAT SERPL-MCNC: 0.62 MG/DL — SIGNIFICANT CHANGE UP (ref 0.5–1.3)
GLUCOSE BLDC GLUCOMTR-MCNC: 152 MG/DL — HIGH (ref 70–99)
GLUCOSE BLDC GLUCOMTR-MCNC: 163 MG/DL — HIGH (ref 70–99)
GLUCOSE BLDC GLUCOMTR-MCNC: 167 MG/DL — HIGH (ref 70–99)
GLUCOSE BLDC GLUCOMTR-MCNC: 297 MG/DL — HIGH (ref 70–99)
GLUCOSE SERPL-MCNC: 252 MG/DL — HIGH (ref 70–99)
HCT VFR BLD CALC: 29.1 % — LOW (ref 39–50)
HGB BLD-MCNC: 8.9 G/DL — LOW (ref 13–17)
MAGNESIUM SERPL-MCNC: 2.1 MG/DL — SIGNIFICANT CHANGE UP (ref 1.6–2.6)
MCHC RBC-ENTMCNC: 26.2 PG — LOW (ref 27–34)
MCHC RBC-ENTMCNC: 30.6 GM/DL — LOW (ref 32–36)
MCV RBC AUTO: 85.6 FL — SIGNIFICANT CHANGE UP (ref 80–100)
METHOD TYPE: SIGNIFICANT CHANGE UP
PHOSPHATE SERPL-MCNC: 2.7 MG/DL — SIGNIFICANT CHANGE UP (ref 2.5–4.5)
PLATELET # BLD AUTO: 596 K/UL — HIGH (ref 150–400)
POTASSIUM SERPL-MCNC: 4.1 MMOL/L — SIGNIFICANT CHANGE UP (ref 3.5–5.3)
POTASSIUM SERPL-SCNC: 4.1 MMOL/L — SIGNIFICANT CHANGE UP (ref 3.5–5.3)
PROT SERPL-MCNC: 7 G/DL — SIGNIFICANT CHANGE UP (ref 6–8.3)
RBC # BLD: 3.4 M/UL — LOW (ref 4.2–5.8)
RBC # FLD: 21.6 % — HIGH (ref 10.3–14.5)
SODIUM SERPL-SCNC: 135 MMOL/L — SIGNIFICANT CHANGE UP (ref 135–145)
WBC # BLD: 10.93 K/UL — HIGH (ref 3.8–10.5)
WBC # FLD AUTO: 10.93 K/UL — HIGH (ref 3.8–10.5)

## 2018-08-30 PROCEDURE — 99233 SBSQ HOSP IP/OBS HIGH 50: CPT | Mod: 24

## 2018-08-30 PROCEDURE — 99232 SBSQ HOSP IP/OBS MODERATE 35: CPT

## 2018-08-30 RX ORDER — APIXABAN 2.5 MG/1
10 TABLET, FILM COATED ORAL EVERY 12 HOURS
Qty: 0 | Refills: 0 | Status: COMPLETED | OUTPATIENT
Start: 2018-08-30 | End: 2018-09-06

## 2018-08-30 RX ORDER — HYDROMORPHONE HYDROCHLORIDE 2 MG/ML
0.5 INJECTION INTRAMUSCULAR; INTRAVENOUS; SUBCUTANEOUS DAILY
Qty: 0 | Refills: 0 | Status: DISCONTINUED | OUTPATIENT
Start: 2018-08-30 | End: 2018-09-05

## 2018-08-30 RX ORDER — APIXABAN 2.5 MG/1
10 TABLET, FILM COATED ORAL EVERY 12 HOURS
Qty: 0 | Refills: 0 | Status: DISCONTINUED | OUTPATIENT
Start: 2018-08-30 | End: 2018-08-30

## 2018-08-30 RX ADMIN — Medication 650 MILLIGRAM(S): at 21:25

## 2018-08-30 RX ADMIN — Medication 25 MILLIGRAM(S): at 06:07

## 2018-08-30 RX ADMIN — SODIUM CHLORIDE 1 GRAM(S): 9 INJECTION INTRAMUSCULAR; INTRAVENOUS; SUBCUTANEOUS at 21:25

## 2018-08-30 RX ADMIN — Medication 650 MILLIGRAM(S): at 01:04

## 2018-08-30 RX ADMIN — Medication 5 MILLIGRAM(S): at 21:25

## 2018-08-30 RX ADMIN — HYDROMORPHONE HYDROCHLORIDE 0.5 MILLIGRAM(S): 2 INJECTION INTRAMUSCULAR; INTRAVENOUS; SUBCUTANEOUS at 11:35

## 2018-08-30 RX ADMIN — OLANZAPINE 5 MILLIGRAM(S): 15 TABLET, FILM COATED ORAL at 21:25

## 2018-08-30 RX ADMIN — APIXABAN 10 MILLIGRAM(S): 2.5 TABLET, FILM COATED ORAL at 17:39

## 2018-08-30 RX ADMIN — Medication 650 MILLIGRAM(S): at 01:34

## 2018-08-30 RX ADMIN — Medication 6: at 11:53

## 2018-08-30 RX ADMIN — Medication 650 MILLIGRAM(S): at 14:14

## 2018-08-30 RX ADMIN — Medication 650 MILLIGRAM(S): at 08:18

## 2018-08-30 RX ADMIN — Medication 650 MILLIGRAM(S): at 21:55

## 2018-08-30 RX ADMIN — Medication 650 MILLIGRAM(S): at 13:44

## 2018-08-30 RX ADMIN — Medication 0: at 21:32

## 2018-08-30 RX ADMIN — ENOXAPARIN SODIUM 70 MILLIGRAM(S): 100 INJECTION SUBCUTANEOUS at 06:07

## 2018-08-30 RX ADMIN — Medication 650 MILLIGRAM(S): at 07:48

## 2018-08-30 RX ADMIN — HYDROMORPHONE HYDROCHLORIDE 0.5 MILLIGRAM(S): 2 INJECTION INTRAMUSCULAR; INTRAVENOUS; SUBCUTANEOUS at 12:05

## 2018-08-30 RX ADMIN — SODIUM CHLORIDE 1 GRAM(S): 9 INJECTION INTRAMUSCULAR; INTRAVENOUS; SUBCUTANEOUS at 13:44

## 2018-08-30 RX ADMIN — Medication 2: at 19:01

## 2018-08-30 RX ADMIN — Medication 2: at 07:50

## 2018-08-30 RX ADMIN — Medication 25 MILLIGRAM(S): at 17:39

## 2018-08-30 RX ADMIN — SODIUM CHLORIDE 1 GRAM(S): 9 INJECTION INTRAMUSCULAR; INTRAVENOUS; SUBCUTANEOUS at 06:07

## 2018-08-30 NOTE — PROGRESS NOTE ADULT - ASSESSMENT
57yo M with perforated R colon and large RP abscess/abdominal wall infection s/p open ileocecectomy in discontinuity and abdominal wall/RP debridement with open abdomen/abthera on 8/11, s/p abdominal washout, end ileostomy, abdominal closure on 8/13. CT A/P showed a 10x5cm fluid/gas collection in right hemipelvis. SUDHIR drain with minimal output.  NGT removed tube on 8/19. IR drain placed 8/20. Now s/p IR drainage of a L abdominal abscess 8/23. CT scan reviewed by ID and radiology was s/o osteo. PICC line placed 8/29.       - RUE DVT ppx: Transitioned from therapeutic lovenox to eliquis for DVT ppx on 8/30. Patient to receive 10 mg BID for 7d followed by 5mg BID for 3 months.  Pt will need outpt follow with ATP service attending, possibly 3 months of anticoagulation.   - monitor drain output  - ID: PICC in place for IV antibiotics, continue avycaz.  - Pain control: tylenol q6h, oxy PRN.  - Zyprexa 5 mg qhs for agitation.  - SSI, diabetic diet  - Salt tabs for hyponatremia  - PT/OOB  - Ostomy care  - Dispo: Rehab; Profile sent out to multiple rehab facillities.

## 2018-08-30 NOTE — PROGRESS NOTE ADULT - SUBJECTIVE AND OBJECTIVE BOX
TRAUMA SURGERY DAILY PROGRESS NOTE    Subjective:  Patient seen and examined on morning rounds.         MEDICATIONS  (STANDING):  acetaminophen   Tablet. 650 milliGRAM(s) Oral every 6 hours  apixaban 10 milliGRAM(s) Oral every 12 hours  cefTAZidime/avibactam IVPB      cefTAZidime/avibactam IVPB 2.5 Gram(s) IV Intermittent every 8 hours  HYDROmorphone  Injectable 0.5 milliGRAM(s) IV Push daily  HYDROmorphone  Injectable 0.5 milliGRAM(s) IV Push daily  insulin lispro (HumaLOG) corrective regimen sliding scale   SubCutaneous three times a day before meals  insulin lispro (HumaLOG) corrective regimen sliding scale   SubCutaneous at bedtime  melatonin 5 milliGRAM(s) Oral at bedtime  metoprolol tartrate 25 milliGRAM(s) Oral two times a day  OLANZapine Disintegrating Tablet 5 milliGRAM(s) Oral <User Schedule>  sodium chloride 1 Gram(s) Oral three times a day    MEDICATIONS  (PRN):  benzocaine 15 mG/menthol 3.6 mG Lozenge 1 Lozenge Oral every 6 hours PRN Sore Throat  ondansetron Injectable 4 milliGRAM(s) IV Push every 6 hours PRN Nausea    Vital Signs Last 24 Hrs  T(C): 36.8 (30 Aug 2018 09:30), Max: 36.9 (30 Aug 2018 01:00)  T(F): 98.2 (30 Aug 2018 09:30), Max: 98.5 (30 Aug 2018 01:00)  HR: 82 (30 Aug 2018 09:30) (74 - 86)  BP: 104/63 (30 Aug 2018 09:30) (104/63 - 160/82)  BP(mean): --  RR: 18 (30 Aug 2018 09:30) (18 - 18)  SpO2: 100% (30 Aug 2018 09:30) (97% - 100%)  I&O's Detail    29 Aug 2018 07:01  -  30 Aug 2018 07:00  --------------------------------------------------------  IN:    Oral Fluid: 740 mL    Solution: 250 mL    Solution: 50 mL    Solution: 300 mL  Total IN: 1340 mL    OUT:    Bulb: 2 mL    Bulb: 7 mL    Drain: 45 mL    Ileostomy: 975 mL    Voided: 2750 mL  Total OUT: 3779 mL    Total NET: -2439 mL      30 Aug 2018 07:01  -  30 Aug 2018 11:15  --------------------------------------------------------  IN:    Oral Fluid: 380 mL  Total IN: 380 mL    OUT:    Drain: 10 mL    Ileostomy: 100 mL    Voided: 300 mL  Total OUT: 410 mL    Total NET: -30 mL        Daily     Daily     LABS:                        9.6    11.3  )-----------( 604      ( 29 Aug 2018 11:32 )             31.0     08-29    130<L>  |  96  |  6<L>  ----------------------------<  233<H>  4.7   |  26  |  0.76    Ca    8.5      29 Aug 2018 11:32  Phos  2.4     08-29  Mg     1.7     08-29      Physical Exam:  General Appearance: Appears well, NAD  Respiratory: No labored breathing  CV: Pulse regularly present  Abdomen: Soft, nontender, midline incision with purulent drainage, changed packing and gauze. +SUDHIR drain, IR drains with purulent output.

## 2018-08-30 NOTE — PROGRESS NOTE ADULT - ATTENDING COMMENTS
seen and examined 08-30-18 @ 0858    8/11 - right hemicolectomy / ABThera for cecal perforation with purulent peritonitis  8/13 - end ileostomy creation  8/20 - U/S guided RLQ abscess drainage  8/27 - CT guided left pelvic abscess drainage    RLQ percutaneous drain remains purulent    right iliac osteomyelitis at [facility]  purulent peritonitis  carbapenem resistant E coli  -sensitive to aminoglycosides but requires ceftazidime/avibactam (8/27 - 9/10) for osteomyelitis  -further sensitivities pending to assure not [employer] metallo-beta-lactamase 1    DVT in distal right brachial / proximal right radial veins  -therapeutic Lovenox -> Eliquis    MONTSERRAT placement with IV ABx via PICC when available

## 2018-08-30 NOTE — PROGRESS NOTE ADULT - SUBJECTIVE AND OBJECTIVE BOX
Follow Up:  abd abscess    Interval History: repeat CT with another abscess s/p IR drainage and is growing CRE E-coli, s/p PICC line    ROS:      All other systems negative    Constitutional: no fever,  chills,  sweat  Eye: no eye pain, no redness, no vision changes  ENT:  no sore throat, no rhinorrhea  Cardiovascular:  no chest pain, no palpitation  Respiratory:  no SOB, no cough  GI:  improved abd pain, no vomiting, no diarrhea  urinary: resolved dysuria, no hematuria, no flank pain  : no penile discharge or bleeding  musculoskeletal:  no joint pain, no joint swelling,   skin:  no rash  neurology:  no headache, no seizure, + RLE weakness  psych: no anxiety, no depression         Allergies  No Known Allergies        ANTIMICROBIALS:  cefTAZidime/avibactam IVPB    cefTAZidime/avibactam IVPB 2.5 every 8 hours      OTHER MEDS:  acetaminophen   Tablet. 650 milliGRAM(s) Oral every 6 hours  apixaban 10 milliGRAM(s) Oral every 12 hours  benzocaine 15 mG/menthol 3.6 mG Lozenge 1 Lozenge Oral every 6 hours PRN  HYDROmorphone  Injectable 0.5 milliGRAM(s) IV Push daily  HYDROmorphone  Injectable 0.5 milliGRAM(s) IV Push daily  insulin lispro (HumaLOG) corrective regimen sliding scale   SubCutaneous three times a day before meals  insulin lispro (HumaLOG) corrective regimen sliding scale   SubCutaneous at bedtime  melatonin 5 milliGRAM(s) Oral at bedtime  metoprolol tartrate 25 milliGRAM(s) Oral two times a day  OLANZapine Disintegrating Tablet 5 milliGRAM(s) Oral <User Schedule>  ondansetron Injectable 4 milliGRAM(s) IV Push every 6 hours PRN  sodium chloride 1 Gram(s) Oral three times a day      Vital Signs Last 24 Hrs  T(C): 36.8 (30 Aug 2018 13:24), Max: 36.9 (30 Aug 2018 01:00)  T(F): 98.2 (30 Aug 2018 13:24), Max: 98.5 (30 Aug 2018 01:00)  HR: 87 (30 Aug 2018 13:24) (74 - 87)  BP: 103/65 (30 Aug 2018 13:24) (103/65 - 160/82)  BP(mean): --  RR: 18 (30 Aug 2018 13:24) (18 - 18)  SpO2: 99% (30 Aug 2018 13:24) (97% - 100%)    Physical Exam:    General:    NAD, non toxic  Head: atraumatic, normocephalic  Eyes: normal sclera and conjunctiva  ENT:   no oropharyngeal lesions, no LAD, neck supple  Cardio:    regular S1,S2, no murmur  Respiratory:   clear b/l, no wheezing  abd:   soft, BS +, incision clean, L SUDHIR drain, RLQ IR drain with yellow purulent drainage  :     no CVAT, no suprapubic tenderness, no eugene  Musculoskeletal : no joint swelling, no edema  Skin:    no rash  vascular: LUE PICC, normal pulses  Neurologic:   RLE weakness 3/5  psych: normal affect, no suicidal ideation                          8.9    10.93 )-----------( 596      ( 30 Aug 2018 13:52 )             29.1       08-30    135  |  99  |  9   ----------------------------<  252<H>  4.1   |  24  |  0.62    Ca    8.7      30 Aug 2018 11:20  Phos  2.7     08-30  Mg     2.1     08-30    TPro  7.0  /  Alb  2.8<L>  /  TBili  0.2  /  DBili  x   /  AST  15  /  ALT  8<L>  /  AlkPhos  66  08-30          MICROBIOLOGY:  v  Abdominal Fl Abdominal Fluid  08-25-18   Rare Escherichia coli (Carbapenem Resistant)  Moderate Bacteroides fragilis "Susceptibilities not performed"  --  Escherichia coli (Carbapenem Resistant)      .Body Fluid IR drain (skin)  08-22-18   Moderate Mixed gram negative rods "Susceptibilities not performed"  --    Moderate polymorphonuclear leukocytes per low power field  Rare Gram Negative Rods per oil power field      .Surgical Swab retroperitoneal fluid  08-14-18   Moderate Mixed gram negative rods "Susceptibilities not performed"  Few Lactobacillus species "Susceptibilities not performed"  Unable to evaluate further due to Proteus overgrowth  --  --      .Blood Blood  08-12-18   No growth at 5 days.  --  --      .Urine Clean Catch (Midstream)  08-11-18   >100,000 CFU/ml Yeast-like cells, presumptively not Candida albicans  --  --      .Blood Blood-Peripheral  08-11-18   No growth at 5 days.  --  --                RADIOLOGY:  Images below reviewed personally  < from: Xray Chest 1 View- PORTABLE-Urgent (08.29.18 @ 18:07) >  IMPRESSION:     Left sided PICC is in place with itstip in the proximal right atrium.    No pneumothorax.      < from: CT Chest w/ IV Cont (08.24.18 @ 13:00) >    IMPRESSION: Small bilateral pleural effusions, decreased in size since   8/18/2018. There is associated compressive atelectasis at the lung bases   bilaterally.    Indeterminate 0.4 cm right upper lobe nodule. According to Fleischner   criteria, if the patient has high risk, follow-up chest CT may be   obtained in 12 months to assess for stability.    Status post interval percutaneous drainage of a right lower quadrant   fluid and gas collection, with decrease in size of the collection.    New multiloculated collections in the left pelvis. Infected collections   are considered.    Droplets of gas within the right iliac bone adjacent to the right lower   quadrant collection, concerning for extension of infection to bone.

## 2018-08-30 NOTE — PROGRESS NOTE ADULT - ASSESSMENT
58 M with DM, HTN, peripheral neuropathy, was visiting [facility] that developed fever, weakness and abd pain, was diagnosed with bowel perf and abd abscess, refused surgery there and came here with fever, leukocytosis to 24, CT with ileal and cecal perf, stool extending to the iliacus muscle with R pelvis fluid and gas collection  s/p open ileocecectomy in discontinuity and abdominal wall/RP debridement with open abdomen/abthera on 8/11, and then abdominal washout, end ileostomy, abdominal closure on 8/13. surgical cx with mixed gram negs, lactobacillus and overgrowth of proteus, received vanco 8/11-8/15, fluconazole 8/11-8/17 and zosyn 8/11 now day 11 but was still febrile with leukocytosis and repeat CT 8/18 showed a 10x5cm fluid/gas collection in right hemipelvis, decreased in size.  s/p IR drain 8/20 and 250 cc of yellow purulent drainage now afebrile, but WBC worsened to 20  The IR drainage was not sent for culture yesterday    sepsis due to ileocecal perf and large R hemipelvis abscess s/p laparotomy, washout, ileostomy, OR cx with mixed GNR, lactobacillus and proteus overgrowth but persistent abscess s/p IR drainage 8/20 but no culture, repeat CT with new left pelvis collection and a droplet of gas in the bone s/o extension to the bone  I reviewed the CT with radiology and the imaging is s/o osteomyelitis   leukocytosis improved  RLE weakness, started with the abd pain, related to nerve damage due to abscess    * no culture from IR 8/20  * IR culture with CRE E-coli  * s/p zosyn 8/11-8/22,  vanco 8/11-8/15,  fluconazole 8/11-8/17, micafungin and meropenem 8/22-8/27  * I called the lab and added sensitivities to avycaz and polymixin, still pending  * c/w  avycaz 2.5 q 8, started 8/27, day 4  * will need  a 6 weeks course in view of osteomyelitis until 10/14  * weekly CBC and CMP while on antibiotics if pt is going to rehab, labs will be checked by the rehab MD  * neurology eval for the RLE weakness

## 2018-08-31 LAB
ANION GAP SERPL CALC-SCNC: 12 MMOL/L — SIGNIFICANT CHANGE UP (ref 5–17)
BUN SERPL-MCNC: 10 MG/DL — SIGNIFICANT CHANGE UP (ref 7–23)
CALCIUM SERPL-MCNC: 9.4 MG/DL — SIGNIFICANT CHANGE UP (ref 8.4–10.5)
CHLORIDE SERPL-SCNC: 96 MMOL/L — SIGNIFICANT CHANGE UP (ref 96–108)
CO2 SERPL-SCNC: 27 MMOL/L — SIGNIFICANT CHANGE UP (ref 22–31)
CREAT SERPL-MCNC: 0.76 MG/DL — SIGNIFICANT CHANGE UP (ref 0.5–1.3)
GLUCOSE BLDC GLUCOMTR-MCNC: 103 MG/DL — HIGH (ref 70–99)
GLUCOSE BLDC GLUCOMTR-MCNC: 159 MG/DL — HIGH (ref 70–99)
GLUCOSE BLDC GLUCOMTR-MCNC: 165 MG/DL — HIGH (ref 70–99)
GLUCOSE BLDC GLUCOMTR-MCNC: 259 MG/DL — HIGH (ref 70–99)
GLUCOSE SERPL-MCNC: 140 MG/DL — HIGH (ref 70–99)
HCT VFR BLD CALC: 31.4 % — LOW (ref 39–50)
HGB BLD-MCNC: 10 G/DL — LOW (ref 13–17)
MAGNESIUM SERPL-MCNC: 2 MG/DL — SIGNIFICANT CHANGE UP (ref 1.6–2.6)
MCHC RBC-ENTMCNC: 28.1 PG — SIGNIFICANT CHANGE UP (ref 27–34)
MCHC RBC-ENTMCNC: 31.8 GM/DL — LOW (ref 32–36)
MCV RBC AUTO: 88.2 FL — SIGNIFICANT CHANGE UP (ref 80–100)
PHOSPHATE SERPL-MCNC: 2.6 MG/DL — SIGNIFICANT CHANGE UP (ref 2.5–4.5)
PLATELET # BLD AUTO: 578 K/UL — HIGH (ref 150–400)
POTASSIUM SERPL-MCNC: 4.2 MMOL/L — SIGNIFICANT CHANGE UP (ref 3.5–5.3)
POTASSIUM SERPL-SCNC: 4.2 MMOL/L — SIGNIFICANT CHANGE UP (ref 3.5–5.3)
RBC # BLD: 3.56 M/UL — LOW (ref 4.2–5.8)
RBC # FLD: 21 % — HIGH (ref 10.3–14.5)
SODIUM SERPL-SCNC: 135 MMOL/L — SIGNIFICANT CHANGE UP (ref 135–145)
WBC # BLD: 13.4 K/UL — HIGH (ref 3.8–10.5)
WBC # FLD AUTO: 13.4 K/UL — HIGH (ref 3.8–10.5)

## 2018-08-31 PROCEDURE — 99232 SBSQ HOSP IP/OBS MODERATE 35: CPT

## 2018-08-31 PROCEDURE — 99233 SBSQ HOSP IP/OBS HIGH 50: CPT

## 2018-08-31 RX ORDER — AMIKACIN SULFATE 250 MG/ML
1050 INJECTION, SOLUTION INTRAMUSCULAR; INTRAVENOUS DAILY
Qty: 0 | Refills: 0 | Status: DISCONTINUED | OUTPATIENT
Start: 2018-09-01 | End: 2018-09-07

## 2018-08-31 RX ORDER — AMIKACIN SULFATE 250 MG/ML
1050 INJECTION, SOLUTION INTRAMUSCULAR; INTRAVENOUS ONCE
Qty: 0 | Refills: 0 | Status: COMPLETED | OUTPATIENT
Start: 2018-08-31 | End: 2018-08-31

## 2018-08-31 RX ORDER — HYDROMORPHONE HYDROCHLORIDE 2 MG/ML
0.5 INJECTION INTRAMUSCULAR; INTRAVENOUS; SUBCUTANEOUS ONCE
Qty: 0 | Refills: 0 | Status: DISCONTINUED | OUTPATIENT
Start: 2018-08-31 | End: 2018-08-31

## 2018-08-31 RX ORDER — METRONIDAZOLE 500 MG
TABLET ORAL
Qty: 0 | Refills: 0 | Status: DISCONTINUED | OUTPATIENT
Start: 2018-08-31 | End: 2018-09-07

## 2018-08-31 RX ORDER — METRONIDAZOLE 500 MG
500 TABLET ORAL ONCE
Qty: 0 | Refills: 0 | Status: COMPLETED | OUTPATIENT
Start: 2018-08-31 | End: 2018-08-31

## 2018-08-31 RX ORDER — METRONIDAZOLE 500 MG
500 TABLET ORAL EVERY 8 HOURS
Qty: 0 | Refills: 0 | Status: DISCONTINUED | OUTPATIENT
Start: 2018-08-31 | End: 2018-09-07

## 2018-08-31 RX ORDER — AMIKACIN SULFATE 250 MG/ML
INJECTION, SOLUTION INTRAMUSCULAR; INTRAVENOUS
Qty: 0 | Refills: 0 | Status: DISCONTINUED | OUTPATIENT
Start: 2018-08-31 | End: 2018-09-07

## 2018-08-31 RX ADMIN — Medication 6: at 13:52

## 2018-08-31 RX ADMIN — Medication 25 MILLIGRAM(S): at 05:15

## 2018-08-31 RX ADMIN — HYDROMORPHONE HYDROCHLORIDE 0.5 MILLIGRAM(S): 2 INJECTION INTRAMUSCULAR; INTRAVENOUS; SUBCUTANEOUS at 05:16

## 2018-08-31 RX ADMIN — Medication 650 MILLIGRAM(S): at 13:00

## 2018-08-31 RX ADMIN — AMIKACIN SULFATE 254.2 MILLIGRAM(S): 250 INJECTION, SOLUTION INTRAMUSCULAR; INTRAVENOUS at 10:24

## 2018-08-31 RX ADMIN — Medication 650 MILLIGRAM(S): at 09:02

## 2018-08-31 RX ADMIN — Medication 650 MILLIGRAM(S): at 22:00

## 2018-08-31 RX ADMIN — Medication 650 MILLIGRAM(S): at 09:05

## 2018-08-31 RX ADMIN — HYDROMORPHONE HYDROCHLORIDE 0.5 MILLIGRAM(S): 2 INJECTION INTRAMUSCULAR; INTRAVENOUS; SUBCUTANEOUS at 05:46

## 2018-08-31 RX ADMIN — Medication 2: at 09:01

## 2018-08-31 RX ADMIN — SODIUM CHLORIDE 1 GRAM(S): 9 INJECTION INTRAMUSCULAR; INTRAVENOUS; SUBCUTANEOUS at 21:59

## 2018-08-31 RX ADMIN — Medication 650 MILLIGRAM(S): at 22:24

## 2018-08-31 RX ADMIN — Medication 100 MILLIGRAM(S): at 21:59

## 2018-08-31 RX ADMIN — HYDROMORPHONE HYDROCHLORIDE 0.5 MILLIGRAM(S): 2 INJECTION INTRAMUSCULAR; INTRAVENOUS; SUBCUTANEOUS at 15:28

## 2018-08-31 RX ADMIN — SODIUM CHLORIDE 1 GRAM(S): 9 INJECTION INTRAMUSCULAR; INTRAVENOUS; SUBCUTANEOUS at 13:00

## 2018-08-31 RX ADMIN — Medication 100 MILLIGRAM(S): at 12:54

## 2018-08-31 RX ADMIN — APIXABAN 10 MILLIGRAM(S): 2.5 TABLET, FILM COATED ORAL at 05:15

## 2018-08-31 RX ADMIN — HYDROMORPHONE HYDROCHLORIDE 0.5 MILLIGRAM(S): 2 INJECTION INTRAMUSCULAR; INTRAVENOUS; SUBCUTANEOUS at 15:07

## 2018-08-31 RX ADMIN — OLANZAPINE 5 MILLIGRAM(S): 15 TABLET, FILM COATED ORAL at 21:59

## 2018-08-31 RX ADMIN — SODIUM CHLORIDE 1 GRAM(S): 9 INJECTION INTRAMUSCULAR; INTRAVENOUS; SUBCUTANEOUS at 05:15

## 2018-08-31 RX ADMIN — APIXABAN 10 MILLIGRAM(S): 2.5 TABLET, FILM COATED ORAL at 17:27

## 2018-08-31 RX ADMIN — Medication 5 MILLIGRAM(S): at 21:59

## 2018-08-31 NOTE — PROGRESS NOTE ADULT - ASSESSMENT
59yo M with perforated R colon and large RP abscess/abdominal wall infection s/p open ileocecectomy in discontinuity and abdominal wall/RP debridement with open abdomen/abthera on 8/11, s/p abdominal washout, end ileostomy, abdominal closure on 8/13. CT A/P showed a 10x5cm fluid/gas collection in right hemipelvis. SUDHIR drain with minimal output.  NGT removed tube on 8/19. IR drain placed 8/20. Now s/p IR drainage of a L abdominal abscess 8/23. CT scan reviewed by ID and radiology was s/o osteo. PICC line placed 8/29.     - RUE DVT ppx: Transitioned from therapeutic lovenox to eliquis for DVT ppx on 8/30. Patient to receive 10 mg BID for 7d followed by 5mg BID for 3 months. possibly 3 months of anticoagulation.   - monitor drain output  - ID: PICC in place for IV antibiotics  - start amikacin  - Pain control: Tylenol q6h, oxy PRN.  - Zyprexa 5 mg qhs for agitation.  - SSI, diabetic diet  - Salt tabs for hyponatremia  - PT/OOB  - Ostomy care  - Dispo: Rehab; Profile sent out to multiple rehab facilities

## 2018-08-31 NOTE — PROGRESS NOTE ADULT - SUBJECTIVE AND OBJECTIVE BOX
Surgery Progress Note      Subjective: Patient seen and examined. No acute events overnight. Microbiology update: abdominal fluid E. Coli is resistant to current abx therapy of avycaz. ID to change abx to amikacin. He states that his pain is improving. He tolerates his daily dressing changes better if pre-medicated with Dilaudid.     T(C): 36.6 (08-31-18 @ 09:18), Max: 37.2 (08-30-18 @ 20:30)  HR: 83 (08-31-18 @ 09:18) (83 - 96)  BP: 126/72 (08-31-18 @ 09:18) (103/65 - 155/83)  RR: 18 (08-31-18 @ 09:18) (18 - 18)  SpO2: 100% (08-31-18 @ 09:18) (99% - 100%)      08-30-18 @ 07:01  -  08-31-18 @ 07:00  --------------------------------------------------------  IN: 1360 mL / OUT: 3477 mL / NET: -2117 mL    08-31-18 @ 07:01  -  08-31-18 @ 10:57  --------------------------------------------------------  IN: 320 mL / OUT: 655 mL / NET: -335 mL        Physical Exam:   General: NAD  Abdomen: soft, appropriately tender to palpation, midline incision packed with iodoform, 1 drain on right, 2 drains on left.       Medications:     acetaminophen   Tablet. 650 milliGRAM(s) Oral every 6 hours  amiKACIN  IVPB      apixaban 10 milliGRAM(s) Oral every 12 hours  benzocaine 15 mG/menthol 3.6 mG Lozenge 1 Lozenge Oral every 6 hours PRN  HYDROmorphone  Injectable 0.5 milliGRAM(s) IV Push daily  HYDROmorphone  Injectable 0.5 milliGRAM(s) IV Push daily  insulin lispro (HumaLOG) corrective regimen sliding scale   SubCutaneous three times a day before meals  insulin lispro (HumaLOG) corrective regimen sliding scale   SubCutaneous at bedtime  melatonin 5 milliGRAM(s) Oral at bedtime  metoprolol tartrate 25 milliGRAM(s) Oral two times a day  OLANZapine Disintegrating Tablet 5 milliGRAM(s) Oral <User Schedule>  ondansetron Injectable 4 milliGRAM(s) IV Push every 6 hours PRN  sodium chloride 1 Gram(s) Oral three times a day      Radiographs: No new imaging

## 2018-08-31 NOTE — PROGRESS NOTE ADULT - ASSESSMENT
58 M with DM, HTN, peripheral neuropathy, was visiting [facility] that developed fever, weakness and abd pain, was diagnosed with bowel perf and abd abscess, refused surgery there and came here with fever, leukocytosis to 24, CT with ileal and cecal perf, stool extending to the iliacus muscle with R pelvis fluid and gas collection  s/p open ileocecectomy in discontinuity and abdominal wall/RP debridement with open abdomen/abthera on 8/11, and then abdominal washout, end ileostomy, abdominal closure on 8/13. surgical cx with mixed gram negs, lactobacillus and overgrowth of proteus, received vanco 8/11-8/15, fluconazole 8/11-8/17 and zosyn 8/11 now day 11 but was still febrile with leukocytosis and repeat CT 8/18 showed a 10x5cm fluid/gas collection in right hemipelvis, decreased in size.  s/p IR drain 8/20 and 250 cc of yellow purulent drainage now afebrile, but WBC worsened to 20  The IR drainage was not sent for culture yesterday    sepsis due to ileocecal perf and large R hemipelvis abscess s/p laparotomy, washout, ileostomy, OR cx with mixed GNR, lactobacillus and proteus overgrowth but persistent abscess s/p IR drainage 8/20 but no culture, repeat CT with new left pelvis collection and a droplet of gas in the bone s/o extension to the bone, s/p IR drainage, growing CRE E-coli resistant to acycaz, S to amikacin/genta and tigecycline also bacteroides  I reviewed the CT with radiology and the imaging is s/o osteomyelitis   leukocytosis improved  RLE weakness, started with the abd pain, related to nerve damage due to abscess    * no culture from IR 8/20  * IR culture with bacteroides fragilis and CRE E-coli also resistant to avycaz  * s/p zosyn 8/11-8/22,  vanco 8/11-8/15,  fluconazole 8/11-8/17, micafungin and meropenem 8/22-8/27  * DC avycaz, received 5 days 8/27-8/31  * start amikacin 15 mg/kg qd and flagyl 500 tid  * will need  a 6 weeks course in view of osteomyelitis until 10/14  * biweekly CBC, CMP and amikacin trough, while on antibiotics if pt is going to rehab, labs will be checked by the rehab MD  * neurology eval for the RLE weakness

## 2018-08-31 NOTE — PROGRESS NOTE ADULT - SUBJECTIVE AND OBJECTIVE BOX
Follow Up:  abd abscess    Interval History: repeat CT with another abscess s/p IR drainage and is growing CRE E-coli, which is also resistant to avycaz and bacteroides, s/p PICC line    ROS:      All other systems negative    Constitutional: no fever,  chills,  sweat  Eye: no eye pain, no redness, no vision changes  ENT:  no sore throat, no rhinorrhea  Cardiovascular:  no chest pain, no palpitation  Respiratory:  no SOB, no cough  GI:  improved abd pain, no vomiting, no diarrhea  urinary: resolved dysuria, no hematuria, no flank pain  : no penile discharge or bleeding  musculoskeletal:  no joint pain, no joint swelling,   skin:  no rash  neurology:  no headache, no seizure, + RLE weakness  psych: no anxiety, no depression             Allergies  No Known Allergies        ANTIMICROBIALS:  amiKACIN  IVPB    metroNIDAZOLE  IVPB        OTHER MEDS:  acetaminophen   Tablet. 650 milliGRAM(s) Oral every 6 hours  apixaban 10 milliGRAM(s) Oral every 12 hours  benzocaine 15 mG/menthol 3.6 mG Lozenge 1 Lozenge Oral every 6 hours PRN  HYDROmorphone  Injectable 0.5 milliGRAM(s) IV Push daily  HYDROmorphone  Injectable 0.5 milliGRAM(s) IV Push daily  insulin lispro (HumaLOG) corrective regimen sliding scale   SubCutaneous three times a day before meals  insulin lispro (HumaLOG) corrective regimen sliding scale   SubCutaneous at bedtime  melatonin 5 milliGRAM(s) Oral at bedtime  metoprolol tartrate 25 milliGRAM(s) Oral two times a day  OLANZapine Disintegrating Tablet 5 milliGRAM(s) Oral <User Schedule>  ondansetron Injectable 4 milliGRAM(s) IV Push every 6 hours PRN  sodium chloride 1 Gram(s) Oral three times a day      Vital Signs Last 24 Hrs  T(C): 36.6 (31 Aug 2018 09:18), Max: 37.2 (30 Aug 2018 20:30)  T(F): 97.8 (31 Aug 2018 09:18), Max: 99 (30 Aug 2018 20:30)  HR: 83 (31 Aug 2018 09:18) (83 - 96)  BP: 126/72 (31 Aug 2018 09:18) (103/65 - 155/83)  BP(mean): --  RR: 18 (31 Aug 2018 09:18) (18 - 18)  SpO2: 100% (31 Aug 2018 09:18) (99% - 100%)    Physical Exam:    General:    NAD, non toxic  Head: atraumatic, normocephalic  Eyes: normal sclera and conjunctiva  ENT:   no oropharyngeal lesions, no LAD, neck supple  Cardio:    regular S1,S2, no murmur  Respiratory:   clear b/l, no wheezing  abd:   soft, BS +, incision clean, L SUDHIR drain, RLQ IR drain with yellow purulent drainage  :     no CVAT, no suprapubic tenderness, no eugene  Musculoskeletal : no joint swelling, no edema  Skin:    no rash  vascular: LUE PICC, normal pulses  Neurologic:   RLE weakness 3/5  psych: normal affect, no suicidal ideation                          10.0   13.40 )-----------( 578      ( 31 Aug 2018 09:55 )             31.4       08-31    135  |  96  |  10  ----------------------------<  140<H>  4.2   |  27  |  0.76    Ca    9.4      31 Aug 2018 08:31  Phos  2.6     08-31  Mg     2.0     08-31    TPro  7.0  /  Alb  2.8<L>  /  TBili  0.2  /  DBili  x   /  AST  15  /  ALT  8<L>  /  AlkPhos  66  08-30          MICROBIOLOGY:  v  Abdominal Fl Abdominal Fluid  08-25-18   Rare Escherichia coli (Carbapenem Resistant)  Moderate Bacteroides fragilis "Susceptibilities not performed"  --  Escherichia coli (Carbapenem Resistant)      .Body Fluid IR drain (skin)  08-22-18   Moderate Mixed gram negative rods "Susceptibilities not performed"  --    Moderate polymorphonuclear leukocytes per low power field  Rare Gram Negative Rods per oil power field      .Surgical Swab retroperitoneal fluid  08-14-18   Moderate Mixed gram negative rods "Susceptibilities not performed"  Few Lactobacillus species "Susceptibilities not performed"  Unable to evaluate further due to Proteus overgrowth  --  --      .Blood Blood  08-12-18   No growth at 5 days.  --  --      .Urine Clean Catch (Midstream)  08-11-18   >100,000 CFU/ml Yeast-like cells, presumptively not Candida albicans  --  --      .Blood Blood-Peripheral  08-11-18   No growth at 5 days.  --  --                RADIOLOGY:  Images below reviewed personally  < from: CT Chest w/ IV Cont (08.24.18 @ 13:00) >  IMPRESSION: Small bilateral pleural effusions, decreased in size since   8/18/2018. There is associated compressive atelectasis at the lung bases   bilaterally.    Indeterminate 0.4 cm right upper lobe nodule. According to Fleischner   criteria, if the patient has high risk, follow-up chest CT may be   obtained in 12 months to assess for stability.    Status post interval percutaneous drainage of a right lower quadrant   fluid and gas collection, with decrease in size of the collection.    New multiloculated collections in the left pelvis. Infected collections   are considered.    Droplets of gas within the right iliac bone adjacent to the right lower   quadrant collection, concerning for extension of infection to bone.

## 2018-08-31 NOTE — PROVIDER CONTACT NOTE (OTHER) - SITUATION
pt with ulcer on medial aspect of both right and left ankle. left ankle wound appears dry and healing. right ankle wound presents with drainage. RN placed gauze and mindy wrap
Pt HR elevated (114 bpm), all other VSS. Pt denies any chest pain, SOB, headache. Pt does c/o severe abdominal pain (10 out of 10) not improved w/ pain meds given an hour ago.
returned to OR for closure and ileostomy with SUDHIR drain  Post op labs, clotted PTT/INR and unable to redraw.  @ nurses attempted with use of vein finder.

## 2018-08-31 NOTE — PROGRESS NOTE ADULT - ATTENDING COMMENTS
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)  -sensitive to tigecycline, but I discussed this with ID and they believe toxicity too high  -will change to amikacin despite poor bone penetration of aminoglycosides    DVT in distal right brachial / proximal right radial veins  -therapeutic Eliquis    MONTSERRAT placement with IV ABx via PICC when available 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

## 2018-08-31 NOTE — PROVIDER CONTACT NOTE (OTHER) - RECOMMENDATIONS
podiatry consult
PA notified. PA at bedside to assess patient. STAT EKG to be completed
MD aware, draw with US

## 2018-09-01 LAB
ANION GAP SERPL CALC-SCNC: 9 MMOL/L — SIGNIFICANT CHANGE UP (ref 5–17)
BUN SERPL-MCNC: 18 MG/DL — SIGNIFICANT CHANGE UP (ref 7–23)
CALCIUM SERPL-MCNC: 9.8 MG/DL — SIGNIFICANT CHANGE UP (ref 8.4–10.5)
CHLORIDE SERPL-SCNC: 94 MMOL/L — LOW (ref 96–108)
CO2 SERPL-SCNC: 25 MMOL/L — SIGNIFICANT CHANGE UP (ref 22–31)
CREAT SERPL-MCNC: 0.97 MG/DL — SIGNIFICANT CHANGE UP (ref 0.5–1.3)
GLUCOSE BLDC GLUCOMTR-MCNC: 125 MG/DL — HIGH (ref 70–99)
GLUCOSE BLDC GLUCOMTR-MCNC: 141 MG/DL — HIGH (ref 70–99)
GLUCOSE BLDC GLUCOMTR-MCNC: 168 MG/DL — HIGH (ref 70–99)
GLUCOSE BLDC GLUCOMTR-MCNC: 262 MG/DL — HIGH (ref 70–99)
GLUCOSE BLDC GLUCOMTR-MCNC: 337 MG/DL — HIGH (ref 70–99)
GLUCOSE SERPL-MCNC: 143 MG/DL — HIGH (ref 70–99)
HCT VFR BLD CALC: 31.4 % — LOW (ref 39–50)
HGB BLD-MCNC: 9.9 G/DL — LOW (ref 13–17)
MAGNESIUM SERPL-MCNC: 1.8 MG/DL — SIGNIFICANT CHANGE UP (ref 1.6–2.6)
MCHC RBC-ENTMCNC: 27.1 PG — SIGNIFICANT CHANGE UP (ref 27–34)
MCHC RBC-ENTMCNC: 31.5 GM/DL — LOW (ref 32–36)
MCV RBC AUTO: 86 FL — SIGNIFICANT CHANGE UP (ref 80–100)
PHOSPHATE SERPL-MCNC: 3.5 MG/DL — SIGNIFICANT CHANGE UP (ref 2.5–4.5)
PLATELET # BLD AUTO: 535 K/UL — HIGH (ref 150–400)
POTASSIUM SERPL-MCNC: 4.1 MMOL/L — SIGNIFICANT CHANGE UP (ref 3.5–5.3)
POTASSIUM SERPL-SCNC: 4.1 MMOL/L — SIGNIFICANT CHANGE UP (ref 3.5–5.3)
RBC # BLD: 3.65 M/UL — LOW (ref 4.2–5.8)
RBC # FLD: 21.2 % — HIGH (ref 10.3–14.5)
SODIUM SERPL-SCNC: 128 MMOL/L — LOW (ref 135–145)
WBC # BLD: 14.19 K/UL — HIGH (ref 3.8–10.5)
WBC # FLD AUTO: 14.19 K/UL — HIGH (ref 3.8–10.5)

## 2018-09-01 PROCEDURE — 99231 SBSQ HOSP IP/OBS SF/LOW 25: CPT

## 2018-09-01 PROCEDURE — 99232 SBSQ HOSP IP/OBS MODERATE 35: CPT | Mod: GC

## 2018-09-01 RX ORDER — MAGNESIUM SULFATE 500 MG/ML
2 VIAL (ML) INJECTION ONCE
Qty: 0 | Refills: 0 | Status: COMPLETED | OUTPATIENT
Start: 2018-09-01 | End: 2018-09-01

## 2018-09-01 RX ORDER — MAGNESIUM SULFATE 500 MG/ML
2 VIAL (ML) INJECTION ONCE
Qty: 0 | Refills: 0 | Status: DISCONTINUED | OUTPATIENT
Start: 2018-09-01 | End: 2018-09-01

## 2018-09-01 RX ADMIN — Medication 650 MILLIGRAM(S): at 04:49

## 2018-09-01 RX ADMIN — Medication 5 MILLIGRAM(S): at 20:39

## 2018-09-01 RX ADMIN — Medication 6: at 18:15

## 2018-09-01 RX ADMIN — Medication 650 MILLIGRAM(S): at 20:44

## 2018-09-01 RX ADMIN — OLANZAPINE 5 MILLIGRAM(S): 15 TABLET, FILM COATED ORAL at 20:38

## 2018-09-01 RX ADMIN — Medication 100 MILLIGRAM(S): at 20:40

## 2018-09-01 RX ADMIN — SODIUM CHLORIDE 1 GRAM(S): 9 INJECTION INTRAMUSCULAR; INTRAVENOUS; SUBCUTANEOUS at 13:40

## 2018-09-01 RX ADMIN — Medication 100 MILLIGRAM(S): at 05:09

## 2018-09-01 RX ADMIN — APIXABAN 10 MILLIGRAM(S): 2.5 TABLET, FILM COATED ORAL at 05:09

## 2018-09-01 RX ADMIN — Medication 650 MILLIGRAM(S): at 11:30

## 2018-09-01 RX ADMIN — Medication 650 MILLIGRAM(S): at 15:19

## 2018-09-01 RX ADMIN — Medication 650 MILLIGRAM(S): at 04:02

## 2018-09-01 RX ADMIN — Medication 650 MILLIGRAM(S): at 11:00

## 2018-09-01 RX ADMIN — Medication 650 MILLIGRAM(S): at 20:38

## 2018-09-01 RX ADMIN — Medication 2: at 11:00

## 2018-09-01 RX ADMIN — Medication 650 MILLIGRAM(S): at 14:49

## 2018-09-01 RX ADMIN — Medication 8: at 13:41

## 2018-09-01 RX ADMIN — SODIUM CHLORIDE 1 GRAM(S): 9 INJECTION INTRAMUSCULAR; INTRAVENOUS; SUBCUTANEOUS at 20:38

## 2018-09-01 RX ADMIN — Medication 100 MILLIGRAM(S): at 13:41

## 2018-09-01 RX ADMIN — AMIKACIN SULFATE 254.2 MILLIGRAM(S): 250 INJECTION, SOLUTION INTRAMUSCULAR; INTRAVENOUS at 12:16

## 2018-09-01 RX ADMIN — Medication 25 MILLIGRAM(S): at 05:09

## 2018-09-01 RX ADMIN — APIXABAN 10 MILLIGRAM(S): 2.5 TABLET, FILM COATED ORAL at 18:15

## 2018-09-01 RX ADMIN — Medication 50 GRAM(S): at 13:40

## 2018-09-01 RX ADMIN — SODIUM CHLORIDE 1 GRAM(S): 9 INJECTION INTRAMUSCULAR; INTRAVENOUS; SUBCUTANEOUS at 05:09

## 2018-09-01 RX ADMIN — Medication 83.33 MILLIMOLE(S): at 14:49

## 2018-09-01 NOTE — PROGRESS NOTE ADULT - SUBJECTIVE AND OBJECTIVE BOX
Follow Up:  abd abscess    Interval History: repeat CT with another abscess s/p IR drainage and is growing CRE E-coli, which is also resistant to avycaz and bacteroides, s/p PICC line    ROS:      All other systems negative    Constitutional: no fever,  chills,  sweat  Eye: no eye pain, no redness, no vision changes  ENT:  no sore throat, no rhinorrhea  Cardiovascular:  no chest pain, no palpitation  Respiratory:  no SOB, no cough  GI:  improved abd pain, no vomiting, no diarrhea  urinary: resolved dysuria, no hematuria, no flank pain  : no penile discharge or bleeding  musculoskeletal:  no joint pain, no joint swelling,   skin:  no rash  neurology:  no headache, no seizure, + RLE weakness  psych: no anxiety, no depression         Allergies  No Known Allergies        ANTIMICROBIALS:  amiKACIN  IVPB    amiKACIN  IVPB 1050 daily  metroNIDAZOLE  IVPB 500 every 8 hours  metroNIDAZOLE  IVPB        OTHER MEDS:  acetaminophen   Tablet. 650 milliGRAM(s) Oral every 6 hours  apixaban 10 milliGRAM(s) Oral every 12 hours  benzocaine 15 mG/menthol 3.6 mG Lozenge 1 Lozenge Oral every 6 hours PRN  HYDROmorphone  Injectable 0.5 milliGRAM(s) IV Push daily  HYDROmorphone  Injectable 0.5 milliGRAM(s) IV Push daily  insulin lispro (HumaLOG) corrective regimen sliding scale   SubCutaneous three times a day before meals  insulin lispro (HumaLOG) corrective regimen sliding scale   SubCutaneous at bedtime  melatonin 5 milliGRAM(s) Oral at bedtime  metoprolol tartrate 25 milliGRAM(s) Oral two times a day  OLANZapine Disintegrating Tablet 5 milliGRAM(s) Oral <User Schedule>  ondansetron Injectable 4 milliGRAM(s) IV Push every 6 hours PRN  sodium chloride 1 Gram(s) Oral three times a day      Vital Signs Last 24 Hrs  T(C): 37.1 (01 Sep 2018 14:33), Max: 37.1 (01 Sep 2018 11:19)  T(F): 98.7 (01 Sep 2018 14:33), Max: 98.7 (01 Sep 2018 11:19)  HR: 90 (01 Sep 2018 14:33) (78 - 111)  BP: 122/70 (01 Sep 2018 14:33) (100/63 - 135/84)  BP(mean): --  RR: 18 (01 Sep 2018 14:33) (18 - 18)  SpO2: 98% (01 Sep 2018 14:33) (98% - 100%)    Physical Exam:    General:    NAD, non toxic  Head: atraumatic, normocephalic  Eyes: normal sclera and conjunctiva  ENT:   no oropharyngeal lesions, no LAD, neck supple  Cardio:    regular S1,S2, no murmur  Respiratory:   clear b/l, no wheezing  abd:   soft, BS +, incision clean, L SUDHIR drain, RLQ IR drain with yellow purulent drainage  :     no CVAT, no suprapubic tenderness, no eugene  Musculoskeletal : no joint swelling, no edema  Skin:    no rash  vascular: LUE PICC, normal pulses  Neurologic:   RLE weakness 3/5  psych: normal affect, no suicidal ideation                        9.9    14.19 )-----------( 535      ( 01 Sep 2018 09:28 )             31.4       09-01    128<L>  |  94<L>  |  18  ----------------------------<  143<H>  4.1   |  25  |  0.97    Ca    9.8      01 Sep 2018 07:21  Phos  3.5     09-01  Mg     1.8     09-01            MICROBIOLOGY:  v  Abdominal Fl Abdominal Fluid  08-25-18   Rare Escherichia coli (Carbapenem Resistant)  Moderate Bacteroides fragilis "Susceptibilities not performed"  --  Escherichia coli (Carbapenem Resistant)      .Body Fluid IR drain (skin)  08-22-18   Moderate Mixed gram negative rods "Susceptibilities not performed"  --    Moderate polymorphonuclear leukocytes per low power field  Rare Gram Negative Rods per oil power field      .Surgical Swab retroperitoneal fluid  08-14-18   Moderate Mixed gram negative rods "Susceptibilities not performed"  Few Lactobacillus species "Susceptibilities not performed"  Unable to evaluate further due to Proteus overgrowth  --  --      .Blood Blood  08-12-18   No growth at 5 days.  --  --      .Urine Clean Catch (Midstream)  08-11-18   >100,000 CFU/ml Yeast-like cells, presumptively not Candida albicans  --  --      .Blood Blood-Peripheral  08-11-18   No growth at 5 days.  --  --                RADIOLOGY:  Images below reviewed personally  < from: Xray Chest 1 View- PORTABLE-Urgent (08.29.18 @ 18:07) >  IMPRESSION:     Left sided PICC is in place with itstip in the proximal right atrium.    No pneumothorax.      < from: CT Chest w/ IV Cont (08.24.18 @ 13:00) >  IMPRESSION: Small bilateral pleural effusions, decreased in size since   8/18/2018. There is associated compressive atelectasis at the lung bases   bilaterally.    Indeterminate 0.4 cm right upper lobe nodule. According to Fleischner   criteria, if the patient has high risk, follow-up chest CT may be   obtained in 12 months to assess for stability.    Status post interval percutaneous drainage of a right lower quadrant   fluid and gas collection, with decrease in size of the collection.    New multiloculated collections in the left pelvis. Infected collections   are considered.    Droplets of gas within the right iliac bone adjacent to the right lower   quadrant collection, concerning for extension of infection to bone.

## 2018-09-01 NOTE — PROGRESS NOTE ADULT - ASSESSMENT
58 M with DM, HTN, peripheral neuropathy, was visiting [facility] that developed fever, weakness and abd pain, was diagnosed with bowel perf and abd abscess, refused surgery there and came here with fever, leukocytosis to 24, CT with ileal and cecal perf, stool extending to the iliacus muscle with R pelvis fluid and gas collection  s/p open ileocecectomy in discontinuity and abdominal wall/RP debridement with open abdomen/abthera on 8/11, and then abdominal washout, end ileostomy, abdominal closure on 8/13. surgical cx with mixed gram negs, lactobacillus and overgrowth of proteus, received vanco 8/11-8/15, fluconazole 8/11-8/17 and zosyn 8/11 now day 11 but was still febrile with leukocytosis and repeat CT 8/18 showed a 10x5cm fluid/gas collection in right hemipelvis, decreased in size.  s/p IR drain 8/20 and 250 cc of yellow purulent drainage now afebrile, but WBC worsened to 20  The IR drainage was not sent for culture yesterday    sepsis due to ileocecal perf and large R hemipelvis abscess s/p laparotomy, washout, ileostomy, OR cx with mixed GNR, lactobacillus and proteus overgrowth but persistent abscess s/p IR drainage 8/20 but no culture, repeat CT with new left pelvis collection and a droplet of gas in the bone s/o extension to the bone, s/p IR drainage, growing CRE E-coli resistant to acycaz, S to amikacin/genta and tigecycline also bacteroides  I reviewed the CT with radiology and the imaging is s/o osteomyelitis   leukocytosis worse to 14  RLE weakness, started with the abd pain, related to nerve damage due to abscess    * no culture from IR 8/20  * IR culture with bacteroides fragilis and CRE E-coli also resistant to avycaz  * s/p zosyn 8/11-8/22,  vanco 8/11-8/15,  fluconazole 8/11-8/17, micafungin and meropenem 8/22-8/27  * DC avycaz, received 5 days 8/27-8/31  * c/w amikacin 15 mg/kg qd and flagyl 500 tid, started 8/31  * monitor renal function  * check amikacin trough tomorrow  * will need  a 6 weeks course in view of osteomyelitis until 10/14  * biweekly CBC, CMP and amikacin trough, while on antibiotics if pt is going to rehab, labs will be checked by the rehab MD  * neurology eval for the RLE weakness

## 2018-09-01 NOTE — PROGRESS NOTE ADULT - ATTENDING COMMENTS
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

## 2018-09-01 NOTE — CONSULT NOTE ADULT - ASSESSMENT
58 M pt with DM neuropathy presents for colon perforation, podiatry was consulted for right foot wound   -pt was seen and examined   -wound is superficial, to subcutaneous. Stable, no signs of infection, no signs of necrosis  -wound was cleaned with saline, dressed with DSD  -VSS, WBC 14  - foot wound not likely the source of leukocytosis  -rec. daily application of silvadine and DSD  -no podiatry surgical intervention   -podiatry will sign off, please reconsult when needed   -will d/w attending

## 2018-09-01 NOTE — PROGRESS NOTE ADULT - ASSESSMENT
57yo M with perforated R colon and large RP abscess/abdominal wall infection s/p open ileocecectomy in discontinuity and abdominal wall/RP debridement with open abdomen/abthera on 8/11, s/p abdominal washout, end ileostomy, abdominal closure on 8/13. CT A/P showed a 10x5cm fluid/gas collection in right hemipelvis. SUDHIR drain with minimal output.  NGT removed tube on 8/19. IR drain placed 8/20. Now s/p IR drainage of a L abdominal abscess 8/23. CT scan reviewed by ID and radiology was s/o osteo. PICC line placed 8/29.     Podiatry rec: silvadene with DSD for R foot ulcer  - ID recs: * c/w amikacin 15 mg/kg qd and flagyl 500 tid, started 8/31, amikacin trough in AM, PICC in place for IV antibiotics,   - RUE DVT ppx: Transitioned from therapeutic lovenox to eliquis for DVT ppx on 8/30. Patient to receive 10 mg BID for 7d followed by 5mg BID for 3 months. possibly 3 months of anticoagulation.   - monitor drain output  - Pain control: Tylenol q6h, oxy PRN.  - Zyprexa 5 mg qhs for agitation.  - SSI, diabetic diet  - Salt tabs for hyponatremia  - PT/OOB  - Ostomy care  - Dispo: Rehab; Profile sent out to multiple rehab facilities

## 2018-09-01 NOTE — PROGRESS NOTE ADULT - SUBJECTIVE AND OBJECTIVE BOX
TRAUMA SURGERY DAILY PROGRESS NOTE    Subjective:  Patient seen and examined on morning rounds.   He states that his pain is improving. He tolerates his daily dressing changes better if pre-medicated with Dilaudid.     MEDICATIONS  (STANDING):  acetaminophen   Tablet. 650 milliGRAM(s) Oral every 6 hours  amiKACIN  IVPB      amiKACIN  IVPB 1050 milliGRAM(s) IV Intermittent daily  apixaban 10 milliGRAM(s) Oral every 12 hours  HYDROmorphone  Injectable 0.5 milliGRAM(s) IV Push daily  HYDROmorphone  Injectable 0.5 milliGRAM(s) IV Push daily  insulin lispro (HumaLOG) corrective regimen sliding scale   SubCutaneous three times a day before meals  insulin lispro (HumaLOG) corrective regimen sliding scale   SubCutaneous at bedtime  melatonin 5 milliGRAM(s) Oral at bedtime  metoprolol tartrate 25 milliGRAM(s) Oral two times a day  metroNIDAZOLE  IVPB 500 milliGRAM(s) IV Intermittent every 8 hours  metroNIDAZOLE  IVPB      OLANZapine Disintegrating Tablet 5 milliGRAM(s) Oral <User Schedule>  silver sulfADIAZINE 1% Cream 1 Application(s) Topical daily  sodium chloride 1 Gram(s) Oral three times a day    MEDICATIONS  (PRN):  benzocaine 15 mG/menthol 3.6 mG Lozenge 1 Lozenge Oral every 6 hours PRN Sore Throat  ondansetron Injectable 4 milliGRAM(s) IV Push every 6 hours PRN Nausea    Vital Signs Last 24 Hrs  T(C): 37 (01 Sep 2018 17:38), Max: 37.1 (01 Sep 2018 11:19)  T(F): 98.6 (01 Sep 2018 17:38), Max: 98.7 (01 Sep 2018 11:19)  HR: 90 (01 Sep 2018 17:38) (85 - 111)  BP: 104/68 (01 Sep 2018 17:38) (104/68 - 135/84)  BP(mean): --  RR: 18 (01 Sep 2018 17:38) (18 - 18)  SpO2: 100% (01 Sep 2018 17:38) (98% - 100%)  I&O's Detail    31 Aug 2018 07:01  -  01 Sep 2018 07:00  --------------------------------------------------------  IN:    Oral Fluid: 880 mL    Solution: 550 mL  Total IN: 1430 mL    OUT:    Drain: 15 mL    Ileostomy: 650 mL    Voided: 3050 mL  Total OUT: 3715 mL    Total NET: -2285 mL      01 Sep 2018 07:01  -  01 Sep 2018 18:32  --------------------------------------------------------  IN:    Oral Fluid: 920 mL    Solution: 250 mL    Solution: 100 mL    Solution: 50 mL    Solution: 500 mL  Total IN: 1820 mL    OUT:    Drain: 70 mL    Ileostomy: 100 mL    Voided: 920 mL  Total OUT: 1090 mL    Total NET: 730 mL        Daily     Daily     LABS:                        9.9    14.19 )-----------( 535      ( 01 Sep 2018 09:28 )             31.4     09-01    128<L>  |  94<L>  |  18  ----------------------------<  143<H>  4.1   |  25  |  0.97    Ca    9.8      01 Sep 2018 07:21  Phos  3.5     09-01  Mg     1.8     09-01            Physical Exam:   General: NAD  Abdomen: soft, appropriately tender to palpation, midline incision, packing removed this AM, 1 drain on right, 2 drains on left.   Extremeties: ulcer noted on the R foot, nonpurulent

## 2018-09-02 LAB
AMIKACIN PEAK SERPL-MCNC: 36.5 UG/ML — SIGNIFICANT CHANGE UP (ref 25–40)
AMIKACIN TROUGH SERPL-MCNC: 1.6 UG/ML — SIGNIFICANT CHANGE UP
ANION GAP SERPL CALC-SCNC: 11 MMOL/L — SIGNIFICANT CHANGE UP (ref 5–17)
BUN SERPL-MCNC: 17 MG/DL — SIGNIFICANT CHANGE UP (ref 7–23)
CALCIUM SERPL-MCNC: 9 MG/DL — SIGNIFICANT CHANGE UP (ref 8.4–10.5)
CHLORIDE SERPL-SCNC: 98 MMOL/L — SIGNIFICANT CHANGE UP (ref 96–108)
CO2 SERPL-SCNC: 25 MMOL/L — SIGNIFICANT CHANGE UP (ref 22–31)
CREAT SERPL-MCNC: 0.68 MG/DL — SIGNIFICANT CHANGE UP (ref 0.5–1.3)
GLUCOSE BLDC GLUCOMTR-MCNC: 145 MG/DL — HIGH (ref 70–99)
GLUCOSE BLDC GLUCOMTR-MCNC: 170 MG/DL — HIGH (ref 70–99)
GLUCOSE BLDC GLUCOMTR-MCNC: 234 MG/DL — HIGH (ref 70–99)
GLUCOSE BLDC GLUCOMTR-MCNC: 253 MG/DL — HIGH (ref 70–99)
GLUCOSE SERPL-MCNC: 146 MG/DL — HIGH (ref 70–99)
HCT VFR BLD CALC: 28.1 % — LOW (ref 39–50)
HGB BLD-MCNC: 8.8 G/DL — LOW (ref 13–17)
MAGNESIUM SERPL-MCNC: 2 MG/DL — SIGNIFICANT CHANGE UP (ref 1.6–2.6)
MCHC RBC-ENTMCNC: 27 PG — SIGNIFICANT CHANGE UP (ref 27–34)
MCHC RBC-ENTMCNC: 31.3 GM/DL — LOW (ref 32–36)
MCV RBC AUTO: 86.2 FL — SIGNIFICANT CHANGE UP (ref 80–100)
PHOSPHATE SERPL-MCNC: 4 MG/DL — SIGNIFICANT CHANGE UP (ref 2.5–4.5)
PLATELET # BLD AUTO: 478 K/UL — HIGH (ref 150–400)
POTASSIUM SERPL-MCNC: 4 MMOL/L — SIGNIFICANT CHANGE UP (ref 3.5–5.3)
POTASSIUM SERPL-SCNC: 4 MMOL/L — SIGNIFICANT CHANGE UP (ref 3.5–5.3)
RBC # BLD: 3.26 M/UL — LOW (ref 4.2–5.8)
RBC # FLD: 21 % — HIGH (ref 10.3–14.5)
SODIUM SERPL-SCNC: 134 MMOL/L — LOW (ref 135–145)
WBC # BLD: 10.45 K/UL — SIGNIFICANT CHANGE UP (ref 3.8–10.5)
WBC # FLD AUTO: 10.45 K/UL — SIGNIFICANT CHANGE UP (ref 3.8–10.5)

## 2018-09-02 PROCEDURE — 99231 SBSQ HOSP IP/OBS SF/LOW 25: CPT | Mod: 24

## 2018-09-02 RX ADMIN — SODIUM CHLORIDE 1 GRAM(S): 9 INJECTION INTRAMUSCULAR; INTRAVENOUS; SUBCUTANEOUS at 22:09

## 2018-09-02 RX ADMIN — Medication 25 MILLIGRAM(S): at 05:23

## 2018-09-02 RX ADMIN — Medication 650 MILLIGRAM(S): at 14:40

## 2018-09-02 RX ADMIN — SODIUM CHLORIDE 1 GRAM(S): 9 INJECTION INTRAMUSCULAR; INTRAVENOUS; SUBCUTANEOUS at 14:40

## 2018-09-02 RX ADMIN — APIXABAN 10 MILLIGRAM(S): 2.5 TABLET, FILM COATED ORAL at 17:33

## 2018-09-02 RX ADMIN — Medication 100 MILLIGRAM(S): at 14:36

## 2018-09-02 RX ADMIN — SODIUM CHLORIDE 1 GRAM(S): 9 INJECTION INTRAMUSCULAR; INTRAVENOUS; SUBCUTANEOUS at 05:23

## 2018-09-02 RX ADMIN — APIXABAN 10 MILLIGRAM(S): 2.5 TABLET, FILM COATED ORAL at 05:23

## 2018-09-02 RX ADMIN — Medication 2: at 17:33

## 2018-09-02 RX ADMIN — AMIKACIN SULFATE 254.2 MILLIGRAM(S): 250 INJECTION, SOLUTION INTRAMUSCULAR; INTRAVENOUS at 12:43

## 2018-09-02 RX ADMIN — Medication 100 MILLIGRAM(S): at 22:09

## 2018-09-02 RX ADMIN — Medication 1 APPLICATION(S): at 12:04

## 2018-09-02 RX ADMIN — Medication 650 MILLIGRAM(S): at 05:23

## 2018-09-02 RX ADMIN — Medication 650 MILLIGRAM(S): at 15:00

## 2018-09-02 RX ADMIN — Medication 0: at 22:10

## 2018-09-02 RX ADMIN — Medication 25 MILLIGRAM(S): at 17:33

## 2018-09-02 RX ADMIN — Medication 100 MILLIGRAM(S): at 05:23

## 2018-09-02 RX ADMIN — Medication 6: at 12:03

## 2018-09-02 RX ADMIN — OLANZAPINE 5 MILLIGRAM(S): 15 TABLET, FILM COATED ORAL at 22:09

## 2018-09-02 RX ADMIN — Medication 650 MILLIGRAM(S): at 20:54

## 2018-09-02 RX ADMIN — Medication 5 MILLIGRAM(S): at 22:09

## 2018-09-02 RX ADMIN — Medication 650 MILLIGRAM(S): at 20:24

## 2018-09-02 NOTE — PROGRESS NOTE ADULT - SUBJECTIVE AND OBJECTIVE BOX
TRAUMA SURGERY DAILY PROGRESS NOTE    Subjective: Patient seen and examined on morning rounds. He states that his pain is improving. He was seen by podiatry yesterday for diabetic foot ulcer on medial aspect of right foot    MEDICATIONS  (STANDING):  MEDICATIONS  (STANDING):  acetaminophen   Tablet. 650 milliGRAM(s) Oral every 6 hours  amiKACIN  IVPB      amiKACIN  IVPB 1050 milliGRAM(s) IV Intermittent daily  apixaban 10 milliGRAM(s) Oral every 12 hours  HYDROmorphone  Injectable 0.5 milliGRAM(s) IV Push daily  HYDROmorphone  Injectable 0.5 milliGRAM(s) IV Push daily  insulin lispro (HumaLOG) corrective regimen sliding scale   SubCutaneous three times a day before meals  insulin lispro (HumaLOG) corrective regimen sliding scale   SubCutaneous at bedtime  melatonin 5 milliGRAM(s) Oral at bedtime  metoprolol tartrate 25 milliGRAM(s) Oral two times a day  metroNIDAZOLE  IVPB 500 milliGRAM(s) IV Intermittent every 8 hours  metroNIDAZOLE  IVPB      OLANZapine Disintegrating Tablet 5 milliGRAM(s) Oral <User Schedule>  silver sulfADIAZINE 1% Cream 1 Application(s) Topical daily  sodium chloride 1 Gram(s) Oral three times a day    MEDICATIONS  (PRN):  benzocaine 15 mG/menthol 3.6 mG Lozenge 1 Lozenge Oral every 6 hours PRN Sore Throat  ondansetron Injectable 4 milliGRAM(s) IV Push every 6 hours PRN Nausea    Vital Signs Last 24 Hrs  T(C): 36.4 (02 Sep 2018 05:22), Max: 37.1 (01 Sep 2018 11:19)  T(F): 97.6 (02 Sep 2018 05:22), Max: 98.7 (01 Sep 2018 11:19)  HR: 97 (02 Sep 2018 05:22) (85 - 97)  BP: 138/76 (02 Sep 2018 05:22) (104/68 - 138/76)  BP(mean): --  RR: 18 (02 Sep 2018 05:22) (18 - 18)  SpO2: 92% (02 Sep 2018 05:22) (92% - 100%)      I&O's Detail    31 Aug 2018 07:01  -  01 Sep 2018 07:00  --------------------------------------------------------  IN:    Oral Fluid: 880 mL    Solution: 550 mL  Total IN: 1430 mL    OUT:    Drain: 15 mL    Ileostomy: 650 mL    Voided: 3050 mL  Total OUT: 3715 mL    Total NET: -2285 mL      01 Sep 2018 07:01  -  01 Sep 2018 18:32  --------------------------------------------------------  IN:    Oral Fluid: 920 mL    Solution: 250 mL    Solution: 100 mL    Solution: 50 mL    Solution: 500 mL  Total IN: 1820 mL    OUT:    Drain: 70 mL    Ileostomy: 100 mL    Voided: 920 mL  Total OUT: 1090 mL    Total NET: 730 mL        Daily     Daily     LABS:                        9.9    14.19 )-----------( 535      ( 01 Sep 2018 09:28 )             31.4     09-01    128<L>  |  94<L>  |  18  ----------------------------<  143<H>  4.1   |  25  |  0.97    Ca    9.8      01 Sep 2018 07:21  Phos  3.5     09-01  Mg     1.8     09-01            Physical Exam:   General: NAD  Abdomen: soft, appropriately tender to palpation, midline incision, packing removed this AM, 1 drain on right, 2 drains on left.   Extremeties: ulcer noted on the R foot, nonpurulent TRAUMA SURGERY DAILY PROGRESS NOTE    Subjective: Patient seen and examined on morning rounds. He states that his pain is improving. He was seen by podiatry yesterday for diabetic foot ulcer on medial aspect of right foot    MEDICATIONS  (STANDING):  MEDICATIONS  (STANDING):  acetaminophen   Tablet. 650 milliGRAM(s) Oral every 6 hours  amiKACIN  IVPB      amiKACIN  IVPB 1050 milliGRAM(s) IV Intermittent daily  apixaban 10 milliGRAM(s) Oral every 12 hours  HYDROmorphone  Injectable 0.5 milliGRAM(s) IV Push daily  HYDROmorphone  Injectable 0.5 milliGRAM(s) IV Push daily  insulin lispro (HumaLOG) corrective regimen sliding scale   SubCutaneous three times a day before meals  insulin lispro (HumaLOG) corrective regimen sliding scale   SubCutaneous at bedtime  melatonin 5 milliGRAM(s) Oral at bedtime  metoprolol tartrate 25 milliGRAM(s) Oral two times a day  metroNIDAZOLE  IVPB 500 milliGRAM(s) IV Intermittent every 8 hours  metroNIDAZOLE  IVPB      OLANZapine Disintegrating Tablet 5 milliGRAM(s) Oral <User Schedule>  silver sulfADIAZINE 1% Cream 1 Application(s) Topical daily  sodium chloride 1 Gram(s) Oral three times a day    MEDICATIONS  (PRN):  benzocaine 15 mG/menthol 3.6 mG Lozenge 1 Lozenge Oral every 6 hours PRN Sore Throat  ondansetron Injectable 4 milliGRAM(s) IV Push every 6 hours PRN Nausea    Vital Signs Last 24 Hrs  T(C): 36.5 (02 Sep 2018 10:00), Max: 37.1 (01 Sep 2018 11:19)  T(F): 97.7 (02 Sep 2018 10:00), Max: 98.7 (01 Sep 2018 11:19)  HR: 83 (02 Sep 2018 10:00) (83 - 97)  BP: 140/80 (02 Sep 2018 10:00) (104/68 - 140/80)  BP(mean): --  RR: 17 (02 Sep 2018 10:00) (17 - 18)  SpO2: 99% (02 Sep 2018 10:00) (92% - 100%)      I&O's Summary    01 Sep 2018 07:01  -  02 Sep 2018 07:00  --------------------------------------------------------  IN: 2060 mL / OUT: 2727 mL / NET: -667 mL    02 Sep 2018 07:01  -  02 Sep 2018 11:16  --------------------------------------------------------  IN: 0 mL / OUT: 350 mL / NET: -350 mL                            8.8    10.45 )-----------( 478      ( 02 Sep 2018 10:09 )             28.1     09-02    134<L>  |  98  |  17  ----------------------------<  146<H>  4.0   |  25  |  0.68    Ca    9.0      02 Sep 2018 07:38  Phos  4.0     09-02  Mg     2.0     09-02        Physical Exam:   General: NAD  Abdomen: soft, appropriately tender to palpation, midline incision, packing removed this AM, 1 drain on right, 2 drains on left.   Extremities ulcer noted on the R foot, nonpurulent

## 2018-09-02 NOTE — PROGRESS NOTE ADULT - ASSESSMENT
59yo M with perforated R colon and large RP abscess/abdominal wall infection s/p open ileocecectomy in discontinuity and abdominal wall/RP debridement with open abdomen/abthera on 8/11, s/p abdominal washout, end ileostomy, abdominal closure on 8/13. CT A/P showed a 10x5cm fluid/gas collection in right hemipelvis. SUDHIR drain with minimal output.  NGT removed tube on 8/19. IR drain placed 8/20. Now s/p IR drainage of a L abdominal abscess 8/23. CT scan reviewed by ID and radiology was s/o osteo. PICC line placed 8/29.     Podiatry rec: silvadene with DSD for R foot ulcer  - ID recs: * c/w amikacin 15 mg/kg qd and flagyl 500 tid, started 8/31, amikacin trough in AM, PICC in place for IV antibiotics,   - RUE DVT ppx: Transitioned from therapeutic lovenox to eliquis for DVT ppx on 8/30. Patient to receive 10 mg BID for 7d followed by 5mg BID for 3 months. possibly 3 months of anticoagulation.   - monitor drain output  - Pain control: Tylenol q6h, oxy PRN.  - Zyprexa 5 mg qhs for agitation.  - SSI, diabetic diet  - Salt tabs for hyponatremia  - PT/OOB  - Ostomy care  - Dispo: Rehab; Profile sent out to multiple rehab facilities 59yo M with perforated R colon and large RP abscess/abdominal wall infection s/p open ileocecectomy in discontinuity and abdominal wall/RP debridement with open abdomen/abthera on 8/11, s/p abdominal washout, end ileostomy, abdominal closure on 8/13. CT A/P showed a 10x5cm fluid/gas collection in right hemipelvis. SUDHIR drain with minimal output.  NGT removed tube on 8/19. IR drain placed 8/20. Now s/p IR drainage of a L abdominal abscess 8/23. CT scan reviewed by ID and radiology was s/o osteo. PICC line placed 8/29.     - Podiatry rec: silvadene with DSD for R foot ulcer  - ID recs: * c/w amikacin 15 mg/kg qd and flagyl 500 tid, started 8/31, amikacin trough in AM, PICC in place for IV antibiotics,   - RUE DVT ppx: Transitioned from therapeutic lovenox to eliquis for DVT ppx on 8/30. Patient to receive 10 mg BID for 7d followed by 5mg BID for 3 months. possibly 3 months of anticoagulation.   - monitor drain output  - Pain control: Tylenol q6h, oxy PRN.  - Zyprexa 5 mg qhs for agitation.  - SSI, diabetic diet  - Salt tabs for hyponatremia  - PT/OOB  - Ostomy care  - Dispo: Rehab; Profile sent out to multiple rehab facilities

## 2018-09-02 NOTE — PROGRESS NOTE ADULT - ATTENDING COMMENTS
seen and examined 09-01-18 @ 1312    8/11 - right hemicolectomy / ABThera for cecal perforation with purulent peritonitis  8/13 - end ileostomy creation  8/20 - U/S guided RLQ abscess drainage  8/27 - CT guided left pelvic abscess drainage    RLQ percutaneous drain remains purulent  WBC = 10    right iliac osteomyelitis at [facility]  purulent peritonitis  carbapenem resistant E coli  -amikacin / Flagyl  -check amikacin peak and trough today    DVT in distal right brachial / proximal right radial veins  -therapeutic Eliquis    left foot ulcer  -daily silvadine dressings    MONTSERRAT placement with IV ABx via PICC when available seen and examined 09-01-18 @ 1312    8/11 - right hemicolectomy / ABThera for cecal perforation with purulent peritonitis  8/13 - end ileostomy creation  8/20 - U/S guided RLQ abscess drainage  8/27 - CT guided left pelvic abscess drainage    RLQ percutaneous drain remains purulent  WBC = 10    right iliac osteomyelitis at [facility]  purulent peritonitis  carbapenem resistant E coli  -amikacin / Flagyl  -check amikacin peak and trough today    DVT in distal right brachial / proximal right radial veins  -therapeutic Eliquis    left foot ulcer  -daily silvadene dressings    MONTSERRAT placement with IV ABx via PICC when available seen and examined 09-02-18 @ 1312    8/11 - right hemicolectomy / ABThera for cecal perforation with purulent peritonitis  8/13 - end ileostomy creation  8/20 - U/S guided RLQ abscess drainage  8/27 - CT guided left pelvic abscess drainage    RLQ percutaneous drain remains purulent  WBC = 10    right iliac osteomyelitis at [facility]  purulent peritonitis  carbapenem resistant E coli  -amikacin / Flagyl  -check amikacin peak and trough today    DVT in distal right brachial / proximal right radial veins  -therapeutic Eliquis    left foot ulcer  -daily silvadene dressings    MONTSERRAT placement with IV ABx via PICC when available

## 2018-09-03 LAB
ALBUMIN SERPL ELPH-MCNC: 3.4 G/DL — SIGNIFICANT CHANGE UP (ref 3.3–5)
ALP SERPL-CCNC: 65 U/L — SIGNIFICANT CHANGE UP (ref 40–120)
ALT FLD-CCNC: 10 U/L — SIGNIFICANT CHANGE UP (ref 10–45)
AMIKACIN TROUGH SERPL-MCNC: 3.6 UG/ML — SIGNIFICANT CHANGE UP
ANION GAP SERPL CALC-SCNC: 14 MMOL/L — SIGNIFICANT CHANGE UP (ref 5–17)
AST SERPL-CCNC: 17 U/L — SIGNIFICANT CHANGE UP (ref 10–40)
BILIRUB SERPL-MCNC: 0.3 MG/DL — SIGNIFICANT CHANGE UP (ref 0.2–1.2)
BUN SERPL-MCNC: 22 MG/DL — SIGNIFICANT CHANGE UP (ref 7–23)
CALCIUM SERPL-MCNC: 9.9 MG/DL — SIGNIFICANT CHANGE UP (ref 8.4–10.5)
CHLORIDE SERPL-SCNC: 96 MMOL/L — SIGNIFICANT CHANGE UP (ref 96–108)
CO2 SERPL-SCNC: 23 MMOL/L — SIGNIFICANT CHANGE UP (ref 22–31)
CREAT SERPL-MCNC: 0.71 MG/DL — SIGNIFICANT CHANGE UP (ref 0.5–1.3)
GLUCOSE BLDC GLUCOMTR-MCNC: 183 MG/DL — HIGH (ref 70–99)
GLUCOSE BLDC GLUCOMTR-MCNC: 207 MG/DL — HIGH (ref 70–99)
GLUCOSE BLDC GLUCOMTR-MCNC: 218 MG/DL — HIGH (ref 70–99)
GLUCOSE BLDC GLUCOMTR-MCNC: 347 MG/DL — HIGH (ref 70–99)
GLUCOSE SERPL-MCNC: 148 MG/DL — HIGH (ref 70–99)
HCT VFR BLD CALC: 30.2 % — LOW (ref 39–50)
HGB BLD-MCNC: 9.8 G/DL — LOW (ref 13–17)
MAGNESIUM SERPL-MCNC: 1.8 MG/DL — SIGNIFICANT CHANGE UP (ref 1.6–2.6)
MCHC RBC-ENTMCNC: 28.1 PG — SIGNIFICANT CHANGE UP (ref 27–34)
MCHC RBC-ENTMCNC: 32.5 GM/DL — SIGNIFICANT CHANGE UP (ref 32–36)
MCV RBC AUTO: 86.5 FL — SIGNIFICANT CHANGE UP (ref 80–100)
PHOSPHATE SERPL-MCNC: 2.9 MG/DL — SIGNIFICANT CHANGE UP (ref 2.5–4.5)
PLATELET # BLD AUTO: 481 K/UL — HIGH (ref 150–400)
POTASSIUM SERPL-MCNC: 4 MMOL/L — SIGNIFICANT CHANGE UP (ref 3.5–5.3)
POTASSIUM SERPL-SCNC: 4 MMOL/L — SIGNIFICANT CHANGE UP (ref 3.5–5.3)
PROT SERPL-MCNC: 7.6 G/DL — SIGNIFICANT CHANGE UP (ref 6–8.3)
RBC # BLD: 3.49 M/UL — LOW (ref 4.2–5.8)
RBC # FLD: 20.9 % — HIGH (ref 10.3–14.5)
SODIUM SERPL-SCNC: 133 MMOL/L — LOW (ref 135–145)
WBC # BLD: 12.75 K/UL — HIGH (ref 3.8–10.5)
WBC # FLD AUTO: 12.75 K/UL — HIGH (ref 3.8–10.5)

## 2018-09-03 RX ORDER — MAGNESIUM SULFATE 500 MG/ML
2 VIAL (ML) INJECTION ONCE
Qty: 0 | Refills: 0 | Status: COMPLETED | OUTPATIENT
Start: 2018-09-03 | End: 2018-09-03

## 2018-09-03 RX ADMIN — Medication 1 APPLICATION(S): at 12:08

## 2018-09-03 RX ADMIN — Medication 650 MILLIGRAM(S): at 21:14

## 2018-09-03 RX ADMIN — Medication 650 MILLIGRAM(S): at 08:44

## 2018-09-03 RX ADMIN — Medication 4: at 18:25

## 2018-09-03 RX ADMIN — Medication 100 MILLIGRAM(S): at 05:07

## 2018-09-03 RX ADMIN — Medication 100 MILLIGRAM(S): at 21:12

## 2018-09-03 RX ADMIN — AMIKACIN SULFATE 254.2 MILLIGRAM(S): 250 INJECTION, SOLUTION INTRAMUSCULAR; INTRAVENOUS at 12:08

## 2018-09-03 RX ADMIN — Medication 4: at 15:34

## 2018-09-03 RX ADMIN — Medication 25 MILLIGRAM(S): at 05:08

## 2018-09-03 RX ADMIN — Medication 650 MILLIGRAM(S): at 21:12

## 2018-09-03 RX ADMIN — Medication 2: at 22:29

## 2018-09-03 RX ADMIN — Medication 650 MILLIGRAM(S): at 09:14

## 2018-09-03 RX ADMIN — Medication 2: at 08:45

## 2018-09-03 RX ADMIN — Medication 100 MILLIGRAM(S): at 13:41

## 2018-09-03 RX ADMIN — Medication 650 MILLIGRAM(S): at 13:41

## 2018-09-03 RX ADMIN — Medication 25 MILLIGRAM(S): at 17:13

## 2018-09-03 RX ADMIN — Medication 50 GRAM(S): at 18:25

## 2018-09-03 RX ADMIN — Medication 650 MILLIGRAM(S): at 14:11

## 2018-09-03 RX ADMIN — OLANZAPINE 5 MILLIGRAM(S): 15 TABLET, FILM COATED ORAL at 21:12

## 2018-09-03 RX ADMIN — APIXABAN 10 MILLIGRAM(S): 2.5 TABLET, FILM COATED ORAL at 05:08

## 2018-09-03 RX ADMIN — Medication 5 MILLIGRAM(S): at 22:29

## 2018-09-03 RX ADMIN — APIXABAN 10 MILLIGRAM(S): 2.5 TABLET, FILM COATED ORAL at 17:13

## 2018-09-03 RX ADMIN — SODIUM CHLORIDE 1 GRAM(S): 9 INJECTION INTRAMUSCULAR; INTRAVENOUS; SUBCUTANEOUS at 05:08

## 2018-09-03 RX ADMIN — SODIUM CHLORIDE 1 GRAM(S): 9 INJECTION INTRAMUSCULAR; INTRAVENOUS; SUBCUTANEOUS at 21:12

## 2018-09-03 RX ADMIN — SODIUM CHLORIDE 1 GRAM(S): 9 INJECTION INTRAMUSCULAR; INTRAVENOUS; SUBCUTANEOUS at 13:42

## 2018-09-03 NOTE — PROGRESS NOTE ADULT - SUBJECTIVE AND OBJECTIVE BOX
TRAUMA SURGERY DAILY PROGRESS NOTE    Subjective: Patient seen and examined on morning rounds. He states that his pain is improving. He was seen by podiatry yesterday for diabetic foot ulcer on medial aspect of right foot    MEDICATIONS  (STANDING):  MEDICATIONS  (STANDING):  acetaminophen   Tablet. 650 milliGRAM(s) Oral every 6 hours  amiKACIN  IVPB      amiKACIN  IVPB 1050 milliGRAM(s) IV Intermittent daily  apixaban 10 milliGRAM(s) Oral every 12 hours  HYDROmorphone  Injectable 0.5 milliGRAM(s) IV Push daily  HYDROmorphone  Injectable 0.5 milliGRAM(s) IV Push daily  insulin lispro (HumaLOG) corrective regimen sliding scale   SubCutaneous three times a day before meals  insulin lispro (HumaLOG) corrective regimen sliding scale   SubCutaneous at bedtime  melatonin 5 milliGRAM(s) Oral at bedtime  metoprolol tartrate 25 milliGRAM(s) Oral two times a day  metroNIDAZOLE  IVPB 500 milliGRAM(s) IV Intermittent every 8 hours  metroNIDAZOLE  IVPB      OLANZapine Disintegrating Tablet 5 milliGRAM(s) Oral <User Schedule>  silver sulfADIAZINE 1% Cream 1 Application(s) Topical daily  sodium chloride 1 Gram(s) Oral three times a day    MEDICATIONS  (PRN):  benzocaine 15 mG/menthol 3.6 mG Lozenge 1 Lozenge Oral every 6 hours PRN Sore Throat  ondansetron Injectable 4 milliGRAM(s) IV Push every 6 hours PRN Nausea    Vital Signs Last 24 Hrs  T(C): 36.5 (02 Sep 2018 10:00), Max: 37.1 (01 Sep 2018 11:19)  T(F): 97.7 (02 Sep 2018 10:00), Max: 98.7 (01 Sep 2018 11:19)  HR: 83 (02 Sep 2018 10:00) (83 - 97)  BP: 140/80 (02 Sep 2018 10:00) (104/68 - 140/80)  BP(mean): --  RR: 17 (02 Sep 2018 10:00) (17 - 18)  SpO2: 99% (02 Sep 2018 10:00) (92% - 100%)      I&O's Summary    01 Sep 2018 07:01  -  02 Sep 2018 07:00  --------------------------------------------------------  IN: 2060 mL / OUT: 2727 mL / NET: -667 mL    02 Sep 2018 07:01  -  02 Sep 2018 11:16  --------------------------------------------------------  IN: 0 mL / OUT: 350 mL / NET: -350 mL                            8.8    10.45 )-----------( 478      ( 02 Sep 2018 10:09 )             28.1     09-02    134<L>  |  98  |  17  ----------------------------<  146<H>  4.0   |  25  |  0.68    Ca    9.0      02 Sep 2018 07:38  Phos  4.0     09-02  Mg     2.0     09-02        Physical Exam:   General: NAD  Abdomen: soft, appropriately tender to palpation, midline incision, packing removed this AM, 1 drain on right, 2 drains on left.   Extremities ulcer noted on the R foot, nonpurulent TRAUMA SURGERY DAILY PROGRESS NOTE    Subjective: Patient seen and examined on morning rounds. He states that his pain is improving.   -patient comfortable, denies N/V  -ulcer on R foot being treated with silvadene, no complaints of pain      MEDICATIONS  (STANDING):  MEDICATIONS  (STANDING):  acetaminophen   Tablet. 650 milliGRAM(s) Oral every 6 hours  amiKACIN  IVPB      amiKACIN  IVPB 1050 milliGRAM(s) IV Intermittent daily  apixaban 10 milliGRAM(s) Oral every 12 hours  HYDROmorphone  Injectable 0.5 milliGRAM(s) IV Push daily  HYDROmorphone  Injectable 0.5 milliGRAM(s) IV Push daily  insulin lispro (HumaLOG) corrective regimen sliding scale   SubCutaneous three times a day before meals  insulin lispro (HumaLOG) corrective regimen sliding scale   SubCutaneous at bedtime  melatonin 5 milliGRAM(s) Oral at bedtime  metoprolol tartrate 25 milliGRAM(s) Oral two times a day  metroNIDAZOLE  IVPB 500 milliGRAM(s) IV Intermittent every 8 hours  metroNIDAZOLE  IVPB      OLANZapine Disintegrating Tablet 5 milliGRAM(s) Oral <User Schedule>  silver sulfADIAZINE 1% Cream 1 Application(s) Topical daily  sodium chloride 1 Gram(s) Oral three times a day    MEDICATIONS  (PRN):  benzocaine 15 mG/menthol 3.6 mG Lozenge 1 Lozenge Oral every 6 hours PRN Sore Throat  ondansetron Injectable 4 milliGRAM(s) IV Push every 6 hours PRN Nausea    Vital Signs Last 24 Hrs  T(C): 36.5 (02 Sep 2018 10:00), Max: 37.1 (01 Sep 2018 11:19)  T(F): 97.7 (02 Sep 2018 10:00), Max: 98.7 (01 Sep 2018 11:19)  HR: 83 (02 Sep 2018 10:00) (83 - 97)  BP: 140/80 (02 Sep 2018 10:00) (104/68 - 140/80)  BP(mean): --  RR: 17 (02 Sep 2018 10:00) (17 - 18)  SpO2: 99% (02 Sep 2018 10:00) (92% - 100%)      I&O's Summary    01 Sep 2018 07:01  -  02 Sep 2018 07:00  --------------------------------------------------------  IN: 2060 mL / OUT: 2727 mL / NET: -667 mL    02 Sep 2018 07:01  -  02 Sep 2018 11:16  --------------------------------------------------------  IN: 0 mL / OUT: 350 mL / NET: -350 mL                            8.8    10.45 )-----------( 478      ( 02 Sep 2018 10:09 )             28.1     09-02    134<L>  |  98  |  17  ----------------------------<  146<H>  4.0   |  25  |  0.68    Ca    9.0      02 Sep 2018 07:38  Phos  4.0     09-02  Mg     2.0     09-02        Physical Exam:   General: NAD  Abdomen: soft, appropriately tender to palpation, midline incision, packing removed this AM, 1 drain on right, 2 drains on left.   Extremities ulcer noted on the R foot, nonpurulent

## 2018-09-03 NOTE — PROGRESS NOTE ADULT - ATTENDING COMMENTS
I saw and evaluated patient.  I agree with residents note.  continue antibiotics.  continue pelvic drains  rehab planning

## 2018-09-03 NOTE — PROGRESS NOTE ADULT - ASSESSMENT
59yo M with perforated R colon and large RP abscess/abdominal wall infection s/p open ileocecectomy in discontinuity and abdominal wall/RP debridement with open abdomen/abthera on 8/11, s/p abdominal washout, end ileostomy, abdominal closure on 8/13. CT A/P showed a 10x5cm fluid/gas collection in right hemipelvis. SUDHIR drain with minimal output.  NGT removed tube on 8/19. IR drain placed 8/20. Now s/p IR drainage of a L abdominal abscess 8/23. CT scan reviewed by ID and radiology was s/o osteo. PICC line placed 8/29.     - Podiatry rec: silvadene with DSD for R foot ulcer  - ID recs: * c/w amikacin 15 mg/kg qd and flagyl 500 tid, started 8/31, amikacin trough in AM, PICC in place for IV antibiotics,   - RUE DVT ppx: Transitioned from therapeutic lovenox to eliquis for DVT ppx on 8/30. Patient to receive 10 mg BID for 7d followed by 5mg BID for 3 months. possibly 3 months of anticoagulation.   - monitor drain output  - Pain control: Tylenol q6h, oxy PRN.  - Zyprexa 5 mg qhs for agitation.  - SSI, diabetic diet  - Salt tabs for hyponatremia  - PT/OOB  - Ostomy care  - Dispo: Rehab; Profile sent out to multiple rehab facilities 59yo M with perforated R colon and large RP abscess/abdominal wall infection s/p open ileocecectomy in discontinuity and abdominal wall/RP debridement with open abdomen/abthera on 8/11, s/p abdominal washout, end ileostomy, abdominal closure on 8/13. CT A/P showed a 10x5cm fluid/gas collection in right hemipelvis. SUDHIR drain with minimal output.  NGT removed tube on 8/19. IR drain placed 8/20. Now s/p IR drainage of a L abdominal abscess 8/23. CT scan reviewed by ID and radiology was s/o osteo. PICC line placed 8/29.     - Podiatry rec: silvadene with DSD for R foot ulcer  - ID recs: * c/w amikacin 15 mg/kg qd and flagyl 500 tid, started 8/31, amikacin trough in AM, PICC in place for IV antibiotics,   - RUE DVT ppx: Transitioned from therapeutic lovenox to eliquis for DVT ppx on 8/30. Patient to receive 10 mg BID for 7d followed by 5mg BID for 3 months. possibly 3 months of anticoagulation.   - monitor drain output  - Pain control: Tylenol q6h, oxy PRN.  - Zyprexa 5 mg qhs for agitation.  - SSI, diabetic diet  - Salt tabs for hyponatremia  - PT/OOB  - Ostomy care  - Dispo: Rehab; Approved for rehabilitation, insurance is pending.

## 2018-09-04 LAB
ALBUMIN SERPL ELPH-MCNC: 3.3 G/DL — SIGNIFICANT CHANGE UP (ref 3.3–5)
ALP SERPL-CCNC: 71 U/L — SIGNIFICANT CHANGE UP (ref 40–120)
ALT FLD-CCNC: 11 U/L — SIGNIFICANT CHANGE UP (ref 10–45)
ANION GAP SERPL CALC-SCNC: 13 MMOL/L — SIGNIFICANT CHANGE UP (ref 5–17)
AST SERPL-CCNC: 13 U/L — SIGNIFICANT CHANGE UP (ref 10–40)
BILIRUB SERPL-MCNC: 0.4 MG/DL — SIGNIFICANT CHANGE UP (ref 0.2–1.2)
BUN SERPL-MCNC: 25 MG/DL — HIGH (ref 7–23)
CALCIUM SERPL-MCNC: 9.4 MG/DL — SIGNIFICANT CHANGE UP (ref 8.4–10.5)
CHLORIDE SERPL-SCNC: 97 MMOL/L — SIGNIFICANT CHANGE UP (ref 96–108)
CO2 SERPL-SCNC: 23 MMOL/L — SIGNIFICANT CHANGE UP (ref 22–31)
CREAT SERPL-MCNC: 0.93 MG/DL — SIGNIFICANT CHANGE UP (ref 0.5–1.3)
GLUCOSE BLDC GLUCOMTR-MCNC: 144 MG/DL — HIGH (ref 70–99)
GLUCOSE BLDC GLUCOMTR-MCNC: 165 MG/DL — HIGH (ref 70–99)
GLUCOSE BLDC GLUCOMTR-MCNC: 203 MG/DL — HIGH (ref 70–99)
GLUCOSE SERPL-MCNC: 138 MG/DL — HIGH (ref 70–99)
HCT VFR BLD CALC: 30.6 % — LOW (ref 39–50)
HGB BLD-MCNC: 9.6 G/DL — LOW (ref 13–17)
MAGNESIUM SERPL-MCNC: 2.2 MG/DL — SIGNIFICANT CHANGE UP (ref 1.6–2.6)
MCHC RBC-ENTMCNC: 27 PG — SIGNIFICANT CHANGE UP (ref 27–34)
MCHC RBC-ENTMCNC: 31.4 GM/DL — LOW (ref 32–36)
MCV RBC AUTO: 86.2 FL — SIGNIFICANT CHANGE UP (ref 80–100)
MISCELLANEOUS TEST NAME: SIGNIFICANT CHANGE UP
PHOSPHATE SERPL-MCNC: 3.5 MG/DL — SIGNIFICANT CHANGE UP (ref 2.5–4.5)
PLATELET # BLD AUTO: 488 K/UL — HIGH (ref 150–400)
POTASSIUM SERPL-MCNC: 4.2 MMOL/L — SIGNIFICANT CHANGE UP (ref 3.5–5.3)
POTASSIUM SERPL-SCNC: 4.2 MMOL/L — SIGNIFICANT CHANGE UP (ref 3.5–5.3)
PROT SERPL-MCNC: 7.7 G/DL — SIGNIFICANT CHANGE UP (ref 6–8.3)
RBC # BLD: 3.55 M/UL — LOW (ref 4.2–5.8)
RBC # FLD: 20.8 % — HIGH (ref 10.3–14.5)
SODIUM SERPL-SCNC: 133 MMOL/L — LOW (ref 135–145)
WBC # BLD: 15.87 K/UL — HIGH (ref 3.8–10.5)
WBC # FLD AUTO: 15.87 K/UL — HIGH (ref 3.8–10.5)

## 2018-09-04 PROCEDURE — 74177 CT ABD & PELVIS W/CONTRAST: CPT | Mod: 26

## 2018-09-04 PROCEDURE — 99232 SBSQ HOSP IP/OBS MODERATE 35: CPT

## 2018-09-04 RX ORDER — SODIUM CHLORIDE 9 MG/ML
1000 INJECTION INTRAMUSCULAR; INTRAVENOUS; SUBCUTANEOUS
Qty: 0 | Refills: 0 | Status: DISCONTINUED | OUTPATIENT
Start: 2018-09-04 | End: 2018-09-07

## 2018-09-04 RX ADMIN — Medication 100 MILLIGRAM(S): at 22:32

## 2018-09-04 RX ADMIN — Medication 650 MILLIGRAM(S): at 23:00

## 2018-09-04 RX ADMIN — Medication 2: at 08:03

## 2018-09-04 RX ADMIN — APIXABAN 10 MILLIGRAM(S): 2.5 TABLET, FILM COATED ORAL at 04:52

## 2018-09-04 RX ADMIN — Medication 100 MILLIGRAM(S): at 04:52

## 2018-09-04 RX ADMIN — Medication 4: at 11:41

## 2018-09-04 RX ADMIN — Medication 650 MILLIGRAM(S): at 22:32

## 2018-09-04 RX ADMIN — Medication 5 MILLIGRAM(S): at 22:32

## 2018-09-04 RX ADMIN — SODIUM CHLORIDE 1 GRAM(S): 9 INJECTION INTRAMUSCULAR; INTRAVENOUS; SUBCUTANEOUS at 14:20

## 2018-09-04 RX ADMIN — AMIKACIN SULFATE 254.2 MILLIGRAM(S): 250 INJECTION, SOLUTION INTRAMUSCULAR; INTRAVENOUS at 11:42

## 2018-09-04 RX ADMIN — Medication 25 MILLIGRAM(S): at 04:52

## 2018-09-04 RX ADMIN — Medication 100 MILLIGRAM(S): at 14:20

## 2018-09-04 RX ADMIN — Medication 650 MILLIGRAM(S): at 08:04

## 2018-09-04 RX ADMIN — SODIUM CHLORIDE 1 GRAM(S): 9 INJECTION INTRAMUSCULAR; INTRAVENOUS; SUBCUTANEOUS at 04:52

## 2018-09-04 RX ADMIN — SODIUM CHLORIDE 1 GRAM(S): 9 INJECTION INTRAMUSCULAR; INTRAVENOUS; SUBCUTANEOUS at 22:32

## 2018-09-04 RX ADMIN — Medication 25 MILLIGRAM(S): at 22:32

## 2018-09-04 RX ADMIN — Medication 650 MILLIGRAM(S): at 14:20

## 2018-09-04 RX ADMIN — OLANZAPINE 5 MILLIGRAM(S): 15 TABLET, FILM COATED ORAL at 22:32

## 2018-09-04 RX ADMIN — APIXABAN 10 MILLIGRAM(S): 2.5 TABLET, FILM COATED ORAL at 22:32

## 2018-09-04 RX ADMIN — Medication 1 APPLICATION(S): at 17:39

## 2018-09-04 NOTE — PROGRESS NOTE ADULT - ASSESSMENT
58 M with DM, HTN, peripheral neuropathy, was visiting [facility] that developed fever, weakness and abd pain, was diagnosed with bowel perf and abd abscess, refused surgery there and came here with fever, leukocytosis to 24, CT with ileal and cecal perf, stool extending to the iliacus muscle with R pelvis fluid and gas collection  s/p open ileocecectomy in discontinuity and abdominal wall/RP debridement with open abdomen/abthera on 8/11, and then abdominal washout, end ileostomy, abdominal closure on 8/13. surgical cx with mixed gram negs, lactobacillus and overgrowth of proteus, received vanco 8/11-8/15, fluconazole 8/11-8/17 and zosyn 8/11 now day 11 but was still febrile with leukocytosis and repeat CT 8/18 showed a 10x5cm fluid/gas collection in right hemipelvis, decreased in size.  s/p IR drain 8/20 and 250 cc of yellow purulent drainage now afebrile, but WBC worsened to 20  The IR drainage was not sent for culture yesterday    new leukocytosis  sepsis due to ileocecal perf and large R hemipelvis abscess s/p laparotomy, washout, ileostomy, OR cx with mixed GNR, lactobacillus and proteus overgrowth but persistent abscess s/p IR drainage 8/20 but no culture, repeat CT with new left pelvis collection and a droplet of gas in the bone s/o extension to the bone, s/p IR drainage, growing CRE E-coli resistant to acycaz, S to amikacin/genta and tigecycline also bacteroides  I reviewed the CT with radiology and the imaging is s/o osteomyelitis   leukocytosis worse to 14  RLE weakness, started with the abd pain, related to nerve damage due to abscess      * monitor CBC, if worsening leukocytosis, will have to repeat the abd CT with contrast  * no culture from IR 8/20  * IR culture with bacteroides fragilis and CRE E-coli also resistant to avycaz  * s/p zosyn 8/11-8/22,  vanco 8/11-8/15,  fluconazole 8/11-8/17, micafungin and meropenem 8/22-8/27  * DC avycaz, received 5 days 8/27-8/31  * c/w amikacin 15 mg/kg qd and flagyl 500 tid, started 8/31  * monitor renal function  * will need  a 6 weeks course in view of osteomyelitis until 10/14  * biweekly CBC, CMP and amikacin trough, while on antibiotics if pt is going to rehab, labs will be checked by the rehab MD  * neurology eval for the RLE weakness 58 M with DM, HTN, peripheral neuropathy, was visiting [facility] that developed fever, weakness and abd pain, was diagnosed with bowel perf and abd abscess, refused surgery there and came here with fever, leukocytosis to 24, CT with ileal and cecal perf, stool extending to the iliacus muscle with R pelvis fluid and gas collection  s/p open ileocecectomy in discontinuity and abdominal wall/RP debridement with open abdomen/abthera on 8/11, and then abdominal washout, end ileostomy, abdominal closure on 8/13. surgical cx with mixed gram negs, lactobacillus and overgrowth of proteus, received vanco 8/11-8/15, fluconazole 8/11-8/17 and zosyn 8/11 now day 11 but was still febrile with leukocytosis and repeat CT 8/18 showed a 10x5cm fluid/gas collection in right hemipelvis, decreased in size.  s/p IR drain 8/20 and 250 cc of yellow purulent drainage now afebrile, but WBC worsened to 20  The IR drainage was not sent for culture yesterday    new leukocytosis  sepsis due to ileocecal perf and large R hemipelvis abscess s/p laparotomy, washout, ileostomy, OR cx with mixed GNR, lactobacillus and proteus overgrowth but persistent abscess s/p IR drainage 8/20 but no culture, repeat CT with new left pelvis collection and a droplet of gas in the bone s/o extension to the bone, s/p IR drainage, growing CRE E-coli resistant to acycaz, S to amikacin/genta and tigecycline also bacteroides  I reviewed the CT with radiology and the imaging is s/o osteomyelitis   leukocytosis worse to 14  monitoring drug levels, amikacin trough and peak within normal limits  RLE weakness, started with the abd pain, related to nerve damage due to abscess      * monitor CBC, if worsening leukocytosis, will have to repeat the abd CT with contrast  * no culture from IR 8/20  * IR culture with bacteroides fragilis and CRE E-coli also resistant to avycaz  * s/p zosyn 8/11-8/22,  vanco 8/11-8/15,  fluconazole 8/11-8/17, micafungin and meropenem 8/22-8/27  * DC avycaz, received 5 days 8/27-8/31  * c/w amikacin 15 mg/kg qd and flagyl 500 tid, started 8/31  * monitor renal function  * will need  a 6 weeks course in view of osteomyelitis until 10/14  * biweekly CBC, CMP and amikacin trough, while on antibiotics if pt is going to rehab, labs will be checked by the rehab MD  * neurology eval for the RLE weakness

## 2018-09-04 NOTE — PROGRESS NOTE ADULT - SUBJECTIVE AND OBJECTIVE BOX
Follow Up:  abd abscess    Interval History: repeat CT with another abscess s/p IR drainage and is growing CRE E-coli, which is also resistant to avycaz and bacteroides, s/p PICC line on amikacin and flagyl    ROS:      All other systems negative    Constitutional: no fever,  chills,  sweat  Eye: no eye pain, no redness, no vision changes  ENT:  no sore throat, no rhinorrhea  Cardiovascular:  no chest pain, no palpitation  Respiratory:  no SOB, no cough  GI:  occasional abd pain, no vomiting, no diarrhea  urinary: resolved dysuria, no hematuria, no flank pain  : no penile discharge or bleeding  musculoskeletal:  no joint pain, no joint swelling,   skin:  no rash  neurology:  no headache, no seizure, + RLE weakness  psych: no anxiety, no depression         Allergies  No Known Allergies        ANTIMICROBIALS:  amiKACIN  IVPB    amiKACIN  IVPB 1050 daily  metroNIDAZOLE  IVPB 500 every 8 hours  metroNIDAZOLE  IVPB        OTHER MEDS:  acetaminophen   Tablet. 650 milliGRAM(s) Oral every 6 hours  apixaban 10 milliGRAM(s) Oral every 12 hours  benzocaine 15 mG/menthol 3.6 mG Lozenge 1 Lozenge Oral every 6 hours PRN  HYDROmorphone  Injectable 0.5 milliGRAM(s) IV Push daily  HYDROmorphone  Injectable 0.5 milliGRAM(s) IV Push daily  insulin lispro (HumaLOG) corrective regimen sliding scale   SubCutaneous three times a day before meals  insulin lispro (HumaLOG) corrective regimen sliding scale   SubCutaneous at bedtime  melatonin 5 milliGRAM(s) Oral at bedtime  metoprolol tartrate 25 milliGRAM(s) Oral two times a day  OLANZapine Disintegrating Tablet 5 milliGRAM(s) Oral <User Schedule>  ondansetron Injectable 4 milliGRAM(s) IV Push every 6 hours PRN  silver sulfADIAZINE 1% Cream 1 Application(s) Topical daily  sodium chloride 1 Gram(s) Oral three times a day      Vital Signs Last 24 Hrs  T(C): 37 (04 Sep 2018 09:30), Max: 37 (04 Sep 2018 09:30)  T(F): 98.6 (04 Sep 2018 09:30), Max: 98.6 (04 Sep 2018 09:30)  HR: 93 (04 Sep 2018 09:30) (74 - 101)  BP: 110/65 (04 Sep 2018 09:30) (110/65 - 140/76)  BP(mean): --  RR: 18 (04 Sep 2018 09:30) (18 - 18)  SpO2: 100% (04 Sep 2018 09:30) (97% - 100%)    Physical Exam:  General:    NAD, non toxic  Head: atraumatic, normocephalic  Eyes: normal sclera and conjunctiva  ENT:   no oropharyngeal lesions, no LAD, neck supple  Cardio:    regular S1,S2, no murmur  Respiratory:   clear b/l, no wheezing  abd:   soft, BS +, incision clean, L SUDHIR drain, RLQ IR drain with yellow purulent drainage  :     no CVAT, no suprapubic tenderness, no eugene  Musculoskeletal : no joint swelling, no edema  Skin:    no rash  vascular: LUE PICC, normal pulses  Neurologic:   RLE weakness 3/5  psych: normal affect, no suicidal ideation                        9.6    15.87 )-----------( 488      ( 04 Sep 2018 09:01 )             30.6       09-04    133<L>  |  97  |  25<H>  ----------------------------<  138<H>  4.2   |  23  |  0.93    Ca    9.4      04 Sep 2018 07:50  Phos  3.5     09-04  Mg     2.2     09-04    TPro  7.7  /  Alb  3.3  /  TBili  0.4  /  DBili  x   /  AST  13  /  ALT  11  /  AlkPhos  71  09-04          MICROBIOLOGY:  v  Abdominal Fl Abdominal Fluid  08-25-18   Rare Escherichia coli (Carbapenem Resistant)  Moderate Bacteroides fragilis "Susceptibilities not performed"  --  Escherichia coli (Carbapenem Resistant)      .Body Fluid IR drain (skin)  08-22-18   Moderate Mixed gram negative rods "Susceptibilities not performed"  --    Moderate polymorphonuclear leukocytes per low power field  Rare Gram Negative Rods per oil power field      .Surgical Swab retroperitoneal fluid  08-14-18   Moderate Mixed gram negative rods "Susceptibilities not performed"  Few Lactobacillus species "Susceptibilities not performed"  Unable to evaluate further due to Proteus overgrowth  --  --      .Blood Blood  08-12-18   No growth at 5 days.  --  --      .Urine Clean Catch (Midstream)  08-11-18   >100,000 CFU/ml Yeast-like cells, presumptively not Candida albicans  --  --      .Blood Blood-Peripheral  08-11-18   No growth at 5 days.  --  --                RADIOLOGY:  Images below reviewed personally  < from: Xray Chest 1 View- PORTABLE-Urgent (08.29.18 @ 18:07) >  IMPRESSION:     Left sided PICC is in place with itstip in the proximal right atrium.    No pneumothorax.      < from: CT Chest w/ IV Cont (08.24.18 @ 13:00) >  IMPRESSION: Small bilateral pleural effusions, decreased in size since   8/18/2018. There is associated compressive atelectasis at the lung bases   bilaterally.    Indeterminate 0.4 cm right upper lobe nodule. According to Fleischner   criteria, if the patient has high risk, follow-up chest CT may be   obtained in 12 months to assess for stability.    Status post interval percutaneous drainage of a right lower quadrant   fluid and gas collection, with decrease in size of the collection.    New multiloculated collections in the left pelvis. Infected collections   are considered.    Droplets of gas within the right iliac bone adjacent to the right lower   quadrant collection, concerning for extension of infection to bone.

## 2018-09-04 NOTE — PROGRESS NOTE ADULT - ASSESSMENT
59yo M with perforated R colon and large RP abscess/abdominal wall infection s/p open ileocecectomy in discontinuity and abdominal wall/RP debridement with open abdomen/abthera on 8/11, s/p abdominal washout, end ileostomy, abdominal closure on 8/13. CT A/P showed a 10x5cm fluid/gas collection in right hemipelvis. SUDHIR drain with minimal output.  NGT removed tube on 8/19. IR drain placed 8/20. Now s/p IR drainage of a L abdominal abscess 8/23. CT scan reviewed by ID and radiology was s/o osteo. PICC line placed 8/29.     - Podiatry rec: silvadene with DSD for R foot ulcer  - ID recs: * c/w amikacin 15 mg/kg qd and flagyl 500 tid, started 8/31, amikacin trough in AM, PICC in place for IV antibiotics,   - RUE DVT ppx: Transitioned from therapeutic lovenox to eliquis for DVT ppx on 8/30. Patient to receive 10 mg BID for 7d followed by 5mg BID for 3 months. possibly 3 months of anticoagulation.   - monitor drain output  - Pain control: Tylenol q6h, oxy PRN.  - Zyprexa 5 mg qhs for agitation.  - SSI, diabetic diet  - Salt tabs for hyponatremia  - PT/OOB  - Ostomy care  - Dispo: Rehab; Approved for rehabilitation, insurance is pending.    June Marie PA-C

## 2018-09-04 NOTE — PROGRESS NOTE ADULT - SUBJECTIVE AND OBJECTIVE BOX
ATP SURGERY DAILY PROGRESS NOTE:       SUBJECTIVE/ROS: Patient seen and examined on morning rounds. No acute overnight events.  He states that his pain is improving.   -patient comfortable, denies N/V      MEDICATIONS  (STANDING):  acetaminophen   Tablet. 650 milliGRAM(s) Oral every 6 hours  amiKACIN  IVPB      amiKACIN  IVPB 1050 milliGRAM(s) IV Intermittent daily  apixaban 10 milliGRAM(s) Oral every 12 hours  HYDROmorphone  Injectable 0.5 milliGRAM(s) IV Push daily  HYDROmorphone  Injectable 0.5 milliGRAM(s) IV Push daily  insulin lispro (HumaLOG) corrective regimen sliding scale   SubCutaneous three times a day before meals  insulin lispro (HumaLOG) corrective regimen sliding scale   SubCutaneous at bedtime  melatonin 5 milliGRAM(s) Oral at bedtime  metoprolol tartrate 25 milliGRAM(s) Oral two times a day  metroNIDAZOLE  IVPB 500 milliGRAM(s) IV Intermittent every 8 hours  metroNIDAZOLE  IVPB      OLANZapine Disintegrating Tablet 5 milliGRAM(s) Oral <User Schedule>  silver sulfADIAZINE 1% Cream 1 Application(s) Topical daily  sodium chloride 1 Gram(s) Oral three times a day    MEDICATIONS  (PRN):  benzocaine 15 mG/menthol 3.6 mG Lozenge 1 Lozenge Oral every 6 hours PRN Sore Throat  ondansetron Injectable 4 milliGRAM(s) IV Push every 6 hours PRN Nausea      OBJECTIVE:    Vital Signs Last 24 Hrs  T(C): 36.7 (04 Sep 2018 04:44), Max: 36.9 (04 Sep 2018 01:40)  T(F): 98 (04 Sep 2018 04:44), Max: 98.5 (04 Sep 2018 01:40)  HR: 88 (04 Sep 2018 04:44) (74 - 101)  BP: 133/75 (04 Sep 2018 04:44) (118/73 - 140/76)  BP(mean): --  RR: 18 (04 Sep 2018 04:44) (18 - 18)  SpO2: 97% (04 Sep 2018 04:44) (97% - 100%)        I&O's Detail    03 Sep 2018 07:01  -  04 Sep 2018 07:00  --------------------------------------------------------  IN:    Oral Fluid: 800 mL    Solution: 400 mL    Solution: 200 mL  Total IN: 1400 mL    OUT:    Drain: 20 mL    Ileostomy: 1125 mL    Voided: 3075 mL  Total OUT: 4220 mL    Total NET: -2820 mL          Daily     Daily     LABS:                        9.8    12.75 )-----------( 481      ( 03 Sep 2018 09:40 )             30.2     09-03    133<L>  |  96  |  22  ----------------------------<  148<H>  4.0   |  23  |  0.71    Ca    9.9      03 Sep 2018 07:54  Phos  2.9     09-03  Mg     1.8     09-03    TPro  7.6  /  Alb  3.4  /  TBili  0.3  /  DBili  x   /  AST  17  /  ALT  10  /  AlkPhos  65  09-03                  PHYSICAL EXAM:  General: NAD  Abdomen: soft, appropriately tender to palpation, midline incision healing well, 1 drain on right, 2 drains on left.   Extremities ulcer noted on the R foot, nonpurulent

## 2018-09-05 LAB
ANION GAP SERPL CALC-SCNC: 12 MMOL/L — SIGNIFICANT CHANGE UP (ref 5–17)
APTT BLD: 33.2 SEC — SIGNIFICANT CHANGE UP (ref 27.5–37.4)
BUN SERPL-MCNC: 22 MG/DL — SIGNIFICANT CHANGE UP (ref 7–23)
CALCIUM SERPL-MCNC: 9 MG/DL — SIGNIFICANT CHANGE UP (ref 8.4–10.5)
CHLORIDE SERPL-SCNC: 102 MMOL/L — SIGNIFICANT CHANGE UP (ref 96–108)
CO2 SERPL-SCNC: 21 MMOL/L — LOW (ref 22–31)
CREAT SERPL-MCNC: 1 MG/DL — SIGNIFICANT CHANGE UP (ref 0.5–1.3)
CULTURE RESULTS: SIGNIFICANT CHANGE UP
GLUCOSE BLDC GLUCOMTR-MCNC: 168 MG/DL — HIGH (ref 70–99)
GLUCOSE BLDC GLUCOMTR-MCNC: 168 MG/DL — HIGH (ref 70–99)
GLUCOSE BLDC GLUCOMTR-MCNC: 221 MG/DL — HIGH (ref 70–99)
GLUCOSE BLDC GLUCOMTR-MCNC: 245 MG/DL — HIGH (ref 70–99)
GLUCOSE SERPL-MCNC: 157 MG/DL — HIGH (ref 70–99)
HCT VFR BLD CALC: 30 % — LOW (ref 39–50)
HGB BLD-MCNC: 9.6 G/DL — LOW (ref 13–17)
INR BLD: 1.21 RATIO — HIGH (ref 0.88–1.16)
MAGNESIUM SERPL-MCNC: 1.8 MG/DL — SIGNIFICANT CHANGE UP (ref 1.6–2.6)
MCHC RBC-ENTMCNC: 28.2 PG — SIGNIFICANT CHANGE UP (ref 27–34)
MCHC RBC-ENTMCNC: 32 GM/DL — SIGNIFICANT CHANGE UP (ref 32–36)
MCV RBC AUTO: 88 FL — SIGNIFICANT CHANGE UP (ref 80–100)
ORGANISM # SPEC MICROSCOPIC CNT: SIGNIFICANT CHANGE UP
PHOSPHATE SERPL-MCNC: 3.8 MG/DL — SIGNIFICANT CHANGE UP (ref 2.5–4.5)
PLATELET # BLD AUTO: 472 K/UL — HIGH (ref 150–400)
POTASSIUM SERPL-MCNC: 4.2 MMOL/L — SIGNIFICANT CHANGE UP (ref 3.5–5.3)
POTASSIUM SERPL-SCNC: 4.2 MMOL/L — SIGNIFICANT CHANGE UP (ref 3.5–5.3)
PROTHROM AB SERPL-ACNC: 13.7 SEC — HIGH (ref 10–13.1)
RBC # BLD: 3.41 M/UL — LOW (ref 4.2–5.8)
RBC # FLD: 20.6 % — HIGH (ref 10.3–14.5)
SODIUM SERPL-SCNC: 135 MMOL/L — SIGNIFICANT CHANGE UP (ref 135–145)
SPECIMEN SOURCE: SIGNIFICANT CHANGE UP
WBC # BLD: 12.01 K/UL — HIGH (ref 3.8–10.5)
WBC # FLD AUTO: 12.01 K/UL — HIGH (ref 3.8–10.5)

## 2018-09-05 PROCEDURE — 99222 1ST HOSP IP/OBS MODERATE 55: CPT | Mod: GC

## 2018-09-05 PROCEDURE — 72148 MRI LUMBAR SPINE W/O DYE: CPT | Mod: 26

## 2018-09-05 PROCEDURE — 99233 SBSQ HOSP IP/OBS HIGH 50: CPT

## 2018-09-05 RX ORDER — LANOLIN ALCOHOL/MO/W.PET/CERES
1 CREAM (GRAM) TOPICAL
Qty: 0 | Refills: 0 | DISCHARGE
Start: 2018-09-05

## 2018-09-05 RX ORDER — APIXABAN 2.5 MG/1
2 TABLET, FILM COATED ORAL
Qty: 0 | Refills: 0 | DISCHARGE
Start: 2018-09-05

## 2018-09-05 RX ORDER — HYDROMORPHONE HYDROCHLORIDE 2 MG/ML
0.5 INJECTION INTRAMUSCULAR; INTRAVENOUS; SUBCUTANEOUS DAILY
Qty: 0 | Refills: 0 | Status: DISCONTINUED | OUTPATIENT
Start: 2018-09-05 | End: 2018-09-06

## 2018-09-05 RX ORDER — MAGNESIUM SULFATE 500 MG/ML
2 VIAL (ML) INJECTION ONCE
Qty: 0 | Refills: 0 | Status: COMPLETED | OUTPATIENT
Start: 2018-09-05 | End: 2018-09-05

## 2018-09-05 RX ORDER — METRONIDAZOLE 500 MG
500 TABLET ORAL
Qty: 1600 | Refills: 0 | OUTPATIENT
Start: 2018-09-05 | End: 2018-10-14

## 2018-09-05 RX ORDER — SODIUM CHLORIDE 9 MG/ML
1 INJECTION INTRAMUSCULAR; INTRAVENOUS; SUBCUTANEOUS
Qty: 0 | Refills: 0 | DISCHARGE
Start: 2018-09-05

## 2018-09-05 RX ORDER — AMIKACIN SULFATE 250 MG/ML
1050 INJECTION, SOLUTION INTRAMUSCULAR; INTRAVENOUS
Qty: 42000 | Refills: 0
Start: 2018-09-05 | End: 2018-10-14

## 2018-09-05 RX ORDER — OLANZAPINE 15 MG/1
1 TABLET, FILM COATED ORAL
Qty: 0 | Refills: 0 | DISCHARGE
Start: 2018-09-05

## 2018-09-05 RX ORDER — METOPROLOL TARTRATE 50 MG
1 TABLET ORAL
Qty: 0 | Refills: 0 | DISCHARGE
Start: 2018-09-05

## 2018-09-05 RX ORDER — ACETAMINOPHEN 500 MG
2 TABLET ORAL
Qty: 0 | Refills: 0 | COMMUNITY
Start: 2018-09-05

## 2018-09-05 RX ADMIN — Medication 2: at 08:59

## 2018-09-05 RX ADMIN — Medication 0: at 21:56

## 2018-09-05 RX ADMIN — Medication 5 MILLIGRAM(S): at 23:27

## 2018-09-05 RX ADMIN — Medication 4: at 13:29

## 2018-09-05 RX ADMIN — Medication 650 MILLIGRAM(S): at 17:42

## 2018-09-05 RX ADMIN — SODIUM CHLORIDE 1 GRAM(S): 9 INJECTION INTRAMUSCULAR; INTRAVENOUS; SUBCUTANEOUS at 04:46

## 2018-09-05 RX ADMIN — APIXABAN 10 MILLIGRAM(S): 2.5 TABLET, FILM COATED ORAL at 17:42

## 2018-09-05 RX ADMIN — Medication 25 MILLIGRAM(S): at 17:42

## 2018-09-05 RX ADMIN — Medication 650 MILLIGRAM(S): at 18:33

## 2018-09-05 RX ADMIN — Medication 650 MILLIGRAM(S): at 04:46

## 2018-09-05 RX ADMIN — Medication 2: at 17:43

## 2018-09-05 RX ADMIN — AMIKACIN SULFATE 254.2 MILLIGRAM(S): 250 INJECTION, SOLUTION INTRAMUSCULAR; INTRAVENOUS at 13:28

## 2018-09-05 RX ADMIN — OLANZAPINE 5 MILLIGRAM(S): 15 TABLET, FILM COATED ORAL at 20:23

## 2018-09-05 RX ADMIN — Medication 650 MILLIGRAM(S): at 08:59

## 2018-09-05 RX ADMIN — SODIUM CHLORIDE 1 GRAM(S): 9 INJECTION INTRAMUSCULAR; INTRAVENOUS; SUBCUTANEOUS at 13:29

## 2018-09-05 RX ADMIN — Medication 100 MILLIGRAM(S): at 13:28

## 2018-09-05 RX ADMIN — SODIUM CHLORIDE 1 GRAM(S): 9 INJECTION INTRAMUSCULAR; INTRAVENOUS; SUBCUTANEOUS at 21:49

## 2018-09-05 RX ADMIN — Medication 100 MILLIGRAM(S): at 23:28

## 2018-09-05 RX ADMIN — Medication 650 MILLIGRAM(S): at 23:57

## 2018-09-05 RX ADMIN — HYDROMORPHONE HYDROCHLORIDE 0.5 MILLIGRAM(S): 2 INJECTION INTRAMUSCULAR; INTRAVENOUS; SUBCUTANEOUS at 06:36

## 2018-09-05 RX ADMIN — Medication 50 GRAM(S): at 13:28

## 2018-09-05 RX ADMIN — APIXABAN 10 MILLIGRAM(S): 2.5 TABLET, FILM COATED ORAL at 04:46

## 2018-09-05 RX ADMIN — Medication 650 MILLIGRAM(S): at 10:00

## 2018-09-05 RX ADMIN — Medication 1 APPLICATION(S): at 13:24

## 2018-09-05 RX ADMIN — Medication 650 MILLIGRAM(S): at 05:52

## 2018-09-05 RX ADMIN — Medication 100 MILLIGRAM(S): at 04:46

## 2018-09-05 RX ADMIN — Medication 650 MILLIGRAM(S): at 23:27

## 2018-09-05 NOTE — CONSULT NOTE ADULT - ASSESSMENT
58 M with DM, HTN, peripheral neuropathy admitted for fever, leukocytosis. CT with ileal and cecal perf, stool extending to the iliacus muscle with R pelvis fluid and gas collection  s/p open ileocecectomy in discontinuity and abdominal wall/RP debridement with open abdomen/abthera on 8/11, and then abdominal washout, end ileostomy, abdominal closure on 8/13. surgical cx with mixed gram negs, lactobacillus and overgrowth of proteus, received vanco 8/11-8/15, fluconazole 8/11-8/17 and zosyn 8/11 now day 11 but was still febrile with leukocytosis and repeat CT 8/18 showed a 10x5cm fluid/gas collection in right hemipelvis, decreased in size.  s/p IR drain 8/20 and 250 cc of yellow purulent drainage now afebrile. Neurology consulted for RLE weakness which patient states started 6 weeks ago and has remained constant with some mild improvement. Neurological exam remarkable for lower extremity atrophy (R >> L) with RLE weakness and decreased in sensation.     Impression: Polyneuropathy w/ deconditioning 2/2 to R retroperitoneal abscess drainage vs less likely discitis.     Recommendations:  [] MRI LS spine   [] PT/OT    Plan to be discussed with Neurology Attending. 58 M with DM, HTN, peripheral neuropathy admitted for fever, leukocytosis. CT with ileal and cecal perf, stool extending to the iliacus muscle with R pelvis fluid and gas collection  s/p open ileocecectomy in discontinuity and abdominal wall/RP debridement with open abdomen/abthera on 8/11, and then abdominal washout, end ileostomy, abdominal closure on 8/13. surgical cx with mixed gram negs, lactobacillus and overgrowth of proteus, received vanco 8/11-8/15, fluconazole 8/11-8/17 and zosyn 8/11 now day 11 but was still febrile with leukocytosis and repeat CT 8/18 showed a 10x5cm fluid/gas collection in right hemipelvis, decreased in size.  s/p IR drain 8/20 and 250 cc of yellow purulent drainage now afebrile. Neurology consulted for RLE weakness which patient states started 6 weeks ago and has remained constant with some mild improvement. Neurological exam remarkable for lower extremity atrophy (R >> L) with RLE weakness and decreased in sensation.     Impression: Polyneuropathy w/ deconditioning 2/2 to R retroperitoneal abscess involving the R psoas muscle vs less likely discitis.     Recommendations:  [] MRI LS spine   [] PT/OT    Plan to be discussed with Neurology Attending.

## 2018-09-05 NOTE — CHART NOTE - NSCHARTNOTEFT_GEN_A_CORE
Patient seen for nutrition follow-up on 2MON.     Pertinent Information: Medical chart reviewed/events noted. Per chart, S/P abdominal washout, end ileostomy, abdominal closure on 8/13. had CT of abdomen and pelvis yesterday. PICC in place of IV antibiotics.     Source: Patient [ x]    Family [ ]     other [ ]    Diet : CSTCHO diet       Patient reports [ ] nausea  [ ] vomiting [ ] diarrhea [ ] constipation  [ ]chewing problems [ ] swallowing issues  [x ] other: Pt reports intermittent nausea but denies any episodes of emesis. Anti-emetic noted. Ostomy output 24hrs: 9/3: 1125ml, 9/4: 1150ml      PO intake:   % [x ]  other : Pt reports overall good PO intake but noted appetite can vary from meal to meal. RD observed breakfast tray with 75% of meal completed. RD reviewed with pt ostomy diet education, pt reports understanding, with no additional questions. Pt made aware RD remains available as needed. Offered to obtain food preferences but pt currently declines.      Source for PO intake [x ] Patient [ ] family [ ] chart [ ] staff [ ] other    No new weight to assess    Pertinent Medications: MEDICATIONS  (STANDING):  acetaminophen   Tablet. 650 milliGRAM(s) Oral every 6 hours  amiKACIN  IVPB      amiKACIN  IVPB 1050 milliGRAM(s) IV Intermittent daily  apixaban 10 milliGRAM(s) Oral every 12 hours  HYDROmorphone  Injectable 0.5 milliGRAM(s) IV Push daily  HYDROmorphone  Injectable 0.5 milliGRAM(s) IV Push daily  insulin lispro (HumaLOG) corrective regimen sliding scale   SubCutaneous three times a day before meals  insulin lispro (HumaLOG) corrective regimen sliding scale   SubCutaneous at bedtime  melatonin 5 milliGRAM(s) Oral at bedtime  metoprolol tartrate 25 milliGRAM(s) Oral two times a day  metroNIDAZOLE  IVPB 500 milliGRAM(s) IV Intermittent every 8 hours  metroNIDAZOLE  IVPB      OLANZapine Disintegrating Tablet 5 milliGRAM(s) Oral <User Schedule>  silver sulfADIAZINE 1% Cream 1 Application(s) Topical daily  sodium chloride 1 Gram(s) Oral three times a day  sodium chloride 0.9%. 1000 milliLiter(s) (100 mL/Hr) IV Continuous <Continuous>    MEDICATIONS  (PRN):  benzocaine 15 mG/menthol 3.6 mG Lozenge 1 Lozenge Oral every 6 hours PRN Sore Throat  ondansetron Injectable 4 milliGRAM(s) IV Push every 6 hours PRN Nausea    Pertinent Labs:  09-05 Na135 mmol/L Glu 157 mg/dL<H> K+ 4.2 mmol/L Cr  1.00 mg/dL BUN 22 mg/dL 09-05 Phos 3.8 mg/dL 09-04 Alb 3.3 g/dL 08-12 [id number] 7.0 %<H>  POCT glucose: 9/5: 168mg/dL, 9/4: 144-203mg/dL, 9/3: 183-347mg/dL     Skin: free of pressure injuries, midline abdomen   Edema: none     Estimated Needs:   [ x] no change since previous assessment  [ ] recalculated:       Previous Nutrition Diagnosis:     [x ] Inadequate Energy Intake      Nutrition Diagnosis is [x ] ongoing  [ ] resolved [ ] not applicable : improving      New Nutrition Diagnosis: [ x] not applicable    Interventions:     Recommend  1. Recommend change diet to CSTCHO + low fiber diet.  2. Provided diet education reinforcement.        Monitoring and Evaluation:     [x ] PO intake [ x] Tolerance to diet prescription [ x] weights [ x] follow up per protocol    [x ] other: RD to remain available and follow-up as medically appropriate. Lina Aldridge RD, CDN, Pager # [phone]

## 2018-09-05 NOTE — PROGRESS NOTE ADULT - SUBJECTIVE AND OBJECTIVE BOX
Trauma Surgery Progress Note    Subjective: Patient seen and examined. Patient had CT of abdomen and pelvis yesterday. He has no complaints this morning.     T(C): 36.8 (09-05-18 @ 04:46), Max: 37.1 (09-04-18 @ 21:00)  HR: 97 (09-05-18 @ 04:46) (92 - 99)  BP: 149/83 (09-05-18 @ 04:46) (110/65 - 149/83)  RR: 18 (09-05-18 @ 04:46) (18 - 18)  SpO2: 100% (09-05-18 @ 04:46) (100% - 100%)      09-04-18 @ 07:01  -  09-05-18 @ 07:00  --------------------------------------------------------  IN: 1990 mL / OUT: 3450 mL / NET: -1460 mL    09-05-18 @ 07:01  -  09-05-18 @ 09:10  --------------------------------------------------------  IN: 0 mL / OUT: 350 mL / NET: -350 mL        Physical Exam:   General: NAD  Abdomen: soft, appropriately tender to palpation, midline incision, 1 drain on right, 2 drains on left.   Extremities: ulcer noted on the R foot, nonpurulent      Medications:     acetaminophen   Tablet. 650 milliGRAM(s) Oral every 6 hours  amiKACIN  IVPB      amiKACIN  IVPB 1050 milliGRAM(s) IV Intermittent daily  apixaban 10 milliGRAM(s) Oral every 12 hours  benzocaine 15 mG/menthol 3.6 mG Lozenge 1 Lozenge Oral every 6 hours PRN  HYDROmorphone  Injectable 0.5 milliGRAM(s) IV Push daily  HYDROmorphone  Injectable 0.5 milliGRAM(s) IV Push daily  insulin lispro (HumaLOG) corrective regimen sliding scale   SubCutaneous three times a day before meals  insulin lispro (HumaLOG) corrective regimen sliding scale   SubCutaneous at bedtime  melatonin 5 milliGRAM(s) Oral at bedtime  metoprolol tartrate 25 milliGRAM(s) Oral two times a day  metroNIDAZOLE  IVPB 500 milliGRAM(s) IV Intermittent every 8 hours  metroNIDAZOLE  IVPB      OLANZapine Disintegrating Tablet 5 milliGRAM(s) Oral <User Schedule>  ondansetron Injectable 4 milliGRAM(s) IV Push every 6 hours PRN  silver sulfADIAZINE 1% Cream 1 Application(s) Topical daily  sodium chloride 1 Gram(s) Oral three times a day  sodium chloride 0.9%. 1000 milliLiter(s) IV Continuous <Continuous>      Radiographs:     CT abdomen/pelvis:   Interval placement of two additional abdominal drainage catheters   decreased size of pelvic and right psoas fluid collections.     Increased fluid in the anterior abdominal wall incision with some   surrounding peripheral enhancement; this may be related to surgical   packing material/procedure, correlate clinically.    Stable appearance of droplets of gas within the right iliac bone. Trauma Surgery Progress Note    Subjective: Patient seen and examined. Patient had CT of abdomen and pelvis yesterday. He has no complaints this morning. Leukocytosis downtrending.     T(C): 36.8 (09-05-18 @ 04:46), Max: 37.1 (09-04-18 @ 21:00)  HR: 97 (09-05-18 @ 04:46) (92 - 99)  BP: 149/83 (09-05-18 @ 04:46) (110/65 - 149/83)  RR: 18 (09-05-18 @ 04:46) (18 - 18)  SpO2: 100% (09-05-18 @ 04:46) (100% - 100%)      09-04-18 @ 07:01  -  09-05-18 @ 07:00  --------------------------------------------------------  IN: 1990 mL / OUT: 3450 mL / NET: -1460 mL    09-05-18 @ 07:01  -  09-05-18 @ 09:10  --------------------------------------------------------  IN: 0 mL / OUT: 350 mL / NET: -350 mL        Physical Exam:   General: NAD  Abdomen: soft, appropriately tender to palpation, midline incision, 1 drain on right, 2 drains on left.   Extremities: ulcer noted on the R foot, nonpurulent                          9.6    12.01 )-----------( 472      ( 05 Sep 2018 07:57 )             30.0     09-05    135  |  102  |  22  ----------------------------<  157<H>  4.2   |  21<L>  |  1.00    Ca    9.0      05 Sep 2018 07:07  Phos  3.8     09-05  Mg     1.8     09-05    TPro  7.7  /  Alb  3.3  /  TBili  0.4  /  DBili  x   /  AST  13  /  ALT  11  /  AlkPhos  71  09-04        Medications:     acetaminophen   Tablet. 650 milliGRAM(s) Oral every 6 hours  amiKACIN  IVPB      amiKACIN  IVPB 1050 milliGRAM(s) IV Intermittent daily  apixaban 10 milliGRAM(s) Oral every 12 hours  benzocaine 15 mG/menthol 3.6 mG Lozenge 1 Lozenge Oral every 6 hours PRN  HYDROmorphone  Injectable 0.5 milliGRAM(s) IV Push daily  HYDROmorphone  Injectable 0.5 milliGRAM(s) IV Push daily  insulin lispro (HumaLOG) corrective regimen sliding scale   SubCutaneous three times a day before meals  insulin lispro (HumaLOG) corrective regimen sliding scale   SubCutaneous at bedtime  melatonin 5 milliGRAM(s) Oral at bedtime  metoprolol tartrate 25 milliGRAM(s) Oral two times a day  metroNIDAZOLE  IVPB 500 milliGRAM(s) IV Intermittent every 8 hours  metroNIDAZOLE  IVPB      OLANZapine Disintegrating Tablet 5 milliGRAM(s) Oral <User Schedule>  ondansetron Injectable 4 milliGRAM(s) IV Push every 6 hours PRN  silver sulfADIAZINE 1% Cream 1 Application(s) Topical daily  sodium chloride 1 Gram(s) Oral three times a day  sodium chloride 0.9%. 1000 milliLiter(s) IV Continuous <Continuous>      Radiographs:     CT abdomen/pelvis:   Interval placement of two additional abdominal drainage catheters   decreased size of pelvic and right psoas fluid collections.     Increased fluid in the anterior abdominal wall incision with some   surrounding peripheral enhancement; this may be related to surgical   packing material/procedure, correlate clinically.    Stable appearance of droplets of gas within the right iliac bone.
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
## 2018-09-05 NOTE — PROGRESS NOTE ADULT - SUBJECTIVE AND OBJECTIVE BOX
Follow Up:  abd abscess    Interval History: abd abscess s/p IR drainage, growing CRE E-coli , which is also resistant to avycaz and bacteroides, s/p PICC line on amikacin and flagyl    ROS:      All other systems negative    Constitutional: no fever,  chills,  sweat  Eye: no eye pain, no redness, no vision changes  ENT:  no sore throat, no rhinorrhea  Cardiovascular:  no chest pain, no palpitation  Respiratory:  no SOB, no cough  GI:  occasional abd pain, no vomiting, no diarrhea  urinary: resolved dysuria, no hematuria, no flank pain  : no penile discharge or bleeding  musculoskeletal:  no joint pain, no joint swelling,   skin:  no rash  neurology:  no headache, no seizure, + RLE weakness  psych: no anxiety, no depression       Allergies  No Known Allergies        ANTIMICROBIALS:  amiKACIN  IVPB    amiKACIN  IVPB 1050 daily  metroNIDAZOLE  IVPB 500 every 8 hours  metroNIDAZOLE  IVPB        OTHER MEDS:  acetaminophen   Tablet. 650 milliGRAM(s) Oral every 6 hours  apixaban 10 milliGRAM(s) Oral every 12 hours  benzocaine 15 mG/menthol 3.6 mG Lozenge 1 Lozenge Oral every 6 hours PRN  HYDROmorphone  Injectable 0.5 milliGRAM(s) IV Push daily  HYDROmorphone  Injectable 0.5 milliGRAM(s) IV Push daily  insulin lispro (HumaLOG) corrective regimen sliding scale   SubCutaneous three times a day before meals  insulin lispro (HumaLOG) corrective regimen sliding scale   SubCutaneous at bedtime  melatonin 5 milliGRAM(s) Oral at bedtime  metoprolol tartrate 25 milliGRAM(s) Oral two times a day  OLANZapine Disintegrating Tablet 5 milliGRAM(s) Oral <User Schedule>  ondansetron Injectable 4 milliGRAM(s) IV Push every 6 hours PRN  silver sulfADIAZINE 1% Cream 1 Application(s) Topical daily  sodium chloride 1 Gram(s) Oral three times a day  sodium chloride 0.9%. 1000 milliLiter(s) IV Continuous <Continuous>      Vital Signs Last 24 Hrs  T(C): 36.8 (05 Sep 2018 09:43), Max: 37.1 (04 Sep 2018 21:00)  T(F): 98.3 (05 Sep 2018 09:43), Max: 98.7 (04 Sep 2018 21:00)  HR: 89 (05 Sep 2018 09:43) (89 - 99)  BP: 133/79 (05 Sep 2018 09:43) (114/67 - 149/83)  BP(mean): --  RR: 18 (05 Sep 2018 09:43) (18 - 18)  SpO2: 100% (05 Sep 2018 09:43) (100% - 100%)    Physical Exam:  General:    NAD, non toxic  Head: atraumatic, normocephalic  Eyes: normal sclera and conjunctiva  ENT:   no oropharyngeal lesions, no LAD, neck supple  Cardio:    regular S1,S2, no murmur  Respiratory:   clear b/l, no wheezing  abd:   soft, BS +, incision clean, L SUDHIR drain, RLQ IR drain with yellow purulent drainage  :     no CVAT, no suprapubic tenderness, no eugene  Musculoskeletal : no joint swelling, no edema  Skin:    no rash  vascular: LUE PICC, normal pulses  Neurologic:   RLE weakness 3/5  psych: normal affect, no suicidal ideation                          9.6    12.01 )-----------( 472      ( 05 Sep 2018 07:57 )             30.0       09-05    135  |  102  |  22  ----------------------------<  157<H>  4.2   |  21<L>  |  1.00    Ca    9.0      05 Sep 2018 07:07  Phos  3.8     09-05  Mg     1.8     09-05    TPro  7.7  /  Alb  3.3  /  TBili  0.4  /  DBili  x   /  AST  13  /  ALT  11  /  AlkPhos  71  09-04          MICROBIOLOGY:  v  Abdominal Fl Abdominal Fluid  08-25-18   Rare Escherichia coli (Carbapenem Resistant)  Moderate Bacteroides fragilis "Susceptibilities not performed"  --  Escherichia coli (Carbapenem Resistant)      .Body Fluid IR drain (skin)  08-22-18   Moderate Mixed gram negative rods "Susceptibilities not performed"  --    Moderate polymorphonuclear leukocytes per low power field  Rare Gram Negative Rods per oil power field      .Surgical Swab retroperitoneal fluid  08-14-18   Moderate Mixed gram negative rods "Susceptibilities not performed"  Few Lactobacillus species "Susceptibilities not performed"  Unable to evaluate further due to Proteus overgrowth  --  --      .Blood Blood  08-12-18   No growth at 5 days.  --  --      .Urine Clean Catch (Midstream)  08-11-18   >100,000 CFU/ml Yeast-like cells, presumptively not Candida albicans  --  --      .Blood Blood-Peripheral  08-11-18   No growth at 5 days.  --  --                RADIOLOGY:  Images below reviewed personally  < from: CT Abdomen and Pelvis w/ Oral Cont and w/ IV Cont (09.04.18 @ 22:02) >  IMPRESSION:     Interval placement of two additional abdominal drainage catheters   decreased size of pelvic and right psoas fluid collections.     Increased fluid in the anterior abdominal wall incision with some   surrounding peripheral enhancement; this may be related to surgical   packing material/procedure, correlate clinically.    Stable appearance of droplets of gas within the right iliac bone.

## 2018-09-05 NOTE — PROGRESS NOTE ADULT - ASSESSMENT
58 M with DM, HTN, peripheral neuropathy, was visiting [facility] that developed fever, weakness and abd pain, was diagnosed with bowel perf and abd abscess, refused surgery there and came here with fever, leukocytosis to 24, CT with ileal and cecal perf, stool extending to the iliacus muscle with R pelvis fluid and gas collection  s/p open ileocecectomy in discontinuity and abdominal wall/RP debridement with open abdomen/abthera on 8/11, and then abdominal washout, end ileostomy, abdominal closure on 8/13. surgical cx with mixed gram negs, lactobacillus and overgrowth of proteus, received vanco 8/11-8/15, fluconazole 8/11-8/17 and zosyn 8/11 now day 11 but was still febrile with leukocytosis and repeat CT 8/18 showed a 10x5cm fluid/gas collection in right hemipelvis, decreased in size.  s/p IR drain 8/20 and 250 cc of yellow purulent drainage  IR cultures with bacteroides and CRE E-coli now on amikacin and flagyl with increasing cr      sepsis due to ileocecal perf and large R hemipelvis abscess s/p laparotomy, washout, ileostomy, OR cx with mixed GNR, lactobacillus and proteus overgrowth but persistent abscess s/p IR drainage 8/20 but no culture, repeat CT with new left pelvis collection and a droplet of gas in the bone s/o extension to the bone, s/p IR drainage, growing CRE E-coli resistant to acycaz, S to amikacin/genta and tigecycline also bacteroides  I reviewed the CT with radiology and the imaging is s/o iliac  osteomyelitis  leukocytosis now 12  monitoring drug levels, amikacin trough and peak within normal limits  increased Cr, ?VICKIE due to amikacin  RLE weakness, started with the abd pain, related to psoas abscess and nerve damage due to abscess?          * IR culture with bacteroides fragilis and CRE E-coli also resistant to avycaz  * s/p zosyn 8/11-8/22,  vanco 8/11-8/15,  fluconazole 8/11-8/17, micafungin and meropenem 8/22-8/27  * DC avycaz, received 5 days 8/27-8/31  * c/w amikacin 15 mg/kg qd and flagyl 500 tid, started 8/31  * monitor renal function now Cr is 1, will have to follow closely and if continues to increase, will have to stop amikacin  * I called the lab and the polymixin sensitivity is not back yet  * will need  a 6 weeks course in view of osteomyelitis until 10/14  * biweekly CBC, CMP and amikacin trough, while on antibiotics if pt is going to rehab, labs will be checked by the rehab MD  * neurology f/u for the LE weakness

## 2018-09-05 NOTE — CONSULT NOTE ADULT - ATTENDING COMMENTS
Pt seen and examined, Agree with above  IV sedation  mechanical ventilation  Close HD monitoring, fix A line  IV hydration  Pressors as needed  IV antibiotics  DVT/GI prophylaxis  F/U culture  Monitor blood sugar  RTOR per primary service in am
Patient was seen and examined with house staff. For additional details of history and examination, please see house staff note above. I agree with house staff assessment and recommendations above. Exam shows multilevel root involvement affecting mainly R quadriceps and sensory changes in multiple dermatomes suggesting injury at the level of the lumbar plexus and origin of the femoral nerve. Anatomically these structures were passing through the fecal content in the R abdomen at time of presentation, and mostly likely the nerves have been injured as a result.  Depending on extent of injury, the recovery may take months, as nerves will need to regrow from the point of injury - assuming the connective tissue sheath is preserved and not damaged.     MRI LS spine and R plexus may be helpful in visualizing injury and r/o discitis. OT/PT to rebuild muscle strength as much as possible.

## 2018-09-05 NOTE — PROGRESS NOTE ADULT - ATTENDING COMMENTS
extremely weak and deconditioned but otherwise looks well  keep drains. to continue antibiotics  needs rehab

## 2018-09-05 NOTE — PROGRESS NOTE ADULT - ASSESSMENT
- Podiatry rec: silvadene with DSD for R foot ulcer  - ID recommendations: c/w amikacin 15 mg/kg qd and flagyl 500 tid, started 8/31, amikacin trough in AM, PICC in place for IV antibiotics,   - RUE DVT ppx: Transitioned from therapeutic lovenox to eliquis for DVT ppx on 8/30. Patient to receive 10 mg BID for 7d followed by 5mg BID for 3 months. possibly 3 months of anticoagulation.   - monitor drain output  - Pain control: Tylenol q6h, oxy PRN.  - Zyprexa 5 mg qhs for agitation.  - SSI, diabetic diet  - PT/OOB  - Ostomy care  - Dispo: Rehab; Approved for rehabilitation, insurance is pending.

## 2018-09-06 LAB
ANION GAP SERPL CALC-SCNC: 10 MMOL/L — SIGNIFICANT CHANGE UP (ref 5–17)
BUN SERPL-MCNC: 17 MG/DL — SIGNIFICANT CHANGE UP (ref 7–23)
CALCIUM SERPL-MCNC: 9 MG/DL — SIGNIFICANT CHANGE UP (ref 8.4–10.5)
CHLORIDE SERPL-SCNC: 103 MMOL/L — SIGNIFICANT CHANGE UP (ref 96–108)
CO2 SERPL-SCNC: 22 MMOL/L — SIGNIFICANT CHANGE UP (ref 22–31)
CREAT SERPL-MCNC: 0.81 MG/DL — SIGNIFICANT CHANGE UP (ref 0.5–1.3)
GLUCOSE BLDC GLUCOMTR-MCNC: 140 MG/DL — HIGH (ref 70–99)
GLUCOSE BLDC GLUCOMTR-MCNC: 192 MG/DL — HIGH (ref 70–99)
GLUCOSE BLDC GLUCOMTR-MCNC: 206 MG/DL — HIGH (ref 70–99)
GLUCOSE BLDC GLUCOMTR-MCNC: 238 MG/DL — HIGH (ref 70–99)
GLUCOSE SERPL-MCNC: 138 MG/DL — HIGH (ref 70–99)
HCT VFR BLD CALC: 30.6 % — LOW (ref 39–50)
HGB BLD-MCNC: 9.9 G/DL — LOW (ref 13–17)
MAGNESIUM SERPL-MCNC: 2 MG/DL — SIGNIFICANT CHANGE UP (ref 1.6–2.6)
MCHC RBC-ENTMCNC: 28.8 PG — SIGNIFICANT CHANGE UP (ref 27–34)
MCHC RBC-ENTMCNC: 32.4 GM/DL — SIGNIFICANT CHANGE UP (ref 32–36)
MCV RBC AUTO: 88.7 FL — SIGNIFICANT CHANGE UP (ref 80–100)
PHOSPHATE SERPL-MCNC: 3.7 MG/DL — SIGNIFICANT CHANGE UP (ref 2.5–4.5)
PLATELET # BLD AUTO: 431 K/UL — HIGH (ref 150–400)
POTASSIUM SERPL-MCNC: 4.1 MMOL/L — SIGNIFICANT CHANGE UP (ref 3.5–5.3)
POTASSIUM SERPL-SCNC: 4.1 MMOL/L — SIGNIFICANT CHANGE UP (ref 3.5–5.3)
RBC # BLD: 3.45 M/UL — LOW (ref 4.2–5.8)
RBC # FLD: 18.3 % — HIGH (ref 10.3–14.5)
SODIUM SERPL-SCNC: 135 MMOL/L — SIGNIFICANT CHANGE UP (ref 135–145)
WBC # BLD: 11.9 K/UL — HIGH (ref 3.8–10.5)
WBC # FLD AUTO: 11.9 K/UL — HIGH (ref 3.8–10.5)

## 2018-09-06 PROCEDURE — 74176 CT ABD & PELVIS W/O CONTRAST: CPT | Mod: 26

## 2018-09-06 PROCEDURE — 99233 SBSQ HOSP IP/OBS HIGH 50: CPT

## 2018-09-06 RX ORDER — KETOROLAC TROMETHAMINE 30 MG/ML
30 SYRINGE (ML) INJECTION ONCE
Qty: 0 | Refills: 0 | Status: DISCONTINUED | OUTPATIENT
Start: 2018-09-06 | End: 2018-09-06

## 2018-09-06 RX ORDER — HYDROMORPHONE HYDROCHLORIDE 2 MG/ML
0.5 INJECTION INTRAMUSCULAR; INTRAVENOUS; SUBCUTANEOUS ONCE
Qty: 0 | Refills: 0 | Status: DISCONTINUED | OUTPATIENT
Start: 2018-09-06 | End: 2018-09-06

## 2018-09-06 RX ADMIN — Medication 4: at 18:16

## 2018-09-06 RX ADMIN — Medication 650 MILLIGRAM(S): at 07:55

## 2018-09-06 RX ADMIN — Medication 30 MILLIGRAM(S): at 20:26

## 2018-09-06 RX ADMIN — SODIUM CHLORIDE 1 GRAM(S): 9 INJECTION INTRAMUSCULAR; INTRAVENOUS; SUBCUTANEOUS at 15:00

## 2018-09-06 RX ADMIN — HYDROMORPHONE HYDROCHLORIDE 0.5 MILLIGRAM(S): 2 INJECTION INTRAMUSCULAR; INTRAVENOUS; SUBCUTANEOUS at 09:50

## 2018-09-06 RX ADMIN — Medication 650 MILLIGRAM(S): at 13:00

## 2018-09-06 RX ADMIN — Medication 30 MILLIGRAM(S): at 20:56

## 2018-09-06 RX ADMIN — Medication 650 MILLIGRAM(S): at 07:25

## 2018-09-06 RX ADMIN — SODIUM CHLORIDE 1 GRAM(S): 9 INJECTION INTRAMUSCULAR; INTRAVENOUS; SUBCUTANEOUS at 21:56

## 2018-09-06 RX ADMIN — Medication 650 MILLIGRAM(S): at 23:59

## 2018-09-06 RX ADMIN — Medication 1 APPLICATION(S): at 15:04

## 2018-09-06 RX ADMIN — Medication 100 MILLIGRAM(S): at 15:04

## 2018-09-06 RX ADMIN — Medication 100 MILLIGRAM(S): at 10:00

## 2018-09-06 RX ADMIN — Medication 25 MILLIGRAM(S): at 18:15

## 2018-09-06 RX ADMIN — Medication 100 MILLIGRAM(S): at 23:59

## 2018-09-06 RX ADMIN — OLANZAPINE 5 MILLIGRAM(S): 15 TABLET, FILM COATED ORAL at 20:27

## 2018-09-06 RX ADMIN — APIXABAN 10 MILLIGRAM(S): 2.5 TABLET, FILM COATED ORAL at 07:27

## 2018-09-06 RX ADMIN — HYDROMORPHONE HYDROCHLORIDE 0.5 MILLIGRAM(S): 2 INJECTION INTRAMUSCULAR; INTRAVENOUS; SUBCUTANEOUS at 09:28

## 2018-09-06 RX ADMIN — Medication 5 MILLIGRAM(S): at 21:56

## 2018-09-06 RX ADMIN — SODIUM CHLORIDE 1 GRAM(S): 9 INJECTION INTRAMUSCULAR; INTRAVENOUS; SUBCUTANEOUS at 07:27

## 2018-09-06 RX ADMIN — Medication 650 MILLIGRAM(S): at 18:16

## 2018-09-06 RX ADMIN — Medication 25 MILLIGRAM(S): at 07:27

## 2018-09-06 RX ADMIN — AMIKACIN SULFATE 254.2 MILLIGRAM(S): 250 INJECTION, SOLUTION INTRAMUSCULAR; INTRAVENOUS at 15:00

## 2018-09-06 NOTE — PROGRESS NOTE ADULT - SUBJECTIVE AND OBJECTIVE BOX
Trauma Surgery Progress Note    Subjective: Patient seen and examined.   Patient had MRI of lumbar spine last night to assess RLE weakness.   He has no complaints this morning. Leukocytosis downtrending.     Vital Signs Last 24 Hrs  T(C): 36.6 (06 Sep 2018 09:00), Max: 37 (05 Sep 2018 13:12)  T(F): 97.9 (06 Sep 2018 09:00), Max: 98.6 (05 Sep 2018 13:12)  HR: 89 (06 Sep 2018 09:00) (72 - 93)  BP: 118/79 (06 Sep 2018 09:00) (112/63 - 154/77)  BP(mean): --  RR: 18 (06 Sep 2018 09:00) (18 - 18)  SpO2: 99% (06 Sep 2018 09:00) (99% - 100%)    I&O's Detail    05 Sep 2018 07:01  -  06 Sep 2018 07:00  --------------------------------------------------------  IN:    Oral Fluid: 680 mL    Solution: 100 mL  Total IN: 780 mL    OUT:    Bulb: 10 mL    Drain: 15 mL    Ileostomy: 650 mL    Voided: 2600 mL  Total OUT: 3275 mL    Total NET: -2495 mL      06 Sep 2018 07:01  -  06 Sep 2018 09:19  --------------------------------------------------------  IN:    Oral Fluid: 280 mL  Total IN: 280 mL    OUT:    Voided: 400 mL  Total OUT: 400 mL    Total NET: -120 mL      MEDICATIONS  (STANDING):  acetaminophen   Tablet. 650 milliGRAM(s) Oral every 6 hours  amiKACIN  IVPB      amiKACIN  IVPB 1050 milliGRAM(s) IV Intermittent daily  insulin lispro (HumaLOG) corrective regimen sliding scale   SubCutaneous three times a day before meals  insulin lispro (HumaLOG) corrective regimen sliding scale   SubCutaneous at bedtime  melatonin 5 milliGRAM(s) Oral at bedtime  metoprolol tartrate 25 milliGRAM(s) Oral two times a day  metroNIDAZOLE  IVPB 500 milliGRAM(s) IV Intermittent every 8 hours  metroNIDAZOLE  IVPB      OLANZapine Disintegrating Tablet 5 milliGRAM(s) Oral <User Schedule>  silver sulfADIAZINE 1% Cream 1 Application(s) Topical daily  sodium chloride 1 Gram(s) Oral three times a day  sodium chloride 0.9%. 1000 milliLiter(s) (100 mL/Hr) IV Continuous <Continuous>    MEDICATIONS  (PRN):  benzocaine 15 mG/menthol 3.6 mG Lozenge 1 Lozenge Oral every 6 hours PRN Sore Throat  HYDROmorphone  Injectable 0.5 milliGRAM(s) IV Push once PRN Moderate Pain (4 - 6)  ondansetron Injectable 4 milliGRAM(s) IV Push every 6 hours PRN Nausea        Physical Exam:   General: NAD  Abdomen: soft, appropriately tender to palpation, midline incision, 1 drain on right, 2 drains on left. Drain placed by IR on the left is loose with drainage noted at incision site.   Extremities: ulcer noted on the R foot, nonpurulent    Radiographs:     CT abdomen/pelvis:   Interval placement of two additional abdominal drainage catheters   decreased size of pelvic and right psoas fluid collections.     Increased fluid in the anterior abdominal wall incision with some   surrounding peripheral enhancement; this may be related to surgical   packing material/procedure, correlate clinically.    Stable appearance of droplets of gas within the right iliac bone.

## 2018-09-06 NOTE — CHART NOTE - NSCHARTNOTEFT_GEN_A_CORE
MRI LS spine reviewed, showing multilevel degenerative changes, small disc fragment R spinal canal at L5-S1 without spinal canal compromise or spinal cord compromise. No evidence of discitis.     No change in recommendations, continue PT/OT.     Please recall as needed.

## 2018-09-06 NOTE — PROGRESS NOTE ADULT - ASSESSMENT
- fu MRI spine with neurology for RLE weakness  - fu IR for for loose drain on the left  - Podiatry rec: silvadene with DSD for R foot ulcer  - ID recommendations: c/w amikacin 15 mg/kg qd and flagyl 500 tid, started 8/31, amikacin trough in AM, PICC in place for IV antibiotics,   - RUE DVT ppx: Transitioned from therapeutic lovenox to eliquis for DVT ppx on 8/30. Patient to receive 10 mg BID for 7d followed by 5mg BID for 3 months. possibly 3 months of anticoagulation.   - monitor drain output  - Pain control: Tylenol q6h, oxy PRN.  - Zyprexa 5 mg qhs for agitation.  - SSI, diabetic diet  - PT/OOB  - Ostomy care  - Dispo: Rehab; Case accepted at [facility]

## 2018-09-06 NOTE — PROGRESS NOTE ADULT - SUBJECTIVE AND OBJECTIVE BOX
Follow Up:  abd abscess    Interval History: abd abscess s/p IR drainage, growing CRE E-coli , which is also resistant to avycaz and bacteroides, s/p PICC line on amikacin and flagyl    ROS:      All other systems negative    Constitutional: no fever,  chills,  sweat  Eye: no eye pain, no redness, no vision changes  ENT:  no sore throat, no rhinorrhea  Cardiovascular:  no chest pain, no palpitation  Respiratory:  no SOB, no cough  GI:  occasional abd pain, no vomiting, no diarrhea  urinary: resolved dysuria, no hematuria, no flank pain  : no penile discharge or bleeding  musculoskeletal:  no joint pain, no joint swelling,   skin:  no rash  neurology:  no headache, no seizure, + RLE weakness  psych: no anxiety, no depression         Allergies  No Known Allergies        ANTIMICROBIALS:  amiKACIN  IVPB    amiKACIN  IVPB 1050 daily  metroNIDAZOLE  IVPB 500 every 8 hours  metroNIDAZOLE  IVPB        OTHER MEDS:  acetaminophen   Tablet. 650 milliGRAM(s) Oral every 6 hours  benzocaine 15 mG/menthol 3.6 mG Lozenge 1 Lozenge Oral every 6 hours PRN  insulin lispro (HumaLOG) corrective regimen sliding scale   SubCutaneous three times a day before meals  insulin lispro (HumaLOG) corrective regimen sliding scale   SubCutaneous at bedtime  melatonin 5 milliGRAM(s) Oral at bedtime  metoprolol tartrate 25 milliGRAM(s) Oral two times a day  OLANZapine Disintegrating Tablet 5 milliGRAM(s) Oral <User Schedule>  ondansetron Injectable 4 milliGRAM(s) IV Push every 6 hours PRN  silver sulfADIAZINE 1% Cream 1 Application(s) Topical daily  sodium chloride 1 Gram(s) Oral three times a day  sodium chloride 0.9%. 1000 milliLiter(s) IV Continuous <Continuous>      Vital Signs Last 24 Hrs  T(C): 36.6 (06 Sep 2018 09:00), Max: 37 (05 Sep 2018 13:12)  T(F): 97.9 (06 Sep 2018 09:00), Max: 98.6 (05 Sep 2018 13:12)  HR: 89 (06 Sep 2018 09:00) (72 - 93)  BP: 118/79 (06 Sep 2018 09:00) (112/63 - 154/77)  BP(mean): --  RR: 18 (06 Sep 2018 09:00) (18 - 18)  SpO2: 99% (06 Sep 2018 09:00) (99% - 100%)    Physical Exam:  General:    NAD, non toxic  Head: atraumatic, normocephalic  Eyes: normal sclera and conjunctiva  ENT:   no oropharyngeal lesions, no LAD, neck supple  Cardio:    regular S1,S2, no murmur  Respiratory:   clear b/l, no wheezing  abd:   soft, BS +, incision clean, L SUDHIR drain, RLQ IR drain with yellow purulent drainage  :     no CVAT, no suprapubic tenderness, no eugene  Musculoskeletal : no joint swelling, no edema  Skin:    no rash  vascular: LUE PICC, normal pulses  Neurologic:   RLE weakness 3/5  psych: normal affect, no suicidal ideation                            9.9    11.9  )-----------( 431      ( 06 Sep 2018 07:43 )             30.6       09-06    135  |  103  |  17  ----------------------------<  138<H>  4.1   |  22  |  0.81    Ca    9.0      06 Sep 2018 07:43  Phos  3.7     09-06  Mg     2.0     09-06            MICROBIOLOGY:  v  Abdominal Fl Abdominal Fluid  08-25-18   Rare Escherichia coli (Carbapenem Resistant)  Moderate Bacteroides fragilis "Susceptibilities not performed"  --  Escherichia coli (Carbapenem Resistant)      .Body Fluid IR drain (skin)  08-22-18   Moderate Mixed gram negative rods "Susceptibilities not performed"  --    Moderate polymorphonuclear leukocytes per low power field  Rare Gram Negative Rods per oil power field      .Surgical Swab retroperitoneal fluid  08-14-18   Moderate Mixed gram negative rods "Susceptibilities not performed"  Few Lactobacillus species "Susceptibilities not performed"  Unable to evaluate further due to Proteus overgrowth  --  --      .Blood Blood  08-12-18   No growth at 5 days.  --  --      .Urine Clean Catch (Midstream)  08-11-18   >100,000 CFU/ml Yeast-like cells, presumptively not Candida albicans  --  --      .Blood Blood-Peripheral  08-11-18   No growth at 5 days.  --  --                RADIOLOGY:  Images below reviewed personally  < from: MR Lumbar Spine No Cont (09.05.18 @ 23:03) >    Impression: Degenerative changes as described above.    Small focus of abnormal signal seen involving the right spinal canal   which could be compatible with a small disc fragment, clinical   correlation and continued close interval follow-up is recommended.    < from: CT Abdomen and Pelvis w/ Oral Cont and w/ IV Cont (09.04.18 @ 22:02) >    Interval placement of two additional abdominal drainage catheters   decreased size of pelvic and right psoas fluid collections.     Increased fluid in the anterior abdominal wall incision with some   surrounding peripheral enhancement; this may be related to surgical   packing material/procedure, correlate clinically.    Stable appearance of droplets of gas within the right iliac bone.

## 2018-09-06 NOTE — PROGRESS NOTE ADULT - SUBJECTIVE AND OBJECTIVE BOX
Trauma Surgery Progress Note    Subjective: Patient seen and examined.   Patient had MRI of lumbar spine last night to assess RLE weakness.   He has no complaints this morning. Leukocytosis downtrending.     Vital Signs Last 24 Hrs  T(C): 36.6 (06 Sep 2018 09:00), Max: 37 (05 Sep 2018 13:12)  T(F): 97.9 (06 Sep 2018 09:00), Max: 98.6 (05 Sep 2018 13:12)  HR: 89 (06 Sep 2018 09:00) (72 - 93)  BP: 118/79 (06 Sep 2018 09:00) (112/63 - 154/77)  BP(mean): --  RR: 18 (06 Sep 2018 09:00) (18 - 18)  SpO2: 99% (06 Sep 2018 09:00) (99% - 100%)    I&O's Detail    05 Sep 2018 07:01  -  06 Sep 2018 07:00  --------------------------------------------------------  IN:    Oral Fluid: 680 mL    Solution: 100 mL  Total IN: 780 mL    OUT:    Bulb: 10 mL    Drain: 15 mL    Ileostomy: 650 mL    Voided: 2600 mL  Total OUT: 3275 mL    Total NET: -2495 mL      06 Sep 2018 07:01  -  06 Sep 2018 09:19  --------------------------------------------------------  IN:    Oral Fluid: 280 mL  Total IN: 280 mL    OUT:    Voided: 400 mL  Total OUT: 400 mL    Total NET: -120 mL      MEDICATIONS  (STANDING):  acetaminophen   Tablet. 650 milliGRAM(s) Oral every 6 hours  amiKACIN  IVPB      amiKACIN  IVPB 1050 milliGRAM(s) IV Intermittent daily  insulin lispro (HumaLOG) corrective regimen sliding scale   SubCutaneous three times a day before meals  insulin lispro (HumaLOG) corrective regimen sliding scale   SubCutaneous at bedtime  melatonin 5 milliGRAM(s) Oral at bedtime  metoprolol tartrate 25 milliGRAM(s) Oral two times a day  metroNIDAZOLE  IVPB 500 milliGRAM(s) IV Intermittent every 8 hours  metroNIDAZOLE  IVPB      OLANZapine Disintegrating Tablet 5 milliGRAM(s) Oral <User Schedule>  silver sulfADIAZINE 1% Cream 1 Application(s) Topical daily  sodium chloride 1 Gram(s) Oral three times a day  sodium chloride 0.9%. 1000 milliLiter(s) (100 mL/Hr) IV Continuous <Continuous>    MEDICATIONS  (PRN):  benzocaine 15 mG/menthol 3.6 mG Lozenge 1 Lozenge Oral every 6 hours PRN Sore Throat  HYDROmorphone  Injectable 0.5 milliGRAM(s) IV Push once PRN Moderate Pain (4 - 6)  ondansetron Injectable 4 milliGRAM(s) IV Push every 6 hours PRN Nausea        Physical Exam:   General: NAD  Abdomen: soft, appropriately tender to palpation, midline incision, 1 drain on right, 2 drains on left. Drain placed by IR on the left is loose with some output from incision site.   Extremities: ulcer noted on the R foot, nonpurulent    Radiographs:     CT abdomen/pelvis:   Interval placement of two additional abdominal drainage catheters   decreased size of pelvic and right psoas fluid collections.     Increased fluid in the anterior abdominal wall incision with some   surrounding peripheral enhancement; this may be related to surgical   packing material/procedure, correlate clinically.    Stable appearance of droplets of gas within the right iliac bone.

## 2018-09-06 NOTE — PROGRESS NOTE ADULT - ATTENDING COMMENTS
I saw and evaluated patient and agree with above note    will need to get neurology input regarding MRI disk but does not seem to be emergent  needs CT to assess possible drain displacement   antibiotics

## 2018-09-06 NOTE — PROGRESS NOTE ADULT - ASSESSMENT
58 M with DM, HTN, peripheral neuropathy, was visiting [facility] that developed fever, weakness and abd pain, was diagnosed with bowel perf and abd abscess, refused surgery there and came here with fever, leukocytosis to 24, CT with ileal and cecal perf, stool extending to the iliacus muscle with R pelvis fluid and gas collection  s/p open ileocecectomy in discontinuity and abdominal wall/RP debridement with open abdomen/abthera on 8/11, and then abdominal washout, end ileostomy, abdominal closure on 8/13. surgical cx with mixed gram negs, lactobacillus and overgrowth of proteus, received vanco 8/11-8/15, fluconazole 8/11-8/17 and zosyn 8/11 now day 11 but was still febrile with leukocytosis and repeat CT 8/18 showed a 10x5cm fluid/gas collection in right hemipelvis, decreased in size.  s/p IR drain 8/20 and 250 cc of yellow purulent drainage  IR cultures with bacteroides and CRE E-coli now on amikacin and flagyl with increasing cr      sepsis due to ileocecal perf and large R hemipelvis abscess s/p laparotomy, washout, ileostomy, OR cx with mixed GNR, lactobacillus and proteus overgrowth but persistent abscess s/p IR drainage 8/20 but no culture, repeat CT with new left pelvis collection and a droplet of gas in the bone s/o extension to the bone, s/p IR drainage, growing CRE E-coli resistant to acycaz, S to amikacin/genta and tigecycline also bacteroides  I reviewed the CT with radiology and the imaging is s/o iliac  osteomyelitis  leukocytosis now 12  monitoring drug levels, amikacin trough and peak within normal limits  monitoring drug levels and renal function  RLE weakness, started with the abd pain, related to psoas abscess and nerve damage due to abscess?          * IR culture with bacteroides fragilis and CRE E-coli also resistant to avycaz  * s/p zosyn 8/11-8/22,  vanco 8/11-8/15,  fluconazole 8/11-8/17, micafungin and meropenem 8/22-8/27  * DC avycaz, received 5 days 8/27-8/31  * c/w amikacin 15 mg/kg qd and flagyl 500 tid, started 8/31  * monitor renal function now Cr is 0.8, will have to follow closely and if continues to increase, will have to stop amikacin  * I called the lab and the polymixin sensitivity is not back yet  * will need  a 6 weeks course in view of osteomyelitis until 10/14  * biweekly CBC, CMP and amikacin trough, while on antibiotics if pt is going to rehab, labs will be checked by the rehab MD  * neurology f/u for the LE weakness

## 2018-09-06 NOTE — PROGRESS NOTE ADULT - ASSESSMENT
59yo M with perforated R colon and large RP abscess/abdominal wall infection s/p open ileocecectomy in discontinuity and abdominal wall/RP debridement with open abdomen/abthera on 8/11, s/p abdominal washout, end ileostomy, abdominal closure on 8/13. CT A/P showed a 10x5cm fluid/gas collection in right hemipelvis. SUDHIR drain with minimal output.  NGT removed tube on 8/19. IR drain placed 8/20. Now s/p IR drainage of a L abdominal abscess 8/23. CT scan reviewed by ID and radiology was s/o osteo. PICC line placed 8/29.    - fu MRI spine with neurology for RLE weakness  - fu IR for drain displacement on the left abdomen - noncontrast CT abdomen ordered 9/6 per Please follow up with Interventional radiology to have your drain evaluated one week from discharge.  Please call today, to schedule an appointment (for one week from discharge date) [phone].  Continue to empty and record the drain output daily as you have been taught.  - Podiatry rec: silvadene with DSD for R foot ulcer  - ID recommendations: c/w amikacin 15 mg/kg qd and flagyl 500 tid, started 8/31, amikacin trough in AM, PICC in place for IV antibiotics,   - RUE DVT ppx: Transitioned from therapeutic lovenox to eliquis for DVT ppx on 8/30. Patient to receive 10 mg BID for 7d followed by 5mg BID for 3 months. possibly 3 months of anticoagulation.   - monitor drain output  - Pain control: Tylenol q6h, oxy PRN.  - Zyprexa 5 mg qhs for agitation.  - SSI, diabetic diet  - PT/OOB  - Ostomy care  - Dispo: Rehab; Case accepted at [facility]. Discharge pending results of MRI and L drain assessment.

## 2018-09-07 VITALS
OXYGEN SATURATION: 99 % | DIASTOLIC BLOOD PRESSURE: 66 MMHG | RESPIRATION RATE: 18 BRPM | SYSTOLIC BLOOD PRESSURE: 149 MMHG | TEMPERATURE: 97 F | HEART RATE: 65 BPM

## 2018-09-07 LAB
ANION GAP SERPL CALC-SCNC: 10 MMOL/L — SIGNIFICANT CHANGE UP (ref 5–17)
BUN SERPL-MCNC: 21 MG/DL — SIGNIFICANT CHANGE UP (ref 7–23)
CALCIUM SERPL-MCNC: 9 MG/DL — SIGNIFICANT CHANGE UP (ref 8.4–10.5)
CHLORIDE SERPL-SCNC: 101 MMOL/L — SIGNIFICANT CHANGE UP (ref 96–108)
CO2 SERPL-SCNC: 22 MMOL/L — SIGNIFICANT CHANGE UP (ref 22–31)
CREAT SERPL-MCNC: 1.14 MG/DL — SIGNIFICANT CHANGE UP (ref 0.5–1.3)
GLUCOSE BLDC GLUCOMTR-MCNC: 120 MG/DL — HIGH (ref 70–99)
GLUCOSE BLDC GLUCOMTR-MCNC: 222 MG/DL — HIGH (ref 70–99)
GLUCOSE SERPL-MCNC: 116 MG/DL — HIGH (ref 70–99)
HCT VFR BLD CALC: 26.8 % — LOW (ref 39–50)
HGB BLD-MCNC: 8.7 G/DL — LOW (ref 13–17)
MAGNESIUM SERPL-MCNC: 1.8 MG/DL — SIGNIFICANT CHANGE UP (ref 1.6–2.6)
MCHC RBC-ENTMCNC: 28.1 PG — SIGNIFICANT CHANGE UP (ref 27–34)
MCHC RBC-ENTMCNC: 32.5 GM/DL — SIGNIFICANT CHANGE UP (ref 32–36)
MCV RBC AUTO: 86.5 FL — SIGNIFICANT CHANGE UP (ref 80–100)
PHOSPHATE SERPL-MCNC: 4 MG/DL — SIGNIFICANT CHANGE UP (ref 2.5–4.5)
PLATELET # BLD AUTO: 370 K/UL — SIGNIFICANT CHANGE UP (ref 150–400)
POTASSIUM SERPL-MCNC: 4.5 MMOL/L — SIGNIFICANT CHANGE UP (ref 3.5–5.3)
POTASSIUM SERPL-SCNC: 4.5 MMOL/L — SIGNIFICANT CHANGE UP (ref 3.5–5.3)
RBC # BLD: 3.1 M/UL — LOW (ref 4.2–5.8)
RBC # FLD: 20.5 % — HIGH (ref 10.3–14.5)
SODIUM SERPL-SCNC: 133 MMOL/L — LOW (ref 135–145)
WBC # BLD: 10.76 K/UL — HIGH (ref 3.8–10.5)
WBC # FLD AUTO: 10.76 K/UL — HIGH (ref 3.8–10.5)

## 2018-09-07 PROCEDURE — 82570 ASSAY OF URINE CREATININE: CPT

## 2018-09-07 PROCEDURE — 84295 ASSAY OF SERUM SODIUM: CPT

## 2018-09-07 PROCEDURE — 83880 ASSAY OF NATRIURETIC PEPTIDE: CPT

## 2018-09-07 PROCEDURE — 86850 RBC ANTIBODY SCREEN: CPT

## 2018-09-07 PROCEDURE — 85027 COMPLETE CBC AUTOMATED: CPT

## 2018-09-07 PROCEDURE — 82962 GLUCOSE BLOOD TEST: CPT

## 2018-09-07 PROCEDURE — 87205 SMEAR GRAM STAIN: CPT

## 2018-09-07 PROCEDURE — C1729: CPT

## 2018-09-07 PROCEDURE — 82565 ASSAY OF CREATININE: CPT

## 2018-09-07 PROCEDURE — 85610 PROTHROMBIN TIME: CPT

## 2018-09-07 PROCEDURE — 80202 ASSAY OF VANCOMYCIN: CPT

## 2018-09-07 PROCEDURE — 87070 CULTURE OTHR SPECIMN AEROBIC: CPT

## 2018-09-07 PROCEDURE — 97530 THERAPEUTIC ACTIVITIES: CPT

## 2018-09-07 PROCEDURE — 93005 ELECTROCARDIOGRAM TRACING: CPT

## 2018-09-07 PROCEDURE — 82435 ASSAY OF BLOOD CHLORIDE: CPT

## 2018-09-07 PROCEDURE — 82803 BLOOD GASES ANY COMBINATION: CPT

## 2018-09-07 PROCEDURE — 84132 ASSAY OF SERUM POTASSIUM: CPT

## 2018-09-07 PROCEDURE — 88307 TISSUE EXAM BY PATHOLOGIST: CPT

## 2018-09-07 PROCEDURE — 94003 VENT MGMT INPAT SUBQ DAY: CPT

## 2018-09-07 PROCEDURE — 80048 BASIC METABOLIC PNL TOTAL CA: CPT

## 2018-09-07 PROCEDURE — 51702 INSERT TEMP BLADDER CATH: CPT

## 2018-09-07 PROCEDURE — C1751: CPT

## 2018-09-07 PROCEDURE — 87102 FUNGUS ISOLATION CULTURE: CPT

## 2018-09-07 PROCEDURE — 74176 CT ABD & PELVIS W/O CONTRAST: CPT

## 2018-09-07 PROCEDURE — 86901 BLOOD TYPING SEROLOGIC RH(D): CPT

## 2018-09-07 PROCEDURE — 85730 THROMBOPLASTIN TIME PARTIAL: CPT

## 2018-09-07 PROCEDURE — 82947 ASSAY GLUCOSE BLOOD QUANT: CPT

## 2018-09-07 PROCEDURE — 99285 EMERGENCY DEPT VISIT HI MDM: CPT | Mod: 25

## 2018-09-07 PROCEDURE — 87075 CULTR BACTERIA EXCEPT BLOOD: CPT

## 2018-09-07 PROCEDURE — 81001 URINALYSIS AUTO W/SCOPE: CPT

## 2018-09-07 PROCEDURE — 93970 EXTREMITY STUDY: CPT

## 2018-09-07 PROCEDURE — 88312 SPECIAL STAINS GROUP 1: CPT

## 2018-09-07 PROCEDURE — 84133 ASSAY OF URINE POTASSIUM: CPT

## 2018-09-07 PROCEDURE — 83735 ASSAY OF MAGNESIUM: CPT

## 2018-09-07 PROCEDURE — 87040 BLOOD CULTURE FOR BACTERIA: CPT

## 2018-09-07 PROCEDURE — 76080 X-RAY EXAM OF FISTULA: CPT

## 2018-09-07 PROCEDURE — 83935 ASSAY OF URINE OSMOLALITY: CPT

## 2018-09-07 PROCEDURE — 74177 CT ABD & PELVIS W/CONTRAST: CPT

## 2018-09-07 PROCEDURE — 49424 ASSESS CYST CONTRAST INJECT: CPT

## 2018-09-07 PROCEDURE — 85014 HEMATOCRIT: CPT

## 2018-09-07 PROCEDURE — 84484 ASSAY OF TROPONIN QUANT: CPT

## 2018-09-07 PROCEDURE — 76080 X-RAY EXAM OF FISTULA: CPT | Mod: 26,59

## 2018-09-07 PROCEDURE — 96374 THER/PROPH/DIAG INJ IV PUSH: CPT | Mod: XU

## 2018-09-07 PROCEDURE — 82330 ASSAY OF CALCIUM: CPT

## 2018-09-07 PROCEDURE — 87186 SC STD MICRODIL/AGAR DIL: CPT

## 2018-09-07 PROCEDURE — 86900 BLOOD TYPING SEROLOGIC ABO: CPT

## 2018-09-07 PROCEDURE — 80150 ASSAY OF AMIKACIN: CPT

## 2018-09-07 PROCEDURE — 84300 ASSAY OF URINE SODIUM: CPT

## 2018-09-07 PROCEDURE — 80053 COMPREHEN METABOLIC PANEL: CPT

## 2018-09-07 PROCEDURE — 83036 HEMOGLOBIN GLYCOSYLATED A1C: CPT

## 2018-09-07 PROCEDURE — 97116 GAIT TRAINING THERAPY: CPT

## 2018-09-07 PROCEDURE — 87184 SC STD DISK METHOD PER PLATE: CPT

## 2018-09-07 PROCEDURE — 94002 VENT MGMT INPAT INIT DAY: CPT

## 2018-09-07 PROCEDURE — 97110 THERAPEUTIC EXERCISES: CPT

## 2018-09-07 PROCEDURE — 71260 CT THORAX DX C+: CPT

## 2018-09-07 PROCEDURE — 73620 X-RAY EXAM OF FOOT: CPT

## 2018-09-07 PROCEDURE — 84100 ASSAY OF PHOSPHORUS: CPT

## 2018-09-07 PROCEDURE — 49406 IMAGE CATH FLUID PERI/RETRO: CPT

## 2018-09-07 PROCEDURE — 97162 PT EVAL MOD COMPLEX 30 MIN: CPT

## 2018-09-07 PROCEDURE — 87086 URINE CULTURE/COLONY COUNT: CPT

## 2018-09-07 PROCEDURE — 72148 MRI LUMBAR SPINE W/O DYE: CPT

## 2018-09-07 PROCEDURE — 71045 X-RAY EXAM CHEST 1 VIEW: CPT

## 2018-09-07 PROCEDURE — C1769: CPT

## 2018-09-07 PROCEDURE — 96375 TX/PRO/DX INJ NEW DRUG ADDON: CPT | Mod: XU

## 2018-09-07 PROCEDURE — 36569 INSJ PICC 5 YR+ W/O IMAGING: CPT

## 2018-09-07 PROCEDURE — 83605 ASSAY OF LACTIC ACID: CPT

## 2018-09-07 PROCEDURE — 84145 PROCALCITONIN (PCT): CPT

## 2018-09-07 RX ADMIN — SODIUM CHLORIDE 1 GRAM(S): 9 INJECTION INTRAMUSCULAR; INTRAVENOUS; SUBCUTANEOUS at 05:07

## 2018-09-07 RX ADMIN — Medication 650 MILLIGRAM(S): at 05:37

## 2018-09-07 RX ADMIN — Medication 650 MILLIGRAM(S): at 16:12

## 2018-09-07 RX ADMIN — Medication 650 MILLIGRAM(S): at 00:29

## 2018-09-07 RX ADMIN — Medication 4: at 11:56

## 2018-09-07 RX ADMIN — Medication 100 MILLIGRAM(S): at 16:11

## 2018-09-07 RX ADMIN — Medication 650 MILLIGRAM(S): at 11:56

## 2018-09-07 RX ADMIN — Medication 25 MILLIGRAM(S): at 05:07

## 2018-09-07 RX ADMIN — AMIKACIN SULFATE 254.2 MILLIGRAM(S): 250 INJECTION, SOLUTION INTRAMUSCULAR; INTRAVENOUS at 11:56

## 2018-09-07 RX ADMIN — SODIUM CHLORIDE 1 GRAM(S): 9 INJECTION INTRAMUSCULAR; INTRAVENOUS; SUBCUTANEOUS at 16:12

## 2018-09-07 RX ADMIN — Medication 650 MILLIGRAM(S): at 05:07

## 2018-09-07 RX ADMIN — Medication 100 MILLIGRAM(S): at 09:39

## 2018-09-07 NOTE — PROGRESS NOTE ADULT - PROVIDER SPECIALTY LIST ADULT
Anesthesia
Colorectal Surgery
Colorectal Surgery
Infectious Disease
Intervent Radiology
SICU
Surgery
Trauma Surgery
Infectious Disease

## 2018-09-07 NOTE — PROGRESS NOTE ADULT - NSHPATTENDINGPLANDISCUSS_GEN_ALL_CORE
surgery
Resident
resident
surgery
the primary team
resident
resident
Dr. Cardoso
family Dr Cardoso
resident
resident
surgery
surgery

## 2018-09-07 NOTE — PROGRESS NOTE ADULT - REASON FOR ADMISSION
Colon perforation

## 2018-09-07 NOTE — PROGRESS NOTE ADULT - SUBJECTIVE AND OBJECTIVE BOX
58M PMH DM, HTN large cecal/terminal ileum perforation  s/p open ileocecectomy in discontinuity and abdominal wall/RP debridement with open abdomen/abthera on 8/11, and then abdominal washout, end ileostomy, abdominal closure on 8/13 with abdominal fluid collection s/p right fluid collection drainage 8/20 and left abdominal fluid collection drainage on 8/27 with CT showing imporvement in fluid collection and less out put from the drain presented to IR for draine evaluation and possible removal.     Allergies: No Known Allergies      PAST MEDICAL & SURGICAL HISTORY:  Diabetes  Hypertension  No significant past surgical history        Pertinent labs:                      9.9    11.9  )-----------( 431      ( 06 Sep 2018 07:43 )             30.6   09-06    135  |  103  |  17  ----------------------------<  138<H>  4.1   |  22  |  0.81    Ca    9.0      06 Sep 2018 07:43  Phos  3.7     09-06  Mg     2.0     09-06        Consent: Procedure/risks/ Benefits explained. Informed consent obtained. Pt verbalizes understanding.

## 2018-09-07 NOTE — PROGRESS NOTE ADULT - ASSESSMENT
59yo M with perforated R colon and large RP abscess/abdominal wall infection s/p open ileocecectomy in discontinuity and abdominal wall/RP debridement with open abdomen/abthera on 8/11, s/p abdominal washout, end ileostomy, abdominal closure on 8/13. CT A/P showed a 10x5cm fluid/gas collection in right hemipelvis. SUDHIR drain with minimal output.  NGT removed tube on 8/19. IR drain placed 8/20. Now s/p IR drainage of a L abdominal abscess 8/23. CT scan reviewed by ID and radiology was s/o osteo. PICC line placed 8/29.    - Appreciate neurology recs- no acute intervention with spinal disc fragment  - fu IR for drain check today   - Podiatry rec: silvadene with DSD for R foot ulcer  - ID recommendations: c/w amikacin 15 mg/kg qd and flagyl 500 tid, started 8/31, amikacin trough in AM, PICC in place for IV antibiotics,   - RUE DVT ppx: Transitioned from therapeutic lovenox to eliquis for DVT ppx on 8/30. Patient to receive 10 mg BID for 7d followed by 5mg BID for 3 months. possibly 3 months of anticoagulation.   - monitor drain output  - Pain control: Tylenol q6h, oxy PRN.  - Zyprexa 5 mg qhs for agitation.  - SSI, diabetic diet  - PT/OOB  - Ostomy care  - Dispo: Rehab; Case accepted at [facility]    Louisa Garcia PA-C [id number]

## 2018-09-07 NOTE — PROGRESS NOTE ADULT - SUBJECTIVE AND OBJECTIVE BOX
ACS DAILY PROGRESS NOTE:       SUBJECTIVE/ROS: Patient feels well- no events overnight- CT prelim shows left pelvic drain in place   Denies nausea, vomiting, chest pain, shortness of breath         MEDICATIONS  (STANDING):  acetaminophen   Tablet. 650 milliGRAM(s) Oral every 6 hours  amiKACIN  IVPB      amiKACIN  IVPB 1050 milliGRAM(s) IV Intermittent daily  insulin lispro (HumaLOG) corrective regimen sliding scale   SubCutaneous three times a day before meals  insulin lispro (HumaLOG) corrective regimen sliding scale   SubCutaneous at bedtime  melatonin 5 milliGRAM(s) Oral at bedtime  metoprolol tartrate 25 milliGRAM(s) Oral two times a day  metroNIDAZOLE  IVPB 500 milliGRAM(s) IV Intermittent every 8 hours  metroNIDAZOLE  IVPB      OLANZapine Disintegrating Tablet 5 milliGRAM(s) Oral <User Schedule>  silver sulfADIAZINE 1% Cream 1 Application(s) Topical daily  sodium chloride 1 Gram(s) Oral three times a day  sodium chloride 0.9%. 1000 milliLiter(s) (100 mL/Hr) IV Continuous <Continuous>    MEDICATIONS  (PRN):  benzocaine 15 mG/menthol 3.6 mG Lozenge 1 Lozenge Oral every 6 hours PRN Sore Throat  ondansetron Injectable 4 milliGRAM(s) IV Push every 6 hours PRN Nausea      OBJECTIVE:    Vital Signs Last 24 Hrs  T(C): 36.3 (07 Sep 2018 04:57), Max: 37.2 (06 Sep 2018 18:09)  T(F): 97.4 (07 Sep 2018 04:57), Max: 99 (06 Sep 2018 18:09)  HR: 70 (07 Sep 2018 04:57) (70 - 91)  BP: 146/74 (07 Sep 2018 04:57) (114/67 - 146/74)  BP(mean): --  RR: 18 (07 Sep 2018 04:57) (18 - 18)  SpO2: 100% (07 Sep 2018 04:57) (98% - 100%)        I&O's Detail    06 Sep 2018 07:01  -  07 Sep 2018 07:00  --------------------------------------------------------  IN:    Oral Fluid: 1240 mL  Total IN: 1240 mL    OUT:    Bulb: 25 mL    Drain: 5 mL    Ileostomy: 1100 mL    Voided: 1860 mL  Total OUT: 2990 mL    Total NET: -1750 mL          Daily     Daily     LABS:                        9.9    11.9  )-----------( 431      ( 06 Sep 2018 07:43 )             30.6     09-06    135  |  103  |  17  ----------------------------<  138<H>  4.1   |  22  |  0.81    Ca    9.0      06 Sep 2018 07:43  Phos  3.7     09-06  Mg     2.0     09-06    < from: MR Lumbar Spine No Cont (09.05.18 @ 23:03) >  EXAM:  MR SPINE LUMBAR                            PROCEDURE DATE:  09/05/2018      INTERPRETATION:  History: Status post bowel surgery. Patient presents   with right lower extremity weakness.    MRI of the lumbar spine was performed using sagittal T1-T2 and STIR   sequence. Axial T1 and T2-weighted sequence was performed as well.    Narrowing of the spinal canal is seen in the lumbar spine region which is   secondary to congenital spinal stenosis.    The vertebral body height alignment appear normal    Disc desiccation seen involving the L3-4 and L4-5 levels.    T12-L1: Bilateral hypertrophic facet joint changes are seen. Small nerve   root cyst is seen on the right side which measures approximately 2.6 mm.    L1-2: Bilateral hypertrophic facet joint changes seen. No significant,   minus of the spinal canal or either neural foramen.    L2-3: Bilateral hypertrophic facet joint changes are seen. Mild narrowing   of the spinal canal and both neural foramen.    L3-4: Disc bulge and bilateral hypertrophic facet joint changes are seen.   Moderate narrowing of the spinal canal and both neural foramen.    L4-5: Disc bulge and bilateral hypertrophic facet joint changes are seen.   Moderate to severe narrowing of the spinal canal and both neural foramen.    L5-S1: Disc bulge and bilateral hypertrophic facet joint changes seen.   Small focus of abnormal signal seen involving the right spinal canal.   This could be compatible with a small disc fragment. This is best seen on   series 5image 45. Clinical correlation is recommended. No significant   compromise of the spinal canal or either neural foramen is seen.    Artifact is identified in the right pelvic region which could be   compatible postop changes.    The conus ends at topof L2 and appears normal.    Impression: Degenerative changes as described above.    Small focus of abnormal signal seen involving the right spinal canal   which could be compatible with a small disc fragment, clinical   correlation and continued close interval follow-up is recommended.    BRODY DELAROSA M.D., ATTENDING RADIOLOGIST  This document has been electronically signed. Sep  6 2018 10:16AM              Physical Exam:   General: NAD  Abdomen: soft, appropriately tender to palpation, midline incision, 1 drain on right, 2 drains on left. Drain placed by IR on the left is loose with drainage noted at incision site.   Extremities: ulcer noted on the R foot, nonpurulent

## 2018-09-18 PROBLEM — I10 ESSENTIAL (PRIMARY) HYPERTENSION: Chronic | Status: ACTIVE | Noted: 2018-08-11

## 2018-09-21 PROBLEM — Z00.00 ENCOUNTER FOR PREVENTIVE HEALTH EXAMINATION: Status: ACTIVE | Noted: 2018-09-21

## 2018-09-24 ENCOUNTER — APPOINTMENT (OUTPATIENT)
Dept: TRAUMA SURGERY | Facility: CLINIC | Age: 58
End: 2018-09-24
Payer: MEDICAID

## 2018-09-24 VITALS
WEIGHT: 123 LBS | HEART RATE: 79 BPM | SYSTOLIC BLOOD PRESSURE: 98 MMHG | HEIGHT: 72 IN | DIASTOLIC BLOOD PRESSURE: 61 MMHG | BODY MASS INDEX: 16.66 KG/M2 | TEMPERATURE: 97.9 F

## 2018-09-24 DIAGNOSIS — Z87.891 PERSONAL HISTORY OF NICOTINE DEPENDENCE: ICD-10-CM

## 2018-09-24 PROCEDURE — 99024 POSTOP FOLLOW-UP VISIT: CPT

## 2018-09-24 RX ORDER — ACETAMINOPHEN 325 MG/1
325 TABLET, FILM COATED ORAL
Refills: 0 | Status: ACTIVE | COMMUNITY

## 2018-09-24 RX ORDER — NORMAL SALT TABLETS 1 G/G
1 TABLET ORAL
Refills: 0 | Status: ACTIVE | COMMUNITY

## 2018-09-24 RX ORDER — METOPROLOL TARTRATE 25 MG/1
25 TABLET, FILM COATED ORAL
Refills: 0 | Status: ACTIVE | COMMUNITY

## 2018-09-24 RX ORDER — APIXABAN 5 MG/1
5 TABLET, FILM COATED ORAL
Refills: 0 | Status: ACTIVE | COMMUNITY

## 2018-09-24 RX ORDER — CHLORHEXIDINE GLUCONATE 4 %
5 LIQUID (ML) TOPICAL
Refills: 0 | Status: ACTIVE | COMMUNITY

## 2018-09-24 RX ORDER — METFORMIN HYDROCHLORIDE 500 MG/1
500 TABLET, COATED ORAL
Refills: 0 | Status: ACTIVE | COMMUNITY

## 2018-09-24 RX ORDER — SITAGLIPTIN 100 MG/1
100 TABLET, FILM COATED ORAL
Refills: 0 | Status: ACTIVE | COMMUNITY

## 2018-09-26 LAB
CULTURE RESULTS: SIGNIFICANT CHANGE UP
SPECIMEN SOURCE: SIGNIFICANT CHANGE UP

## 2018-10-01 ENCOUNTER — FORM ENCOUNTER (OUTPATIENT)
Age: 58
End: 2018-10-01

## 2018-10-02 ENCOUNTER — OUTPATIENT (OUTPATIENT)
Dept: OUTPATIENT SERVICES | Facility: HOSPITAL | Age: 58
LOS: 1 days | End: 2018-10-02
Payer: MEDICAID

## 2018-10-02 DIAGNOSIS — K65.1 PERITONEAL ABSCESS: ICD-10-CM

## 2018-10-02 PROCEDURE — 49424 ASSESS CYST CONTRAST INJECT: CPT

## 2018-10-02 PROCEDURE — 76080 X-RAY EXAM OF FISTULA: CPT | Mod: 26

## 2018-10-02 PROCEDURE — 76080 X-RAY EXAM OF FISTULA: CPT

## 2018-10-08 ENCOUNTER — APPOINTMENT (OUTPATIENT)
Dept: INFECTIOUS DISEASE | Facility: CLINIC | Age: 58
End: 2018-10-08
Payer: MEDICAID

## 2018-10-08 ENCOUNTER — OUTPATIENT (OUTPATIENT)
Dept: OUTPATIENT SERVICES | Facility: HOSPITAL | Age: 58
LOS: 1 days | End: 2018-10-08
Payer: MEDICAID

## 2018-10-08 VITALS
HEART RATE: 97 BPM | BODY MASS INDEX: 16.95 KG/M2 | DIASTOLIC BLOOD PRESSURE: 72 MMHG | RESPIRATION RATE: 18 BRPM | TEMPERATURE: 97.3 F | OXYGEN SATURATION: 100 % | WEIGHT: 125 LBS | SYSTOLIC BLOOD PRESSURE: 113 MMHG

## 2018-10-08 DIAGNOSIS — K63.0 ABSCESS OF INTESTINE: ICD-10-CM

## 2018-10-08 DIAGNOSIS — K65.1 PERITONEAL ABSCESS: ICD-10-CM

## 2018-10-08 DIAGNOSIS — Z43.4 ENCOUNTER FOR ATTENTION TO OTHER ARTIFICIAL OPENINGS OF DIGESTIVE TRACT: ICD-10-CM

## 2018-10-08 PROCEDURE — G0463: CPT

## 2018-10-08 PROCEDURE — 99215 OFFICE O/P EST HI 40 MIN: CPT

## 2018-10-15 ENCOUNTER — FORM ENCOUNTER (OUTPATIENT)
Age: 58
End: 2018-10-15

## 2018-10-16 ENCOUNTER — OUTPATIENT (OUTPATIENT)
Dept: OUTPATIENT SERVICES | Facility: HOSPITAL | Age: 58
LOS: 1 days | End: 2018-10-16
Payer: MEDICAID

## 2018-10-16 DIAGNOSIS — K63.0 ABSCESS OF INTESTINE: ICD-10-CM

## 2018-10-16 PROCEDURE — 74176 CT ABD & PELVIS W/O CONTRAST: CPT

## 2018-10-16 PROCEDURE — 49424 ASSESS CYST CONTRAST INJECT: CPT

## 2018-10-16 PROCEDURE — 76080 X-RAY EXAM OF FISTULA: CPT

## 2018-10-16 PROCEDURE — 74176 CT ABD & PELVIS W/O CONTRAST: CPT | Mod: 26

## 2018-10-16 PROCEDURE — 76080 X-RAY EXAM OF FISTULA: CPT | Mod: 26

## 2018-10-17 DIAGNOSIS — M86.9 OSTEOMYELITIS, UNSPECIFIED: ICD-10-CM

## 2018-10-17 DIAGNOSIS — B96.89 OTHER SPECIFIED BACTERIAL AGENTS AS THE CAUSE OF DISEASES CLASSIFIED ELSEWHERE: ICD-10-CM

## 2018-10-21 ENCOUNTER — INPATIENT (INPATIENT)
Facility: HOSPITAL | Age: 58
LOS: 5 days | Discharge: HOME | DRG: 871 | End: 2018-10-27
Attending: SURGERY | Admitting: SURGERY
Payer: MEDICAID

## 2018-10-21 VITALS
HEIGHT: 72 IN | HEART RATE: 84 BPM | TEMPERATURE: 98 F | SYSTOLIC BLOOD PRESSURE: 115 MMHG | OXYGEN SATURATION: 99 % | DIASTOLIC BLOOD PRESSURE: 68 MMHG | WEIGHT: 121.92 LBS | RESPIRATION RATE: 18 BRPM

## 2018-10-21 DIAGNOSIS — Z98.890 OTHER SPECIFIED POSTPROCEDURAL STATES: Chronic | ICD-10-CM

## 2018-10-21 LAB
ALBUMIN SERPL ELPH-MCNC: 4 G/DL — SIGNIFICANT CHANGE UP (ref 3.3–5)
ALP SERPL-CCNC: 64 U/L — SIGNIFICANT CHANGE UP (ref 40–120)
ALT FLD-CCNC: 13 U/L — SIGNIFICANT CHANGE UP (ref 10–45)
ANION GAP SERPL CALC-SCNC: 16 MMOL/L — SIGNIFICANT CHANGE UP (ref 5–17)
APTT BLD: 33.5 SEC — SIGNIFICANT CHANGE UP (ref 27.5–37.4)
AST SERPL-CCNC: 14 U/L — SIGNIFICANT CHANGE UP (ref 10–40)
BILIRUB SERPL-MCNC: 0.3 MG/DL — SIGNIFICANT CHANGE UP (ref 0.2–1.2)
BUN SERPL-MCNC: 28 MG/DL — HIGH (ref 7–23)
CALCIUM SERPL-MCNC: 9.9 MG/DL — SIGNIFICANT CHANGE UP (ref 8.4–10.5)
CHLORIDE SERPL-SCNC: 94 MMOL/L — LOW (ref 96–108)
CO2 SERPL-SCNC: 20 MMOL/L — LOW (ref 22–31)
CREAT SERPL-MCNC: 1.09 MG/DL — SIGNIFICANT CHANGE UP (ref 0.5–1.3)
GLUCOSE SERPL-MCNC: 151 MG/DL — HIGH (ref 70–99)
HCT VFR BLD CALC: 34.6 % — LOW (ref 39–50)
HGB BLD-MCNC: 11.9 G/DL — LOW (ref 13–17)
INR BLD: 1.61 RATIO — HIGH (ref 0.88–1.16)
LACTATE BLDV-MCNC: 3 MMOL/L — HIGH (ref 0.7–2)
MCHC RBC-ENTMCNC: 29.9 PG — SIGNIFICANT CHANGE UP (ref 27–34)
MCHC RBC-ENTMCNC: 34.3 GM/DL — SIGNIFICANT CHANGE UP (ref 32–36)
MCV RBC AUTO: 87.2 FL — SIGNIFICANT CHANGE UP (ref 80–100)
PLATELET # BLD AUTO: 326 K/UL — SIGNIFICANT CHANGE UP (ref 150–400)
POTASSIUM SERPL-MCNC: 4.7 MMOL/L — SIGNIFICANT CHANGE UP (ref 3.5–5.3)
POTASSIUM SERPL-SCNC: 4.7 MMOL/L — SIGNIFICANT CHANGE UP (ref 3.5–5.3)
PROT SERPL-MCNC: 9.2 G/DL — HIGH (ref 6–8.3)
PROTHROM AB SERPL-ACNC: 17.6 SEC — HIGH (ref 9.8–12.7)
RBC # BLD: 3.97 M/UL — LOW (ref 4.2–5.8)
RBC # FLD: 14.6 % — HIGH (ref 10.3–14.5)
SODIUM SERPL-SCNC: 130 MMOL/L — LOW (ref 135–145)
WBC # BLD: 25.5 K/UL — HIGH (ref 3.8–10.5)
WBC # FLD AUTO: 25.5 K/UL — HIGH (ref 3.8–10.5)

## 2018-10-21 PROCEDURE — 99291 CRITICAL CARE FIRST HOUR: CPT

## 2018-10-21 PROCEDURE — 93010 ELECTROCARDIOGRAM REPORT: CPT

## 2018-10-21 PROCEDURE — 71045 X-RAY EXAM CHEST 1 VIEW: CPT | Mod: 26,59

## 2018-10-21 RX ORDER — SODIUM CHLORIDE 9 MG/ML
2000 INJECTION INTRAMUSCULAR; INTRAVENOUS; SUBCUTANEOUS ONCE
Qty: 0 | Refills: 0 | Status: COMPLETED | OUTPATIENT
Start: 2018-10-21 | End: 2018-10-21

## 2018-10-21 RX ORDER — ONDANSETRON 8 MG/1
4 TABLET, FILM COATED ORAL ONCE
Qty: 0 | Refills: 0 | Status: COMPLETED | OUTPATIENT
Start: 2018-10-21 | End: 2018-10-21

## 2018-10-21 RX ORDER — ACETAMINOPHEN 500 MG
650 TABLET ORAL ONCE
Qty: 0 | Refills: 0 | Status: DISCONTINUED | OUTPATIENT
Start: 2018-10-21 | End: 2018-10-21

## 2018-10-21 RX ORDER — PIPERACILLIN AND TAZOBACTAM 4; .5 G/20ML; G/20ML
3.38 INJECTION, POWDER, LYOPHILIZED, FOR SOLUTION INTRAVENOUS ONCE
Qty: 0 | Refills: 0 | Status: COMPLETED | OUTPATIENT
Start: 2018-10-21 | End: 2018-10-21

## 2018-10-21 RX ORDER — ACETAMINOPHEN 500 MG
1000 TABLET ORAL ONCE
Qty: 0 | Refills: 0 | Status: COMPLETED | OUTPATIENT
Start: 2018-10-21 | End: 2018-10-21

## 2018-10-21 RX ADMIN — SODIUM CHLORIDE 2000 MILLILITER(S): 9 INJECTION INTRAMUSCULAR; INTRAVENOUS; SUBCUTANEOUS at 23:08

## 2018-10-21 RX ADMIN — Medication 1000 MILLIGRAM(S): at 23:45

## 2018-10-21 RX ADMIN — PIPERACILLIN AND TAZOBACTAM 200 GRAM(S): 4; .5 INJECTION, POWDER, LYOPHILIZED, FOR SOLUTION INTRAVENOUS at 23:42

## 2018-10-21 RX ADMIN — Medication 400 MILLIGRAM(S): at 23:09

## 2018-10-21 RX ADMIN — ONDANSETRON 4 MILLIGRAM(S): 8 TABLET, FILM COATED ORAL at 23:08

## 2018-10-21 NOTE — ED PROVIDER NOTE - PROGRESS NOTE DETAILS
received pt from Dr Montoya at s/o. hx of abd collection with collection on CT received pt from Dr Montoya at s/o. hx of abd collection with recollection on CT. Sx consulted and requesting admission to their service. discussed with pt and family.

## 2018-10-21 NOTE — ED PROVIDER NOTE - OBJECTIVE STATEMENT
58 . male coming in with fever of 102 at home 2 hours ago.  Pt is s/p abdominal collection requiring resection, ileostomy and drains from August 2018.  Drains were removed last week and pt has been doing well up until this point.  Started with RLQ abd pain this evening prior to having the fever.  Tylenol was given prior to arrival which helped with the pain somewhat.  Some mild runny nose and sore throat.  No cough, no nausea no vomiting.  Last PO was around 7pm and didn't have any issues.  Pt is endorsing some dysuria over the course of the day.  Palpation makes his pain worse, tylenol made it slightly better. 58 y.o. male coming in with fever of 102 at home 2 hours ago.  Pt is s/p abdominal collection requiring resection, ileostomy and drains from August 2018.  Drains were removed last week and pt has been doing well up until this point.  Started with RLQ abd pain this evening prior to having the fever.  Tylenol was given prior to arrival which helped with the pain somewhat.  Some mild runny nose and sore throat.  No cough, no nausea no vomiting.  Last PO was around 7pm and didn't have any issues.  Pt is endorsing some dysuria over the course of the day.  Palpation makes his pain worse, tylenol made it slightly better. 58 y.o. male coming in with fever of 102 at home 2 hours ago.  Pt is s/p abdominal collection requiring resection, ileostomy and drains from 2018.  Drains were removed last week and pt has been doing well up until this point.  Started with RLQ abd pain this evening prior to having the fever.  Tylenol was given prior to arrival which helped with the pain somewhat.  Some mild runny nose and sore throat.  No cough, no nausea no vomiting.  Last PO was around 7pm and didn't have any issues.  Pt is endorsing some dysuria over the course of the day.  Palpation makes his pain worse, tylenol made it slightly better.    Attendinyo male presents with fever to 102 today.  Pt had chills and right lower abdominal pain which began yesterday.  No vomiting.  Had SUDHIR drain pulled from the right lower abdomen 3 days ago.  also has had increased output from his ostomy bag.  pt has been off antibiotics for about 1 month now.  pt is supposed to have follow up with Dr. Cardoso tomorrow.

## 2018-10-21 NOTE — ED PROVIDER NOTE - MEDICAL DECISION MAKING DETAILS
Fever and right lower abdominal pain with history of abdominal abscess, colon resection presents with fever 3 days after SUDHIR drain removed.  Will treat symptoms, get CT abd/pelvis to evaluate for fluid collection, give antibiotics and reassess.

## 2018-10-21 NOTE — ED ADULT NURSE NOTE - OBJECTIVE STATEMENT
57y/o male presented to the ED from home with complaint of fever. A&Ox3, ambulatory. PMH: diabetes, HTN, DVT on Eliquis. 8/18 ileostomy with drain placement for collection within abdomen. Patient states right flank drain was removed last week. Patient reports generalized RLQ abdominal pain with fever x2 days. Highest fever at home 102. F. Reports intermittent nausea with increased ileostomy output. Patient reports dysuria at times, denies hematuria. Ileostomy site, clean dry, intact. Right flank wound draining yellow fluid, puss noted at site. Reports 8/10 pain with palpation. Patient lethargic in appearance, skin hot to touch. Family at bedside.

## 2018-10-22 ENCOUNTER — APPOINTMENT (OUTPATIENT)
Dept: TRAUMA SURGERY | Facility: CLINIC | Age: 58
End: 2018-10-22

## 2018-10-22 DIAGNOSIS — K65.1 PERITONEAL ABSCESS: ICD-10-CM

## 2018-10-22 DIAGNOSIS — Z43.4 ENCOUNTER FOR ATTENTION TO OTHER ARTIFICIAL OPENINGS OF DIGESTIVE TRACT: ICD-10-CM

## 2018-10-22 DIAGNOSIS — K68.12 PSOAS MUSCLE ABSCESS: ICD-10-CM

## 2018-10-22 LAB
APPEARANCE UR: CLEAR — SIGNIFICANT CHANGE UP
BACTERIA # UR AUTO: NEGATIVE — SIGNIFICANT CHANGE UP
BASOPHILS # BLD AUTO: 0.1 K/UL — SIGNIFICANT CHANGE UP (ref 0–0.2)
BILIRUB UR-MCNC: NEGATIVE — SIGNIFICANT CHANGE UP
COLOR SPEC: SIGNIFICANT CHANGE UP
DIFF PNL FLD: NEGATIVE — SIGNIFICANT CHANGE UP
EOSINOPHIL # BLD AUTO: 0.1 K/UL — SIGNIFICANT CHANGE UP (ref 0–0.5)
EPI CELLS # UR: 7 /HPF — HIGH
GLUCOSE BLDC GLUCOMTR-MCNC: 100 MG/DL — HIGH (ref 70–99)
GLUCOSE BLDC GLUCOMTR-MCNC: 107 MG/DL — HIGH (ref 70–99)
GLUCOSE BLDC GLUCOMTR-MCNC: 109 MG/DL — HIGH (ref 70–99)
GLUCOSE BLDC GLUCOMTR-MCNC: 98 MG/DL — SIGNIFICANT CHANGE UP (ref 70–99)
GLUCOSE UR QL: NEGATIVE — SIGNIFICANT CHANGE UP
HYALINE CASTS # UR AUTO: 2 /LPF — SIGNIFICANT CHANGE UP (ref 0–2)
KETONES UR-MCNC: NEGATIVE — SIGNIFICANT CHANGE UP
LACTATE BLDV-MCNC: 1.1 MMOL/L — SIGNIFICANT CHANGE UP (ref 0.7–2)
LEUKOCYTE ESTERASE UR-ACNC: ABNORMAL
LYMPHOCYTES # BLD AUTO: 13 % — SIGNIFICANT CHANGE UP (ref 13–44)
LYMPHOCYTES # BLD AUTO: 3.4 K/UL — HIGH (ref 1–3.3)
MONOCYTES # BLD AUTO: 1.7 K/UL — HIGH (ref 0–0.9)
MONOCYTES NFR BLD AUTO: 9 % — SIGNIFICANT CHANGE UP (ref 2–14)
NEUTROPHILS # BLD AUTO: 20.2 K/UL — HIGH (ref 1.8–7.4)
NEUTROPHILS NFR BLD AUTO: 78 % — HIGH (ref 43–77)
NITRITE UR-MCNC: NEGATIVE — SIGNIFICANT CHANGE UP
PH UR: 6 — SIGNIFICANT CHANGE UP (ref 5–8)
PLAT MORPH BLD: NORMAL — SIGNIFICANT CHANGE UP
PROT UR-MCNC: ABNORMAL
RAPID RVP RESULT: SIGNIFICANT CHANGE UP
RBC BLD AUTO: SIGNIFICANT CHANGE UP
RBC CASTS # UR COMP ASSIST: 1 /HPF — SIGNIFICANT CHANGE UP (ref 0–4)
SP GR SPEC: 1.01 — SIGNIFICANT CHANGE UP (ref 1.01–1.02)
UROBILINOGEN FLD QL: NEGATIVE — SIGNIFICANT CHANGE UP
WBC UR QL: 44 /HPF — HIGH (ref 0–5)

## 2018-10-22 PROCEDURE — 75984 XRAY CONTROL CATHETER CHANGE: CPT | Mod: 26

## 2018-10-22 PROCEDURE — 93010 ELECTROCARDIOGRAM REPORT: CPT

## 2018-10-22 PROCEDURE — 74177 CT ABD & PELVIS W/CONTRAST: CPT | Mod: 26

## 2018-10-22 PROCEDURE — 49423 EXCHANGE DRAINAGE CATHETER: CPT

## 2018-10-22 RX ORDER — METRONIDAZOLE 500 MG
500 TABLET ORAL EVERY 8 HOURS
Qty: 0 | Refills: 0 | Status: DISCONTINUED | OUTPATIENT
Start: 2018-10-22 | End: 2018-10-23

## 2018-10-22 RX ORDER — METOPROLOL TARTRATE 50 MG
25 TABLET ORAL
Qty: 0 | Refills: 0 | Status: DISCONTINUED | OUTPATIENT
Start: 2018-10-22 | End: 2018-10-27

## 2018-10-22 RX ORDER — INSULIN LISPRO 100/ML
VIAL (ML) SUBCUTANEOUS EVERY 6 HOURS
Qty: 0 | Refills: 0 | Status: DISCONTINUED | OUTPATIENT
Start: 2018-10-22 | End: 2018-10-23

## 2018-10-22 RX ORDER — METRONIDAZOLE 500 MG
500 TABLET ORAL ONCE
Qty: 0 | Refills: 0 | Status: COMPLETED | OUTPATIENT
Start: 2018-10-22 | End: 2018-10-22

## 2018-10-22 RX ORDER — SODIUM CHLORIDE 9 MG/ML
1 INJECTION INTRAMUSCULAR; INTRAVENOUS; SUBCUTANEOUS THREE TIMES A DAY
Qty: 0 | Refills: 0 | Status: DISCONTINUED | OUTPATIENT
Start: 2018-10-22 | End: 2018-10-27

## 2018-10-22 RX ORDER — GABAPENTIN 400 MG/1
300 CAPSULE ORAL
Qty: 0 | Refills: 0 | Status: DISCONTINUED | OUTPATIENT
Start: 2018-10-22 | End: 2018-10-27

## 2018-10-22 RX ORDER — OXYCODONE AND ACETAMINOPHEN 5; 325 MG/1; MG/1
1 TABLET ORAL EVERY 4 HOURS
Qty: 0 | Refills: 0 | Status: DISCONTINUED | OUTPATIENT
Start: 2018-10-22 | End: 2018-10-22

## 2018-10-22 RX ORDER — ACETAMINOPHEN 500 MG
1000 TABLET ORAL ONCE
Qty: 0 | Refills: 0 | Status: COMPLETED | OUTPATIENT
Start: 2018-10-22 | End: 2018-10-22

## 2018-10-22 RX ORDER — SODIUM CHLORIDE 9 MG/ML
500 INJECTION INTRAMUSCULAR; INTRAVENOUS; SUBCUTANEOUS ONCE
Qty: 0 | Refills: 0 | Status: COMPLETED | OUTPATIENT
Start: 2018-10-22 | End: 2018-10-22

## 2018-10-22 RX ORDER — DEXTROSE 50 % IN WATER 50 %
15 SYRINGE (ML) INTRAVENOUS ONCE
Qty: 0 | Refills: 0 | Status: DISCONTINUED | OUTPATIENT
Start: 2018-10-22 | End: 2018-10-27

## 2018-10-22 RX ORDER — ENOXAPARIN SODIUM 100 MG/ML
40 INJECTION SUBCUTANEOUS EVERY 24 HOURS
Qty: 0 | Refills: 0 | Status: DISCONTINUED | OUTPATIENT
Start: 2018-10-22 | End: 2018-10-27

## 2018-10-22 RX ORDER — AMIKACIN SULFATE 250 MG/ML
850 INJECTION, SOLUTION INTRAMUSCULAR; INTRAVENOUS EVERY 24 HOURS
Qty: 0 | Refills: 0 | Status: DISCONTINUED | OUTPATIENT
Start: 2018-10-22 | End: 2018-10-23

## 2018-10-22 RX ORDER — DEXTROSE 50 % IN WATER 50 %
12.5 SYRINGE (ML) INTRAVENOUS ONCE
Qty: 0 | Refills: 0 | Status: DISCONTINUED | OUTPATIENT
Start: 2018-10-22 | End: 2018-10-27

## 2018-10-22 RX ORDER — SODIUM CHLORIDE 9 MG/ML
1000 INJECTION, SOLUTION INTRAVENOUS
Qty: 0 | Refills: 0 | Status: DISCONTINUED | OUTPATIENT
Start: 2018-10-22 | End: 2018-10-27

## 2018-10-22 RX ORDER — METRONIDAZOLE 500 MG
TABLET ORAL
Qty: 0 | Refills: 0 | Status: DISCONTINUED | OUTPATIENT
Start: 2018-10-22 | End: 2018-10-23

## 2018-10-22 RX ORDER — DEXTROSE 50 % IN WATER 50 %
25 SYRINGE (ML) INTRAVENOUS ONCE
Qty: 0 | Refills: 0 | Status: DISCONTINUED | OUTPATIENT
Start: 2018-10-22 | End: 2018-10-27

## 2018-10-22 RX ORDER — INFLUENZA VIRUS VACCINE 15; 15; 15; 15 UG/.5ML; UG/.5ML; UG/.5ML; UG/.5ML
0.5 SUSPENSION INTRAMUSCULAR ONCE
Qty: 0 | Refills: 0 | Status: DISCONTINUED | OUTPATIENT
Start: 2018-10-22 | End: 2018-10-27

## 2018-10-22 RX ORDER — SODIUM CHLORIDE 9 MG/ML
1000 INJECTION, SOLUTION INTRAVENOUS
Qty: 0 | Refills: 0 | Status: DISCONTINUED | OUTPATIENT
Start: 2018-10-22 | End: 2018-10-23

## 2018-10-22 RX ORDER — GLUCAGON INJECTION, SOLUTION 0.5 MG/.1ML
1 INJECTION, SOLUTION SUBCUTANEOUS ONCE
Qty: 0 | Refills: 0 | Status: DISCONTINUED | OUTPATIENT
Start: 2018-10-22 | End: 2018-10-27

## 2018-10-22 RX ORDER — FLUTICASONE PROPIONATE 50 MCG
1 SPRAY, SUSPENSION NASAL
Qty: 0 | Refills: 0 | Status: DISCONTINUED | OUTPATIENT
Start: 2018-10-22 | End: 2018-10-27

## 2018-10-22 RX ORDER — ACETAMINOPHEN 500 MG
650 TABLET ORAL EVERY 6 HOURS
Qty: 0 | Refills: 0 | Status: DISCONTINUED | OUTPATIENT
Start: 2018-10-22 | End: 2018-10-27

## 2018-10-22 RX ADMIN — Medication 1 SPRAY(S): at 17:56

## 2018-10-22 RX ADMIN — Medication 100 MILLIGRAM(S): at 02:51

## 2018-10-22 RX ADMIN — AMIKACIN SULFATE 103.4 MILLIGRAM(S): 250 INJECTION, SOLUTION INTRAMUSCULAR; INTRAVENOUS at 05:58

## 2018-10-22 RX ADMIN — SODIUM CHLORIDE 1 GRAM(S): 9 INJECTION INTRAMUSCULAR; INTRAVENOUS; SUBCUTANEOUS at 08:04

## 2018-10-22 RX ADMIN — SODIUM CHLORIDE 1 GRAM(S): 9 INJECTION INTRAMUSCULAR; INTRAVENOUS; SUBCUTANEOUS at 22:50

## 2018-10-22 RX ADMIN — ENOXAPARIN SODIUM 40 MILLIGRAM(S): 100 INJECTION SUBCUTANEOUS at 02:51

## 2018-10-22 RX ADMIN — SODIUM CHLORIDE 1 GRAM(S): 9 INJECTION INTRAMUSCULAR; INTRAVENOUS; SUBCUTANEOUS at 12:41

## 2018-10-22 RX ADMIN — SODIUM CHLORIDE 100 MILLILITER(S): 9 INJECTION, SOLUTION INTRAVENOUS at 02:51

## 2018-10-22 RX ADMIN — SODIUM CHLORIDE 1500 MILLILITER(S): 9 INJECTION INTRAMUSCULAR; INTRAVENOUS; SUBCUTANEOUS at 04:45

## 2018-10-22 RX ADMIN — Medication 1 SPRAY(S): at 05:58

## 2018-10-22 RX ADMIN — GABAPENTIN 300 MILLIGRAM(S): 400 CAPSULE ORAL at 05:58

## 2018-10-22 RX ADMIN — Medication 25 MILLIGRAM(S): at 05:58

## 2018-10-22 RX ADMIN — Medication 1000 MILLIGRAM(S): at 21:00

## 2018-10-22 RX ADMIN — Medication 1000 MILLIGRAM(S): at 05:05

## 2018-10-22 RX ADMIN — Medication 1 TABLET(S): at 12:41

## 2018-10-22 RX ADMIN — Medication 400 MILLIGRAM(S): at 04:50

## 2018-10-22 RX ADMIN — Medication 400 MILLIGRAM(S): at 20:18

## 2018-10-22 RX ADMIN — Medication 100 MILLIGRAM(S): at 22:48

## 2018-10-22 RX ADMIN — Medication 100 MILLIGRAM(S): at 12:41

## 2018-10-22 NOTE — PROGRESS NOTE ADULT - SUBJECTIVE AND OBJECTIVE BOX
Interventional Radiology Procedure Note    Procedure:   Sinogram and replacement of abscess tube    Indication:  Iliopsoas abscess    Operators:  Carlos    Anesthesia (type):  Lidocaine 2%    Contrast:  20 cc    EBL:  minimal    Findings/Follow up Plan of Care:  Successful replacement of right lower quadrant abscess drainage catheter via previous abscess tract.  12 Moroccan drain placed and 93cc of pus obtained.  Specimen sent for culture.  Full report to follow.    Specimens Removed:  culture    Implants:  12 Moroccan    Complications: none    Condition/Disposition:  Recovery on floors.    Please call Interventional Radiology x [phone] with any questions, concerns, or issues.

## 2018-10-22 NOTE — ED ADULT NURSE REASSESSMENT NOTE - NS ED NURSE REASSESS COMMENT FT1
Patient A&Ox3, resting comfortably in bed, no complaints at this time, family at bedside.  TBA to Surgery. VSS. Will continue to monitor.

## 2018-10-22 NOTE — H&P ADULT - NSHPPHYSICALEXAM_GEN_ALL_CORE
T(C): 37.3 (10-22-18 @ 01:43), Max: 38.7 (10-21-18 @ 22:28)  HR: 111 (10-22-18 @ 01:43) (63 - 115)  BP: 148/60 (10-22-18 @ 01:43) (115/68 - 152/76)  RR: 20 (10-22-18 @ 01:43) (18 - 20)  SpO2: 98% (10-22-18 @ 01:43) (97% - 99%)    General: NAD, Lying in bed comfortably  Neuro: A+Ox3, no focal deficits  HEENT: NC/AT, EOMI  Cardio: RRR, nml S1/S2  Resp: Good effort, CTA b/l  GI/Abd: Soft, NT/ND, no rebound/guarding, RLQ ostomy functioning with thick brown stool, R sided prior drain site with purulent drainage, no fluctuance, minimal surrounding erythema.    Vascular: All 4 extremities warm  Skin: Intact, no breakdown  Musculoskeletal: All 4 extremities moving spontaneously, no limitations.

## 2018-10-22 NOTE — PROGRESS NOTE ADULT - SUBJECTIVE AND OBJECTIVE BOX
Interventional Radiology     58y Male with PMH as below with abdominal fluid collection referred to IR for drainage.  Previously on 10/16/18 pt have percutaneous drainage catheter removed when abscess was resolved at that time.      Recent CT imaging demonstrated:   < from: CT Abdomen and Pelvis w/ Oral Cont and w/ IV Cont (10.22.18 @ 00:27) >  IMPRESSION:     Interval removal of previously seen right iliopsoas drainage catheters   with recurrent abscess measuring up to 10.6 x 3.3 x 9.4 cm. Tethering of   small bowel loops in the right lower quadrant to the right iliopsoas   collection. Good opacification of the right lower quadrant small bowel   loops without evidence of extraluminal contrast extravasation (fistula).     < end of copied text >      PAST MEDICAL & SURGICAL HISTORY:  Necrotizing fasciitis  Diabetes  Hypertension  H/O ileostomy    Allergies  No Known Allergies    Labs:                        11.9   25.5  )-----------( 326      ( 21 Oct 2018 23:20 )             34.6     10-21    130<L>  |  94<L>  |  28<H>  ----------------------------<  151<H>  4.7   |  20<L>  |  1.09    Ca    9.9      21 Oct 2018 23:20    TPro  9.2<H>  /  Alb  4.0  /  TBili  0.3  /  DBili  x   /  AST  14  /  ALT  13  /  AlkPhos  64  10-21    PT/INR - ( 21 Oct 2018 23:20 )   PT: 17.6 sec;   INR: 1.61 ratio    PTT - ( 21 Oct 2018 23:20 )  PTT:33.5 sec    After risks, benefits, alternatives discussion pt verbalized understanding and signed informed consent.  Will plan for image-guided abdominal drainage catheter placement.    Freddie Mulligan, LESIA  [facility] [phone]  Ext [phone] Interventional Radiology     58y Male with PMH as below with abdominal fluid collection referred to IR for drainage.  Previously on 10/16/18 pt have percutaneous drainage catheter removed when abscess was resolved at that time.      Recent CT imaging reported:   < from: CT Abdomen and Pelvis w/ Oral Cont and w/ IV Cont (10.22.18 @ 00:27) >  IMPRESSION:     Interval removal of previously seen right iliopsoas drainage catheters   with recurrent abscess measuring up to 10.6 x 3.3 x 9.4 cm. Tethering of   small bowel loops in the right lower quadrant to the right iliopsoas   collection. Good opacification of the right lower quadrant small bowel   loops without evidence of extraluminal contrast extravasation (fistula).     < end of copied text >      PAST MEDICAL & SURGICAL HISTORY:  Necrotizing fasciitis  Diabetes  Hypertension  H/O ileostomy    Allergies  No Known Allergies    Labs:                        11.9   25.5  )-----------( 326      ( 21 Oct 2018 23:20 )             34.6     10-21    130<L>  |  94<L>  |  28<H>  ----------------------------<  151<H>  4.7   |  20<L>  |  1.09    Ca    9.9      21 Oct 2018 23:20    TPro  9.2<H>  /  Alb  4.0  /  TBili  0.3  /  DBili  x   /  AST  14  /  ALT  13  /  AlkPhos  64  10-21    PT/INR - ( 21 Oct 2018 23:20 )   PT: 17.6 sec;   INR: 1.61 ratio    PTT - ( 21 Oct 2018 23:20 )  PTT:33.5 sec    After risks, benefits, alternatives discussion pt verbalized understanding and signed informed consent.  Will plan for image-guided abdominal drainage catheter placement.    Freddie Mulligan, LESIA  [facility] [id number]  Ext [phone]

## 2018-10-22 NOTE — CHART NOTE - NSCHARTNOTEFT_GEN_A_CORE
SURGERY DAILY PROGRESS NOTE:     Procedure: IR drainage of abdominal collection    SUBJECTIVE:  Patient seen and examined at [facility].  Patient spiked fever to 39.4,  when he returned to the surgical floor.  He received a dose of IV tylenol and his temp came down to 36.8 and .         MEDICATIONS  (STANDING):  amiKACIN  IVPB 850 milliGRAM(s) IV Intermittent every 24 hours  dextrose 5%. 1000 milliLiter(s) (50 mL/Hr) IV Continuous <Continuous>  dextrose 50% Injectable 12.5 Gram(s) IV Push once  dextrose 50% Injectable 25 Gram(s) IV Push once  dextrose 50% Injectable 25 Gram(s) IV Push once  enoxaparin Injectable 40 milliGRAM(s) SubCutaneous every 24 hours  fluticasone propionate 50 MICROgram(s)/spray Nasal Spray 1 Spray(s) Both Nostrils two times a day  gabapentin 300 milliGRAM(s) Oral two times a day  influenza   Vaccine 0.5 milliLiter(s) IntraMuscular once  insulin lispro (HumaLOG) corrective regimen sliding scale   SubCutaneous every 6 hours  lactated ringers. 1000 milliLiter(s) (100 mL/Hr) IV Continuous <Continuous>  metoprolol tartrate 25 milliGRAM(s) Oral two times a day  metroNIDAZOLE  IVPB      metroNIDAZOLE  IVPB 500 milliGRAM(s) IV Intermittent every 8 hours  multivitamin 1 Tablet(s) Oral daily  sodium chloride 1 Gram(s) Oral three times a day    MEDICATIONS  (PRN):  acetaminophen   Tablet .. 650 milliGRAM(s) Oral every 6 hours PRN Mild Pain (1 - 3)  dextrose 40% Gel 15 Gram(s) Oral once PRN Blood Glucose LESS THAN 70 milliGRAM(s)/deciliter  glucagon  Injectable 1 milliGRAM(s) IntraMuscular once PRN Glucose LESS THAN 70 milligrams/deciliter      OBJECTIVE:    Vital Signs Last 24 Hrs  T(C): 36.8 (22 Oct 2018 21:10), Max: 39.4 (22 Oct 2018 19:59)  T(F): 98.2 (22 Oct 2018 21:10), Max: 102.9 (22 Oct 2018 19:59)  HR: 100 (22 Oct 2018 21:10) (63 - 131)  BP: 100/52 (22 Oct 2018 21:10) (100/52 - 170/76)  BP(mean): --  RR: 17 (22 Oct 2018 21:10) (17 - 20)  SpO2: 99% (22 Oct 2018 21:10) (97% - 100%)        I&O's Detail    21 Oct 2018 07:01  -  22 Oct 2018 07:00  --------------------------------------------------------  IN:  Total IN: 0 mL    OUT:    Voided: 400 mL  Total OUT: 400 mL    Total NET: -400 mL      22 Oct 2018 07:01  -  22 Oct 2018 22:12  --------------------------------------------------------  IN:    IV PiggyBack: 100 mL  Total IN: 100 mL    OUT:    Ileostomy: 475 mL    Voided: 1700 mL  Total OUT: 2175 mL    Total NET: -2075 mL          Daily     Daily     LABS:                        11.9   25.5  )-----------( 326      ( 21 Oct 2018 23:20 )             34.6     10-21    130<L>  |  94<L>  |  28<H>  ----------------------------<  151<H>  4.7   |  20<L>  |  1.09    Ca    9.9      21 Oct 2018 23:20    TPro  9.2<H>  /  Alb  4.0  /  TBili  0.3  /  DBili  x   /  AST  14  /  ALT  13  /  AlkPhos  64  10-21    PT/INR - ( 21 Oct 2018 23:20 )   PT: 17.6 sec;   INR: 1.61 ratio         PTT - ( 21 Oct 2018 23:20 )  PTT:33.5 sec  Urinalysis Basic - ( 22 Oct 2018 00:33 )    Color: Light Yellow / Appearance: Clear / S.015 / pH: x  Gluc: x / Ketone: Negative  / Bili: Negative / Urobili: Negative   Blood: x / Protein: Trace / Nitrite: Negative   Leuk Esterase: Large / RBC: 1 /hpf / WBC 44 /hpf   Sq Epi: x / Non Sq Epi: 7 /hpf / Bacteria: Negative              PHYSICAL EXAM:      IR PROCEDURE:   Findings/Follow up Plan of Care:  Successful replacement of right lower quadrant abscess drainage catheter via previous abscess tract.  12 Kazakh drain placed and 93cc of pus obtained.  Specimen sent for culture.  Full report to follow.        ASSESSMENT/PLAN: Patient is a 58y old m with perforated colon s/p ileocolic resection now with recurrent abdominal fluid collection    PLAN:     - Pain control  - IV abx: flagyl / amikacin as per previous ID recs, pending ID reconsult  - Monitor vitals, fevers, WBC  - Monitor drain output   - NPO with sips for now  - DVT ppx- Lovenox  - PT consult      June Marie PA-C SURGERY DAILY PROGRESS NOTE:     Procedure: IR drainage of abdominal collection    SUBJECTIVE:  Patient seen and examined at [facility].  Patient spiked fever to 39.4,  when he returned to the surgical floor.  He received a dose of IV tylenol and his temp came down to 36.8 and .  Patient states he has some RLQ pain around the drain site.  He denies any chest pain, SOB, nausea/vomiting.         MEDICATIONS  (STANDING):  amiKACIN  IVPB 850 milliGRAM(s) IV Intermittent every 24 hours  dextrose 5%. 1000 milliLiter(s) (50 mL/Hr) IV Continuous <Continuous>  dextrose 50% Injectable 12.5 Gram(s) IV Push once  dextrose 50% Injectable 25 Gram(s) IV Push once  dextrose 50% Injectable 25 Gram(s) IV Push once  enoxaparin Injectable 40 milliGRAM(s) SubCutaneous every 24 hours  fluticasone propionate 50 MICROgram(s)/spray Nasal Spray 1 Spray(s) Both Nostrils two times a day  gabapentin 300 milliGRAM(s) Oral two times a day  influenza   Vaccine 0.5 milliLiter(s) IntraMuscular once  insulin lispro (HumaLOG) corrective regimen sliding scale   SubCutaneous every 6 hours  lactated ringers. 1000 milliLiter(s) (100 mL/Hr) IV Continuous <Continuous>  metoprolol tartrate 25 milliGRAM(s) Oral two times a day  metroNIDAZOLE  IVPB      metroNIDAZOLE  IVPB 500 milliGRAM(s) IV Intermittent every 8 hours  multivitamin 1 Tablet(s) Oral daily  sodium chloride 1 Gram(s) Oral three times a day    MEDICATIONS  (PRN):  acetaminophen   Tablet .. 650 milliGRAM(s) Oral every 6 hours PRN Mild Pain (1 - 3)  dextrose 40% Gel 15 Gram(s) Oral once PRN Blood Glucose LESS THAN 70 milliGRAM(s)/deciliter  glucagon  Injectable 1 milliGRAM(s) IntraMuscular once PRN Glucose LESS THAN 70 milligrams/deciliter      OBJECTIVE:    Vital Signs Last 24 Hrs  T(C): 36.8 (22 Oct 2018 21:10), Max: 39.4 (22 Oct 2018 19:59)  T(F): 98.2 (22 Oct 2018 21:10), Max: 102.9 (22 Oct 2018 19:59)  HR: 100 (22 Oct 2018 21:10) (63 - 131)  BP: 100/52 (22 Oct 2018 21:10) (100/52 - 170/76)  BP(mean): --  RR: 17 (22 Oct 2018 21:10) (17 - 20)  SpO2: 99% (22 Oct 2018 21:10) (97% - 100%)        I&O's Detail    21 Oct 2018 07:01  -  22 Oct 2018 07:00  --------------------------------------------------------  IN:  Total IN: 0 mL    OUT:    Voided: 400 mL  Total OUT: 400 mL    Total NET: -400 mL      22 Oct 2018 07:01  -  22 Oct 2018 22:12  --------------------------------------------------------  IN:    IV PiggyBack: 100 mL  Total IN: 100 mL    OUT:    Ileostomy: 475 mL    Voided: 1700 mL  Total OUT: 2175 mL    Total NET: -2075 mL          Daily     Daily     LABS:                        11.9   25.5  )-----------( 326      ( 21 Oct 2018 23:20 )             34.6     10-21    130<L>  |  94<L>  |  28<H>  ----------------------------<  151<H>  4.7   |  20<L>  |  1.09    Ca    9.9      21 Oct 2018 23:20    TPro  9.2<H>  /  Alb  4.0  /  TBili  0.3  /  DBili  x   /  AST  14  /  ALT  13  /  AlkPhos  64  10-21    PT/INR - ( 21 Oct 2018 23:20 )   PT: 17.6 sec;   INR: 1.61 ratio         PTT - ( 21 Oct 2018 23:20 )  PTT:33.5 sec  Urinalysis Basic - ( 22 Oct 2018 00:33 )    Color: Light Yellow / Appearance: Clear / S.015 / pH: x  Gluc: x / Ketone: Negative  / Bili: Negative / Urobili: Negative   Blood: x / Protein: Trace / Nitrite: Negative   Leuk Esterase: Large / RBC: 1 /hpf / WBC 44 /hpf   Sq Epi: x / Non Sq Epi: 7 /hpf / Bacteria: Negative              PHYSICAL EXAM:  General: Resting comfortably, NAD  Respiratory: No increased WOB  Abdomen: Soft, ttp in RLQ, drain in place with cloudy serosanguineous drainage.  Ostomy in place with soft stool in bag.  Psych: A&Ox3      IR PROCEDURE:   Findings/Follow up Plan of Care:  Successful replacement of right lower quadrant abscess drainage catheter via previous abscess tract.  12 [place]ronesian drain placed and 93cc of pus obtained.  Specimen sent for culture.  Full report to follow.        ASSESSMENT/PLAN: Patient is a 58y old m with perforated colon s/p ileocolic resection now with recurrent abdominal fluid collection    PLAN:     - Pain control  - IV abx: flagyl / amikacin as per previous ID recs, pending ID reconsult  - Monitor vitals, fevers, WBC  - Monitor drain output   - NPO with sips for now  - DVT ppx- Lovenox  - PT consult      June Marie PA-C

## 2018-10-22 NOTE — PROGRESS NOTE ADULT - ASSESSMENT
ASSESSMENT: Patient is a 58y old m with perforated colon s/p ileocolic resection now with recurrent abdominal fluid collection    PLAN:   - NPO/IVF  - Pain control prn  - IV abx: flagyl / amikacin as per previous ID recs  - Reconsult ID  - IR this morning consult for percutaneous drainage of collection    Acute [facility] Surgery  [phone]

## 2018-10-22 NOTE — H&P ADULT - ATTENDING COMMENTS
collection right lower retroperitoneal  started antibiotics  will consult for interventional radiology

## 2018-10-22 NOTE — H&P ADULT - HISTORY OF PRESENT ILLNESS
Patient is a 58y old  Male who presents with a chief complaint of lower abdominal pain and fevers.  Patient's medical history is significant for initially presenting to [facility] in August with a perforated right colon, and he underwent an ileocolic resection with end ileostomy. Patient has multiple intraabdominal drains in place on discharge, and was recently at rehab with 2 drains in place and on IV abx.  He completed his course of IV abx on 10/14.  He was recently seen by IR on 10/16 for a drain study and the drains and his PICC were removed by IR that day.  He was since discharged from Rehab to home on Friday.  Saturday night into Sunday he began having lower abdominal pain, worse on the right, with fevers up to 102F at home.  He was brought into the hospital by his family for further evaluation.  Since presentation to the ER, he has noticed purulent drainage from the right lateral tube site.      Patient has been on anticoagulation with Eliquis for known DVTs during the last admission.  His last dose of eliquis was taken Sunday morning.      Patient denies lightheadedness/dizziness, denies SOB/chest pain, denies nausea/vomiting, denies constipation/diarrhea. Patient's ostomy has been functioning.

## 2018-10-22 NOTE — PROGRESS NOTE ADULT - SUBJECTIVE AND OBJECTIVE BOX
SURGERY DAILY PROGRESS NOTE:     Subjective:    Patient was seen and examined this morning during morning rounds. Patient came into the hospital yesterday. Complaining of night sweats and chills. RLQ abdominal pain.    Objective:    NAD, awake and alert  Respirations nonlabored  Abd soft, NT/ND, no rebound/guarding   RLQ ostomy functioning with thick brown stool   R sided prior drain site with purulent drainage, no fluctuance, minimal surrounding erythema.      MEDICATIONS  (STANDING):  amiKACIN  IVPB 850 milliGRAM(s) IV Intermittent every 24 hours  dextrose 5%. 1000 milliLiter(s) (50 mL/Hr) IV Continuous <Continuous>  dextrose 50% Injectable 12.5 Gram(s) IV Push once  dextrose 50% Injectable 25 Gram(s) IV Push once  dextrose 50% Injectable 25 Gram(s) IV Push once  enoxaparin Injectable 40 milliGRAM(s) SubCutaneous every 24 hours  fluticasone propionate 50 MICROgram(s)/spray Nasal Spray 1 Spray(s) Both Nostrils two times a day  gabapentin 300 milliGRAM(s) Oral two times a day  insulin lispro (HumaLOG) corrective regimen sliding scale   SubCutaneous every 6 hours  lactated ringers. 1000 milliLiter(s) (100 mL/Hr) IV Continuous <Continuous>  metoprolol tartrate 25 milliGRAM(s) Oral two times a day  metroNIDAZOLE  IVPB      metroNIDAZOLE  IVPB 500 milliGRAM(s) IV Intermittent every 8 hours  multivitamin 1 Tablet(s) Oral daily  sodium chloride 1 Gram(s) Oral three times a day    MEDICATIONS  (PRN):  acetaminophen   Tablet .. 650 milliGRAM(s) Oral every 6 hours PRN Mild Pain (1 - 3)  dextrose 40% Gel 15 Gram(s) Oral once PRN Blood Glucose LESS THAN 70 milliGRAM(s)/deciliter  glucagon  Injectable 1 milliGRAM(s) IntraMuscular once PRN Glucose LESS THAN 70 milligrams/deciliter  oxyCODONE    5 mG/acetaminophen 325 mG 1 Tablet(s) Oral every 4 hours PRN Moderate Pain (4 - 6)      Vital Signs Last 24 Hrs  T(C): 36.8 (22 Oct 2018 05:33), Max: 39.3 (22 Oct 2018 04:02)  T(F): 98.3 (22 Oct 2018 05:33), Max: 102.8 (22 Oct 2018 04:02)  HR: 124 (22 Oct 2018 05:33) (63 - 131)  BP: 107/57 (22 Oct 2018 05:33) (107/57 - 170/76)  BP(mean): --  RR: 20 (22 Oct 2018 05:33) (18 - 20)  SpO2: 97% (22 Oct 2018 05:33) (97% - 99%)    I&O's Detail    21 Oct 2018 07:01  -  22 Oct 2018 07:00  --------------------------------------------------------  IN:  Total IN: 0 mL    OUT:    Voided: 400 mL  Total OUT: 400 mL    Total NET: -400 mL          Daily Height in cm: 182.88 (21 Oct 2018 21:38)    Daily     LABS:                        11.9   25.5  )-----------( 326      ( 21 Oct 2018 23:20 )             34.6     10-21    130<L>  |  94<L>  |  28<H>  ----------------------------<  151<H>  4.7   |  20<L>  |  1.09    Ca    9.9      21 Oct 2018 23:20    TPro  9.2<H>  /  Alb  4.0  /  TBili  0.3  /  DBili  x   /  AST  14  /  ALT  13  /  AlkPhos  64  10-21    PT/INR - ( 21 Oct 2018 23:20 )   PT: 17.6 sec;   INR: 1.61 ratio         PTT - ( 21 Oct 2018 23:20 )  PTT:33.5 sec  Urinalysis Basic - ( 22 Oct 2018 00:33 )    Color: Light Yellow / Appearance: Clear / S.015 / pH: x  Gluc: x / Ketone: Negative  / Bili: Negative / Urobili: Negative   Blood: x / Protein: Trace / Nitrite: Negative   Leuk Esterase: Large / RBC: 1 /hpf / WBC 44 /hpf   Sq Epi: x / Non Sq Epi: 7 /hpf / Bacteria: Negative

## 2018-10-22 NOTE — CHART NOTE - NSCHARTNOTEFT_GEN_A_CORE
ATP CHART NOTE      RN called, patient febrile to 102.8 and tachycardic to 130s.  Patient admitted for large fluid collection 2/2 ileocolic resection back in August.  Patient seen and examined at bedside.  He complains of shaking chills.  Patient denies any chest pain, or shortness of breath.  He states he is a little nauseous, but no vomiting.  His BP is 170/76 and he is saturating 98% on room air.    Exam:  General: Shivering with blankets on  Resp: No increased WOB  GI/Abd: Softly distended, minimal tenderness, no rebound/guarding, RLQ ostomy functioning with stool in bag.  Psych: A&Ox3    Plan:   - 500 cc bolus STAT  - IV tylenol  - Continue IV antibiotics  - Monitor HR, BP, fevers  - IR in AM for drainage of collection    June Marie PA-C

## 2018-10-22 NOTE — H&P ADULT - ASSESSMENT
ASSESSMENT: Patient is a 58y old m with perforated colon s/p ileocolic resection now with recurrent abdominal fluid collection    PLAN:   - Admit to [facility], Dr. Adams  - IV abx: will restart flagyl / amikacin as per previous ID recs  - Will reconsult ID in am  - IR consult for percutaneous drainage of collection  - NPO for procedure  - Patient and plan discussed with Attending, Dr. Adams.     Wilma Madison MD PGY4  Acute Care Surgery, #[id number]

## 2018-10-22 NOTE — PATIENT PROFILE ADULT - NSPROALCOHOLUSE2_GEN_A_NUR
Message from [employer]LED[employer]Tt:  Original authorizing provider: MD ALEJANDRO DicksonKARIS GARVEYTylor Matheusla would like a refill of the following medications:  citalopram (CELEXA) 20 MG tablet [Sheeba Graham MD]    Preferred pharmacy: [facility] [address] [place] [address] AT [facility]    Comment:     never

## 2018-10-22 NOTE — H&P ADULT - NSHPLABSRESULTS_GEN_ALL_CORE
CBC (10-21 @ 23:20)                              11.9<L>                         25.5<H>  )----------------(  326        78.0<H>% Neutrophils, 13.0  % Lymphocytes, ANC: 20.2<H>                              34.6<L>    BMP (10-21 @ 23:20)             130<L>  |  94<L>   |  28<H> 		Ca++ --      Ca 9.9                ---------------------------------( 151<H>		Mg --                 4.7     |  20<L>   |  1.09  			Ph --        LFTs (10-21 @ 23:20)      TPro 9.2<H> / Alb 4.0 / TBili 0.3 / DBili -- / AST 14 / ALT 13 / AlkPhos 64    Coags (10-21 @ 23:20)  aPTT 33.5 / INR 1.61<H> / PT 17.6<H>    VBG (10-22 @ 01:12)     -- / -- / -- / -- / -- / --%     Lactate: 1.1  VBG (10-21 @ 23:20)     -- / -- / -- / -- / -- / --%     Lactate: 3.0<H>    < from: CT Abdomen and Pelvis w/ Oral Cont and w/ IV Cont (10.22.18 @ 00:27) >    IMPRESSION:     Interval removal of previously seen right iliopsoas drainage catheters   with recurrent abscess measuring up to 10.6 x 3.3 x 9.4 cm. Tethering of   small bowel loops in the right lower quadrant to the right iliopsoas   collection. Good opacification of the right lower quadrant small bowel   loops without evidence of extraluminal contrast extravasation (fistula).     < end of copied text >

## 2018-10-23 LAB
-  CANDIDA ALBICANS: SIGNIFICANT CHANGE UP
-  CANDIDA GLABRATA: SIGNIFICANT CHANGE UP
-  CANDIDA KRUSEI: SIGNIFICANT CHANGE UP
-  CANDIDA PARAPSILOSIS: SIGNIFICANT CHANGE UP
-  CANDIDA TROPICALIS: SIGNIFICANT CHANGE UP
-  COAGULASE NEGATIVE STAPHYLOCOCCUS: SIGNIFICANT CHANGE UP
-  K. PNEUMONIAE GROUP: SIGNIFICANT CHANGE UP
-  KPC RESISTANCE GENE: SIGNIFICANT CHANGE UP
-  STREPTOCOCCUS SP. (NOT GRP A, B OR S PNEUMONIAE): SIGNIFICANT CHANGE UP
A BAUMANNII DNA SPEC QL NAA+PROBE: SIGNIFICANT CHANGE UP
ALBUMIN SERPL ELPH-MCNC: 3 G/DL — LOW (ref 3.3–5)
ALP SERPL-CCNC: 52 U/L — SIGNIFICANT CHANGE UP (ref 40–120)
ALT FLD-CCNC: 8 U/L — LOW (ref 10–45)
ANION GAP SERPL CALC-SCNC: 13 MMOL/L — SIGNIFICANT CHANGE UP (ref 5–17)
AST SERPL-CCNC: 13 U/L — SIGNIFICANT CHANGE UP (ref 10–40)
BILIRUB SERPL-MCNC: 0.3 MG/DL — SIGNIFICANT CHANGE UP (ref 0.2–1.2)
BUN SERPL-MCNC: 20 MG/DL — SIGNIFICANT CHANGE UP (ref 7–23)
CALCIUM SERPL-MCNC: 9.4 MG/DL — SIGNIFICANT CHANGE UP (ref 8.4–10.5)
CHLORIDE SERPL-SCNC: 101 MMOL/L — SIGNIFICANT CHANGE UP (ref 96–108)
CO2 SERPL-SCNC: 19 MMOL/L — LOW (ref 22–31)
CREAT SERPL-MCNC: 0.99 MG/DL — SIGNIFICANT CHANGE UP (ref 0.5–1.3)
CULTURE RESULTS: SIGNIFICANT CHANGE UP
E CLOAC COMP DNA BLD POS QL NAA+PROBE: SIGNIFICANT CHANGE UP
E COLI DNA BLD POS QL NAA+NON-PROBE: SIGNIFICANT CHANGE UP
ENTEROCOC DNA BLD POS QL NAA+NON-PROBE: SIGNIFICANT CHANGE UP
ENTEROCOC DNA BLD POS QL NAA+NON-PROBE: SIGNIFICANT CHANGE UP
GLUCOSE BLDC GLUCOMTR-MCNC: 111 MG/DL — HIGH (ref 70–99)
GLUCOSE BLDC GLUCOMTR-MCNC: 122 MG/DL — HIGH (ref 70–99)
GLUCOSE BLDC GLUCOMTR-MCNC: 136 MG/DL — HIGH (ref 70–99)
GLUCOSE BLDC GLUCOMTR-MCNC: 145 MG/DL — HIGH (ref 70–99)
GLUCOSE BLDC GLUCOMTR-MCNC: 82 MG/DL — SIGNIFICANT CHANGE UP (ref 70–99)
GLUCOSE BLDC GLUCOMTR-MCNC: 91 MG/DL — SIGNIFICANT CHANGE UP (ref 70–99)
GLUCOSE SERPL-MCNC: 118 MG/DL — HIGH (ref 70–99)
GP B STREP DNA BLD POS QL NAA+NON-PROBE: SIGNIFICANT CHANGE UP
GRAM STN FLD: SIGNIFICANT CHANGE UP
HAEM INFLU DNA BLD POS QL NAA+NON-PROBE: SIGNIFICANT CHANGE UP
HCT VFR BLD CALC: 29.4 % — LOW (ref 39–50)
HGB BLD-MCNC: 9.6 G/DL — LOW (ref 13–17)
K OXYTOCA DNA BLD POS QL NAA+NON-PROBE: SIGNIFICANT CHANGE UP
L MONOCYTOG DNA BLD POS QL NAA+NON-PROBE: SIGNIFICANT CHANGE UP
MAGNESIUM SERPL-MCNC: 1.8 MG/DL — SIGNIFICANT CHANGE UP (ref 1.6–2.6)
MCHC RBC-ENTMCNC: 29 PG — SIGNIFICANT CHANGE UP (ref 27–34)
MCHC RBC-ENTMCNC: 32.8 GM/DL — SIGNIFICANT CHANGE UP (ref 32–36)
MCV RBC AUTO: 88.4 FL — SIGNIFICANT CHANGE UP (ref 80–100)
METHOD TYPE: SIGNIFICANT CHANGE UP
MRSA SPEC QL CULT: SIGNIFICANT CHANGE UP
MSSA DNA SPEC QL NAA+PROBE: SIGNIFICANT CHANGE UP
N MEN ISLT CULT: SIGNIFICANT CHANGE UP
P AERUGINOSA DNA BLD POS NAA+NON-PROBE: SIGNIFICANT CHANGE UP
PHOSPHATE SERPL-MCNC: 4 MG/DL — SIGNIFICANT CHANGE UP (ref 2.5–4.5)
PLATELET # BLD AUTO: 274 K/UL — SIGNIFICANT CHANGE UP (ref 150–400)
POTASSIUM SERPL-MCNC: 3.6 MMOL/L — SIGNIFICANT CHANGE UP (ref 3.5–5.3)
POTASSIUM SERPL-SCNC: 3.6 MMOL/L — SIGNIFICANT CHANGE UP (ref 3.5–5.3)
PROT SERPL-MCNC: 7.3 G/DL — SIGNIFICANT CHANGE UP (ref 6–8.3)
RBC # BLD: 3.32 M/UL — LOW (ref 4.2–5.8)
RBC # FLD: 14.9 % — HIGH (ref 10.3–14.5)
S MARCESCENS DNA BLD POS NAA+NON-PROBE: SIGNIFICANT CHANGE UP
S PNEUM DNA BLD POS QL NAA+NON-PROBE: SIGNIFICANT CHANGE UP
S PYO DNA BLD POS QL NAA+NON-PROBE: SIGNIFICANT CHANGE UP
SODIUM SERPL-SCNC: 133 MMOL/L — LOW (ref 135–145)
SPECIMEN SOURCE: SIGNIFICANT CHANGE UP
WBC # BLD: 16.8 K/UL — HIGH (ref 3.8–10.5)
WBC # FLD AUTO: 16.8 K/UL — HIGH (ref 3.8–10.5)

## 2018-10-23 PROCEDURE — 99231 SBSQ HOSP IP/OBS SF/LOW 25: CPT

## 2018-10-23 PROCEDURE — 99223 1ST HOSP IP/OBS HIGH 75: CPT

## 2018-10-23 RX ORDER — POTASSIUM CHLORIDE 20 MEQ
40 PACKET (EA) ORAL ONCE
Qty: 0 | Refills: 0 | Status: COMPLETED | OUTPATIENT
Start: 2018-10-23 | End: 2018-10-23

## 2018-10-23 RX ORDER — IBUPROFEN 200 MG
600 TABLET ORAL EVERY 6 HOURS
Qty: 0 | Refills: 0 | Status: DISCONTINUED | OUTPATIENT
Start: 2018-10-23 | End: 2018-10-27

## 2018-10-23 RX ORDER — OXYCODONE HYDROCHLORIDE 5 MG/1
5 TABLET ORAL EVERY 4 HOURS
Qty: 0 | Refills: 0 | Status: DISCONTINUED | OUTPATIENT
Start: 2018-10-23 | End: 2018-10-27

## 2018-10-23 RX ORDER — INSULIN LISPRO 100/ML
VIAL (ML) SUBCUTANEOUS
Qty: 0 | Refills: 0 | Status: DISCONTINUED | OUTPATIENT
Start: 2018-10-23 | End: 2018-10-27

## 2018-10-23 RX ORDER — INSULIN LISPRO 100/ML
VIAL (ML) SUBCUTANEOUS
Qty: 0 | Refills: 0 | Status: DISCONTINUED | OUTPATIENT
Start: 2018-10-23 | End: 2018-10-23

## 2018-10-23 RX ORDER — MAGNESIUM SULFATE 500 MG/ML
2 VIAL (ML) INJECTION ONCE
Qty: 0 | Refills: 0 | Status: COMPLETED | OUTPATIENT
Start: 2018-10-23 | End: 2018-10-23

## 2018-10-23 RX ORDER — MEROPENEM 1 G/30ML
1000 INJECTION INTRAVENOUS EVERY 8 HOURS
Qty: 0 | Refills: 0 | Status: DISCONTINUED | OUTPATIENT
Start: 2018-10-23 | End: 2018-10-25

## 2018-10-23 RX ADMIN — Medication 100 MILLIGRAM(S): at 05:28

## 2018-10-23 RX ADMIN — OXYCODONE HYDROCHLORIDE 5 MILLIGRAM(S): 5 TABLET ORAL at 12:38

## 2018-10-23 RX ADMIN — SODIUM CHLORIDE 1 GRAM(S): 9 INJECTION INTRAMUSCULAR; INTRAVENOUS; SUBCUTANEOUS at 14:32

## 2018-10-23 RX ADMIN — SODIUM CHLORIDE 1 GRAM(S): 9 INJECTION INTRAMUSCULAR; INTRAVENOUS; SUBCUTANEOUS at 05:20

## 2018-10-23 RX ADMIN — Medication 100 MILLIGRAM(S): at 14:32

## 2018-10-23 RX ADMIN — OXYCODONE HYDROCHLORIDE 5 MILLIGRAM(S): 5 TABLET ORAL at 12:08

## 2018-10-23 RX ADMIN — Medication 1 TABLET(S): at 12:08

## 2018-10-23 RX ADMIN — AMIKACIN SULFATE 253.4 MILLIGRAM(S): 250 INJECTION, SOLUTION INTRAMUSCULAR; INTRAVENOUS at 05:20

## 2018-10-23 RX ADMIN — GABAPENTIN 300 MILLIGRAM(S): 400 CAPSULE ORAL at 17:52

## 2018-10-23 RX ADMIN — Medication 25 MILLIGRAM(S): at 17:52

## 2018-10-23 RX ADMIN — Medication 650 MILLIGRAM(S): at 05:22

## 2018-10-23 RX ADMIN — SODIUM CHLORIDE 1 GRAM(S): 9 INJECTION INTRAMUSCULAR; INTRAVENOUS; SUBCUTANEOUS at 21:55

## 2018-10-23 RX ADMIN — GABAPENTIN 300 MILLIGRAM(S): 400 CAPSULE ORAL at 05:20

## 2018-10-23 RX ADMIN — Medication 1 SPRAY(S): at 05:27

## 2018-10-23 RX ADMIN — Medication 650 MILLIGRAM(S): at 06:15

## 2018-10-23 RX ADMIN — MEROPENEM 100 MILLIGRAM(S): 1 INJECTION INTRAVENOUS at 21:55

## 2018-10-23 RX ADMIN — Medication 25 MILLIGRAM(S): at 05:20

## 2018-10-23 RX ADMIN — Medication 50 GRAM(S): at 14:32

## 2018-10-23 RX ADMIN — Medication 40 MILLIEQUIVALENT(S): at 14:32

## 2018-10-23 RX ADMIN — ENOXAPARIN SODIUM 40 MILLIGRAM(S): 100 INJECTION SUBCUTANEOUS at 05:20

## 2018-10-23 NOTE — PROGRESS NOTE ADULT - SUBJECTIVE AND OBJECTIVE BOX
Interventional Radiology Follow- Up Note      This is a 58y Male with PMH of colonic perforation c/b fluid collections s/p IR drainage x2 right fluid collection drainage 8/20 and left abdominal fluid collection drainage on 8/27. Left drain removed on 9/7 and RLQ drain removed on 10/16 2/2 resolution of collection. Surgical drain was removed on 10/16 by surgical team. Pt presented on 10/22 with worsening RLQ pain, fever and oozing purulent material from previous RLQ drain site. CT abd/pelvis 10/22 demonstrated "recurrent abscess measuring up to 10.6 x 3.3 x 9.4 cm", IR requested for reinsertion of RLQ drainage catheter for which he underwent yesterday with Dr. Gonzalez (93cc of puss was aspirated).     Pt seen and examined at bedside. Reports RLQ pain around drain. Overnight pt spiked a fever of Tmax 102.9,  with resolved after dose of tylenol. He is currently on Amikacin 850mg QD and flagyl 500mg q8hr.     Pt on isolation for CRE.     PAST MEDICAL & SURGICAL HISTORY:  Necrotizing fasciitis  Diabetes  Hypertension  H/O ileostomy      Allergies: No Known Allergies      LABS:                        9.6    16.8  )-----------( 274      ( 23 Oct 2018 06:50 )             29.4     10-23    133<L>  |  101  |  20  ----------------------------<  118<H>  3.6   |  19<L>  |  0.99    Ca    9.4      23 Oct 2018 06:49  Phos  4.0     10-23  Mg     1.8     10-23    TPro  7.3  /  Alb  3.0<L>  /  TBili  0.3  /  DBili  x   /  AST  13  /  ALT  8<L>  /  AlkPhos  52  10-23    PT/INR - ( 21 Oct 2018 23:20 )   PT: 17.6 sec;   INR: 1.61 ratio         PTT - ( 21 Oct 2018 23:20 )  PTT:33.5 sec      Abdominal fluid Cultures: Pending     Vitals: T(F): 98.1 (10-23-18 @ 05:17), Max: 102.9 (10-22-18 @ 19:59)  HR: 100 (10-23-18 @ 05:17) (92 - 118)  BP: 137/74 (10-23-18 @ 05:17) (99/57 - 137/74)  RR: 17 (10-23-18 @ 05:17) (17 - 18)  SpO2: 100% (10-23-18 @ 05:17) (98% - 100%)    PHYSICAL EXAM:  General: Nontoxic, in NAD, A&O x3  Abd: ND, soft, TTP over drain site. RLQ drain intact to SUDHIR with serosanguinous output 25cc in bulb and 80cc documented since drain placement.     A/P: 58y Male admitted with recurrent RLQ abdominal abscess s/p reinsertion on 10/22 with Dr. Gonzalez.  -WBC trending down 25.5-->16.8  -Continue abx therapy per primary team  -continue to monitor output    -flush drain per doctor orders  -trend vitals, labs  -will discuss with IR attending     Please call IR at extension [phone] with any questions, concerns, or issues regarding above.

## 2018-10-23 NOTE — PROGRESS NOTE ADULT - SUBJECTIVE AND OBJECTIVE BOX
SURGERY DAILY PROGRESS NOTE:     Subjective:    Patient was seen and examined this morning during morning rounds.     Objective:    NAD, awake and alert  Respirations nonlabored  Abdomen soft, nontender, nondistended  No guarding or rebound tenderness     MEDICATIONS  (STANDING):  amiKACIN  IVPB 850 milliGRAM(s) IV Intermittent every 24 hours  dextrose 5%. 1000 milliLiter(s) (50 mL/Hr) IV Continuous <Continuous>  dextrose 50% Injectable 12.5 Gram(s) IV Push once  dextrose 50% Injectable 25 Gram(s) IV Push once  dextrose 50% Injectable 25 Gram(s) IV Push once  enoxaparin Injectable 40 milliGRAM(s) SubCutaneous every 24 hours  fluticasone propionate 50 MICROgram(s)/spray Nasal Spray 1 Spray(s) Both Nostrils two times a day  gabapentin 300 milliGRAM(s) Oral two times a day  influenza   Vaccine 0.5 milliLiter(s) IntraMuscular once  insulin lispro (HumaLOG) corrective regimen sliding scale   SubCutaneous Before meals and at bedtime  magnesium sulfate  IVPB 2 Gram(s) IV Intermittent once  metoprolol tartrate 25 milliGRAM(s) Oral two times a day  metroNIDAZOLE  IVPB      metroNIDAZOLE  IVPB 500 milliGRAM(s) IV Intermittent every 8 hours  multivitamin 1 Tablet(s) Oral daily  potassium chloride    Tablet ER 40 milliEquivalent(s) Oral once  sodium chloride 1 Gram(s) Oral three times a day    MEDICATIONS  (PRN):  acetaminophen   Tablet .. 650 milliGRAM(s) Oral every 6 hours PRN Mild Pain (1 - 3)  dextrose 40% Gel 15 Gram(s) Oral once PRN Blood Glucose LESS THAN 70 milliGRAM(s)/deciliter  glucagon  Injectable 1 milliGRAM(s) IntraMuscular once PRN Glucose LESS THAN 70 milligrams/deciliter  ibuprofen  Tablet. 600 milliGRAM(s) Oral every 6 hours PRN Moderate Pain (4 - 6)  oxyCODONE    IR 5 milliGRAM(s) Oral every 4 hours PRN Severe Pain (7 - 10)      Vital Signs Last 24 Hrs  T(C): 36.7 (23 Oct 2018 05:17), Max: 39.4 (22 Oct 2018 19:59)  T(F): 98.1 (23 Oct 2018 05:17), Max: 102.9 (22 Oct 2018 19:59)  HR: 100 (23 Oct 2018 05:17) (92 - 118)  BP: 137/74 (23 Oct 2018 05:17) (99/57 - 137/74)  BP(mean): --  RR: 17 (23 Oct 2018 05:17) (17 - 18)  SpO2: 100% (23 Oct 2018 05:17) (98% - 100%)    I&O's Detail    22 Oct 2018 07:01  -  23 Oct 2018 07:00  --------------------------------------------------------  IN:    IV PiggyBack: 450 mL    lactated ringers.: 1200 mL    Oral Fluid: 170 mL  Total IN: 1820 mL    OUT:    Bulb: 80 mL    Ileostomy: 875 mL    Voided: 2175 mL  Total OUT: 3130 mL    Total NET: -1310 mL      23 Oct 2018 07:01  -  23 Oct 2018 12:11  --------------------------------------------------------  IN:  Total IN: 0 mL    OUT:    Bulb: 20 mL    Ileostomy: 125 mL    Voided: 350 mL  Total OUT: 495 mL    Total NET: -495 mL          Daily     Daily     LABS:                        9.6    16.8  )-----------( 274      ( 23 Oct 2018 06:50 )             29.4     10-    133<L>  |  101  |  20  ----------------------------<  118<H>  3.6   |  19<L>  |  0.99    Ca    9.4      23 Oct 2018 06:49  Phos  4.0     10  Mg     1.8     10-23    TPro  7.3  /  Alb  3.0<L>  /  TBili  0.3  /  DBili  x   /  AST  13  /  ALT  8<L>  /  AlkPhos  52  10-23    PT/INR - ( 21 Oct 2018 23:20 )   PT: 17.6 sec;   INR: 1.61 ratio         PTT - ( 21 Oct 2018 23:20 )  PTT:33.5 sec  Urinalysis Basic - ( 22 Oct 2018 00:33 )    Color: Light Yellow / Appearance: Clear / S.015 / pH: x  Gluc: x / Ketone: Negative  / Bili: Negative / Urobili: Negative   Blood: x / Protein: Trace / Nitrite: Negative   Leuk Esterase: Large / RBC: 1 /hpf / WBC 44 /hpf   Sq Epi: x / Non Sq Epi: 7 /hpf / Bacteria: Negative SURGERY DAILY PROGRESS NOTE:     Subjective:    Patient was seen and examined this morning during morning rounds. Abdominal pain improving. Still having chills at night. Had IR drain placed yesterday for RLQ collection.     Objective:    NAD, awake and alert  Respirations nonlabored  Abd soft, mildly tender, nondistended, no rebound/guarding   RLQ ostomy functioning with thick brown stool   R sided IR drain with purulent output      MEDICATIONS  (STANDING):  amiKACIN  IVPB 850 milliGRAM(s) IV Intermittent every 24 hours  dextrose 5%. 1000 milliLiter(s) (50 mL/Hr) IV Continuous <Continuous>  dextrose 50% Injectable 12.5 Gram(s) IV Push once  dextrose 50% Injectable 25 Gram(s) IV Push once  dextrose 50% Injectable 25 Gram(s) IV Push once  enoxaparin Injectable 40 milliGRAM(s) SubCutaneous every 24 hours  fluticasone propionate 50 MICROgram(s)/spray Nasal Spray 1 Spray(s) Both Nostrils two times a day  gabapentin 300 milliGRAM(s) Oral two times a day  influenza   Vaccine 0.5 milliLiter(s) IntraMuscular once  insulin lispro (HumaLOG) corrective regimen sliding scale   SubCutaneous Before meals and at bedtime  magnesium sulfate  IVPB 2 Gram(s) IV Intermittent once  metoprolol tartrate 25 milliGRAM(s) Oral two times a day  metroNIDAZOLE  IVPB      metroNIDAZOLE  IVPB 500 milliGRAM(s) IV Intermittent every 8 hours  multivitamin 1 Tablet(s) Oral daily  potassium chloride    Tablet ER 40 milliEquivalent(s) Oral once  sodium chloride 1 Gram(s) Oral three times a day    MEDICATIONS  (PRN):  acetaminophen   Tablet .. 650 milliGRAM(s) Oral every 6 hours PRN Mild Pain (1 - 3)  dextrose 40% Gel 15 Gram(s) Oral once PRN Blood Glucose LESS THAN 70 milliGRAM(s)/deciliter  glucagon  Injectable 1 milliGRAM(s) IntraMuscular once PRN Glucose LESS THAN 70 milligrams/deciliter  ibuprofen  Tablet. 600 milliGRAM(s) Oral every 6 hours PRN Moderate Pain (4 - 6)  oxyCODONE    IR 5 milliGRAM(s) Oral every 4 hours PRN Severe Pain (7 - 10)      Vital Signs Last 24 Hrs  T(C): 36.7 (23 Oct 2018 05:17), Max: 39.4 (22 Oct 2018 19:59)  T(F): 98.1 (23 Oct 2018 05:17), Max: 102.9 (22 Oct 2018 19:59)  HR: 100 (23 Oct 2018 05:17) (92 - 118)  BP: 137/74 (23 Oct 2018 05:17) (99/57 - 137/74)  BP(mean): --  RR: 17 (23 Oct 2018 05:17) (17 - 18)  SpO2: 100% (23 Oct 2018 05:17) (98% - 100%)    I&O's Detail    22 Oct 2018 07:01  -  23 Oct 2018 07:00  --------------------------------------------------------  IN:    IV PiggyBack: 450 mL    lactated ringers.: 1200 mL    Oral Fluid: 170 mL  Total IN: 1820 mL    OUT:    Bulb: 80 mL    Ileostomy: 875 mL    Voided: 2175 mL  Total OUT: 3130 mL    Total NET: -1310 mL      23 Oct 2018 07:01  -  23 Oct 2018 12:11  --------------------------------------------------------  IN:  Total IN: 0 mL    OUT:    Bulb: 20 mL    Ileostomy: 125 mL    Voided: 350 mL  Total OUT: 495 mL    Total NET: -495 mL          Daily     Daily     LABS:                        9.6    16.8  )-----------( 274      ( 23 Oct 2018 06:50 )             29.4     10    133<L>  |  101  |  20  ----------------------------<  118<H>  3.6   |  19<L>  |  0.99    Ca    9.4      23 Oct 2018 06:49  Phos  4.0     10  Mg     1.8     10-23    TPro  7.3  /  Alb  3.0<L>  /  TBili  0.3  /  DBili  x   /  AST  13  /  ALT  8<L>  /  AlkPhos  52  10    PT/INR - ( 21 Oct 2018 23:20 )   PT: 17.6 sec;   INR: 1.61 ratio         PTT - ( 21 Oct 2018 23:20 )  PTT:33.5 sec  Urinalysis Basic - ( 22 Oct 2018 00:33 )    Color: Light Yellow / Appearance: Clear / S.015 / pH: x  Gluc: x / Ketone: Negative  / Bili: Negative / Urobili: Negative   Blood: x / Protein: Trace / Nitrite: Negative   Leuk Esterase: Large / RBC: 1 /hpf / WBC 44 /hpf   Sq Epi: x / Non Sq Epi: 7 /hpf / Bacteria: Negative

## 2018-10-23 NOTE — PROGRESS NOTE ADULT - ASSESSMENT
ASSESSMENT: Patient is a 58y old m with perforated colon s/p ileocolic resection now with recurrent abdominal fluid collection now s/p IR drainage which put out 93 cc purulent fluid    PLAN:   - Advance to a regular diet, f/u tolerance  - Pain control prn  - IV abx: flagyl / amikacin as per previous ID recs  - Reconsult ID      Acute Care Surgery  [id number]

## 2018-10-23 NOTE — PHYSICAL THERAPY INITIAL EVALUATION ADULT - PERTINENT HX OF CURRENT PROBLEM, REHAB EVAL
58yoM s/p recent ileocolic resection, DC to rehab, then DC home last Friday, p/w lower abd pain, ECG 10/22, tachy, nonspecific ST/T wave abnormality, L anterior fascicular block, CXR (-)

## 2018-10-23 NOTE — PHYSICAL THERAPY INITIAL EVALUATION ADULT - PLANNED THERAPY INTERVENTIONS, PT EVAL
stairs: GOAL: patient will be able to negotiated 2 stairs (I) with one HR in 2 weeks/transfer training/gait training/bed mobility training

## 2018-10-23 NOTE — PHYSICAL THERAPY INITIAL EVALUATION ADULT - DISCHARGE DISPOSITION, PT EVAL
rehabilitation facility/Subacute Rehab, however, pt refused and preferred home, if home, Home with Home PT for strengthening, bed mob, transfer, gait and balance training, home with assist for all functional mobility, 3-1 commode, wheelchair

## 2018-10-23 NOTE — CONSULT NOTE ADULT - SUBJECTIVE AND OBJECTIVE BOX
Patient is a 58y old  Male who presents with a chief complaint of abdominal pain (23 Oct 2018 12:10)      HPI:  Patient is a 58y old  Male who presents with a chief complaint of lower abdominal pain and fevers.  Patient's medical history is significant for initially presenting to [facility] in August with a perforated right colon, and he underwent an ileocolic resection with end ileostomy. Patient has multiple intraabdominal drains in place on discharge, and was recently at rehab with 2 drains in place and on IV abx.  He completed his course of IV abx on 10/14.  He was recently seen by IR on 10/16 for a drain study and the drains and his PICC were removed by IR that day.  He was since discharged from Rehab to home on Friday.  Saturday night into Sunday he began having lower abdominal pain, worse on the right, with fevers up to 102F at home.  He was brought into the hospital by his family for further evaluation.  Since presentation to the ER, he has noticed purulent drainage from the right lateral tube site.      Patient has been on anticoagulation with Eliquis for known DVTs during the last admission.  His last dose of eliquis was taken Sunday morning.      Patient denies lightheadedness/dizziness, denies SOB/chest pain, denies nausea/vomiting, denies constipation/diarrhea. Patient's ostomy has been functioning. (22 Oct 2018 02:10)  Above reviewed:   Per pt feels better today, he noted at home 2 days PTA ileostomy bag was leaking, soiling the opening from the prior drainage catheter site and then he started having fever and chills with nausea. No urinary complains, some cough intermittently.       PAST MEDICAL & SURGICAL HISTORY:  Necrotizing fasciitis  Diabetes  Hypertension  H/O ileostomy      REVIEW OF SYSTEMS    General: + chills and Fevers    Skin: No rash  	  Ophthalmologic: Denies any visual complaints, discharge, redness.  	  ENT: No nasal congestion or throat pain.     Respiratory and Thorax: No sputum or chest pain. Denies shortness of breath.  	  Cardiovascular: No chest pain, palpitations.    Gastrointestinal: Per HPI.     Genitourinary: No dysuria, frequency. No flank pain.    Musculoskeletal: No joint swelling or pain.    Neurological: No confusion. No extremity weakness.    Psychiatric: No hallucinations	    Endocrine: No abnormal heat/cold intolerance    Allergic/Immunologic:	No hives or rash       Social history:  , no smoking.       FAMILY HISTORY:  Father with DM.       Allergies  No Known Allergies          Antimicrobials:  amiKACIN  IVPB 850 milliGRAM(s) IV Intermittent every 24 hours   metroNIDAZOLE  IVPB 500 milliGRAM(s) IV Intermittent every 8 hours        Vital Signs Last 24 Hrs  T(C): 36.8 (23 Oct 2018 16:43), Max: 39.4 (22 Oct 2018 19:59)  T(F): 98.3 (23 Oct 2018 16:43), Max: 102.9 (22 Oct 2018 19:59)  HR: 84 (23 Oct 2018 16:43) (71 - 116)  BP: 123/75 (23 Oct 2018 16:43) (99/57 - 137/74)  RR: 18 (23 Oct 2018 16:43) (16 - 18)  SpO2: 100% (23 Oct 2018 16:43) (98% - 100%)      PHYSICAL EXAM: Patient in no acute distress.    Constitutional: Comfortable. Awake and alert    Eyes: No discharge or conjunctival injection    ENMT: No thrush. No pharyngeal exudate or erythema.    Neck: Supple,    Respiratory: + air entry bilaterally.    Cardiovascular: S1 S2 wnl, No murmurs.    Gastrointestinal: Midline surgical incision healed, + ostomy, rt sided drain.     Genitourinary: No CVA tenderness     Extremities: No edema.    Vascular: peripheral pulses felt    Neurological: AAO X 3. No grossly focal deficits.    Skin: No rash     Musculoskeletal: No joint swelling.    Psychiatric: Affect normal.                          9.6    16.8  )-----------( 274      ( 23 Oct 2018 06:50 )             29.4       10-23    133<L>  |  101  |  20  ----------------------------<  118<H>  3.6   |  19<L>  |  0.99    Ca    9.4      23 Oct 2018 06:49  Phos  4.0     10-23  Mg     1.8     10-23    TPro  7.3  /  Alb  3.0<L>  /  TBili  0.3  /  DBili  x   /  AST  13  /  ALT  8<L>  /  AlkPhos  52  10-23    Urinalysis (10.22.18 @ 00:33)    Nitrite: Negative    Leukocyte Esterase Concentration: Large  Urine Microscopic-Add On (NC) (10.22.18 @ 00:33)    Bacteria: Negative    Epithelial Cells: 7 /hpf    Red Blood Cell - Urine: 1 /hpf    White Blood Cell - Urine: 44 /hpf    Hyaline Casts: 2 /lpf      Culture - Blood (collected 22 Oct 2018 06:33)  Source: .Blood Blood-Peripheral  Gram Stain (23 Oct 2018 12:50):    Growth in anaerobic bottle: Gram Negative Rods  Preliminary Report (23 Oct 2018 12:51):    Growth in anaerobic bottle: Gram Negative Rods      Culture - Blood (collected 22 Oct 2018 06:33)  Source: .Blood Blood-Peripheral  Preliminary Report (23 Oct 2018 07:01):    No growth to date.    Culture - Urine (collected 22 Oct 2018 06:22)  Source: .Urine Clean Catch (Midstream)  Final Report (23 Oct 2018 10:42):    Culture grew 3 or more types of organisms which indicate    collection contamination; consider recollection only if clinically    indicated.      Rapid Respiratory Viral Panel (10.21.18 @ 23:19)    [facility]        Radiology: Imaging reviewed personally [ x]  < from: CT Abdomen and Pelvis w/ Oral Cont and w/ IV Cont (10.22.18 @ 00:27) >  IMPRESSION:     Interval removal of previously seen right iliopsoas drainage catheters   with recurrent abscess measuring up to 10.6 x 3.3 x 9.4 cm. Tethering of   small bowel loops in the right lower quadrant to the right iliopsoas   collection. Good opacification of the right lower quadrant small bowel   loops without evidence of extraluminal contrast extravasation (fistula).       < from: Xray Chest 1 View AP/PA (10.21.18 @ 23:29) >  IMPRESSION:   Clear lungs.

## 2018-10-23 NOTE — PHYSICAL THERAPY INITIAL EVALUATION ADULT - ACTIVE RANGE OF MOTION EXAMINATION, REHAB EVAL
Left LE Active ROM was WFL (within functional limits)/bilateral upper extremity Active ROM was WFL (within functional limits)/R hip flexion minimal observed

## 2018-10-24 DIAGNOSIS — I82.629 ACUTE EMBOLISM AND THROMBOSIS OF DEEP VEINS OF UNSPECIFIED UPPER EXTREMITY: ICD-10-CM

## 2018-10-24 DIAGNOSIS — Z29.9 ENCOUNTER FOR PROPHYLACTIC MEASURES, UNSPECIFIED: ICD-10-CM

## 2018-10-24 DIAGNOSIS — E87.1 HYPO-OSMOLALITY AND HYPONATREMIA: ICD-10-CM

## 2018-10-24 DIAGNOSIS — Z79.899 OTHER LONG TERM (CURRENT) DRUG THERAPY: ICD-10-CM

## 2018-10-24 DIAGNOSIS — E11.42 TYPE 2 DIABETES MELLITUS WITH DIABETIC POLYNEUROPATHY: ICD-10-CM

## 2018-10-24 DIAGNOSIS — K65.1 PERITONEAL ABSCESS: ICD-10-CM

## 2018-10-24 DIAGNOSIS — D64.9 ANEMIA, UNSPECIFIED: ICD-10-CM

## 2018-10-24 DIAGNOSIS — I10 ESSENTIAL (PRIMARY) HYPERTENSION: ICD-10-CM

## 2018-10-24 LAB
-  AMIKACIN: SIGNIFICANT CHANGE UP
-  AMOXICILLIN/CLAVULANIC ACID: SIGNIFICANT CHANGE UP
-  AMPICILLIN/SULBACTAM: SIGNIFICANT CHANGE UP
-  AMPICILLIN: SIGNIFICANT CHANGE UP
-  AMPICILLIN: SIGNIFICANT CHANGE UP
-  AZTREONAM: SIGNIFICANT CHANGE UP
-  CEFAZOLIN: SIGNIFICANT CHANGE UP
-  CEFEPIME: SIGNIFICANT CHANGE UP
-  CEFOTAXIME: SIGNIFICANT CHANGE UP
-  CEFOXITIN: SIGNIFICANT CHANGE UP
-  CEFTAZIDIME: SIGNIFICANT CHANGE UP
-  CEFTRIAXONE: SIGNIFICANT CHANGE UP
-  CEFUROXIME: SIGNIFICANT CHANGE UP
-  CIPROFLOXACIN: SIGNIFICANT CHANGE UP
-  ERTAPENEM: SIGNIFICANT CHANGE UP
-  GENTAMICIN: SIGNIFICANT CHANGE UP
-  IMIPENEM: SIGNIFICANT CHANGE UP
-  LEVOFLOXACIN: SIGNIFICANT CHANGE UP
-  MEROPENEM: SIGNIFICANT CHANGE UP
-  MOXIFLOXACIN(AEROBIC): SIGNIFICANT CHANGE UP
-  PIPERACILLIN/TAZOBACTAM: SIGNIFICANT CHANGE UP
-  TETRACYCLINE: SIGNIFICANT CHANGE UP
-  TETRACYCLINE: SIGNIFICANT CHANGE UP
-  TIGECYCLINE: SIGNIFICANT CHANGE UP
-  TOBRAMYCIN: SIGNIFICANT CHANGE UP
-  TRIMETHOPRIM/SULFAMETHOXAZOLE: SIGNIFICANT CHANGE UP
-  VANCOMYCIN: SIGNIFICANT CHANGE UP
ANION GAP SERPL CALC-SCNC: 13 MMOL/L — SIGNIFICANT CHANGE UP (ref 5–17)
BUN SERPL-MCNC: 26 MG/DL — HIGH (ref 7–23)
CALCIUM SERPL-MCNC: 10.2 MG/DL — SIGNIFICANT CHANGE UP (ref 8.4–10.5)
CHLORIDE SERPL-SCNC: 100 MMOL/L — SIGNIFICANT CHANGE UP (ref 96–108)
CO2 SERPL-SCNC: 20 MMOL/L — LOW (ref 22–31)
CREAT SERPL-MCNC: 1.18 MG/DL — SIGNIFICANT CHANGE UP (ref 0.5–1.3)
CULTURE RESULTS: SIGNIFICANT CHANGE UP
GLUCOSE BLDC GLUCOMTR-MCNC: 158 MG/DL — HIGH (ref 70–99)
GLUCOSE BLDC GLUCOMTR-MCNC: 184 MG/DL — HIGH (ref 70–99)
GLUCOSE BLDC GLUCOMTR-MCNC: 222 MG/DL — HIGH (ref 70–99)
GLUCOSE BLDC GLUCOMTR-MCNC: 224 MG/DL — HIGH (ref 70–99)
GLUCOSE BLDC GLUCOMTR-MCNC: 228 MG/DL — HIGH (ref 70–99)
GLUCOSE SERPL-MCNC: 224 MG/DL — HIGH (ref 70–99)
HCT VFR BLD CALC: 31.9 % — LOW (ref 39–50)
HGB BLD-MCNC: 10.7 G/DL — LOW (ref 13–17)
MAGNESIUM SERPL-MCNC: 1.9 MG/DL — SIGNIFICANT CHANGE UP (ref 1.6–2.6)
MCHC RBC-ENTMCNC: 29.4 PG — SIGNIFICANT CHANGE UP (ref 27–34)
MCHC RBC-ENTMCNC: 33.4 GM/DL — SIGNIFICANT CHANGE UP (ref 32–36)
MCV RBC AUTO: 88 FL — SIGNIFICANT CHANGE UP (ref 80–100)
METHOD TYPE: SIGNIFICANT CHANGE UP
METHOD TYPE: SIGNIFICANT CHANGE UP
ORGANISM # SPEC MICROSCOPIC CNT: SIGNIFICANT CHANGE UP
ORGANISM # SPEC MICROSCOPIC CNT: SIGNIFICANT CHANGE UP
PHOSPHATE SERPL-MCNC: 3.9 MG/DL — SIGNIFICANT CHANGE UP (ref 2.5–4.5)
PLATELET # BLD AUTO: 316 K/UL — SIGNIFICANT CHANGE UP (ref 150–400)
POTASSIUM SERPL-MCNC: 4.4 MMOL/L — SIGNIFICANT CHANGE UP (ref 3.5–5.3)
POTASSIUM SERPL-SCNC: 4.4 MMOL/L — SIGNIFICANT CHANGE UP (ref 3.5–5.3)
RBC # BLD: 3.63 M/UL — LOW (ref 4.2–5.8)
RBC # FLD: 14.6 % — HIGH (ref 10.3–14.5)
SODIUM SERPL-SCNC: 133 MMOL/L — LOW (ref 135–145)
SPECIMEN SOURCE: SIGNIFICANT CHANGE UP
WBC # BLD: 10 K/UL — SIGNIFICANT CHANGE UP (ref 3.8–10.5)
WBC # FLD AUTO: 10 K/UL — SIGNIFICANT CHANGE UP (ref 3.8–10.5)

## 2018-10-24 PROCEDURE — 99223 1ST HOSP IP/OBS HIGH 75: CPT

## 2018-10-24 PROCEDURE — 93971 EXTREMITY STUDY: CPT | Mod: 26

## 2018-10-24 PROCEDURE — 99233 SBSQ HOSP IP/OBS HIGH 50: CPT

## 2018-10-24 RX ORDER — LINEZOLID 600 MG/300ML
600 INJECTION, SOLUTION INTRAVENOUS ONCE
Qty: 0 | Refills: 0 | Status: COMPLETED | OUTPATIENT
Start: 2018-10-24 | End: 2018-10-24

## 2018-10-24 RX ORDER — LINEZOLID 600 MG/300ML
INJECTION, SOLUTION INTRAVENOUS
Qty: 0 | Refills: 0 | Status: DISCONTINUED | OUTPATIENT
Start: 2018-10-24 | End: 2018-10-25

## 2018-10-24 RX ORDER — LINEZOLID 600 MG/300ML
600 INJECTION, SOLUTION INTRAVENOUS EVERY 12 HOURS
Qty: 0 | Refills: 0 | Status: DISCONTINUED | OUTPATIENT
Start: 2018-10-24 | End: 2018-10-25

## 2018-10-24 RX ADMIN — SODIUM CHLORIDE 1 GRAM(S): 9 INJECTION INTRAMUSCULAR; INTRAVENOUS; SUBCUTANEOUS at 13:23

## 2018-10-24 RX ADMIN — MEROPENEM 100 MILLIGRAM(S): 1 INJECTION INTRAVENOUS at 13:24

## 2018-10-24 RX ADMIN — GABAPENTIN 300 MILLIGRAM(S): 400 CAPSULE ORAL at 18:56

## 2018-10-24 RX ADMIN — Medication 2: at 09:22

## 2018-10-24 RX ADMIN — Medication 1 SPRAY(S): at 05:56

## 2018-10-24 RX ADMIN — Medication 650 MILLIGRAM(S): at 23:30

## 2018-10-24 RX ADMIN — SODIUM CHLORIDE 1 GRAM(S): 9 INJECTION INTRAMUSCULAR; INTRAVENOUS; SUBCUTANEOUS at 05:56

## 2018-10-24 RX ADMIN — LINEZOLID 300 MILLIGRAM(S): 600 INJECTION, SOLUTION INTRAVENOUS at 23:47

## 2018-10-24 RX ADMIN — Medication 1 TABLET(S): at 13:23

## 2018-10-24 RX ADMIN — ENOXAPARIN SODIUM 40 MILLIGRAM(S): 100 INJECTION SUBCUTANEOUS at 05:58

## 2018-10-24 RX ADMIN — Medication 4: at 13:24

## 2018-10-24 RX ADMIN — MEROPENEM 100 MILLIGRAM(S): 1 INJECTION INTRAVENOUS at 05:56

## 2018-10-24 RX ADMIN — GABAPENTIN 300 MILLIGRAM(S): 400 CAPSULE ORAL at 05:56

## 2018-10-24 RX ADMIN — Medication 25 MILLIGRAM(S): at 18:56

## 2018-10-24 RX ADMIN — Medication 25 MILLIGRAM(S): at 05:56

## 2018-10-24 RX ADMIN — LINEZOLID 300 MILLIGRAM(S): 600 INJECTION, SOLUTION INTRAVENOUS at 13:23

## 2018-10-24 RX ADMIN — Medication 4: at 22:48

## 2018-10-24 RX ADMIN — Medication 650 MILLIGRAM(S): at 13:23

## 2018-10-24 RX ADMIN — MEROPENEM 100 MILLIGRAM(S): 1 INJECTION INTRAVENOUS at 22:46

## 2018-10-24 RX ADMIN — SODIUM CHLORIDE 1 GRAM(S): 9 INJECTION INTRAMUSCULAR; INTRAVENOUS; SUBCUTANEOUS at 22:46

## 2018-10-24 RX ADMIN — Medication 4: at 19:57

## 2018-10-24 RX ADMIN — Medication 650 MILLIGRAM(S): at 22:46

## 2018-10-24 RX ADMIN — Medication 650 MILLIGRAM(S): at 13:53

## 2018-10-24 NOTE — CONSULT NOTE ADULT - SUBJECTIVE AND OBJECTIVE BOX
TRAUMA/ACUTE CARE SURGERY - HOSPITAL MEDICINE CO-MANAGEMENT INITIAL VISIT NOTE  Contact Number:[phone]    TIFFANY LEMOS  [phone]    CHIEF COMPLAINT: Patient is a 58y old  Male who presents with a chief complaint of abdominal pain (24 Oct 2018 12:13)      HPI: Patient is a 58y old  Male who presents with a chief complaint of lower abdominal pain and fevers.  Patient's medical history is significant for initially presenting to [facility] in August with a perforated right colon, and he underwent an ileocolic resection with end ileostomy. Patient has multiple intraabdominal drains in place on discharge, and was recently at rehab with 2 drains in place and on IV abx.  He completed his course of IV abx on 10/14.  He was recently seen by IR on 10/16 for a drain study and the drains and his PICC were removed by IR that day.  He was since discharged from Rehab to home on Friday.  Saturday night into Sunday he began having lower abdominal pain, worse on the right, with fevers up to 102F at home.  He was brought into the hospital by his family for further evaluation.  Since presentation to the ER, he has noticed purulent drainage from the right lateral tube site.      Patient has been on anticoagulation with Eliquis for known DVTs during the last admission.  His last dose of eliquis was taken Sunday morning.      Patient denies lightheadedness/dizziness, denies SOB/chest pain, denies nausea/vomiting, denies constipation/diarrhea. Patient's ostomy has been functioning. (22 Oct 2018 02:10)      History limited due to: [ ] Dementia  [ ] Delirium  [ ] Condition    Pain Symptoms if applicable:             	                         none	   mild         moderate         severe  	                            0	    1-3	     4-6	         7-10  Pain:  Location:	  Modifying factors:	  Associated symptoms:	    Allergies    No Known Allergies    Intolerances        acetaminophen   Tablet .. 650 milliGRAM(s) Oral every 6 hours PRN  dextrose 40% Gel 15 Gram(s) Oral once PRN  dextrose 5%. 1000 milliLiter(s) IV Continuous <Continuous>  dextrose 50% Injectable 12.5 Gram(s) IV Push once  dextrose 50% Injectable 25 Gram(s) IV Push once  dextrose 50% Injectable 25 Gram(s) IV Push once  enoxaparin Injectable 40 milliGRAM(s) SubCutaneous every 24 hours  fluticasone propionate 50 MICROgram(s)/spray Nasal Spray 1 Spray(s) Both Nostrils two times a day  gabapentin 300 milliGRAM(s) Oral two times a day  glucagon  Injectable 1 milliGRAM(s) IntraMuscular once PRN  ibuprofen  Tablet. 600 milliGRAM(s) Oral every 6 hours PRN  influenza   Vaccine 0.5 milliLiter(s) IntraMuscular once  insulin lispro (HumaLOG) corrective regimen sliding scale   SubCutaneous Before meals and at bedtime  linezolid  IVPB      linezolid  IVPB 600 milliGRAM(s) IV Intermittent every 12 hours  meropenem  IVPB 1000 milliGRAM(s) IV Intermittent every 8 hours  metoprolol tartrate 25 milliGRAM(s) Oral two times a day  multivitamin 1 Tablet(s) Oral daily  oxyCODONE    IR 5 milliGRAM(s) Oral every 4 hours PRN  sodium chloride 1 Gram(s) Oral three times a day      MEDICATIONS  (STANDING):  dextrose 5%. 1000 milliLiter(s) (50 mL/Hr) IV Continuous <Continuous>  dextrose 50% Injectable 12.5 Gram(s) IV Push once  dextrose 50% Injectable 25 Gram(s) IV Push once  dextrose 50% Injectable 25 Gram(s) IV Push once  enoxaparin Injectable 40 milliGRAM(s) SubCutaneous every 24 hours  fluticasone propionate 50 MICROgram(s)/spray Nasal Spray 1 Spray(s) Both Nostrils two times a day  gabapentin 300 milliGRAM(s) Oral two times a day  influenza   Vaccine 0.5 milliLiter(s) IntraMuscular once  insulin lispro (HumaLOG) corrective regimen sliding scale   SubCutaneous Before meals and at bedtime  linezolid  IVPB      linezolid  IVPB 600 milliGRAM(s) IV Intermittent every 12 hours  meropenem  IVPB 1000 milliGRAM(s) IV Intermittent every 8 hours  metoprolol tartrate 25 milliGRAM(s) Oral two times a day  multivitamin 1 Tablet(s) Oral daily  sodium chloride 1 Gram(s) Oral three times a day    MEDICATIONS  (PRN):  acetaminophen   Tablet .. 650 milliGRAM(s) Oral every 6 hours PRN Mild Pain (1 - 3)  dextrose 40% Gel 15 Gram(s) Oral once PRN Blood Glucose LESS THAN 70 milliGRAM(s)/deciliter  glucagon  Injectable 1 milliGRAM(s) IntraMuscular once PRN Glucose LESS THAN 70 milligrams/deciliter  ibuprofen  Tablet. 600 milliGRAM(s) Oral every 6 hours PRN Moderate Pain (4 - 6)  oxyCODONE    IR 5 milliGRAM(s) Oral every 4 hours PRN Severe Pain (7 - 10)      PAST MEDICAL & SURGICAL HISTORY:  Necrotizing fasciitis  Diabetes  Hypertension  H/O ileostomy  [ ] Reviewed     Functional Assessment: [ ] Independent  [ ] Assistance  [ ] Total care  [ ] Non-ambulatory    SOCIAL HISTORY:  Residence: [ ] [facility]  [ ] SNF  [ ] Community  [ ] Substance abuse:   [ ] Tobacco:   [ ] Alcohol use:     FAMILY HISTORY:  No pertinent family history in first degree relatives  [ ] No pertinent family history in first degree relatives     REVIEW OF SYSTEMS:    Review of Systems:  14 point ROS negative in detail except for the above: ***  Unable to evaluate ROS due to: cognitive deficits / altered mental status    CONSTITUTIONAL: No fever, weight loss, or fatigue  EYES: No eye pain, visual disturbances, or discharge  ENMT:  No difficulty hearing, tinnitus, vertigo; No sinus or throat pain  NECK: No pain or stiffness  BREASTS: No pain, masses, or nipple discharge  RESPIRATORY: No cough, wheezing, chills or hemoptysis; No shortness of breath  CARDIOVASCULAR: No chest pain, palpitations, dizziness, or leg swelling  GASTROINTESTINAL: No abdominal or epigastric pain. No nausea, vomiting, or hematemesis; No diarrhea or constipation. No melena or hematochezia.  GENITOURINARY: No dysuria, frequency, hematuria, or incontinence  NEUROLOGICAL: No headaches, memory loss, loss of strength, numbness, or tremors  SKIN: No itching, burning, rashes, or lesions   LYMPH NODES: No enlarged glands  ENDOCRINE: No heat or cold intolerance; No hair loss  MUSCULOSKELETAL: No muscle or back pain  PSYCHIATRIC: No depression, anxiety, mood swings, or difficulty sleeping  HEME/LYMPH: No easy bruising, or bleeding gums  ALLERGY AND IMMUNOLOGIC: No hives or eczema    [  ] All other ROS negative  [  ] Unable to obtain due to poor mental status    PHYSICAL EXAM:    T(C): 36.5 (10-24-18 @ 13:00), Max: 36.8 (10-23-18 @ 16:43)  HR: 87 (10-24-18 @ 13:00) (83 - 93)  BP: 121/74 (10-24-18 @ 13:00) (121/74 - 142/84)  RR: 18 (10-24-18 @ 13:00) (16 - 18)  SpO2: 99% (10-24-18 @ 13:00) (99% - 100%)    GENERAL: NAD, well-groomed, well-developed  HEAD:  Atraumatic, Normocephalic  EYES: EOMI, PERRLA, conjunctiva and sclera clear  ENMT: Moist mucous membranes  NECK: Supple, No JVD  RESPIRATORY: Clear to auscultation bilaterally; No rales, rhonchi, wheezing, or rubs  CARDIOVASCULAR: Regular rate and rhythm; No murmurs, rubs, or gallops  GASTROINTESTINAL: Soft, Nontender, Nondistended; Bowel sounds present  GENITOURINARY: Not examined  EXTREMITIES:  2+ Peripheral Pulses, No clubbing, cyanosis, or edema  NERVOUS SYSTEM:  Alert & Oriented X3; Moving all 4 extremities; No gross sensory deficits  HEME/LYMPH: No lymphadenopathy noted  SKIN: No rashes or lesions; Incisions C/D/I    LABS:                        10.7   10.0  )-----------( 316      ( 24 Oct 2018 07:12 )             31.9     Hemoglobin: 10.7 g/dL (10-24 @ 07:12)  Hemoglobin: 9.6 g/dL (10-23 @ 06:50)  Hemoglobin: 11.9 g/dL (10-21 @ 23:20)    10-24    133<L>  |  100  |  26<H>  ----------------------------<  224<H>  4.4   |  20<L>  |  1.18    Ca    10.2      24 Oct 2018 07:07  Phos  3.9     10-24  Mg     1.9     10-24    TPro  7.3  /  Alb  3.0<L>  /  TBili  0.3  /  DBili  x   /  AST  13  /  ALT  8<L>  /  AlkPhos  52  10-23        CAPILLARY BLOOD GLUCOSE      POCT Blood Glucose.: 222 mg/dL (24 Oct 2018 12:17)    Microbiology Culture Results:   Numerous Enterococcus faecium  Numerous Escherichia coli (10-22 @ 22:58)  Culture Results:   No growth to date. (10-22 @ 06:33)  Culture Results:   Growth in anaerobic bottle: Bacteroides fragilis "Susceptibilities not  performed"  "Due to technical problems, Proteus sp. will Not be reported as part of  the BCID panel until further notice"  ***Blood Panel PCR results on this specimen are available  approximately 3 hours after the Gram stain result.***  Gram stain, PCR, and/or culture results may not always  correspond due to difference in methodologies.  ************************************************************  This PCR assay was performed using [employer].  The following targets are tested for: Enterococcus,  vancomycin resistant enterococci, Listeria monocytogenes,  coagulase negative staphylococci, S. aureus,  methicillin resistant S. aureus, Streptococcus agalactiae  (Group B), S. pneumoniae, S. pyogenes (Group A),  Acinetobacter baumannii, Enterobacter cloacae, E. coli,  Klebsiella oxytoca, K. pneumoniae, Proteus sp.,  Serratia marcescens, Haemophilus influenzae,  Neisseria meningitidis, Pseudomonas aeruginosa, Candida  albicans, C. glabrata, C krusei, C parapsilosis,  C. tropicalis and the KPC resistance gene. (10-22 @ 06:33)  Culture Results:   Culture grew 3 or more types of organisms which indicate  collection contamination; consider recollection only if clinically  indicated. (10-22 @ 06:22)      RADIOLOGY & ADDITIONAL STUDIES:    EKG:   Personally Reviewed:  [ ] YES     Imaging:   Personally Reviewed:  [ ] YES               [ ] Consultant(s) Notes Reviewed  [x] Care Discussed with Consultants/Other Providers: Trauma Team    [ ] Fall risks identified:    [ ] High risk medications identified:    [ ] Probable osteoporosis    [ ] Possible osteomalacia    [ ] Increased delirium risk    [ ] Delirium and other risks can be reduced by:          -early ambulation          -minimizing "tethers" - IV, oxygen, catheters, etc          -avoiding hypnotics and sedatives          -maintaining hydration/nutrition          -avoid anticholinergics - diphenhydramine, etc          -pain control          -supportive environment    Advanced Directives: [ ] DNR  [ ] No feeding tube  [ ] MOLST in chart  [ ] MOLST completed today  [ ] Unknown TRAUMA/ACUTE CARE SURGERY - HOSPITAL MEDICINE CO-MANAGEMENT INITIAL VISIT NOTE  Contact Number:[phone]    TIFFANY LEMOS  [phone]    CHIEF COMPLAINT: Patient is a 58y old  Male who presents with a chief complaint of abdominal pain (24 Oct 2018 12:13)      HPI: Patient is a 58y old Male with PMH of HTN, DM2, UE DVT (diagnosed 8/2018 during admission to hospital) who presents with a chief complaint of lower abdominal pain and fevers.  Patient's medical history is significant for initially presenting to [facility] in August with a perforated right colon, and he underwent an ileocolic resection with end ileostomy. Patient has multiple intraabdominal drains in place on discharge, and was recently at rehab with 2 drains in place and on IV abx.  He completed his course of IV abx on 10/14.  He was recently seen by IR on 10/16 for a drain study and the drains and his PICC were removed by IR that day.  He was since discharged from Rehab to home on Friday.  Saturday night into Sunday he began having lower abdominal pain, worse on the right, with fevers up to 102F at home.  He was brought into the hospital by his family for further evaluation.  Since presentation to the ER, he has noticed purulent drainage from the right lateral tube site.   Patient has been on anticoagulation with Eliquis for known UE DVTs diagnosed during the last admission.  His last dose of eliquis was taken Sunday morning prior to admission. Since discharge he reports having taken 2 tablets of eliquis BID (this is how it was dosed during rehab).   pt was also managed last admission for hyponatremia and was discharged on salt tablets.    Patient denies lightheadedness/dizziness, denies SOB/chest pain, denies nausea/vomiting, denies constipation/diarrhea. Patient's ostomy has been functioning.     On admission pt was found to have Recurrent intraabdominal collection/abscess.     He is admitted to general surgery for replacement of intraabdominal drain and IV antibiotics.   pt reports pain in area of drain placed by IR on 10/23 but pain is controlled for most part with tylenol.  denies cp, sob, nausea/vomiting, change in ostomy output, dysuria.  otherwise feeling OK. Reports he wants to go home at time of discharge and not back to [facility].    Allergies    No Known Allergies          acetaminophen   Tablet .. 650 milliGRAM(s) Oral every 6 hours PRN  dextrose 40% Gel 15 Gram(s) Oral once PRN  dextrose 5%. 1000 milliLiter(s) IV Continuous <Continuous>  dextrose 50% Injectable 12.5 Gram(s) IV Push once  dextrose 50% Injectable 25 Gram(s) IV Push once  dextrose 50% Injectable 25 Gram(s) IV Push once  enoxaparin Injectable 40 milliGRAM(s) SubCutaneous every 24 hours  fluticasone propionate 50 MICROgram(s)/spray Nasal Spray 1 Spray(s) Both Nostrils two times a day  gabapentin 300 milliGRAM(s) Oral two times a day  glucagon  Injectable 1 milliGRAM(s) IntraMuscular once PRN  ibuprofen  Tablet. 600 milliGRAM(s) Oral every 6 hours PRN  influenza   Vaccine 0.5 milliLiter(s) IntraMuscular once  insulin lispro (HumaLOG) corrective regimen sliding scale   SubCutaneous Before meals and at bedtime  linezolid  IVPB      linezolid  IVPB 600 milliGRAM(s) IV Intermittent every 12 hours  meropenem  IVPB 1000 milliGRAM(s) IV Intermittent every 8 hours  metoprolol tartrate 25 milliGRAM(s) Oral two times a day  multivitamin 1 Tablet(s) Oral daily  oxyCODONE    IR 5 milliGRAM(s) Oral every 4 hours PRN  sodium chloride 1 Gram(s) Oral three times a day      MEDICATIONS  (STANDING):  dextrose 5%. 1000 milliLiter(s) (50 mL/Hr) IV Continuous <Continuous>  dextrose 50% Injectable 12.5 Gram(s) IV Push once  dextrose 50% Injectable 25 Gram(s) IV Push once  dextrose 50% Injectable 25 Gram(s) IV Push once  enoxaparin Injectable 40 milliGRAM(s) SubCutaneous every 24 hours  fluticasone propionate 50 MICROgram(s)/spray Nasal Spray 1 Spray(s) Both Nostrils two times a day  gabapentin 300 milliGRAM(s) Oral two times a day  influenza   Vaccine 0.5 milliLiter(s) IntraMuscular once  insulin lispro (HumaLOG) corrective regimen sliding scale   SubCutaneous Before meals and at bedtime  linezolid  IVPB      linezolid  IVPB 600 milliGRAM(s) IV Intermittent every 12 hours  meropenem  IVPB 1000 milliGRAM(s) IV Intermittent every 8 hours  metoprolol tartrate 25 milliGRAM(s) Oral two times a day  multivitamin 1 Tablet(s) Oral daily  sodium chloride 1 Gram(s) Oral three times a day    MEDICATIONS  (PRN):  acetaminophen   Tablet .. 650 milliGRAM(s) Oral every 6 hours PRN Mild Pain (1 - 3)  dextrose 40% Gel 15 Gram(s) Oral once PRN Blood Glucose LESS THAN 70 milliGRAM(s)/deciliter  glucagon  Injectable 1 milliGRAM(s) IntraMuscular once PRN Glucose LESS THAN 70 milligrams/deciliter  ibuprofen  Tablet. 600 milliGRAM(s) Oral every 6 hours PRN Moderate Pain (4 - 6)  oxyCODONE    IR 5 milliGRAM(s) Oral every 4 hours PRN Severe Pain (7 - 10)      PAST MEDICAL & SURGICAL HISTORY:  Necrotizing fasciitis  Diabetes  Hypertension  H/O ileostomy  UE DVT diagnosed 8/2018  [x ] Reviewed     Functional Assessment: [ x] Independent  [ ] Assistance  [ ] Total care  [ ] Non-ambulatory  previously independent prior to prolonged hospital stay 8/2018    SOCIAL HISTORY:  Residence: [ ] long term  [ ] SNF  [ x] Community  [ ] Substance abuse: denies  [ ] Tobacco: denies  [ ] Alcohol use: denies    FAMILY HISTORY:  No pertinent family history in first degree relatives  [ x] No pertinent family history in first degree relatives     REVIEW OF SYSTEMS:    Review of Systems:  14 point ROS negative in detail except for the above: as per HPI      PHYSICAL EXAM:    T(C): 36.5 (10-24-18 @ 13:00), Max: 36.8 (10-23-18 @ 16:43)  HR: 87 (10-24-18 @ 13:00) (83 - 93)  BP: 121/74 (10-24-18 @ 13:00) (121/74 - 142/84)  RR: 18 (10-24-18 @ 13:00) (16 - 18)  SpO2: 99% (10-24-18 @ 13:00) (99% - 100%)    GENERAL: NAD, well-groomed, well-developed  HEAD:  Atraumatic, Normocephalic  EYES: EOMI, PERRLA, conjunctiva and sclera clear  ENMT: Moist mucous membranes  NECK: Supple, No JVD  RESPIRATORY: Clear to auscultation bilaterally; No rales, rhonchi, wheezing, or rubs  CARDIOVASCULAR: Regular rate and rhythm; No murmurs, rubs, or gallops  GASTROINTESTINAL: Soft, Nontender, Nondistended; Bowel sounds present  GENITOURINARY: Not examined  EXTREMITIES:  2+ Peripheral Pulses, No clubbing, cyanosis, or edema  NERVOUS SYSTEM:  Alert & Oriented X3; Moving all 4 extremities; No gross sensory deficits  HEME/LYMPH: No lymphadenopathy noted  SKIN: No rashes or lesions; Incisions C/D/I    LABS:                        10.7   10.0  )-----------( 316      ( 24 Oct 2018 07:12 )             31.9     Hemoglobin: 10.7 g/dL (10-24 @ 07:12)  Hemoglobin: 9.6 g/dL (10-23 @ 06:50)  Hemoglobin: 11.9 g/dL (10-21 @ 23:20)    10-24    133<L>  |  100  |  26<H>  ----------------------------<  224<H>  4.4   |  20<L>  |  1.18    Ca    10.2      24 Oct 2018 07:07  Phos  3.9     10-24  Mg     1.9     10-24    TPro  7.3  /  Alb  3.0<L>  /  TBili  0.3  /  DBili  x   /  AST  13  /  ALT  8<L>  /  AlkPhos  52  10-23        CAPILLARY BLOOD GLUCOSE      POCT Blood Glucose.: 222 mg/dL (24 Oct 2018 12:17)    Microbiology Culture Results:   Numerous Enterococcus faecium  Numerous Escherichia coli (10-22 @ 22:58)  Culture Results:   No growth to date. (10-22 @ 06:33)  Culture Results:   Growth in anaerobic bottle: Bacteroides fragilis "Susceptibilities not  performed"  "Due to technical problems, Proteus sp. will Not be reported as part of  the BCID panel until further notice"  ***Blood Panel PCR results on this specimen are available  approximately 3 hours after the Gram stain result.***  Gram stain, PCR, and/or culture results may not always  correspond due to difference in methodologies.  ************************************************************  This PCR assay was performed using [employer].  The following targets are tested for: Enterococcus,  vancomycin resistant enterococci, Listeria monocytogenes,  coagulase negative staphylococci, S. aureus,  methicillin resistant S. aureus, Streptococcus agalactiae  (Group B), S. pneumoniae, S. pyogenes (Group A),  Acinetobacter baumannii, Enterobacter cloacae, E. coli,  Klebsiella oxytoca, K. pneumoniae, Proteus sp.,  Serratia marcescens, Haemophilus influenzae,  Neisseria meningitidis, Pseudomonas aeruginosa, Candida  albicans, C. glabrata, C krusei, C parapsilosis,  C. tropicalis and the KPC resistance gene. (10-22 @ 06:33)  Culture Results:   Culture grew 3 or more types of organisms which indicate  collection contamination; consider recollection only if clinically  indicated. (10-22 @ 06:22)      RADIOLOGY & ADDITIONAL STUDIES:    EKG:   Personally Reviewed:  [x ] YES     Imaging:   Personally Reviewed:  [x ] YES     CT A/P:  Interval removal of previously seen right iliopsoas drainage catheters   with recurrent abscess measuring up to 10.6 x 3.3 x 9.4 cm. Tethering of   small bowel loops in the right lower quadrant to the right iliopsoas   collection. Good opacification of the right lower quadrant small bowel   loops without evidence of extraluminal contrast extravasation (fistula).                 [x ] Consultant(s) Notes Reviewed: ID, IR  [x] Care Discussed with Consultants/Other Providers: Trauma Team        Advanced Directives: full code. TRAUMA/ACUTE CARE SURGERY - HOSPITAL MEDICINE CO-MANAGEMENT INITIAL VISIT NOTE  Contact Number:[phone]    TIFFANY LEMOS  [phone]    CHIEF COMPLAINT: Patient is a 58y old  Male who presents with a chief complaint of abdominal pain (24 Oct 2018 12:13)      HPI: Patient is a 58y old Male with PMH of HTN, DM2 with neuropathy, UE DVT (diagnosed 8/2018 during admission to hospital) who presents with a chief complaint of lower abdominal pain and fevers.  Patient's medical history is significant for initially presenting to [facility] in August with a perforated right colon, and he underwent an ileocolic resection with end ileostomy. Patient has multiple intraabdominal drains in place on discharge, and was recently at rehab with 2 drains in place and on IV abx.  He completed his course of IV abx on 10/14.  He was recently seen by IR on 10/16 for a drain study and the drains and his PICC were removed by IR that day.  He was since discharged from Rehab to home on Friday.  Saturday night into Sunday he began having lower abdominal pain, worse on the right, with fevers up to 102F at home.  He was brought into the hospital by his family for further evaluation.  Since presentation to the ER, he has noticed purulent drainage from the right lateral tube site.   Patient has been on anticoagulation with Eliquis for known UE DVTs diagnosed during the last admission.  His last dose of eliquis was taken Sunday morning prior to admission. Since discharge he reports having taken 2 tablets of eliquis BID (this is how it was dosed during rehab).   pt was also managed last admission for hyponatremia and was discharged on salt tablets.    Patient denies lightheadedness/dizziness, denies SOB/chest pain, denies nausea/vomiting, denies constipation/diarrhea. Patient's ostomy has been functioning.     On admission pt was found to have Recurrent intraabdominal collection/abscess.     He is admitted to general surgery for replacement of intraabdominal drain and IV antibiotics.   pt reports pain in area of drain placed by IR on 10/23 but pain is controlled for most part with tylenol.  denies cp, sob, nausea/vomiting, change in ostomy output, dysuria.  otherwise feeling OK. Reports he wants to go home at time of discharge and not back to [facility].    Allergies    No Known Allergies          acetaminophen   Tablet .. 650 milliGRAM(s) Oral every 6 hours PRN  dextrose 40% Gel 15 Gram(s) Oral once PRN  dextrose 5%. 1000 milliLiter(s) IV Continuous <Continuous>  dextrose 50% Injectable 12.5 Gram(s) IV Push once  dextrose 50% Injectable 25 Gram(s) IV Push once  dextrose 50% Injectable 25 Gram(s) IV Push once  enoxaparin Injectable 40 milliGRAM(s) SubCutaneous every 24 hours  fluticasone propionate 50 MICROgram(s)/spray Nasal Spray 1 Spray(s) Both Nostrils two times a day  gabapentin 300 milliGRAM(s) Oral two times a day  glucagon  Injectable 1 milliGRAM(s) IntraMuscular once PRN  ibuprofen  Tablet. 600 milliGRAM(s) Oral every 6 hours PRN  influenza   Vaccine 0.5 milliLiter(s) IntraMuscular once  insulin lispro (HumaLOG) corrective regimen sliding scale   SubCutaneous Before meals and at bedtime  linezolid  IVPB      linezolid  IVPB 600 milliGRAM(s) IV Intermittent every 12 hours  meropenem  IVPB 1000 milliGRAM(s) IV Intermittent every 8 hours  metoprolol tartrate 25 milliGRAM(s) Oral two times a day  multivitamin 1 Tablet(s) Oral daily  oxyCODONE    IR 5 milliGRAM(s) Oral every 4 hours PRN  sodium chloride 1 Gram(s) Oral three times a day      MEDICATIONS  (STANDING):  dextrose 5%. 1000 milliLiter(s) (50 mL/Hr) IV Continuous <Continuous>  dextrose 50% Injectable 12.5 Gram(s) IV Push once  dextrose 50% Injectable 25 Gram(s) IV Push once  dextrose 50% Injectable 25 Gram(s) IV Push once  enoxaparin Injectable 40 milliGRAM(s) SubCutaneous every 24 hours  fluticasone propionate 50 MICROgram(s)/spray Nasal Spray 1 Spray(s) Both Nostrils two times a day  gabapentin 300 milliGRAM(s) Oral two times a day  influenza   Vaccine 0.5 milliLiter(s) IntraMuscular once  insulin lispro (HumaLOG) corrective regimen sliding scale   SubCutaneous Before meals and at bedtime  linezolid  IVPB      linezolid  IVPB 600 milliGRAM(s) IV Intermittent every 12 hours  meropenem  IVPB 1000 milliGRAM(s) IV Intermittent every 8 hours  metoprolol tartrate 25 milliGRAM(s) Oral two times a day  multivitamin 1 Tablet(s) Oral daily  sodium chloride 1 Gram(s) Oral three times a day    MEDICATIONS  (PRN):  acetaminophen   Tablet .. 650 milliGRAM(s) Oral every 6 hours PRN Mild Pain (1 - 3)  dextrose 40% Gel 15 Gram(s) Oral once PRN Blood Glucose LESS THAN 70 milliGRAM(s)/deciliter  glucagon  Injectable 1 milliGRAM(s) IntraMuscular once PRN Glucose LESS THAN 70 milligrams/deciliter  ibuprofen  Tablet. 600 milliGRAM(s) Oral every 6 hours PRN Moderate Pain (4 - 6)  oxyCODONE    IR 5 milliGRAM(s) Oral every 4 hours PRN Severe Pain (7 - 10)      PAST MEDICAL & SURGICAL HISTORY:  Necrotizing fasciitis  Diabetes  Hypertension  H/O ileostomy  UE DVT diagnosed 8/2018  [x ] Reviewed     Functional Assessment: [ x] Independent  [ ] Assistance  [ ] Total care  [ ] Non-ambulatory  previously independent prior to prolonged hospital stay 8/2018    SOCIAL HISTORY:  Residence: [ ] [facility]OL[facility]  [ ] SNF  [ x] Community  [ ] Substance abuse: denies  [ ] Tobacco: denies  [ ] Alcohol use: denies    FAMILY HISTORY:  No pertinent family history in first degree relatives  [ x] No pertinent family history in first degree relatives     REVIEW OF SYSTEMS:    Review of Systems:  14 point ROS negative in detail except for the above: as per HPI      PHYSICAL EXAM:    T(C): 36.5 (10-24-18 @ 13:00), Max: 36.8 (10-23-18 @ 16:43)  HR: 87 (10-24-18 @ 13:00) (83 - 93)  BP: 121/74 (10-24-18 @ 13:00) (121/74 - 142/84)  RR: 18 (10-24-18 @ 13:00) (16 - 18)  SpO2: 99% (10-24-18 @ 13:00) (99% - 100%)    GENERAL: NAD, well-groomed, well-developed, thin  HEAD:  Atraumatic, Normocephalic  EYES: EOMI, PERRLA, conjunctiva and sclera clear  ENMT: Moist mucous membranes  NECK: Supple, No JVD  RESPIRATORY: Clear to auscultation bilaterally; No rales, rhonchi, wheezing, or rubs  CARDIOVASCULAR: Regular rate and rhythm; No murmurs, rubs, or gallops  GASTROINTESTINAL: + ostomy in place and RLQ drain with serosangiounous output. pt with mild discomfort on abdominal palpation.  GENITOURINARY: Not examined  EXTREMITIES:  2+ Peripheral Pulses, No clubbing, cyanosis, or edema  NERVOUS SYSTEM:  Alert & Oriented X3; Moving all 4 extremities; No gross sensory deficits  HEME/LYMPH: No lymphadenopathy noted  SKIN: No rashes or lesions; Incisions C/D/I    LABS:                        10.7   10.0  )-----------( 316      ( 24 Oct 2018 07:12 )             31.9     Hemoglobin: 10.7 g/dL (10-24 @ 07:12)  Hemoglobin: 9.6 g/dL (10-23 @ 06:50)  Hemoglobin: 11.9 g/dL (10-21 @ 23:20)    10-24    133<L>  |  100  |  26<H>  ----------------------------<  224<H>  4.4   |  20<L>  |  1.18    Ca    10.2      24 Oct 2018 07:07  Phos  3.9     10-24  Mg     1.9     10-24    TPro  7.3  /  Alb  3.0<L>  /  TBili  0.3  /  DBili  x   /  AST  13  /  ALT  8<L>  /  AlkPhos  52  10-23        CAPILLARY BLOOD GLUCOSE      POCT Blood Glucose.: 222 mg/dL (24 Oct 2018 12:17)    Microbiology Culture Results:   Numerous Enterococcus faecium  Numerous Escherichia coli (10-22 @ 22:58)  Culture Results:   No growth to date. (10-22 @ 06:33)  Culture Results:   Growth in anaerobic bottle: Bacteroides fragilis "Susceptibilities not  performed"  "Due to technical problems, Proteus sp. will Not be reported as part of  the BCID panel until further notice"  ***Blood Panel PCR results on this specimen are available  approximately 3 hours after the Gram stain result.***  Gram stain, PCR, and/or culture results may not always  correspond due to difference in methodologies.  ************************************************************  This PCR assay was performed using [employer].  The following targets are tested for: Enterococcus,  vancomycin resistant enterococci, Listeria monocytogenes,  coagulase negative staphylococci, S. aureus,  methicillin resistant S. aureus, Streptococcus agalactiae  (Group B), S. pneumoniae, S. pyogenes (Group A),  Acinetobacter baumannii, Enterobacter cloacae, E. coli,  Klebsiella oxytoca, K. pneumoniae, Proteus sp.,  Serratia marcescens, Haemophilus influenzae,  Neisseria meningitidis, Pseudomonas aeruginosa, Candida  albicans, C. glabrata, C krusei, C parapsilosis,  C. tropicalis and the KPC resistance gene. (10-22 @ 06:33)  Culture Results:   Culture grew 3 or more types of organisms which indicate  collection contamination; consider recollection only if clinically  indicated. (10-22 @ 06:22)      RADIOLOGY & ADDITIONAL STUDIES:    EKG:   Personally Reviewed:  [x ] YES     Imaging:   Personally Reviewed:  [x ] YES     CT A/P:  Interval removal of previously seen right iliopsoas drainage catheters   with recurrent abscess measuring up to 10.6 x 3.3 x 9.4 cm. Tethering of   small bowel loops in the right lower quadrant to the right iliopsoas   collection. Good opacification of the right lower quadrant small bowel   loops without evidence of extraluminal contrast extravasation (fistula).     UE dopplers:  Impression: Positive deep venous thrombosis within the mid to distal   right brachial veins and proximal right radial vein.  No duplex evidence of DVT in the left upper extremity.  Superficial hrombophlebitis involving the left cephalic vein.  Superficial thrombophlebitis involving the right cephalic and basilic   veins.              [x ] Consultant(s) Notes Reviewed: ID, IR  [x] Care Discussed with Consultants/Other Providers: Trauma Team        Advanced Directives: full code.

## 2018-10-24 NOTE — CONSULT NOTE ADULT - PROBLEM SELECTOR RECOMMENDATION 7
confirmed home meds with pt and updated in sunrise, however need to confirm salt tablets. pt has been inappropriately taking 10mg eliquis bid, which is loading dose for dvt. he should be taking 5mg bid.

## 2018-10-24 NOTE — CONSULT NOTE ADULT - PROBLEM SELECTOR RECOMMENDATION 3
last a1c 7.0, controlled on oral medications. per patient he is only taking glipizide 10mg daily since discharge. complicated by diabetic neuropathy  -agree with sliding scale and monitoring of fingersticks while inpatient  -continue gabapentin 300mg bid  -add glucerna for meal supplementation per pt request

## 2018-10-24 NOTE — PROGRESS NOTE ADULT - SUBJECTIVE AND OBJECTIVE BOX
Interventional Radiology Follow- Up Note    This is a 58y Male with PMH of colonic perforation c/b fluid collections s/p IR drainage x2 right fluid collection drainage 8/20 and left abdominal fluid collection drainage on 8/27. Left drain removed on 9/7 and RLQ drain removed on 10/16 2/2 resolution of collection. Surgical drain was removed on 10/16 by surgical team. Pt presented on 10/22 with worsening RLQ pain, fever and oozing purulent material from previous RLQ drain site. CT abd/pelvis 10/22 demonstrated "recurrent abscess measuring up to 10.6 x 3.3 x 9.4 cm", IR requested for reinsertion of RLQ drainage catheter with Dr. Gonzalez (93cc of puss was aspirated)on 10/22.     Reports 9/10 abdominal pain.     Vitals: T(F): 97.7 (10-24-18 @ 09:18), Max: 98.3 (10-23-18 @ 16:43)  HR: 83 (10-24-18 @ 09:18) (71 - 93)  BP: 142/84 (10-24-18 @ 09:18) (122/71 - 142/84)  RR: 18 (10-24-18 @ 09:18) (16 - 18)  SpO2: 100% (10-24-18 @ 09:18) (99% - 100%)  Wt(kg): --    LABS:                        10.7   10.0  )-----------( 316      ( 24 Oct 2018 07:12 )             31.9     10-24    133<L>  |  100  |  26<H>  ----------------------------<  224<H>  4.4   |  20<L>  |  1.18    Ca    10.2      24 Oct 2018 07:07  Phos  3.9     10-24  Mg     1.9     10-24    TPro  7.3  /  Alb  3.0<L>  /  TBili  0.3  /  DBili  x   /  AST  13  /  ALT  8<L>  /  AlkPhos  52  10-23            10-23-18 @ 07:01  -  10-24-18 @ 07:00  --------------------------------------------------------  IN: 1460 mL / OUT: 3900 mL / NET: -2440 mL    Culture - Abscess with Gram Stain (10.22.18 @ 22:58)    Specimen Source: .Abscess Pelvis    Culture Results:   Numerous Enterococcus faecium  Numerous Escherichia coli      output : 100cc from the drain/24hrs    PHYSICAL EXAM:  General: Nontoxic, in NAD  Neuro:  Alert & oriented x 3  Abdomen: soft, ND.  ttp on the RLQ. Drain with serosanguinous output.      Impression: 58y Male s/p drainage of abdominal fluid collection    Plan:  - IV abx per ID   -continue to monitor out put    -Flush drain per doctor orders  -trend vitals, labs  -will discuss with IR attending     Please call IR at extension [phone] with any questions, concerns, or issues regarding above.

## 2018-10-24 NOTE — CONSULT NOTE ADULT - PROBLEM SELECTOR RECOMMENDATION 2
diagnosed RUE brachial vein dvt last admission 8/2018 and was recommended for 3 mo anticoagulation. pt has been taking 10mg bid eliquis which is the loading dose.   -recommend resumption of eliquis at 5 mg bid (which is chronic dosing) to complete 3 mo of treatment (needs approximately 1 more month)

## 2018-10-24 NOTE — PROGRESS NOTE ADULT - ASSESSMENT
ASSESSMENT: Patient is a 58y old m with perforated colon s/p ileocolic resection now with recurrent abdominal fluid collection now s/p IR drainage growing GNR.    PLAN:   - Abx changed to Linezolid and Meropenem for GNR drain culture  - f/u ID  - f/u blood culture  - needs PICC when blood culture negative  - Diet: regular  - Pain control prn    Acute Care Surgery  [phone]

## 2018-10-24 NOTE — CONSULT NOTE ADULT - PROBLEM SELECTOR RECOMMENDATION 5
stable and chronic from last admission. unclear cause. on salt tablets.   -continue salt tablets 1g tid, consider downtitration

## 2018-10-24 NOTE — PROGRESS NOTE ADULT - SUBJECTIVE AND OBJECTIVE BOX
58y old  Male who presents with a chief complaint of abdominal pain (24 Oct 2018 14:51)      Interval history:  Afebrile, no cough, no SOB, + Nausea, no Vomiting, rt sided abdominal pain, denies dysuria.     No Known Allergies    Antimicrobials:  linezolid  IVPB 600 milliGRAM(s) IV Intermittent every 12 hours  meropenem  IVPB 1000 milliGRAM(s) IV Intermittent every 8 hours      REVIEW OF SYSTEMS:  No chest pain   No  frequency  No rash.     Vital Signs Last 24 Hrs  T(C): 36.5 (10-24-18 @ 17:15), Max: 36.8 (10-23-18 @ 22:08)  T(F): 97.7 (10-24-18 @ 17:15), Max: 98.3 (10-23-18 @ 22:08)  HR: 76 (10-24-18 @ 17:15) (76 - 93)  BP: 131/77 (10-24-18 @ 17:15) (121/74 - 142/84)  RR: 18 (10-24-18 @ 17:15) (16 - 18)  SpO2: 100% (10-24-18 @ 17:15) (99% - 100%)      PHYSICAL EXAM:  Patient in no acute distress. AAOX3.  No icterus, no oral ulcers.  Cardiovascular: S1S2 normal.  Lungs: + air entry B/L lung fields.  Gastrointestinal: soft, + ostomy, rt sided drain.   Extremities: no edema.  IV sites not inflamed.                           10.7   10.0  )-----------( 316      ( 24 Oct 2018 07:12 )             31.9   10-24    133<L>  |  100  |  26<H>  ----------------------------<  224<H>  4.4   |  20<L>  |  1.18    Ca    10.2      24 Oct 2018 07:07  Phos  3.9     10-24  Mg     1.9     10-24    TPro  7.3  /  Alb  3.0<L>  /  TBili  0.3  /  DBili  x   /  AST  13  /  ALT  8<L>  /  AlkPhos  52  10-23      LIVER FUNCTIONS - ( 23 Oct 2018 06:49 )  Alb: 3.0 g/dL / Pro: 7.3 g/dL / ALK PHOS: 52 U/L / ALT: 8 U/L / AST: 13 U/L / GGT: x               Culture - Abscess with Gram Stain (collected 22 Oct 2018 22:58)  Source: .Abscess Pelvis  Preliminary Report (23 Oct 2018 20:23):    Numerous Enterococcus faecium    Numerous Escherichia coli    Culture - Blood (collected 22 Oct 2018 06:33)  Source: .Blood Blood-Peripheral  Gram Stain (23 Oct 2018 12:50):    Growth in anaerobic bottle: Gram Negative Rods  Final Report (24 Oct 2018 11:05):    Growth in anaerobic bottle: Bacteroides fragilis "Susceptibilities not    Culture - Blood (collected 22 Oct 2018 06:33)  Source: .Blood Blood-Peripheral  Preliminary Report (23 Oct 2018 07:01):    No growth to date.    Culture - Urine (collected 22 Oct 2018 06:22)  Source: .Urine Clean Catch (Midstream)  Final Report (23 Oct 2018 10:42):    Culture grew 3 or more types of organisms which indicate    collection contamination; consider recollection only if clinically    indicated.

## 2018-10-24 NOTE — CONSULT NOTE ADULT - ASSESSMENT
Patient's medical history is significant for initially presenting to [facility] in August with a perforated right colon, and he underwent an ileocolic resection with end ileostomy. Patient s/p completion of therapy for intraabdominal abscess.   Admitted with sepsis (fever, leucocytosis, tachycardia) due to GNR bacteremia and intra abdominal abscess.   S/p IR guided drain placement.     Plan:   Stop amikacin and flagyl  Started Meropenem 1 gm iv q8h  Follow up final identification of GNR  Follow up abscess cx  Repeat blood culture in am
58y old Male with PMH of HTN, DM2 with neuropathy, UE DVT (diagnosed 8/2018 during admission to hospital), recent colonic perforation likely 2/2 missed appendicitis s/p ileocolic resection with end ileostomy complicated by intraabdominal abscess treated with IV antibiotics and drainage, now presenting with lower abdominal pain and fevers secondary to recurrent abscess.

## 2018-10-24 NOTE — PROGRESS NOTE ADULT - ASSESSMENT
Patient's medical history is significant for initially presenting to [facility] in August with a perforated right colon, and he underwent an ileocolic resection with end ileostomy. Patient s/p completion of therapy for intraabdominal abscess.   Admitted with sepsis (fever, leucocytosis, tachycardia) due to GNR bacteremia and intra abdominal abscess.   S/p IR guided drain placement.   Resolved sepsis   Bacteroides bacteremia  Positive cx finding  Intra-abdominal abscess.     Plan:   Continue with Meropenem 1 gm iv q8h  Follow up repeat blood cx  Follow up final abscess cx  Given E fecium in abscess cx started Linezolid 600 mg q12h   Reviewed all scans with radiology, no obvious leak, maybe a small metallic body unchanged since initial presentation.

## 2018-10-24 NOTE — PROGRESS NOTE ADULT - SUBJECTIVE AND OBJECTIVE BOX
SURGERY DAILY PROGRESS NOTE:     Subjective:    Patient was seen and examined this morning during morning rounds. Abdominal pain improving.    Objective:    NAD, awake and alert  Respirations nonlabored  Abd soft, mildly tender, nondistended, no rebound/guarding   RLQ ostomy functioning with thick brown stool   R sided IR drain with output    Vitals 24hrs  Vital Signs Last 24 Hrs  T(C): 36.5 (24 Oct 2018 09:18), Max: 36.8 (23 Oct 2018 16:43)  T(F): 97.7 (24 Oct 2018 09:18), Max: 98.3 (23 Oct 2018 16:43)  HR: 83 (24 Oct 2018 09:18) (71 - 93)  BP: 142/84 (24 Oct 2018 09:18) (122/71 - 142/84)  BP(mean): --  RR: 18 (24 Oct 2018 09:18) (16 - 18)  SpO2: 100% (24 Oct 2018 09:18) (99% - 100%)      10-23-18 @ 07:01  -  10-24-18 @ 07:00  --------------------------------------------------------  IN: 1460 mL / OUT: 3900 mL / NET: -2440 mL    10-24-18 @ 07:01  -  10-24-18 @ 12:14  --------------------------------------------------------  IN: 0 mL / OUT: 200 mL / NET: -200 mL      Lab Results 24hrs:                        10.7   10.0  )-----------( 316      ( 24 Oct 2018 07:12 )             31.9     10-24    133<L>  |  100  |  26<H>  ----------------------------<  224<H>  4.4   |  20<L>  |  1.18    Ca    10.2      24 Oct 2018 07:07  Phos  3.9     10-24  Mg     1.9     10-24    TPro  7.3  /  Alb  3.0<L>  /  TBili  0.3  /  DBili  x   /  AST  13  /  ALT  8<L>  /  AlkPhos  52  10-23

## 2018-10-24 NOTE — CONSULT NOTE ADULT - PROBLEM SELECTOR RECOMMENDATION 4
stable, normocytic anemia. likely 2/2 AOCD.   -can consider iron studies, b12, folate for further workup  -pt should be referred to GI on discharge for screening colonoscopy if he has never had one in setting of recent colonic perforation (however suspected to be 2/2 missed appendicitis)

## 2018-10-25 LAB
-  AMIKACIN: SIGNIFICANT CHANGE UP
-  AMOXICILLIN/CLAVULANIC ACID: SIGNIFICANT CHANGE UP
-  AMPICILLIN/SULBACTAM: SIGNIFICANT CHANGE UP
-  AMPICILLIN: SIGNIFICANT CHANGE UP
-  AZTREONAM: SIGNIFICANT CHANGE UP
-  CEFAZOLIN: SIGNIFICANT CHANGE UP
-  CEFEPIME: SIGNIFICANT CHANGE UP
-  CEFOXITIN: SIGNIFICANT CHANGE UP
-  CEFTRIAXONE: SIGNIFICANT CHANGE UP
-  CIPROFLOXACIN: SIGNIFICANT CHANGE UP
-  ERTAPENEM: SIGNIFICANT CHANGE UP
-  GENTAMICIN: SIGNIFICANT CHANGE UP
-  LEVOFLOXACIN: SIGNIFICANT CHANGE UP
-  MEROPENEM: SIGNIFICANT CHANGE UP
-  PIPERACILLIN/TAZOBACTAM: SIGNIFICANT CHANGE UP
-  TOBRAMYCIN: SIGNIFICANT CHANGE UP
-  TRIMETHOPRIM/SULFAMETHOXAZOLE: SIGNIFICANT CHANGE UP
ANION GAP SERPL CALC-SCNC: 16 MMOL/L — SIGNIFICANT CHANGE UP (ref 5–17)
BUN SERPL-MCNC: 26 MG/DL — HIGH (ref 7–23)
CALCIUM SERPL-MCNC: 9.9 MG/DL — SIGNIFICANT CHANGE UP (ref 8.4–10.5)
CHLORIDE SERPL-SCNC: 98 MMOL/L — SIGNIFICANT CHANGE UP (ref 96–108)
CO2 SERPL-SCNC: 19 MMOL/L — LOW (ref 22–31)
CREAT SERPL-MCNC: 1.18 MG/DL — SIGNIFICANT CHANGE UP (ref 0.5–1.3)
GLUCOSE BLDC GLUCOMTR-MCNC: 172 MG/DL — HIGH (ref 70–99)
GLUCOSE BLDC GLUCOMTR-MCNC: 181 MG/DL — HIGH (ref 70–99)
GLUCOSE BLDC GLUCOMTR-MCNC: 234 MG/DL — HIGH (ref 70–99)
GLUCOSE BLDC GLUCOMTR-MCNC: 256 MG/DL — HIGH (ref 70–99)
GLUCOSE SERPL-MCNC: 156 MG/DL — HIGH (ref 70–99)
HCT VFR BLD CALC: 32.7 % — LOW (ref 39–50)
HGB BLD-MCNC: 11.1 G/DL — LOW (ref 13–17)
MAGNESIUM SERPL-MCNC: 1.6 MG/DL — SIGNIFICANT CHANGE UP (ref 1.6–2.6)
MCHC RBC-ENTMCNC: 30.3 PG — SIGNIFICANT CHANGE UP (ref 27–34)
MCHC RBC-ENTMCNC: 34 GM/DL — SIGNIFICANT CHANGE UP (ref 32–36)
MCV RBC AUTO: 88.9 FL — SIGNIFICANT CHANGE UP (ref 80–100)
METHOD TYPE: SIGNIFICANT CHANGE UP
PHOSPHATE SERPL-MCNC: 3.6 MG/DL — SIGNIFICANT CHANGE UP (ref 2.5–4.5)
PLATELET # BLD AUTO: 332 K/UL — SIGNIFICANT CHANGE UP (ref 150–400)
POTASSIUM SERPL-MCNC: 4.3 MMOL/L — SIGNIFICANT CHANGE UP (ref 3.5–5.3)
POTASSIUM SERPL-SCNC: 4.3 MMOL/L — SIGNIFICANT CHANGE UP (ref 3.5–5.3)
RBC # BLD: 3.68 M/UL — LOW (ref 4.2–5.8)
RBC # FLD: 14.1 % — SIGNIFICANT CHANGE UP (ref 10.3–14.5)
SODIUM SERPL-SCNC: 133 MMOL/L — LOW (ref 135–145)
WBC # BLD: 8.6 K/UL — SIGNIFICANT CHANGE UP (ref 3.8–10.5)
WBC # FLD AUTO: 8.6 K/UL — SIGNIFICANT CHANGE UP (ref 3.8–10.5)

## 2018-10-25 PROCEDURE — 99233 SBSQ HOSP IP/OBS HIGH 50: CPT

## 2018-10-25 PROCEDURE — 99232 SBSQ HOSP IP/OBS MODERATE 35: CPT

## 2018-10-25 RX ORDER — INSULIN LISPRO 100/ML
2 VIAL (ML) SUBCUTANEOUS
Qty: 0 | Refills: 0 | Status: DISCONTINUED | OUTPATIENT
Start: 2018-10-25 | End: 2018-10-27

## 2018-10-25 RX ORDER — IMIPENEM AND CILASTATIN 250; 250 MG/100ML; MG/100ML
1000 INJECTION, POWDER, FOR SOLUTION INTRAVENOUS ONCE
Qty: 0 | Refills: 0 | Status: COMPLETED | OUTPATIENT
Start: 2018-10-25 | End: 2018-10-25

## 2018-10-25 RX ORDER — IMIPENEM AND CILASTATIN 250; 250 MG/100ML; MG/100ML
INJECTION, POWDER, FOR SOLUTION INTRAVENOUS
Qty: 0 | Refills: 0 | Status: DISCONTINUED | OUTPATIENT
Start: 2018-10-25 | End: 2018-10-26

## 2018-10-25 RX ORDER — MAGNESIUM SULFATE 500 MG/ML
1 VIAL (ML) INJECTION ONCE
Qty: 0 | Refills: 0 | Status: COMPLETED | OUTPATIENT
Start: 2018-10-25 | End: 2018-10-25

## 2018-10-25 RX ORDER — IMIPENEM AND CILASTATIN 250; 250 MG/100ML; MG/100ML
1000 INJECTION, POWDER, FOR SOLUTION INTRAVENOUS EVERY 8 HOURS
Qty: 0 | Refills: 0 | Status: DISCONTINUED | OUTPATIENT
Start: 2018-10-25 | End: 2018-10-26

## 2018-10-25 RX ADMIN — SODIUM CHLORIDE 1 GRAM(S): 9 INJECTION INTRAMUSCULAR; INTRAVENOUS; SUBCUTANEOUS at 13:42

## 2018-10-25 RX ADMIN — Medication 1 SPRAY(S): at 17:54

## 2018-10-25 RX ADMIN — Medication 2 UNIT(S): at 19:32

## 2018-10-25 RX ADMIN — Medication 600 MILLIGRAM(S): at 22:35

## 2018-10-25 RX ADMIN — IMIPENEM AND CILASTATIN 250 MILLIGRAM(S): 250; 250 INJECTION, POWDER, FOR SOLUTION INTRAVENOUS at 22:00

## 2018-10-25 RX ADMIN — Medication 1 TABLET(S): at 13:40

## 2018-10-25 RX ADMIN — Medication 100 GRAM(S): at 13:42

## 2018-10-25 RX ADMIN — GABAPENTIN 300 MILLIGRAM(S): 400 CAPSULE ORAL at 17:50

## 2018-10-25 RX ADMIN — MEROPENEM 100 MILLIGRAM(S): 1 INJECTION INTRAVENOUS at 05:59

## 2018-10-25 RX ADMIN — SODIUM CHLORIDE 1 GRAM(S): 9 INJECTION INTRAMUSCULAR; INTRAVENOUS; SUBCUTANEOUS at 05:59

## 2018-10-25 RX ADMIN — Medication 650 MILLIGRAM(S): at 14:10

## 2018-10-25 RX ADMIN — GABAPENTIN 300 MILLIGRAM(S): 400 CAPSULE ORAL at 05:59

## 2018-10-25 RX ADMIN — Medication 2: at 09:49

## 2018-10-25 RX ADMIN — OXYCODONE HYDROCHLORIDE 5 MILLIGRAM(S): 5 TABLET ORAL at 18:20

## 2018-10-25 RX ADMIN — OXYCODONE HYDROCHLORIDE 5 MILLIGRAM(S): 5 TABLET ORAL at 17:51

## 2018-10-25 RX ADMIN — Medication 650 MILLIGRAM(S): at 06:06

## 2018-10-25 RX ADMIN — Medication 650 MILLIGRAM(S): at 13:40

## 2018-10-25 RX ADMIN — Medication 600 MILLIGRAM(S): at 22:05

## 2018-10-25 RX ADMIN — LINEZOLID 300 MILLIGRAM(S): 600 INJECTION, SOLUTION INTRAVENOUS at 15:03

## 2018-10-25 RX ADMIN — Medication 4: at 19:33

## 2018-10-25 RX ADMIN — ENOXAPARIN SODIUM 40 MILLIGRAM(S): 100 INJECTION SUBCUTANEOUS at 05:59

## 2018-10-25 RX ADMIN — Medication 25 MILLIGRAM(S): at 05:59

## 2018-10-25 RX ADMIN — Medication 6: at 13:40

## 2018-10-25 RX ADMIN — SODIUM CHLORIDE 1 GRAM(S): 9 INJECTION INTRAMUSCULAR; INTRAVENOUS; SUBCUTANEOUS at 22:00

## 2018-10-25 RX ADMIN — Medication 1 SPRAY(S): at 05:59

## 2018-10-25 NOTE — PROGRESS NOTE ADULT - SUBJECTIVE AND OBJECTIVE BOX
Patient is a 58y old  Male who presents with a chief complaint of abdominal pain (25 Oct 2018 07:11)      SUBJECTIVE / OVERNIGHT EVENTS:    RUE doppler without continued dvt-- prior dvt has resolved  pt without acute complaints today. pain controlled.     MEDICATIONS  (STANDING):  dextrose 5%. 1000 milliLiter(s) (50 mL/Hr) IV Continuous <Continuous>  dextrose 50% Injectable 12.5 Gram(s) IV Push once  dextrose 50% Injectable 25 Gram(s) IV Push once  dextrose 50% Injectable 25 Gram(s) IV Push once  enoxaparin Injectable 40 milliGRAM(s) SubCutaneous every 24 hours  fluticasone propionate 50 MICROgram(s)/spray Nasal Spray 1 Spray(s) Both Nostrils two times a day  gabapentin 300 milliGRAM(s) Oral two times a day  influenza   Vaccine 0.5 milliLiter(s) IntraMuscular once  insulin lispro (HumaLOG) corrective regimen sliding scale   SubCutaneous Before meals and at bedtime  linezolid  IVPB      linezolid  IVPB 600 milliGRAM(s) IV Intermittent every 12 hours  meropenem  IVPB 1000 milliGRAM(s) IV Intermittent every 8 hours  metoprolol tartrate 25 milliGRAM(s) Oral two times a day  multivitamin 1 Tablet(s) Oral daily  sodium chloride 1 Gram(s) Oral three times a day    MEDICATIONS  (PRN):  acetaminophen   Tablet .. 650 milliGRAM(s) Oral every 6 hours PRN Mild Pain (1 - 3)  dextrose 40% Gel 15 Gram(s) Oral once PRN Blood Glucose LESS THAN 70 milliGRAM(s)/deciliter  glucagon  Injectable 1 milliGRAM(s) IntraMuscular once PRN Glucose LESS THAN 70 milligrams/deciliter  ibuprofen  Tablet. 600 milliGRAM(s) Oral every 6 hours PRN Moderate Pain (4 - 6)  oxyCODONE    IR 5 milliGRAM(s) Oral every 4 hours PRN Severe Pain (7 - 10)        CAPILLARY BLOOD GLUCOSE      POCT Blood Glucose.: 256 mg/dL (25 Oct 2018 13:15)  POCT Blood Glucose.: 181 mg/dL (25 Oct 2018 09:47)  POCT Blood Glucose.: 228 mg/dL (24 Oct 2018 22:13)  POCT Blood Glucose.: 224 mg/dL (24 Oct 2018 19:52)    I&O's Summary    24 Oct 2018 07:01  -  25 Oct 2018 07:00  --------------------------------------------------------  IN: 1560 mL / OUT: 3675 mL / NET: -2115 mL    25 Oct 2018 07:01  -  25 Oct 2018 14:41  --------------------------------------------------------  IN: 720 mL / OUT: 805 mL / NET: -85 mL      97.6, P 96, /66, R 16, O2 100%  PHYSICAL EXAM:  GENERAL: NAD, well-developed  HEAD:  Atraumatic, Normocephalic  EYES: EOMI, PERRLA, conjunctiva and sclera clear  NECK: Supple, No JVD  CHEST/LUNG: Clear to auscultation bilaterally; No wheeze  HEART: Regular rate and rhythm; No murmurs, rubs, or gallops  ABDOMEN: Soft, Nontender, Nondistended; Bowel sounds present  EXTREMITIES:  2+ Peripheral Pulses, No clubbing, cyanosis, or edema  PSYCH: AAOx3  NEUROLOGY: non-focal  SKIN: No rashes or lesions    LABS:                        11.1   8.6   )-----------( 332      ( 25 Oct 2018 12:45 )             32.7     10-25    133<L>  |  98  |  26<H>  ----------------------------<  156<H>  4.3   |  19<L>  |  1.18    Ca    9.9      25 Oct 2018 12:45  Phos  3.6     10-25  Mg     1.6     10-25                RADIOLOGY & ADDITIONAL TESTS:    Imaging Personally Reviewed:    Consultant(s) Notes Reviewed:  ID    Care Discussed with Consultants/Other Providers: trauma

## 2018-10-25 NOTE — PROGRESS NOTE ADULT - SUBJECTIVE AND OBJECTIVE BOX
SURGERY DAILY PROGRESS NOTE:     Subjective:    Patient was seen and examined this morning during morning rounds. Abdominal pain improving. No fevers or chills overnight. No new complaints.    Objective:    NAD, awake and alert  Respirations nonlabored  Abd soft, mildly tender, nondistended, no rebound/guarding   RLQ ostomy functioning with thick brown stool   R sided IR drain with output    Vitals 24hrs  Vital Signs Last 24 Hrs  T(C): 36.6 (25 Oct 2018 05:58), Max: 36.9 (25 Oct 2018 01:32)  T(F): 97.8 (25 Oct 2018 05:58), Max: 98.5 (25 Oct 2018 01:32)  HR: 88 (25 Oct 2018 05:58) (70 - 88)  BP: 120/72 (25 Oct 2018 05:58) (120/72 - 142/84)  BP(mean): --  RR: 16 (25 Oct 2018 05:58) (16 - 18)  SpO2: 100% (25 Oct 2018 05:58) (96% - 100%)      10-24-18 @ 07:01  -  10-25-18 @ 07:00  --------------------------------------------------------  IN: 1560 mL / OUT: 3675 mL / NET: -2115 mL      Lab Results 24hrs:                        10.7   10.0  )-----------( 316      ( 24 Oct 2018 07:12 )             31.9     10-24    133<L>  |  100  |  26<H>  ----------------------------<  224<H>  4.4   |  20<L>  |  1.18    Ca    10.2      24 Oct 2018 07:07  Phos  3.9     10-24  Mg     1.9     10-24      Culture - Abscess with Gram Stain (10.22.18 @ 22:58)    -  Trimethoprim/Sulfamethoxazole: R >2/38    -  Vancomycin: S 1    -  Tobramycin: R >8    -  Amikacin: S <=8    -  Piperacillin/Tazobactam: R <=8    -  Tetra/Doxy: R >8    -  Tetra/Doxy: R >8    -  Tigecycline: S <=1    -  Meropenem: S <=1    -  Moxifloxacin(Aerobic): R >4    -  Imipenem: S <=1    -  Levofloxacin: R >4    -  Gentamicin: S 2    -  Ertapenem: S <=0.5    -  Ciprofloxacin: R >2    -  Ceftriaxone: R >32 Enterobacter, Citrobacter, and Serratia may develop resistance during prolonged therapy    -  Cefuroxime: R >16    -  Cefoxitin: S 8    -  Ceftazidime: R >16    -  Cefepime: R >16    -  Cefotaxime: R >32    -  Cefazolin: R >16    -  Aztreonam: R >16    -  Amoxicillin/Clavulanic Acid: I 16/8    -  Ampicillin: R >16 These ampicillin results predict results for amoxicillin    -  Ampicillin: S <=2 Predicts results to ampicillin/sulbactam, amoxacillin-clavulanate and  piperacillin-tazobactam.    -  Ampicillin/Sulbactam: R >16/8    Specimen Source: .Abscess Pelvis    Culture Results:   Numerous Enterococcus faecium  Numerous Escherichia coli ESBL  Few Proteus mirabilis    Organism Identification: Enterococcus faecium  Escherichia coli ESBL    Organism: Escherichia coli ESBL    Organism: Enterococcus faecium    Method Type: OMEGA    Method Type: OMEGA

## 2018-10-25 NOTE — PROGRESS NOTE ADULT - PROBLEM SELECTOR PLAN 3
last a1c 7.0, controlled on oral medications. per patient he is only taking glipizide 10mg daily since discharge. complicated by diabetic neuropathy  -agree with sliding scale and monitoring of fingersticks while inpatient  -will add 2 units humalog tidac today  -continue gabapentin 300mg bid

## 2018-10-25 NOTE — PROGRESS NOTE ADULT - ASSESSMENT
58y old Male with PMH of HTN, DM2 with neuropathy, UE DVT (diagnosed 8/2018 during admission to hospital), recent colonic perforation likely 2/2 missed appendicitis s/p ileocolic resection with end ileostomy complicated by intraabdominal abscess treated with IV antibiotics and drainage, now presenting with lower abdominal pain and fevers secondary to recurrent abscess.

## 2018-10-25 NOTE — PROGRESS NOTE ADULT - ASSESSMENT
Patient's medical history is significant for initially presenting to [facility] in August with a perforated right colon, and he underwent an ileocolic resection with end ileostomy. Patient s/p completion of therapy for intraabdominal abscess.   Admitted with sepsis (fever, leucocytosis, tachycardia) due to GNR bacteremia and intra abdominal abscess.   S/p IR guided drain placement.   Resolved sepsis   Bacteroides bacteremia  Positive cx finding  Intra-abdominal abscess.     Plan:   Given E fecium ampicillin sensitive, switch Christy to Imipenem 1gm iv q8h   Follow up repeat blood cx  Follow up final abscess cx  Stop linezolid    Will need PICC eventually.

## 2018-10-25 NOTE — PROGRESS NOTE ADULT - PROBLEM SELECTOR PLAN 7
confirmed home meds with pt and updated in sunrise  eliquis can be stopped on d/c  recommend trying to titrate down salt tabs

## 2018-10-25 NOTE — PROGRESS NOTE ADULT - ASSESSMENT
ASSESSMENT: Patient is a 58y old m with perforated colon s/p ileocolic resection now with recurrent abdominal fluid collection now s/p IR drainage. Fluid culture growing ESBL Ecoli and Enterococcus faecium.    PLAN:   - Linezolid and Meropenem for GNR drain culture  - f/u ID regarding Abx and sensitivities  - f/u blood culture  - needs PICC when blood culture negative  - Diet: regular  - Pain control prn    Acute Care Surgery  [phone]

## 2018-10-25 NOTE — PROGRESS NOTE ADULT - SUBJECTIVE AND OBJECTIVE BOX
58y old  Male who presents with a chief complaint of abdominal pain (25 Oct 2018 14:41)      Interval history:  Afebrile, some rt sided pain, nausea intermittent.     No Known Allergies    Antimicrobials:  linezolid  IVPB 600 milliGRAM(s) IV Intermittent every 12 hours  meropenem  IVPB 1000 milliGRAM(s) IV Intermittent every 8 hours      REVIEW OF SYSTEMS:  No chest pain  No cough, no SOB  No dysuria or frequency  No rash.     Vital Signs Last 24 Hrs  T(C): 36.8 (10-25-18 @ 17:05), Max: 36.9 (10-25-18 @ 01:32)  T(F): 98.2 (10-25-18 @ 17:05), Max: 98.5 (10-25-18 @ 01:32)  HR: 90 (10-25-18 @ 17:05) (70 - 96)  BP: 112/61 (10-25-18 @ 17:05) (101/66 - 131/70)  RR: 18 (10-25-18 @ 17:05) (16 - 18)  SpO2: 100% (10-25-18 @ 17:05) (96% - 100%)    PHYSICAL EXAM:  Patient in no acute distress. AAOX3.  No icterus, no oral ulcers.  Cardiovascular: S1S2 normal.  Lungs: + air entry B/L lung fields.  Gastrointestinal: soft, + ostomy, rt sided drain.   Extremities: no edema.  IV sites not inflamed.                             11.1   8.6   )-----------( 332      ( 25 Oct 2018 12:45 )             32.7   10-25    133<L>  |  98  |  26<H>  ----------------------------<  156<H>  4.3   |  19<L>  |  1.18    Ca    9.9      25 Oct 2018 12:45  Phos  3.6     10-25  Mg     1.6     10-25          Culture - Blood (collected 24 Oct 2018 09:41)  Source: .Blood Blood-Peripheral  Preliminary Report (25 Oct 2018 10:01):    No growth to date.    Culture - Blood (collected 24 Oct 2018 09:41)  Source: .Blood Blood-Peripheral  Preliminary Report (25 Oct 2018 10:01):    No growth to date.    Culture - Abscess with Gram Stain (collected 22 Oct 2018 22:58)  Source: .Abscess Pelvis  Preliminary Report (24 Oct 2018 23:26):    Numerous Enterococcus faecium    Numerous Escherichia coli ESBL    Few Proteus mirabilis  Organism: Enterococcus faecium  Escherichia coli ESBL (24 Oct 2018 23:20)  Organism: Enterococcus faecium (24 Oct 2018 23:20)  Organism: Escherichia coli ESBL (24 Oct 2018 21:00)      Radiology:  < from: [facility] Duplex Upper Ext Vein Scan, Right (10.24.18 @ 18:11) >  IMPRESSION:     No evidence of right upper extremity deep venous thrombosis. The   previously seen deep venous thrombosis involving the mid to distal right   brachial vein and proximal right radial vein is no longer identified.    Superficial thrombophlebitis involving the right cephalic vein.

## 2018-10-25 NOTE — PROGRESS NOTE ADULT - PROBLEM SELECTOR PLAN 4
stable, normocytic anemia. likely 2/2 AOCD.   -can consider iron studies, b12, folate for further workup  -pt should be referred to GI on discharge for screening colonoscopy if he has never had one in setting of recent colonic perforation (however suspected to be 2/2 missed appendicitis).

## 2018-10-25 NOTE — PROGRESS NOTE ADULT - PROBLEM SELECTOR PLAN 5
stable and chronic from last admission. unclear cause. on salt tablets.   -continue salt tablets 1g tid, consider downtitration.

## 2018-10-26 ENCOUNTER — TRANSCRIPTION ENCOUNTER (OUTPATIENT)
Age: 58
End: 2018-10-26

## 2018-10-26 DIAGNOSIS — N17.9 ACUTE KIDNEY FAILURE, UNSPECIFIED: ICD-10-CM

## 2018-10-26 DIAGNOSIS — L85.3 XEROSIS CUTIS: ICD-10-CM

## 2018-10-26 LAB
ANION GAP SERPL CALC-SCNC: 12 MMOL/L — SIGNIFICANT CHANGE UP (ref 5–17)
BUN SERPL-MCNC: 32 MG/DL — HIGH (ref 7–23)
CALCIUM SERPL-MCNC: 10.2 MG/DL — SIGNIFICANT CHANGE UP (ref 8.4–10.5)
CHLORIDE SERPL-SCNC: 97 MMOL/L — SIGNIFICANT CHANGE UP (ref 96–108)
CO2 SERPL-SCNC: 23 MMOL/L — SIGNIFICANT CHANGE UP (ref 22–31)
CREAT SERPL-MCNC: 1.38 MG/DL — HIGH (ref 0.5–1.3)
CULTURE RESULTS: SIGNIFICANT CHANGE UP
GLUCOSE BLDC GLUCOMTR-MCNC: 152 MG/DL — HIGH (ref 70–99)
GLUCOSE BLDC GLUCOMTR-MCNC: 166 MG/DL — HIGH (ref 70–99)
GLUCOSE BLDC GLUCOMTR-MCNC: 199 MG/DL — HIGH (ref 70–99)
GLUCOSE BLDC GLUCOMTR-MCNC: 241 MG/DL — HIGH (ref 70–99)
GLUCOSE SERPL-MCNC: 178 MG/DL — HIGH (ref 70–99)
HCT VFR BLD CALC: 34.1 % — LOW (ref 39–50)
HGB BLD-MCNC: 11.1 G/DL — LOW (ref 13–17)
MAGNESIUM SERPL-MCNC: 1.8 MG/DL — SIGNIFICANT CHANGE UP (ref 1.6–2.6)
MCHC RBC-ENTMCNC: 28.6 PG — SIGNIFICANT CHANGE UP (ref 27–34)
MCHC RBC-ENTMCNC: 32.6 GM/DL — SIGNIFICANT CHANGE UP (ref 32–36)
MCV RBC AUTO: 87.8 FL — SIGNIFICANT CHANGE UP (ref 80–100)
ORGANISM # SPEC MICROSCOPIC CNT: SIGNIFICANT CHANGE UP
PHOSPHATE SERPL-MCNC: 4 MG/DL — SIGNIFICANT CHANGE UP (ref 2.5–4.5)
PLATELET # BLD AUTO: 358 K/UL — SIGNIFICANT CHANGE UP (ref 150–400)
POTASSIUM SERPL-MCNC: 4.4 MMOL/L — SIGNIFICANT CHANGE UP (ref 3.5–5.3)
POTASSIUM SERPL-SCNC: 4.4 MMOL/L — SIGNIFICANT CHANGE UP (ref 3.5–5.3)
RBC # BLD: 3.88 M/UL — LOW (ref 4.2–5.8)
RBC # FLD: 14.3 % — SIGNIFICANT CHANGE UP (ref 10.3–14.5)
SODIUM SERPL-SCNC: 132 MMOL/L — LOW (ref 135–145)
SPECIMEN SOURCE: SIGNIFICANT CHANGE UP
WBC # BLD: 8.2 K/UL — SIGNIFICANT CHANGE UP (ref 3.8–10.5)
WBC # FLD AUTO: 8.2 K/UL — SIGNIFICANT CHANGE UP (ref 3.8–10.5)

## 2018-10-26 PROCEDURE — 71045 X-RAY EXAM CHEST 1 VIEW: CPT | Mod: 26

## 2018-10-26 PROCEDURE — 99232 SBSQ HOSP IP/OBS MODERATE 35: CPT

## 2018-10-26 PROCEDURE — 99233 SBSQ HOSP IP/OBS HIGH 50: CPT

## 2018-10-26 RX ORDER — IMIPENEM AND CILASTATIN 250; 250 MG/100ML; MG/100ML
500 INJECTION, POWDER, FOR SOLUTION INTRAVENOUS
Qty: 21000 | Refills: 0
Start: 2018-10-26 | End: 2018-11-08

## 2018-10-26 RX ORDER — SOD,AMMONIUM,POTASSIUM LACTATE
1 CREAM (GRAM) TOPICAL
Qty: 0 | Refills: 0 | Status: DISCONTINUED | OUTPATIENT
Start: 2018-10-26 | End: 2018-10-27

## 2018-10-26 RX ORDER — MAGNESIUM SULFATE 500 MG/ML
2 VIAL (ML) INJECTION ONCE
Qty: 0 | Refills: 0 | Status: COMPLETED | OUTPATIENT
Start: 2018-10-26 | End: 2018-10-26

## 2018-10-26 RX ORDER — DIPHENHYDRAMINE HCL 50 MG
25 CAPSULE ORAL ONCE
Qty: 0 | Refills: 0 | Status: COMPLETED | OUTPATIENT
Start: 2018-10-26 | End: 2018-10-26

## 2018-10-26 RX ORDER — DIPHENHYDRAMINE HCL 50 MG
25 CAPSULE ORAL EVERY 4 HOURS
Qty: 0 | Refills: 0 | Status: DISCONTINUED | OUTPATIENT
Start: 2018-10-26 | End: 2018-10-27

## 2018-10-26 RX ORDER — IMIPENEM AND CILASTATIN 250; 250 MG/100ML; MG/100ML
500 INJECTION, POWDER, FOR SOLUTION INTRAVENOUS EVERY 8 HOURS
Qty: 0 | Refills: 0 | Status: DISCONTINUED | OUTPATIENT
Start: 2018-10-26 | End: 2018-10-27

## 2018-10-26 RX ORDER — SODIUM CHLORIDE 9 MG/ML
1000 INJECTION INTRAMUSCULAR; INTRAVENOUS; SUBCUTANEOUS
Qty: 0 | Refills: 0 | Status: DISCONTINUED | OUTPATIENT
Start: 2018-10-26 | End: 2018-10-27

## 2018-10-26 RX ADMIN — Medication 2: at 19:35

## 2018-10-26 RX ADMIN — Medication 50 GRAM(S): at 08:33

## 2018-10-26 RX ADMIN — Medication 2 UNIT(S): at 12:29

## 2018-10-26 RX ADMIN — Medication 2: at 09:14

## 2018-10-26 RX ADMIN — ENOXAPARIN SODIUM 40 MILLIGRAM(S): 100 INJECTION SUBCUTANEOUS at 06:29

## 2018-10-26 RX ADMIN — IMIPENEM AND CILASTATIN 100 MILLIGRAM(S): 250; 250 INJECTION, POWDER, FOR SOLUTION INTRAVENOUS at 13:35

## 2018-10-26 RX ADMIN — Medication 2 UNIT(S): at 19:34

## 2018-10-26 RX ADMIN — Medication 1 SPRAY(S): at 06:29

## 2018-10-26 RX ADMIN — Medication 25 MILLIGRAM(S): at 02:24

## 2018-10-26 RX ADMIN — Medication 1 APPLICATION(S): at 17:19

## 2018-10-26 RX ADMIN — IMIPENEM AND CILASTATIN 250 MILLIGRAM(S): 250; 250 INJECTION, POWDER, FOR SOLUTION INTRAVENOUS at 06:29

## 2018-10-26 RX ADMIN — Medication 2 UNIT(S): at 09:14

## 2018-10-26 RX ADMIN — GABAPENTIN 300 MILLIGRAM(S): 400 CAPSULE ORAL at 17:19

## 2018-10-26 RX ADMIN — Medication 1 SPRAY(S): at 17:19

## 2018-10-26 RX ADMIN — Medication 1 TABLET(S): at 12:29

## 2018-10-26 RX ADMIN — SODIUM CHLORIDE 1 GRAM(S): 9 INJECTION INTRAMUSCULAR; INTRAVENOUS; SUBCUTANEOUS at 21:01

## 2018-10-26 RX ADMIN — Medication 4: at 22:24

## 2018-10-26 RX ADMIN — Medication 650 MILLIGRAM(S): at 20:07

## 2018-10-26 RX ADMIN — Medication 650 MILLIGRAM(S): at 19:37

## 2018-10-26 RX ADMIN — IMIPENEM AND CILASTATIN 100 MILLIGRAM(S): 250; 250 INJECTION, POWDER, FOR SOLUTION INTRAVENOUS at 21:01

## 2018-10-26 RX ADMIN — Medication 25 MILLIGRAM(S): at 17:19

## 2018-10-26 RX ADMIN — GABAPENTIN 300 MILLIGRAM(S): 400 CAPSULE ORAL at 06:30

## 2018-10-26 RX ADMIN — SODIUM CHLORIDE 75 MILLILITER(S): 9 INJECTION INTRAMUSCULAR; INTRAVENOUS; SUBCUTANEOUS at 15:13

## 2018-10-26 RX ADMIN — Medication 25 MILLIGRAM(S): at 21:01

## 2018-10-26 RX ADMIN — SODIUM CHLORIDE 1 GRAM(S): 9 INJECTION INTRAMUSCULAR; INTRAVENOUS; SUBCUTANEOUS at 06:30

## 2018-10-26 RX ADMIN — Medication 2: at 12:29

## 2018-10-26 RX ADMIN — SODIUM CHLORIDE 1 GRAM(S): 9 INJECTION INTRAMUSCULAR; INTRAVENOUS; SUBCUTANEOUS at 13:35

## 2018-10-26 NOTE — PROGRESS NOTE ADULT - SUBJECTIVE AND OBJECTIVE BOX
SURGERY DAILY PROGRESS NOTE:     Subjective:    Patient was seen and examined this morning during morning rounds. Abdominal pain improving. No fevers or chills overnight. Patient endorses new rash on his back.  Patient's Antibiotics were changed to Imipenemby ID based on cultures.    Objective:    NAD, awake and alert  Respirations nonlabored  Abd soft, mildly tender, nondistended, no rebound/guarding   RLQ ostomy functioning with thick brown stool   R sided IR drain with output  Back: white rash    Vitals 24hrs  Vital Signs Last 24 Hrs  T(C): 36.4 (26 Oct 2018 05:15), Max: 36.8 (25 Oct 2018 17:05)  T(F): 97.5 (26 Oct 2018 05:15), Max: 98.2 (25 Oct 2018 17:05)  HR: 86 (26 Oct 2018 06:31) (85 - 96)  BP: 103/65 (26 Oct 2018 06:31) (101/66 - 125/80)  BP(mean): --  RR: 18 (26 Oct 2018 05:15) (16 - 18)  SpO2: 100% (26 Oct 2018 05:15) (100% - 100%)      10-25-18 @ 07:01  -  10-26-18 @ 07:00  --------------------------------------------------------  IN: 720 mL / OUT: 3295 mL / NET: -2575 mL      Lab Results 24hrs:                        11.1   8.6   )-----------( 332      ( 25 Oct 2018 12:45 )             32.7     10-25    133<L>  |  98  |  26<H>  ----------------------------<  156<H>  4.3   |  19<L>  |  1.18    Ca    9.9      25 Oct 2018 12:45  Phos  3.6     10-25  Mg     1.6     10-25

## 2018-10-26 NOTE — PROGRESS NOTE ADULT - ATTENDING COMMENTS
Alen Aguirre  Pager: [phone]. If no response or past 5 pm call [phone].
Alen Aguirre  Pager: [phone]. If no response or past 5 pm call [phone].     Please call ID service for questions over weekend at [phone].
Alen Aguirre  Pager: [phone]. If no response or past 5 pm call [phone].
As above, will attempt to replace drainage catheter.
Pt seen and examined.    Chart reviewed.    Resident note confirmed.      Pt is a 58 year old male with a medical history significant for HTN/CAD/DM who was treated for a perforated colon with an ileocolic resection/ileostomy who returned with a recurrent abdominal fluid collection.  Pt is s/p VIR drainage of the collection.  Approximately 93ml of purulent fluid was evacuated.  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  Leukocytosis is resolving  ID consult  PICC line placement  PT/OT evaluation.  Discharge planning .
Pt seen and examined.    Chart reviewed.    Resident note confirmed.      Pt is a 58 year old male with a medical history significant for HTN/CAD/DM who was treated for a perforated colon with an ileocolic resection/ileostomy who returned with a recurrent abdominal fluid collection.  Pt is s/p VIR drainage of the collection.  Approximately 93ml of purulent fluid was evacuated.  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      PMH/PSH/MEDS/ALL/SH/FH/ROS:  Unchanged from H&P above  Vitals/PE/Labs/Radiographic data:  Reviewed     Continue pain control  Continue ISP  Continue reg diet  CKD 4  Monitor and replace lytes  H/h is trending down.  Leukocytosis is resolving  ID consult appreciated  PICC line placement once blood culture negative  PT/OT evaluation.  Discharge planning.
Pt seen and examined.    Chart reviewed.    Resident note confirmed.      Pt is a 58 year old male with a medical history significant for HTN/CAD/DM who was treated for a perforated colon with an ileocolic resection/ileostomy who returned with a recurrent abdominal fluid collection.  Pt is s/p VIR drainage of the collection.  Approximately 93ml of purulent fluid was evacuated.  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  Repeat blood cultures are negative for bacteria.    PMH/PSH/MEDS/ALL/SH/FH/ROS:  Unchanged from H&P above  Vitals/PE/Labs/Radiographic data:  Reviewed     Continue pain control  Continue ISP  Continue reg diet  CKD 4  Monitor and replace lytes  H/h is trending down.  Leukocytosis is resolving  ID consult appreciated  PICC line placement   PT/OT evaluation.  Discharge planning.
Pt seen and examined.    Chart reviewed.    Resident note confirmed.      Pt is a 58 year old male with a medical history significant for HTN/CAD/DM who was treated for a perforated colon with an ileocolic resection/ileostomy who returned with a recurrent abdominal fluid collection.  Pt is s/p VIR drainage of the collection.  Approximately 93ml of purulent fluid was evacuated.  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  Leukocytosis is resolving  ID consult  PICC line placement  PT/OT evaluation.  Discharge planning

## 2018-10-26 NOTE — PROGRESS NOTE ADULT - ASSESSMENT
ASSESSMENT: Patient is a 58y old m with perforated colon s/p ileocolic resection now with recurrent abdominal fluid collection now s/p IR drainage. Fluid culture growing ESBL Ecoli and Enterococcus faecium.    PLAN:   - Imipenem  - f/u ID recs regarding Abx and new rash  - f/u blood culture  - needs PICC when blood culture negative  - Diet: regular  - Pain control prn  - Benadryl prn    Acute Care Surgery  [phone]

## 2018-10-26 NOTE — PROVIDER CONTACT NOTE (OTHER) - ACTION/TREATMENT ORDERED:
500cc NS bolus and IV tylenol. repeat vital signs until WNL
MD made aware. Continue to monitor
MD aware, benadryl given.

## 2018-10-26 NOTE — PROGRESS NOTE ADULT - PROBLEM SELECTOR PLAN 7
stable. continue metoprolol 25mg bid stable and chronic from last admission. unclear cause. on salt tablets.   -continue salt tablets 1g tid, consider downtitration.

## 2018-10-26 NOTE — DISCHARGE NOTE ADULT - PATIENT PORTAL LINK FT
You can access the [facility] Patient Portal, offered by [facility], by registering with the following website: [email or web address]

## 2018-10-26 NOTE — DISCHARGE NOTE ADULT - PLAN OF CARE
infection control Please continue to take IV antibiotics as prescribed.   Follow up with the infectious disease doctors within 1 week.  Follow up with the interventional radiology doctor in 1-2 weeks.   Follow up with the acute care surgeon in 1-2 weeks.

## 2018-10-26 NOTE — DISCHARGE NOTE ADULT - INSTRUCTIONS
Regular diet picc care, right abd sun in place and functioning well. right ileostomy functioning well. voiding well.

## 2018-10-26 NOTE — PROGRESS NOTE ADULT - PROBLEM SELECTOR PLAN 3
repeat UE ultrasound without dvt  agree with stopping eliquis going forward skin findings of back appear 2/2 dry skin  -ammonium lactate lotion for hydration/moisture of skin and anti-pruritic   -benadryl prn  -low suspicion for drug rash but monitor progression

## 2018-10-26 NOTE — PROGRESS NOTE ADULT - PROBLEM SELECTOR PROBLEM 4
Type 2 diabetes mellitus with diabetic polyneuropathy, without long-term current use of insulin Deep vein thrombosis (DVT) of brachial vein, unspecified chronicity, unspecified laterality

## 2018-10-26 NOTE — PROGRESS NOTE ADULT - PROBLEM SELECTOR PLAN 2
skin findings of back appear 2/2 dry skin  -ammonium lactate lotion for hydration/moisture of skin and anti-pruritic   -benadryl prn  -low suspicion for drug rash but monitor progression new. suspect prerenal (BUN/Cr ratio >20) vs less likely medication related (linezolid)  -trial of IVF  -bedside bladder sono if concerns for obstruction  -trend bmp inpatient

## 2018-10-26 NOTE — PROGRESS NOTE ADULT - PROBLEM SELECTOR PLAN 5
stable, normocytic anemia. likely 2/2 AOCD.   -can consider iron studies, b12, folate for further workup  -pt should be referred to GI on discharge for screening colonoscopy if he has never had one in setting of recent colonic perforation (however suspected to be 2/2 missed appendicitis). last a1c 7.0, controlled on oral medications. per patient he is only taking glipizide 10mg daily since discharge. complicated by diabetic neuropathy  -agree with sliding scale and monitoring of fingersticks while inpatient  -added 2 units humalog tidac and FS improved today  -continue gabapentin 300mg bid  -dc on glipizide

## 2018-10-26 NOTE — PROGRESS NOTE ADULT - PROBLEM SELECTOR PLAN 9
lovenox confirmed home meds with pt and updated in sunrise  eliquis can be stopped on d/c  recommend trying to titrate down salt tabs

## 2018-10-26 NOTE — DISCHARGE NOTE ADULT - CARE PROVIDER_API CALL
Chase Gonzalez), Diagnostic Radiology; [facility]  [address] [place] [address]  Phone: [phone]  Fax: [phone]    Neftali Adams), Surgery; Surgical Critical Care  [address] [place] [address]  Phone: [phone]  Fax: [phone]    Alen Vera), Infectious Disease; Internal Medicine  [address] [place] [address]  Phone: [phone]  Fax: [phone]

## 2018-10-26 NOTE — DISCHARGE NOTE ADULT - CARE PLAN
Principal Discharge DX:	Abscess of abdominal cavity  Goal:	infection control  Assessment and plan of treatment:	Please continue to take IV antibiotics as prescribed.   Follow up with the infectious disease doctors within 1 week.  Follow up with the interventional radiology doctor in 1-2 weeks.   Follow up with the acute care surgeon in 1-2 weeks.

## 2018-10-26 NOTE — PROGRESS NOTE ADULT - ASSESSMENT
Patient's medical history is significant for initially presenting to [facility] in August with a perforated right colon, and he underwent an ileocolic resection with end ileostomy. Patient s/p completion of therapy for intraabdominal abscess.   Admitted with sepsis (fever, leucocytosis, tachycardia) due to GNR bacteremia and intra abdominal abscess.   S/p IR guided drain placement.   Resolved sepsis   Bacteroides bacteremia  Positive cx finding  Intra-abdominal abscess.     Plan:   Given E fecium ampicillin sensitive, decreased dose of Imipenem to 500 mg iv q8h   Will need 14 days therapy, drain check planned in a week.  CBC/CMP once a week while on OPAT.  Follow up repeat blood cx, NTD.   S/p PICC   Jump in Cr, monitor Cr, hydration.   PT TO FOLLOW WITH ME AS OUTPT .

## 2018-10-26 NOTE — PROGRESS NOTE ADULT - SUBJECTIVE AND OBJECTIVE BOX
58y old  Male who presents with a chief complaint of abdominal pain (26 Oct 2018 14:15)      Interval history:  Afebrile, no cough, no SOB, denies nausea today, some abdominal pain, denies dysuria. He had itching of the back overnight, improved after applying moisturizer, no documented rash.     No Known Allergies    Antimicrobials:    imipenem/cilastatin  IVPB 500 milliGRAM(s) IV Intermittent every 8 hours    REVIEW OF SYSTEMS:  No chest pain   No rash.     Vital Signs Last 24 Hrs  T(C): 36.6 (10-26-18 @ 16:50), Max: 36.8 (10-25-18 @ 20:59)  T(F): 97.8 (10-26-18 @ 16:50), Max: 98.2 (10-25-18 @ 20:59)  HR: 92 (10-26-18 @ 16:50) (86 - 93)  BP: 117/77 (10-26-18 @ 16:50) (103/65 - 123/70)  RR: 18 (10-26-18 @ 16:50) (16 - 18)  SpO2: 100% (10-26-18 @ 16:50) (99% - 100%)    PHYSICAL EXAM:  Patient in no acute distress. AAOX3.  No icterus, no oral ulcers.  Cardiovascular: S1S2 normal.  Lungs: + air entry B/L lung fields.  Gastrointestinal: soft, + ostomy, rt sided drain.   Extremities: no edema.  Lt arm PICC   No rash                           11.1   8.2   )-----------( 358      ( 26 Oct 2018 07:09 )             34.1   10-26    132<L>  |  97  |  32<H>  ----------------------------<  178<H>  4.4   |  23  |  1.38<H>    Ca    10.2      26 Oct 2018 07:09  Phos  4.0     10-26  Mg     1.8     10-26        Culture - Blood (collected 24 Oct 2018 09:41)  Source: .Blood Blood-Peripheral  Preliminary Report (25 Oct 2018 10:01):    No growth to date.    Culture - Blood (collected 24 Oct 2018 09:41)  Source: .Blood Blood-Peripheral  Preliminary Report (25 Oct 2018 10:01):    No growth to date.      Radiology:  < from: Xray Chest 1 View- PORTABLE-Urgent (10.26.18 @ 13:58) >    IMPRESSION:   Left upper extremity PICC terminates in the superior vena cava.

## 2018-10-26 NOTE — DISCHARGE NOTE ADULT - HOME CARE AGENCY
[facility] care for iv infusion soc 10/27 [phone]), [facility] home care soc 10/27 [phone].

## 2018-10-26 NOTE — DISCHARGE NOTE ADULT - CARE PROVIDERS DIRECT ADDRESSES
[email or web address]

## 2018-10-26 NOTE — PROGRESS NOTE ADULT - PROBLEM SELECTOR PROBLEM 5
Anemia Type 2 diabetes mellitus with diabetic polyneuropathy, without long-term current use of insulin

## 2018-10-26 NOTE — DISCHARGE NOTE ADULT - ADDITIONAL INSTRUCTIONS
Please call Dr. Gonzalez' office to make an appointment at [phone]. You will followup in 1-2 weeks.   Please followup with your surgeon Dr. Adams with Surgery in 1-2 weeks. Please call [phone] to schedule.  Please followup [facility] Dr. Vera with Infectious Disease. Please call  [phone] to schedule an appointment.

## 2018-10-26 NOTE — PROGRESS NOTE ADULT - PROBLEM SELECTOR PROBLEM 3
Deep vein thrombosis (DVT) of brachial vein, unspecified chronicity, unspecified laterality Dry skin dermatitis

## 2018-10-26 NOTE — DISCHARGE NOTE ADULT - MEDICATION SUMMARY - MEDICATIONS TO TAKE
I will START or STAY ON the medications listed below when I get home from the hospital:    Three in one commode  -- Indication: For Home aid    acetaminophen 325 mg oral tablet  -- 2 tab(s) by mouth every 6 hours  -- Indication: For Home medication    apixaban 5 mg oral tablet  -- 2 tab(s) by mouth every 12 hours  -- Indication: For Home medication    gabapentin 300 mg oral tablet  -- 1 tab(s) by mouth 2 times a day  -- Indication: For Home medication    glipiZIDE 10 mg oral tablet  -- 1 tab(s) by mouth once a day  -- Indication: For Home medication    metoprolol tartrate 25 mg oral tablet  -- 1 tab(s) by mouth 2 times a day  -- Indication: For Home medication    imipenem-cilastatin 500 mg injection  -- 500 milligram(s) intravenously (IVPB) in 0.9% NaCl 100ml IV intermittent, every 8 hours, infuse over 60 minutes.  Continue treatment for 14 days  -- Indication: For Antibiotics    sodium chloride 1 g oral tablet  -- 1 tab(s) by mouth 3 times a day  -- Indication: For Home medication    melatonin 5 mg oral tablet  -- 1 tab(s) by mouth once a day (at bedtime)  -- Indication: For Home medication    fluticasone 50 mcg/inh inhalation powder  -- 1 puff(s) inhaled 2 times a day  -- Indication: For Home medication    Multiple Vitamins oral tablet  -- 1 tab(s) by mouth once a day  -- Indication: For Home medication

## 2018-10-26 NOTE — PROGRESS NOTE ADULT - SUBJECTIVE AND OBJECTIVE BOX
Patient is a 58y old  Male who presents with a chief complaint of abdominal pain (26 Oct 2018 11:11)      SUBJECTIVE / OVERNIGHT EVENTS:    pt complains of pruritus of back   continued abdominal discomfort at site of drain and ostomy  ros otherwise negative    MEDICATIONS  (STANDING):  ammonium lactate 12% Lotion 1 Application(s) Topical two times a day  dextrose 5%. 1000 milliLiter(s) (50 mL/Hr) IV Continuous <Continuous>  dextrose 50% Injectable 12.5 Gram(s) IV Push once  dextrose 50% Injectable 25 Gram(s) IV Push once  dextrose 50% Injectable 25 Gram(s) IV Push once  enoxaparin Injectable 40 milliGRAM(s) SubCutaneous every 24 hours  fluticasone propionate 50 MICROgram(s)/spray Nasal Spray 1 Spray(s) Both Nostrils two times a day  gabapentin 300 milliGRAM(s) Oral two times a day  imipenem/cilastatin  IVPB 500 milliGRAM(s) IV Intermittent every 8 hours  influenza   Vaccine 0.5 milliLiter(s) IntraMuscular once  insulin lispro (HumaLOG) corrective regimen sliding scale   SubCutaneous Before meals and at bedtime  insulin lispro Injectable (HumaLOG) 2 Unit(s) SubCutaneous three times a day before meals  metoprolol tartrate 25 milliGRAM(s) Oral two times a day  multivitamin 1 Tablet(s) Oral daily  sodium chloride 1 Gram(s) Oral three times a day    MEDICATIONS  (PRN):  acetaminophen   Tablet .. 650 milliGRAM(s) Oral every 6 hours PRN Mild Pain (1 - 3)  dextrose 40% Gel 15 Gram(s) Oral once PRN Blood Glucose LESS THAN 70 milliGRAM(s)/deciliter  diphenhydrAMINE 25 milliGRAM(s) Oral every 4 hours PRN Rash and/or Itching  glucagon  Injectable 1 milliGRAM(s) IntraMuscular once PRN Glucose LESS THAN 70 milligrams/deciliter  ibuprofen  Tablet. 600 milliGRAM(s) Oral every 6 hours PRN Moderate Pain (4 - 6)  oxyCODONE    IR 5 milliGRAM(s) Oral every 4 hours PRN Severe Pain (7 - 10)        CAPILLARY BLOOD GLUCOSE      POCT Blood Glucose.: 199 mg/dL (26 Oct 2018 12:20)  POCT Blood Glucose.: 166 mg/dL (26 Oct 2018 09:07)  POCT Blood Glucose.: 172 mg/dL (25 Oct 2018 20:58)  POCT Blood Glucose.: 234 mg/dL (25 Oct 2018 19:10)    I&O's Summary    25 Oct 2018 07:01  -  26 Oct 2018 07:00  --------------------------------------------------------  IN: 720 mL / OUT: 3295 mL / NET: -2575 mL    26 Oct 2018 07:01  -  26 Oct 2018 14:15  --------------------------------------------------------  IN: 720 mL / OUT: 1200 mL / NET: -480 mL        PHYSICAL EXAM:  GENERAL: NAD, well-developed  HEAD:  Atraumatic, Normocephalic  EYES: EOMI, PERRLA, conjunctiva and sclera clear  NECK: Supple, No JVD  CHEST/LUNG: Clear to auscultation bilaterally; No wheeze  HEART: Regular rate and rhythm; No murmurs, rubs, or gallops  ABDOMEN: + ostomy and drain in place. some discomfort to palpation of surrounding areas.   EXTREMITIES:  2+ Peripheral Pulses, No clubbing, cyanosis, or edema  PSYCH: AAOx3  NEUROLOGY: non-focal  SKIN: patchy desquamation of skin throughout back-- appears to be very dry. no macules/papules/plaques noted. no erythematous lesions.      LABS:                        11.1   8.2   )-----------( 358      ( 26 Oct 2018 07:09 )             34.1     10-26    132<L>  |  97  |  32<H>  ----------------------------<  178<H>  4.4   |  23  |  1.38<H>    Ca    10.2      26 Oct 2018 07:09  Phos  4.0     10-26  Mg     1.8     10-26                RADIOLOGY & ADDITIONAL TESTS:    Imaging Personally Reviewed:    Consultant(s) Notes Reviewed:  ID, case management.     Care Discussed with Consultants/Other Providers: trauma surgry

## 2018-10-26 NOTE — DISCHARGE NOTE ADULT - HOSPITAL COURSE
Mr Choi came into the ED with an abdominal collection in the RLQ. He was having fevers and abdominal pain. He had a drain removed from the area just over a week prior.     He was admitted to the hospital and started on IV antibiotics. Interventional radiology was consulted to drain the collection. This was performed. With antibiotics and the drain, the patient's status improved. His fevers resolved and his vitals remained stable. A PICC line was placed on admission.     He was discharged with home care services to help administer IV antibiotics and care for his PICC.

## 2018-10-26 NOTE — PROGRESS NOTE ADULT - PROBLEM SELECTOR PLAN 6
stable and chronic from last admission. unclear cause. on salt tablets.   -continue salt tablets 1g tid, consider downtitration. stable, normocytic anemia. likely 2/2 AOCD.   -can consider iron studies, b12, folate for further workup  -pt should be referred to GI on discharge for screening colonoscopy if he has never had one in setting of recent colonic perforation (however suspected to be 2/2 missed appendicitis).

## 2018-10-26 NOTE — PROGRESS NOTE ADULT - PROBLEM SELECTOR PLAN 8
confirmed home meds with pt and updated in sunrise  eliquis can be stopped on d/c  recommend trying to titrate down salt tabs stable. continue metoprolol 25mg bid

## 2018-10-26 NOTE — PROGRESS NOTE ADULT - PROBLEM SELECTOR PLAN 4
last a1c 7.0, controlled on oral medications. per patient he is only taking glipizide 10mg daily since discharge. complicated by diabetic neuropathy  -agree with sliding scale and monitoring of fingersticks while inpatient  -added 2 units humalog tidac and FS improved today  -continue gabapentin 300mg bid  -dc on glipizide repeat UE ultrasound without dvt  agree with stopping eliquis going forward

## 2018-10-27 VITALS
RESPIRATION RATE: 17 BRPM | DIASTOLIC BLOOD PRESSURE: 72 MMHG | SYSTOLIC BLOOD PRESSURE: 118 MMHG | HEART RATE: 97 BPM | OXYGEN SATURATION: 98 % | TEMPERATURE: 98 F

## 2018-10-27 LAB
ANION GAP SERPL CALC-SCNC: 13 MMOL/L — SIGNIFICANT CHANGE UP (ref 5–17)
BUN SERPL-MCNC: 30 MG/DL — HIGH (ref 7–23)
CALCIUM SERPL-MCNC: 10.1 MG/DL — SIGNIFICANT CHANGE UP (ref 8.4–10.5)
CHLORIDE SERPL-SCNC: 101 MMOL/L — SIGNIFICANT CHANGE UP (ref 96–108)
CO2 SERPL-SCNC: 22 MMOL/L — SIGNIFICANT CHANGE UP (ref 22–31)
CREAT SERPL-MCNC: 1.12 MG/DL — SIGNIFICANT CHANGE UP (ref 0.5–1.3)
CULTURE RESULTS: SIGNIFICANT CHANGE UP
GLUCOSE BLDC GLUCOMTR-MCNC: 135 MG/DL — HIGH (ref 70–99)
GLUCOSE BLDC GLUCOMTR-MCNC: 214 MG/DL — HIGH (ref 70–99)
GLUCOSE SERPL-MCNC: 114 MG/DL — HIGH (ref 70–99)
HCT VFR BLD CALC: 32.5 % — LOW (ref 39–50)
HGB BLD-MCNC: 10.7 G/DL — LOW (ref 13–17)
MAGNESIUM SERPL-MCNC: 2.1 MG/DL — SIGNIFICANT CHANGE UP (ref 1.6–2.6)
MCHC RBC-ENTMCNC: 28.9 PG — SIGNIFICANT CHANGE UP (ref 27–34)
MCHC RBC-ENTMCNC: 32.9 GM/DL — SIGNIFICANT CHANGE UP (ref 32–36)
MCV RBC AUTO: 87.9 FL — SIGNIFICANT CHANGE UP (ref 80–100)
PHOSPHATE SERPL-MCNC: 4.4 MG/DL — SIGNIFICANT CHANGE UP (ref 2.5–4.5)
PLATELET # BLD AUTO: 385 K/UL — SIGNIFICANT CHANGE UP (ref 150–400)
POTASSIUM SERPL-MCNC: 4.3 MMOL/L — SIGNIFICANT CHANGE UP (ref 3.5–5.3)
POTASSIUM SERPL-SCNC: 4.3 MMOL/L — SIGNIFICANT CHANGE UP (ref 3.5–5.3)
RBC # BLD: 3.69 M/UL — LOW (ref 4.2–5.8)
RBC # FLD: 14.5 % — SIGNIFICANT CHANGE UP (ref 10.3–14.5)
SODIUM SERPL-SCNC: 136 MMOL/L — SIGNIFICANT CHANGE UP (ref 135–145)
SPECIMEN SOURCE: SIGNIFICANT CHANGE UP
WBC # BLD: 8.5 K/UL — SIGNIFICANT CHANGE UP (ref 3.8–10.5)
WBC # FLD AUTO: 8.5 K/UL — SIGNIFICANT CHANGE UP (ref 3.8–10.5)

## 2018-10-27 PROCEDURE — 85730 THROMBOPLASTIN TIME PARTIAL: CPT

## 2018-10-27 PROCEDURE — 87070 CULTURE OTHR SPECIMN AEROBIC: CPT

## 2018-10-27 PROCEDURE — 87086 URINE CULTURE/COLONY COUNT: CPT

## 2018-10-27 PROCEDURE — 87205 SMEAR GRAM STAIN: CPT

## 2018-10-27 PROCEDURE — C1729: CPT

## 2018-10-27 PROCEDURE — 87633 RESP VIRUS 12-25 TARGETS: CPT

## 2018-10-27 PROCEDURE — 96374 THER/PROPH/DIAG INJ IV PUSH: CPT | Mod: XU

## 2018-10-27 PROCEDURE — 94640 AIRWAY INHALATION TREATMENT: CPT

## 2018-10-27 PROCEDURE — 87186 SC STD MICRODIL/AGAR DIL: CPT

## 2018-10-27 PROCEDURE — C1887: CPT

## 2018-10-27 PROCEDURE — 87798 DETECT AGENT NOS DNA AMP: CPT

## 2018-10-27 PROCEDURE — 96375 TX/PRO/DX INJ NEW DRUG ADDON: CPT

## 2018-10-27 PROCEDURE — 81001 URINALYSIS AUTO W/SCOPE: CPT

## 2018-10-27 PROCEDURE — 84100 ASSAY OF PHOSPHORUS: CPT

## 2018-10-27 PROCEDURE — 93005 ELECTROCARDIOGRAM TRACING: CPT

## 2018-10-27 PROCEDURE — 74177 CT ABD & PELVIS W/CONTRAST: CPT

## 2018-10-27 PROCEDURE — 83735 ASSAY OF MAGNESIUM: CPT

## 2018-10-27 PROCEDURE — 82962 GLUCOSE BLOOD TEST: CPT

## 2018-10-27 PROCEDURE — 85027 COMPLETE CBC AUTOMATED: CPT

## 2018-10-27 PROCEDURE — 36569 INSJ PICC 5 YR+ W/O IMAGING: CPT

## 2018-10-27 PROCEDURE — 87581 M.PNEUMON DNA AMP PROBE: CPT

## 2018-10-27 PROCEDURE — 75984 XRAY CONTROL CATHETER CHANGE: CPT

## 2018-10-27 PROCEDURE — C1751: CPT

## 2018-10-27 PROCEDURE — 49423 EXCHANGE DRAINAGE CATHETER: CPT

## 2018-10-27 PROCEDURE — 80053 COMPREHEN METABOLIC PANEL: CPT

## 2018-10-27 PROCEDURE — 97116 GAIT TRAINING THERAPY: CPT

## 2018-10-27 PROCEDURE — 71045 X-RAY EXAM CHEST 1 VIEW: CPT

## 2018-10-27 PROCEDURE — 87040 BLOOD CULTURE FOR BACTERIA: CPT

## 2018-10-27 PROCEDURE — 80048 BASIC METABOLIC PNL TOTAL CA: CPT

## 2018-10-27 PROCEDURE — 85610 PROTHROMBIN TIME: CPT

## 2018-10-27 PROCEDURE — 87150 DNA/RNA AMPLIFIED PROBE: CPT

## 2018-10-27 PROCEDURE — 93971 EXTREMITY STUDY: CPT

## 2018-10-27 PROCEDURE — 83605 ASSAY OF LACTIC ACID: CPT

## 2018-10-27 PROCEDURE — C1769: CPT

## 2018-10-27 PROCEDURE — 97162 PT EVAL MOD COMPLEX 30 MIN: CPT

## 2018-10-27 PROCEDURE — 87486 CHLMYD PNEUM DNA AMP PROBE: CPT

## 2018-10-27 PROCEDURE — 99285 EMERGENCY DEPT VISIT HI MDM: CPT | Mod: 25

## 2018-10-27 RX ADMIN — Medication 1 SPRAY(S): at 05:52

## 2018-10-27 RX ADMIN — Medication 2 UNIT(S): at 11:57

## 2018-10-27 RX ADMIN — Medication 25 MILLIGRAM(S): at 05:53

## 2018-10-27 RX ADMIN — Medication 650 MILLIGRAM(S): at 05:52

## 2018-10-27 RX ADMIN — Medication 4: at 11:57

## 2018-10-27 RX ADMIN — Medication 1 APPLICATION(S): at 05:50

## 2018-10-27 RX ADMIN — SODIUM CHLORIDE 75 MILLILITER(S): 9 INJECTION INTRAMUSCULAR; INTRAVENOUS; SUBCUTANEOUS at 05:52

## 2018-10-27 RX ADMIN — IMIPENEM AND CILASTATIN 100 MILLIGRAM(S): 250; 250 INJECTION, POWDER, FOR SOLUTION INTRAVENOUS at 05:50

## 2018-10-27 RX ADMIN — GABAPENTIN 300 MILLIGRAM(S): 400 CAPSULE ORAL at 05:52

## 2018-10-27 RX ADMIN — Medication 2 UNIT(S): at 10:01

## 2018-10-27 RX ADMIN — ENOXAPARIN SODIUM 40 MILLIGRAM(S): 100 INJECTION SUBCUTANEOUS at 05:51

## 2018-10-27 RX ADMIN — SODIUM CHLORIDE 1 GRAM(S): 9 INJECTION INTRAMUSCULAR; INTRAVENOUS; SUBCUTANEOUS at 05:53

## 2018-10-27 RX ADMIN — Medication 1 TABLET(S): at 11:04

## 2018-10-27 RX ADMIN — Medication 650 MILLIGRAM(S): at 06:22

## 2018-10-27 NOTE — PROGRESS NOTE ADULT - PROVIDER SPECIALTY LIST ADULT
Hospitalist
Hospitalist
Infectious Disease
Intervent Radiology
Trauma Surgery

## 2018-10-27 NOTE — PROGRESS NOTE ADULT - SUBJECTIVE AND OBJECTIVE BOX
SURGERY DAILY PROGRESS NOTE:     Subjective:    Patient was seen and examined this morning during morning rounds. Abdominal pain improving. No fevers or chills overnight. Patient received PICC line.    Objective:    NAD, awake and alert  Respirations nonlabored  Abd soft, mildly tender, nondistended, no rebound/guarding   RLQ ostomy functioning with thick brown stool   R sided IR drain with output    Vitals 24hrs  Vital Signs Last 24 Hrs  T(C): 36.6 (27 Oct 2018 04:50), Max: 37.1 (26 Oct 2018 21:16)  T(F): 97.9 (27 Oct 2018 04:50), Max: 98.7 (26 Oct 2018 21:16)  HR: 93 (27 Oct 2018 04:50) (85 - 93)  BP: 115/69 (27 Oct 2018 04:50) (103/65 - 126/71)  BP(mean): --  RR: 18 (27 Oct 2018 04:50) (16 - 18)  SpO2: 99% (27 Oct 2018 04:50) (96% - 100%)      10-25-18 @ 07:01  -  10-26-18 @ 07:00  --------------------------------------------------------  IN: 720 mL / OUT: 3295 mL / NET: -2575 mL    10-26-18 @ 07:01  -  10-27-18 @ 05:06  --------------------------------------------------------  IN: 1935 mL / OUT: 3220 mL / NET: -1285 mL      Lab Results 24hrs:                        11.1   8.2   )-----------( 358      ( 26 Oct 2018 07:09 )             34.1     10-26    132<L>  |  97  |  32<H>  ----------------------------<  178<H>  4.4   |  23  |  1.38<H>    Ca    10.2      26 Oct 2018 07:09  Phos  4.0     10-26  Mg     1.8     10-26

## 2018-10-27 NOTE — PROGRESS NOTE ADULT - REASON FOR ADMISSION
abdominal pain

## 2018-10-27 NOTE — PROGRESS NOTE ADULT - ASSESSMENT
ASSESSMENT: Patient is a 58y old m with perforated colon s/p ileocolic resection now with recurrent abdominal fluid collection now s/p IR drainage. Fluid culture growing ESBL Ecoli and Enterococcus faecium.    PLAN:   - Imipenem; has PICC  - f/u ID recs regarding Abx  - Diet: regular  - Pain control prn  - Benadryl prn  - Dispo: home, home PT    Acute Care Surgery  [phone] ASSESSMENT: Patient is a 58y old m with perforated colon s/p ileocolic resection now with recurrent abdominal fluid collection now s/p IR drainage. Fluid culture growing ESBL Ecoli and Enterococcus faecium.    PLAN:   - trend Cr; on IVFs  - Imipenem; has PICC  - f/u ID recs regarding Abx  - Diet: regular  - Pain control prn  - Benadryl prn  - Dispo: home, home PT    Acute Care Surgery  [phone]

## 2018-10-29 LAB
CULTURE RESULTS: SIGNIFICANT CHANGE UP
CULTURE RESULTS: SIGNIFICANT CHANGE UP
SPECIMEN SOURCE: SIGNIFICANT CHANGE UP
SPECIMEN SOURCE: SIGNIFICANT CHANGE UP

## 2018-11-05 ENCOUNTER — EMERGENCY (EMERGENCY)
Facility: HOSPITAL | Age: 58
LOS: 0 days | Discharge: HOME | End: 2018-11-05
Attending: EMERGENCY MEDICINE
Payer: MEDICAID

## 2018-11-05 VITALS
RESPIRATION RATE: 17 BRPM | HEART RATE: 87 BPM | OXYGEN SATURATION: 99 % | WEIGHT: 125 LBS | DIASTOLIC BLOOD PRESSURE: 58 MMHG | TEMPERATURE: 98 F | SYSTOLIC BLOOD PRESSURE: 102 MMHG

## 2018-11-05 VITALS
SYSTOLIC BLOOD PRESSURE: 121 MMHG | TEMPERATURE: 99 F | DIASTOLIC BLOOD PRESSURE: 60 MMHG | RESPIRATION RATE: 18 BRPM | HEART RATE: 89 BPM | OXYGEN SATURATION: 98 %

## 2018-11-05 DIAGNOSIS — T85.698A OTHER MECHANICAL COMPLICATION OF OTHER SPECIFIED INTERNAL PROSTHETIC DEVICES, IMPLANTS AND GRAFTS, INITIAL ENCOUNTER: ICD-10-CM
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
## 2018-11-05 DIAGNOSIS — E11.9 TYPE 2 DIABETES MELLITUS WITHOUT COMPLICATIONS: ICD-10-CM

## 2018-11-05 DIAGNOSIS — K65.1 PERITONEAL ABSCESS: ICD-10-CM

## 2018-11-05 DIAGNOSIS — Z00.00 ENCOUNTER FOR GENERAL ADULT MEDICAL EXAMINATION WITHOUT ABNORMAL FINDINGS: ICD-10-CM

## 2018-11-05 DIAGNOSIS — I10 ESSENTIAL (PRIMARY) HYPERTENSION: ICD-10-CM
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
## 2018-11-05 DIAGNOSIS — Z98.890 OTHER SPECIFIED POSTPROCEDURAL STATES: Chronic | ICD-10-CM

## 2018-11-05 PROBLEM — M72.6 NECROTIZING FASCIITIS: Chronic | Status: ACTIVE | Noted: 2018-10-21

## 2018-11-05 LAB
ALBUMIN SERPL ELPH-MCNC: 2.8 G/DL — LOW (ref 3.3–5)
ALP SERPL-CCNC: 68 U/L — SIGNIFICANT CHANGE UP (ref 40–120)
ALT FLD-CCNC: 19 U/L — SIGNIFICANT CHANGE UP (ref 12–78)
ANION GAP SERPL CALC-SCNC: 9 MMOL/L — SIGNIFICANT CHANGE UP (ref 5–17)
AST SERPL-CCNC: 12 U/L — LOW (ref 15–37)
BASOPHILS # BLD AUTO: 0.05 K/UL — SIGNIFICANT CHANGE UP (ref 0–0.2)
BASOPHILS NFR BLD AUTO: 0.5 % — SIGNIFICANT CHANGE UP (ref 0–2)
BILIRUB SERPL-MCNC: 0.2 MG/DL — SIGNIFICANT CHANGE UP (ref 0.2–1.2)
BUN SERPL-MCNC: 17 MG/DL — SIGNIFICANT CHANGE UP (ref 7–23)
CALCIUM SERPL-MCNC: 9.3 MG/DL — SIGNIFICANT CHANGE UP (ref 8.5–10.1)
CHLORIDE SERPL-SCNC: 103 MMOL/L — SIGNIFICANT CHANGE UP (ref 96–108)
CO2 SERPL-SCNC: 27 MMOL/L — SIGNIFICANT CHANGE UP (ref 22–31)
CREAT SERPL-MCNC: 0.82 MG/DL — SIGNIFICANT CHANGE UP (ref 0.5–1.3)
EOSINOPHIL # BLD AUTO: 0.78 K/UL — HIGH (ref 0–0.5)
EOSINOPHIL NFR BLD AUTO: 7.1 % — HIGH (ref 0–6)
GLUCOSE SERPL-MCNC: 126 MG/DL — HIGH (ref 70–99)
HCT VFR BLD CALC: 30.7 % — LOW (ref 39–50)
HGB BLD-MCNC: 9.9 G/DL — LOW (ref 13–17)
IMM GRANULOCYTES NFR BLD AUTO: 0.5 % — SIGNIFICANT CHANGE UP (ref 0–1.5)
LACTATE SERPL-SCNC: 1.6 MMOL/L — SIGNIFICANT CHANGE UP (ref 0.7–2)
LIDOCAIN IGE QN: 308 U/L — SIGNIFICANT CHANGE UP (ref 73–393)
LYMPHOCYTES # BLD AUTO: 2.62 K/UL — SIGNIFICANT CHANGE UP (ref 1–3.3)
LYMPHOCYTES # BLD AUTO: 23.8 % — SIGNIFICANT CHANGE UP (ref 13–44)
MCHC RBC-ENTMCNC: 28.6 PG — SIGNIFICANT CHANGE UP (ref 27–34)
MCHC RBC-ENTMCNC: 32.2 GM/DL — SIGNIFICANT CHANGE UP (ref 32–36)
MCV RBC AUTO: 88.7 FL — SIGNIFICANT CHANGE UP (ref 80–100)
MONOCYTES # BLD AUTO: 0.7 K/UL — SIGNIFICANT CHANGE UP (ref 0–0.9)
MONOCYTES NFR BLD AUTO: 6.4 % — SIGNIFICANT CHANGE UP (ref 2–14)
NEUTROPHILS # BLD AUTO: 6.82 K/UL — SIGNIFICANT CHANGE UP (ref 1.8–7.4)
NEUTROPHILS NFR BLD AUTO: 61.7 % — SIGNIFICANT CHANGE UP (ref 43–77)
NRBC # BLD: 0 /100 WBCS — SIGNIFICANT CHANGE UP (ref 0–0)
PLATELET # BLD AUTO: 307 K/UL — SIGNIFICANT CHANGE UP (ref 150–400)
POTASSIUM SERPL-MCNC: 4.8 MMOL/L — SIGNIFICANT CHANGE UP (ref 3.5–5.3)
POTASSIUM SERPL-SCNC: 4.8 MMOL/L — SIGNIFICANT CHANGE UP (ref 3.5–5.3)
PROT SERPL-MCNC: 8 GM/DL — SIGNIFICANT CHANGE UP (ref 6–8.3)
RBC # BLD: 3.46 M/UL — LOW (ref 4.2–5.8)
RBC # FLD: 14.6 % — HIGH (ref 10.3–14.5)
SODIUM SERPL-SCNC: 139 MMOL/L — SIGNIFICANT CHANGE UP (ref 135–145)
WBC # BLD: 11.02 K/UL — HIGH (ref 3.8–10.5)
WBC # FLD AUTO: 11.02 K/UL — HIGH (ref 3.8–10.5)

## 2018-11-05 PROCEDURE — 99284 EMERGENCY DEPT VISIT MOD MDM: CPT

## 2018-11-05 PROCEDURE — 74177 CT ABD & PELVIS W/CONTRAST: CPT | Mod: 26

## 2018-11-05 RX ORDER — IMIPENEM AND CILASTATIN 250; 250 MG/100ML; MG/100ML
500 INJECTION, POWDER, FOR SOLUTION INTRAVENOUS ONCE
Qty: 0 | Refills: 0 | Status: COMPLETED | OUTPATIENT
Start: 2018-11-05 | End: 2018-11-05

## 2018-11-05 RX ADMIN — IMIPENEM AND CILASTATIN 100 MILLIGRAM(S): 250; 250 INJECTION, POWDER, FOR SOLUTION INTRAVENOUS at 18:28

## 2018-11-05 NOTE — ED ADULT NURSE NOTE - NSIMPLEMENTINTERV_GEN_ALL_ED
Implemented All Universal Safety Interventions:  Section to call system. Call bell, personal items and telephone within reach. Instruct patient to call for assistance. Room bathroom lighting operational. Non-slip footwear when patient is off stretcher. Physically safe environment: no spills, clutter or unnecessary equipment. Stretcher in lowest position, wheels locked, appropriate side rails in place.

## 2018-11-05 NOTE — ED PROVIDER NOTE - MEDICAL DECISION MAKING DETAILS
pt pending CT abd pelvis for r/o abd abscess, spoke to Dr. Montaño covering Dr. romano [phone] - otherwise aware that if pt has abscess - will need transfer to [place] for further care - pt pending CT abd pelvis for r/o abd abscess, spoke to Dr. Montaño covering Dr. romano [phone] - otherwise aware that if pt has abscess - will need transfer to [place] for further care -  Noted CT clearing collection -will dc with continued PICC abx and follow up with Dr. Muir - call made to update provider about pt current condition. pt pending CT abd pelvis for r/o abd abscess, spoke to Dr. Montaño covering Dr. cardoso [phone] - otherwise aware that if pt has abscess - will need transfer to [place] for further care -  Noted CT clearing collection -will dc with continued PICC abx and follow up with Dr. Cardoso - call made to update provider about pt current condition.

## 2018-11-05 NOTE — ED PROVIDER NOTE - OBJECTIVE STATEMENT
58 year old male with PMH of DM II , HTN, recent hx of ileostomy due to complicated acute appendicitis with perforation - Surgery performed by Dr. Cardoso otherwise then developed abscess to R side of abdomen with SUDHIR drained placed, removed then replaced again as of 10/22 with noted collection to area of lower abdomen - states today SUDHIR drain fell out again on its own. Denies any fever/chills no abd pain.

## 2018-11-16 ENCOUNTER — INPATIENT (INPATIENT)
Facility: HOSPITAL | Age: 58
LOS: 6 days | Discharge: HOME | DRG: 871 | End: 2018-11-23
Attending: SURGERY | Admitting: SURGERY
Payer: MEDICAID

## 2018-11-16 VITALS
OXYGEN SATURATION: 98 % | HEART RATE: 102 BPM | SYSTOLIC BLOOD PRESSURE: 123 MMHG | RESPIRATION RATE: 16 BRPM | TEMPERATURE: 98 F | HEIGHT: 72 IN | DIASTOLIC BLOOD PRESSURE: 75 MMHG

## 2018-11-16 DIAGNOSIS — Z98.890 OTHER SPECIFIED POSTPROCEDURAL STATES: Chronic | ICD-10-CM

## 2018-11-16 DIAGNOSIS — K68.12 PSOAS MUSCLE ABSCESS: ICD-10-CM

## 2018-11-16 LAB
ALBUMIN SERPL ELPH-MCNC: 3.8 G/DL — SIGNIFICANT CHANGE UP (ref 3.3–5)
ALP SERPL-CCNC: 61 U/L — SIGNIFICANT CHANGE UP (ref 40–120)
ALT FLD-CCNC: 10 U/L — SIGNIFICANT CHANGE UP (ref 10–45)
ANION GAP SERPL CALC-SCNC: 11 MMOL/L — SIGNIFICANT CHANGE UP (ref 5–17)
ANION GAP SERPL CALC-SCNC: 15 MMOL/L — SIGNIFICANT CHANGE UP (ref 5–17)
APPEARANCE UR: CLEAR — SIGNIFICANT CHANGE UP
APTT BLD: 30.6 SEC — SIGNIFICANT CHANGE UP (ref 27.5–36.3)
AST SERPL-CCNC: 11 U/L — SIGNIFICANT CHANGE UP (ref 10–40)
BACTERIA # UR AUTO: NEGATIVE — SIGNIFICANT CHANGE UP
BASOPHILS # BLD AUTO: 0 K/UL — SIGNIFICANT CHANGE UP (ref 0–0.2)
BASOPHILS NFR BLD AUTO: 0.2 % — SIGNIFICANT CHANGE UP (ref 0–2)
BILIRUB SERPL-MCNC: 0.3 MG/DL — SIGNIFICANT CHANGE UP (ref 0.2–1.2)
BILIRUB UR-MCNC: NEGATIVE — SIGNIFICANT CHANGE UP
BUN SERPL-MCNC: 25 MG/DL — HIGH (ref 7–23)
BUN SERPL-MCNC: 26 MG/DL — HIGH (ref 7–23)
CALCIUM SERPL-MCNC: 9.4 MG/DL — SIGNIFICANT CHANGE UP (ref 8.4–10.5)
CALCIUM SERPL-MCNC: 9.5 MG/DL — SIGNIFICANT CHANGE UP (ref 8.4–10.5)
CHLORIDE SERPL-SCNC: 96 MMOL/L — SIGNIFICANT CHANGE UP (ref 96–108)
CHLORIDE SERPL-SCNC: 96 MMOL/L — SIGNIFICANT CHANGE UP (ref 96–108)
CO2 SERPL-SCNC: 17 MMOL/L — LOW (ref 22–31)
CO2 SERPL-SCNC: 20 MMOL/L — LOW (ref 22–31)
COLOR SPEC: SIGNIFICANT CHANGE UP
CREAT SERPL-MCNC: 0.91 MG/DL — SIGNIFICANT CHANGE UP (ref 0.5–1.3)
CREAT SERPL-MCNC: 0.95 MG/DL — SIGNIFICANT CHANGE UP (ref 0.5–1.3)
DIFF PNL FLD: NEGATIVE — SIGNIFICANT CHANGE UP
EOSINOPHIL # BLD AUTO: 0 K/UL — SIGNIFICANT CHANGE UP (ref 0–0.5)
EOSINOPHIL NFR BLD AUTO: 0.2 % — SIGNIFICANT CHANGE UP (ref 0–6)
EPI CELLS # UR: 0 /HPF — SIGNIFICANT CHANGE UP
GAS PNL BLDV: SIGNIFICANT CHANGE UP
GLUCOSE SERPL-MCNC: 197 MG/DL — HIGH (ref 70–99)
GLUCOSE SERPL-MCNC: 240 MG/DL — HIGH (ref 70–99)
GLUCOSE UR QL: NEGATIVE — SIGNIFICANT CHANGE UP
HCT VFR BLD CALC: 32.3 % — LOW (ref 39–50)
HGB BLD-MCNC: 11.3 G/DL — LOW (ref 13–17)
HYALINE CASTS # UR AUTO: 1 /LPF — SIGNIFICANT CHANGE UP (ref 0–2)
INR BLD: 1.63 RATIO — HIGH (ref 0.88–1.16)
KETONES UR-MCNC: NEGATIVE — SIGNIFICANT CHANGE UP
LEUKOCYTE ESTERASE UR-ACNC: NEGATIVE — SIGNIFICANT CHANGE UP
LYMPHOCYTES # BLD AUTO: 12 % — LOW (ref 13–44)
LYMPHOCYTES # BLD AUTO: 2.5 K/UL — SIGNIFICANT CHANGE UP (ref 1–3.3)
MCHC RBC-ENTMCNC: 30 PG — SIGNIFICANT CHANGE UP (ref 27–34)
MCHC RBC-ENTMCNC: 35 GM/DL — SIGNIFICANT CHANGE UP (ref 32–36)
MCV RBC AUTO: 85.8 FL — SIGNIFICANT CHANGE UP (ref 80–100)
MONOCYTES # BLD AUTO: 2.2 K/UL — HIGH (ref 0–0.9)
MONOCYTES NFR BLD AUTO: 10.9 % — SIGNIFICANT CHANGE UP (ref 2–14)
NEUTROPHILS # BLD AUTO: 15.7 K/UL — HIGH (ref 1.8–7.4)
NEUTROPHILS NFR BLD AUTO: 76.7 % — SIGNIFICANT CHANGE UP (ref 43–77)
NITRITE UR-MCNC: NEGATIVE — SIGNIFICANT CHANGE UP
PH UR: 6 — SIGNIFICANT CHANGE UP (ref 5–8)
PLATELET # BLD AUTO: 228 K/UL — SIGNIFICANT CHANGE UP (ref 150–400)
POTASSIUM SERPL-MCNC: 5.5 MMOL/L — HIGH (ref 3.5–5.3)
POTASSIUM SERPL-MCNC: 5.8 MMOL/L — HIGH (ref 3.5–5.3)
POTASSIUM SERPL-SCNC: 5.5 MMOL/L — HIGH (ref 3.5–5.3)
POTASSIUM SERPL-SCNC: 5.8 MMOL/L — HIGH (ref 3.5–5.3)
PROT SERPL-MCNC: 8.1 G/DL — SIGNIFICANT CHANGE UP (ref 6–8.3)
PROT UR-MCNC: SIGNIFICANT CHANGE UP
PROTHROM AB SERPL-ACNC: 19 SEC — HIGH (ref 10–12.9)
RBC # BLD: 3.77 M/UL — LOW (ref 4.2–5.8)
RBC # FLD: 13.9 % — SIGNIFICANT CHANGE UP (ref 10.3–14.5)
RBC CASTS # UR COMP ASSIST: 1 /HPF — SIGNIFICANT CHANGE UP (ref 0–4)
SODIUM SERPL-SCNC: 127 MMOL/L — LOW (ref 135–145)
SODIUM SERPL-SCNC: 128 MMOL/L — LOW (ref 135–145)
SP GR SPEC: 1.03 — HIGH (ref 1.01–1.02)
UROBILINOGEN FLD QL: NEGATIVE — SIGNIFICANT CHANGE UP
WBC # BLD: 20.5 K/UL — HIGH (ref 3.8–10.5)
WBC # FLD AUTO: 20.5 K/UL — HIGH (ref 3.8–10.5)
WBC UR QL: 2 /HPF — SIGNIFICANT CHANGE UP (ref 0–5)

## 2018-11-16 PROCEDURE — 99285 EMERGENCY DEPT VISIT HI MDM: CPT | Mod: 25

## 2018-11-16 PROCEDURE — 74177 CT ABD & PELVIS W/CONTRAST: CPT | Mod: 26

## 2018-11-16 PROCEDURE — 93010 ELECTROCARDIOGRAM REPORT: CPT

## 2018-11-16 PROCEDURE — 99291 CRITICAL CARE FIRST HOUR: CPT

## 2018-11-16 RX ORDER — METOPROLOL TARTRATE 50 MG
25 TABLET ORAL
Qty: 0 | Refills: 0 | Status: DISCONTINUED | OUTPATIENT
Start: 2018-11-16 | End: 2018-11-16

## 2018-11-16 RX ORDER — MEROPENEM 1 G/30ML
1000 INJECTION INTRAVENOUS EVERY 8 HOURS
Qty: 0 | Refills: 0 | Status: DISCONTINUED | OUTPATIENT
Start: 2018-11-16 | End: 2018-11-23

## 2018-11-16 RX ORDER — ACETAMINOPHEN 500 MG
1000 TABLET ORAL ONCE
Qty: 0 | Refills: 0 | Status: COMPLETED | OUTPATIENT
Start: 2018-11-16 | End: 2018-11-16

## 2018-11-16 RX ORDER — SODIUM CHLORIDE 9 MG/ML
1000 INJECTION INTRAMUSCULAR; INTRAVENOUS; SUBCUTANEOUS
Qty: 0 | Refills: 0 | Status: DISCONTINUED | OUTPATIENT
Start: 2018-11-16 | End: 2018-11-18

## 2018-11-16 RX ORDER — DEXTROSE 50 % IN WATER 50 %
50 SYRINGE (ML) INTRAVENOUS ONCE
Qty: 0 | Refills: 0 | Status: COMPLETED | OUTPATIENT
Start: 2018-11-16 | End: 2018-11-16

## 2018-11-16 RX ORDER — ACETAMINOPHEN 500 MG
650 TABLET ORAL ONCE
Qty: 0 | Refills: 0 | Status: DISCONTINUED | OUTPATIENT
Start: 2018-11-16 | End: 2018-11-16

## 2018-11-16 RX ORDER — SODIUM CHLORIDE 9 MG/ML
1000 INJECTION, SOLUTION INTRAVENOUS ONCE
Qty: 0 | Refills: 0 | Status: COMPLETED | OUTPATIENT
Start: 2018-11-16 | End: 2018-11-16

## 2018-11-16 RX ORDER — FLUTICASONE PROPIONATE 220 MCG
2 AEROSOL WITH ADAPTER (GRAM) INHALATION ONCE
Qty: 0 | Refills: 0 | Status: COMPLETED | OUTPATIENT
Start: 2018-11-16 | End: 2018-11-17

## 2018-11-16 RX ORDER — SODIUM CHLORIDE 9 MG/ML
1 INJECTION INTRAMUSCULAR; INTRAVENOUS; SUBCUTANEOUS THREE TIMES A DAY
Qty: 0 | Refills: 0 | Status: DISCONTINUED | OUTPATIENT
Start: 2018-11-16 | End: 2018-11-18

## 2018-11-16 RX ORDER — SODIUM CHLORIDE 9 MG/ML
1000 INJECTION, SOLUTION INTRAVENOUS
Qty: 0 | Refills: 0 | Status: DISCONTINUED | OUTPATIENT
Start: 2018-11-16 | End: 2018-11-16

## 2018-11-16 RX ORDER — GABAPENTIN 400 MG/1
300 CAPSULE ORAL EVERY 12 HOURS
Qty: 0 | Refills: 0 | Status: DISCONTINUED | OUTPATIENT
Start: 2018-11-16 | End: 2018-11-23

## 2018-11-16 RX ORDER — SODIUM CHLORIDE 9 MG/ML
1000 INJECTION INTRAMUSCULAR; INTRAVENOUS; SUBCUTANEOUS ONCE
Qty: 0 | Refills: 0 | Status: COMPLETED | OUTPATIENT
Start: 2018-11-16 | End: 2018-11-16

## 2018-11-16 RX ORDER — VANCOMYCIN HCL 1 G
1000 VIAL (EA) INTRAVENOUS ONCE
Qty: 0 | Refills: 0 | Status: COMPLETED | OUTPATIENT
Start: 2018-11-16 | End: 2018-11-16

## 2018-11-16 RX ORDER — ALBUTEROL 90 UG/1
2.5 AEROSOL, METERED ORAL ONCE
Qty: 0 | Refills: 0 | Status: COMPLETED | OUTPATIENT
Start: 2018-11-16 | End: 2018-11-16

## 2018-11-16 RX ORDER — INSULIN HUMAN 100 [IU]/ML
10 INJECTION, SOLUTION SUBCUTANEOUS ONCE
Qty: 0 | Refills: 0 | Status: COMPLETED | OUTPATIENT
Start: 2018-11-16 | End: 2018-11-16

## 2018-11-16 RX ADMIN — SODIUM CHLORIDE 100 MILLILITER(S): 9 INJECTION INTRAMUSCULAR; INTRAVENOUS; SUBCUTANEOUS at 23:19

## 2018-11-16 RX ADMIN — Medication 25 MILLIGRAM(S): at 21:37

## 2018-11-16 RX ADMIN — ALBUTEROL 2.5 MILLIGRAM(S): 90 AEROSOL, METERED ORAL at 19:02

## 2018-11-16 RX ADMIN — GABAPENTIN 300 MILLIGRAM(S): 400 CAPSULE ORAL at 21:38

## 2018-11-16 RX ADMIN — Medication 1000 MILLIGRAM(S): at 20:08

## 2018-11-16 RX ADMIN — Medication 1000 MILLIGRAM(S): at 19:15

## 2018-11-16 RX ADMIN — Medication 400 MILLIGRAM(S): at 17:10

## 2018-11-16 RX ADMIN — SODIUM CHLORIDE 1000 MILLILITER(S): 9 INJECTION, SOLUTION INTRAVENOUS at 17:10

## 2018-11-16 RX ADMIN — SODIUM CHLORIDE 200 MILLILITER(S): 9 INJECTION, SOLUTION INTRAVENOUS at 21:41

## 2018-11-16 RX ADMIN — SODIUM CHLORIDE 2000 MILLILITER(S): 9 INJECTION INTRAMUSCULAR; INTRAVENOUS; SUBCUTANEOUS at 19:27

## 2018-11-16 RX ADMIN — Medication 50 MILLILITER(S): at 21:11

## 2018-11-16 RX ADMIN — MEROPENEM 100 MILLIGRAM(S): 1 INJECTION INTRAVENOUS at 21:45

## 2018-11-16 RX ADMIN — INSULIN HUMAN 10 UNIT(S): 100 INJECTION, SOLUTION SUBCUTANEOUS at 21:12

## 2018-11-16 RX ADMIN — Medication 250 MILLIGRAM(S): at 20:08

## 2018-11-16 RX ADMIN — SODIUM CHLORIDE 1 GRAM(S): 9 INJECTION INTRAMUSCULAR; INTRAVENOUS; SUBCUTANEOUS at 21:47

## 2018-11-16 NOTE — H&P ADULT - NSHPLABSRESULTS_GEN_ALL_CORE
11.3   20.5  )-----------( 228      ( 2018 17:56 )             32.3     11-16    127<L>  |  96  |  25<H>  ----------------------------<  197<H>  5.5<H>   |  20<L>  |  0.91    Ca    9.5      2018 19:26    TPro  8.1  /  Alb  3.8  /  TBili  0.3  /  DBili  x   /  AST  11  /  ALT  10  /  AlkPhos  61  11-16    PT/INR - ( 2018 17:56 )   PT: 19.0 sec;   INR: 1.63 ratio         PTT - ( 2018 17:56 )  PTT:30.6 sec  Urinalysis Basic - ( 2018 20:26 )    Color: Light Yellow / Appearance: Clear / S.032 / pH: x  Gluc: x / Ketone: Negative  / Bili: Negative / Urobili: Negative   Blood: x / Protein: Trace / Nitrite: Negative   Leuk Esterase: Negative / RBC: 1 /hpf / WBC 2 /hpf   Sq Epi: x / Non Sq Epi: 0 /hpf / Bacteria: Negative        IMAGING STUDIES:    < from: CT Abdomen and Pelvis w/ Oral Cont and w/ IV Cont (18 @ 18:45) >    FINDINGS:    LOWER CHEST: Within normal limits.    LIVER: Within normal limits.  BILE DUCTS: Normal caliber.  GALLBLADDER: Contracted.  SPLEEN: Within normal limits.  PANCREAS: Within normal limits.  ADRENALS: Within normal limits.  KIDNEYS/URETERS: Left cortical renal scarring. Right kidney iswithin   normal limits.    BLADDER: Within normal limits.  REPRODUCTIVE ORGANS: The prostate is mildly enlarged.    BOWEL/ABDOMINAL WALL: Status post right hemicolectomy and right lower   quadrant ileostomy. No bowel obstruction.   PERITONEUM: A right iliopsoas fluid collection measuring 7.9 x 4.5 cm,   which has increased in size compared to prior exam of 2018, and   abuts the ileal loops in the right lower quadrant.  VESSELS:  Atheromatous disease of the abdominal aorta and branching   vessels.  RETROPERITONEUM: No lymphadenopathy.    BONES: Within normal limits.    IMPRESSION:  Right iliopsoas fluid collection as above, increased in size compared to   2018.    < end of copied text >

## 2018-11-16 NOTE — ED PROVIDER NOTE - ATTENDING CONTRIBUTION TO CARE
Attending MD Moreno: I personally have seen and examined this patient.  Resident note reviewed and agree on plan of care and except where noted.  See below for details.     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  Patient reports that since yesterday has been having fevers, T max 103 orally, reports nausea, no vomiting, reports change in ostomy output, denies blood.  Reports sometimes stoma retracts into abdomen and has leakage issues.  Denies dysuria, hematuria, change in urinary habits including frequency, urgency. Denies chest pain, shortness of breath, palpitations. Denies abdominal pain, nausea, vomiting, diarrhea, blood in stools. Denies allergies.  Denies ETOH, repotrs smoked in youth for 5 yrs, denies drugs.  On exam, NAD, head NCAT, PERRL, FROM at neck, no tenderness to midline palpation, no stepoffs along length of spine, lungs CTAB with good inspiratory effort, +S1S2, no m/r/g, abdomen soft with +BS, +tenderness to palpation at R side of abdomen greatest at RLQ, stoma pink and moist with yellowish brown liquidy output in bag, ND, no CVAT, no rebound, no guarding, moving all extremities with 5/5 strength bilateral upper and lower extremities, good and equal  strength bilaterally; A/P: 58M with fever and abdominal pain in setting of recent accidental removal of drain for longstanding collection, Ddx includes reaccumulation of fluid, intra-abdominal infection, doubt anastamosis issue but will obtain labs, cultures, Ua, UrCx, will obtain CT A/P with po and IV, surgery consult, will need admission, will speak with ID for abx recommendations

## 2018-11-16 NOTE — H&P ADULT - HISTORY OF PRESENT ILLNESS
57yo M with PMH of DM, HTN, and ileocecectomy for perforated cecum requiring washout and ileostomy for fecal peritonitis with multiple admissions for intra-abdominal collections s/p IR drainage. Pt was recently admitted and IR drain replaced. He was sent home with IV Abx via PICC line and IR Drain in place. The IR drain fell out 11/5 and he went to an outside hospital where a ct was done showing resolution of the collection. He was doing well at home and then started having fever, liquid yellow ostomy output, and nausea last night. He has abdominal pain in the RLQ.

## 2018-11-16 NOTE — ED PROVIDER NOTE - PROGRESS NOTE DETAILS
Attending MD Moreno: Called labs about findings of ?5.2K on VBG and 5.8K on CMP.  Spoke with Joan at [facility] who reports her sample was QNS and Jacqui who says CMP is not hemolyzed but was not repeated.  Will repeat labs now and give Albuterol while awaiting results.  Will obtain EKG. Attending MD Moreno: Spoke with surgical resident, still awaiting consult.  Patient pending admission to surgery at this time. Zack PGY2: pt seen by surgery, accepted to sicu, pt w/ increased tachycardia to 130s, pt mentating well, bps stable, fluids ordered, further care per sicu

## 2018-11-16 NOTE — H&P ADULT - ASSESSMENT
57 yo M with history of DM, HTN, and ileocolic rsxn with ileostomy for cecal perforation presents with sepsis secondary to recurrent intraabdominal infection after IR drain fell out on 11/5    - NPO/IVF  - IV Abx  - stool cultures and Cdiff  - IR consult for drainage of pelvic abscess  - SICU consult  - glucose control, home medications  - hold eliquis tonight for possible IR in am    discussed with Dr. Adams  ATP [phone]

## 2018-11-16 NOTE — ED PROVIDER NOTE - PHYSICAL EXAMINATION
PHYSICAL EXAM:   General: ill-appearing, rigors  HEENT: NC/AT, conjunctiva WNL, airway patent  Cardiovascular: tachycardic, regular rhythm, difficult to auscultate 2/2 rigors  Respiratory: clear to auscultation bilaterally, limited 2/2 rigors  Abdominal: soft, ttp RLQ and LLQ, mildly distended, RLQ tenderness with palpation of LLQ, +bowel sounds  Extremities: no LE edema b/l.  Neuro: Alert and oriented x3. Nonfocal neurologic exam  Psychiatric: appropriate mood and affect.   Skin: appropriate color, warm, dry. No rashes appreciated. Well healed ulcer on medial aspect of R karenmaggi CABELLOAbi PGY-1

## 2018-11-16 NOTE — H&P ADULT - NSHPPHYSICALEXAM_GEN_ALL_CORE
Vital Signs Last 24 Hrs  T(C): 36.7 (16 Nov 2018 19:50), Max: 38.9 (16 Nov 2018 16:14)  T(F): 98.1 (16 Nov 2018 19:50), Max: 102 (16 Nov 2018 16:14)  HR: 122 (16 Nov 2018 19:50) (102 - 122)  BP: 110/66 (16 Nov 2018 19:50) (110/66 - 136/69)  BP(mean): --  RR: 24 (16 Nov 2018 19:50) (16 - 24)  SpO2: 99% (16 Nov 2018 19:50) (98% - 100%)  Daily Height in cm: 182.88 (16 Nov 2018 15:57)    Daily     Exam:  General: awake, alert, NAD  HEENT: NCAT, MMM  Resp: nonlabored  Chest: equal chest rise  Abd: soft, ND, RLQ tenderness, no rebound or guarding, bright yellow liquid ostomy output with gas in the bag  Ext: REYNOLDS  Neuro: intact

## 2018-11-16 NOTE — ED ADULT NURSE NOTE - OBJECTIVE STATEMENT
Pt is a 59 yo M PMH DM2, HTN, hc perforated colon s/p ileocolic resection and ileostomy with Dr. Cardoso with prolonged hospital stay p/w fever and RLQ abdominal pain. Pt had prolonged hospital course from 8/11-9/7, had PICC placement with IV Abx that completed on 10/14. SUDHIR drain placed and subsequently removed 10/16, replaced again on 10/22. Drain then fell out again on own 11/5, imaging at that time showed no increased fluid collection, patient was to follow up outpatient and c/w abx. (from ED Provider note 11/16/18)    Today patient brought by EMS from home c/o fever since yesterday, tmax at home 102. Associated with yellow output from ostomy (usually brown) and LLQ abdominal pain and tenderness Pt is a 57 yo M PMH DM2, HTN, hc perforated colon s/p ileocolic resection and ileostomy with Dr. Cardoso with prolonged hospital stay p/w fever and RLQ abdominal pain. Pt had prolonged hospital course from 8/11-9/7, had PICC placement with IV Abx that completed on 10/14. SUDHIR drain placed and subsequently removed 10/16, replaced again on 10/22. Drain then fell out again on own 11/5, imaging at that time showed no increased fluid collection, patient was to follow up outpatient and c/w abx. (from ED Provider note 11/16/18)    Today patient brought by EMS from home c/o fever since yesterday, tmax at home 102. Associated with yellow output from ostomy (usually brown) and RLQ abdominal pain and tenderness

## 2018-11-16 NOTE — PATIENT PROFILE ADULT - NSPROMEDSPATCH_GEN_A_NUR
After Visit Summary   6/29/2018    Deven Oliveira    MRN: [id number]           Patient Information     Date Of Birth          1950        Visit Information        Provider Department      6/29/2018 10:30 AM [facility]        Today's Diagnoses     Pseudoexfoliation (PXF) glaucoma, severe stage    -  1    Senile nuclear sclerosis, bilateral          Care Instructions    Patient will continue on Combigan (Timolol/brimonidine) which is a blue top drop 2x/day (12 hours apart, 7AM and 7PM) in the right eye and Lumigan which is a teal top drop at bedtime (7:05PM) in the right eye.    Emphasized the importance of medication compliance.  Patient will return to clinic in 2-3 months with visual field test (OD:LVC only) and IOP check.            Follow-ups after your visit        Follow-up notes from your care team     Return 2-3 months with visual field test (OD:[facility] only) and IOP check.  .      Your next 10 appointments already scheduled     Nov 07, 2018  1:00 PM CST   CT CHEST W/O CONTRAST with UCCT1   [facility] ([facility])    [address] [address] [phone]           Please bring any scans or X-rays taken at other hospitals, if similar tests were done. Also bring a list of your medicines, including vitamins, minerals and over-the-counter drugs. It is safest to leave personal items at home.  Be sure to tell your doctor:   If you have any allergies.   If there s any chance you are pregnant.   If you are breastfeeding.  You do not need to do anything special to prepare for this exam.  Please wear loose clothing, such as a sweat suit or jogging clothes. Avoid snaps, zippers and other metal. We may ask you to undress and put on a hospital gown.              Who to contact     Please call your clinic at [phone] to:    Ask questions about your health    Make or cancel appointments    Discuss your  medicines    Learn about your test results    Speak to your doctor            Additional Information About Your Visit        MyChart Information     [facility] gives you secure access to your electronic health record. If you see a primary care provider, you can also send messages to your care team and make appointments. If you have questions, please call your primary care clinic.  If you do not have a primary care provider, please call [phone] and they will assist you.      [facility] is an electronic gateway that provides easy, online access to your medical records. With [facility], you can request a clinic appointment, read your test results, renew a prescription or communicate with your care team.     To access your existing account, please contact your [facility] or call [phone] for assistance.        Care EveryWhere ID     This is your Care EveryWhere ID. This could be used by other organizations to access your [facility] medical records  [id number]         Blood Pressure from Last 3 Encounters:   04/20/18 112/71   04/13/18 (!) 159/107   04/07/18 (!) 154/97    Weight from Last 3 Encounters:   04/20/18 60.8 kg (134 lb)   04/13/18 61.1 kg (134 lb 12.8 oz)   04/13/18 61.1 kg (134 lb 11.2 oz)              Today, you had the following     No orders found for display       Primary Care Provider Office Phone # Fax #    Arthur Nick Maldonado [phone]       [address] [place] [id number]        Equal Access to Services     DEB NIEVES : Hadii anil narvaez hadasho Soomaali, waaxda luqadaha, qaybta kaalmada adeegyada, chuy monreal. So [facility] [phone].    ATENCIÓN: Si habla español, tiene a king disposición servicios gratuitos de asistencia lingüística. Llame al [phone].    We comply with applicable federal civil rights laws and [place] laws. We do not discriminate on the basis of race, color, national origin, age,  disability, sex, sexual orientation, or gender identity.            Thank you!     Thank you for choosing [facility]  for your care. Our goal is always to provide you with excellent care. Hearing back from our patients is one way we can continue to improve our services. Please take a few minutes to complete the written survey that you may receive in the mail after your visit with us. Thank you!             Your Updated Medication List - Protect others around you: Learn how to safely use, store and throw away your medicines at www.disposemymeds.org.          This list is accurate as of 6/29/18 11:25 AM.  Always use your most recent med list.                   Brand Name Dispense Instructions for use Diagnosis    allopurinol 100 MG tablet    ZYLOPRIM    90 tablet    Take 1 tablet (100 mg) by mouth daily    Gout       bimatoprost 0.01 % Soln    LUMIGAN    5 mL    Place 1 drop into the right eye At Bedtime    Primary open angle glaucoma of both eyes, moderate stage       brimonidine-timolol 0.2-0.5 % ophthalmic solution    COMBIGAN    10 mL    Place 1 drop into the right eye 2 times daily    Primary open angle glaucoma of both eyes, moderate stage       NISHANT CAPS PO      Take 1 capsule by mouth daily        SENSIPAR PO      Take 30 mg by mouth At Bedtime        sevelamer 800 MG tablet    RENVELA    360 tablet    TAKE 4 TABLETS BY MOUTH THREE TIMES DAILY WITH MEALS    Secondary renal hyperparathyroidism (H), Hyperphosphatemia          medication patch(es) used

## 2018-11-16 NOTE — ED PROVIDER NOTE - OBJECTIVE STATEMENT
Emmy Winter MD PGY-1 Pt is a 59 yo M PMH DM2, HTN Emmy Winter MD PGY-1 Pt is a 57 yo M PMH DM2, HTN, hc perforated colon s/p ileocolic resection and ileostomy with Dr. Cardoso with prolonged hospital stay p/w fever and RLQ abdominal pain. Pt had prolonged hospital course from 8/11-9/7, had PICC placement with IV Abx that completed on 10/14. SUDHIR drain placed and subsequently removed 10/16, replaced again on 10/22. Drain then fell out again on own 11/5, imaging at that time showed no increased fluid collection, patient was to follow up outpatient and c/w abx. Today pt presents w/ fever T max 102 since yesterday and change in ostomy output (normally brown, now yellow). Previous abscess cultures +E faecaium, proteus, ESBL Ecoli 10/22 and BCx + for bacteroides on 10/22. Denies chest pain, SOB.

## 2018-11-16 NOTE — ED PROVIDER NOTE - MEDICAL DECISION MAKING DETAILS
Emmy Winter MD PGY-1 Pt is a 59 yo M PMH DM2, HTN, hc perforated colon s/p ileocolic resection and ileostomy with Dr. Romano with prolonged hospital stay p/w fever and RLQ abdominal pain. Concern for sepsis 2/2 reaccumulation of intraabdominal abscess. Will CT abd/pelvis with PO and IV contrast, labs, fluids, tylenol, Bcx, ua, ucx, surgery consult/inform dr. romano on patient's status, reassess

## 2018-11-16 NOTE — ED PROVIDER NOTE - NS ED ROS FT
REVIEW OF SYSTEMS:  General:  +fever, +chills  HEENT: no headache,   Cardiac: no chest pain  Respiratory: no shortness of breath  Gastrointestinal: +abdominal pain, +nausea, no vomiting, +change in ostomy output per HPI  Genitourinary: no dysuria  Extremities: +healing ulcer on R foot  Neuro: +chronic R sided weakness  Skin:  no rashes  -Emmy Winter PGY-1

## 2018-11-16 NOTE — ED ADULT NURSE NOTE - NSIMPLEMENTINTERV_GEN_ALL_ED
Implemented All Fall Risk Interventions:  [place] to call system. Call bell, personal items and telephone within reach. Instruct patient to call for assistance. Room bathroom lighting operational. Non-slip footwear when patient is off stretcher. Physically safe environment: no spills, clutter or unnecessary equipment. Stretcher in lowest position, wheels locked, appropriate side rails in place. Provide visual cue, wrist band, yellow gown, etc. Monitor gait and stability. Monitor for mental status changes and reorient to person, place, and time. Review medications for side effects contributing to fall risk. Reinforce activity limits and safety measures with patient and family.

## 2018-11-17 LAB
ALBUMIN SERPL ELPH-MCNC: 3.4 G/DL — SIGNIFICANT CHANGE UP (ref 3.3–5)
ALP SERPL-CCNC: 54 U/L — SIGNIFICANT CHANGE UP (ref 40–120)
ALT FLD-CCNC: 7 U/L — LOW (ref 10–45)
ANION GAP SERPL CALC-SCNC: 11 MMOL/L — SIGNIFICANT CHANGE UP (ref 5–17)
ANION GAP SERPL CALC-SCNC: 11 MMOL/L — SIGNIFICANT CHANGE UP (ref 5–17)
ANION GAP SERPL CALC-SCNC: 12 MMOL/L — SIGNIFICANT CHANGE UP (ref 5–17)
APTT BLD: 31.9 SEC — SIGNIFICANT CHANGE UP (ref 27.5–36.3)
APTT BLD: 32.1 SEC — SIGNIFICANT CHANGE UP (ref 27.5–36.3)
AST SERPL-CCNC: 11 U/L — SIGNIFICANT CHANGE UP (ref 10–40)
BILIRUB SERPL-MCNC: 0.4 MG/DL — SIGNIFICANT CHANGE UP (ref 0.2–1.2)
BLD GP AB SCN SERPL QL: NEGATIVE — SIGNIFICANT CHANGE UP
BUN SERPL-MCNC: 16 MG/DL — SIGNIFICANT CHANGE UP (ref 7–23)
BUN SERPL-MCNC: 17 MG/DL — SIGNIFICANT CHANGE UP (ref 7–23)
BUN SERPL-MCNC: 20 MG/DL — SIGNIFICANT CHANGE UP (ref 7–23)
C DIFF GDH STL QL: NEGATIVE — SIGNIFICANT CHANGE UP
C DIFF GDH STL QL: SIGNIFICANT CHANGE UP
CA-I BLD-SCNC: 1.23 MMOL/L — SIGNIFICANT CHANGE UP (ref 1.12–1.3)
CALCIUM SERPL-MCNC: 8.6 MG/DL — SIGNIFICANT CHANGE UP (ref 8.4–10.5)
CALCIUM SERPL-MCNC: 9 MG/DL — SIGNIFICANT CHANGE UP (ref 8.4–10.5)
CALCIUM SERPL-MCNC: 9.5 MG/DL — SIGNIFICANT CHANGE UP (ref 8.4–10.5)
CHLORIDE SERPL-SCNC: 101 MMOL/L — SIGNIFICANT CHANGE UP (ref 96–108)
CHLORIDE SERPL-SCNC: 103 MMOL/L — SIGNIFICANT CHANGE UP (ref 96–108)
CHLORIDE SERPL-SCNC: 96 MMOL/L — SIGNIFICANT CHANGE UP (ref 96–108)
CO2 SERPL-SCNC: 17 MMOL/L — LOW (ref 22–31)
CO2 SERPL-SCNC: 19 MMOL/L — LOW (ref 22–31)
CO2 SERPL-SCNC: 21 MMOL/L — LOW (ref 22–31)
CREAT SERPL-MCNC: 0.75 MG/DL — SIGNIFICANT CHANGE UP (ref 0.5–1.3)
CREAT SERPL-MCNC: 0.8 MG/DL — SIGNIFICANT CHANGE UP (ref 0.5–1.3)
CREAT SERPL-MCNC: 0.84 MG/DL — SIGNIFICANT CHANGE UP (ref 0.5–1.3)
CULTURE RESULTS: NO GROWTH — SIGNIFICANT CHANGE UP
GAS PNL BLDA: SIGNIFICANT CHANGE UP
GAS PNL BLDA: SIGNIFICANT CHANGE UP
GLUCOSE BLDC GLUCOMTR-MCNC: 108 MG/DL — HIGH (ref 70–99)
GLUCOSE BLDC GLUCOMTR-MCNC: 111 MG/DL — HIGH (ref 70–99)
GLUCOSE BLDC GLUCOMTR-MCNC: 155 MG/DL — HIGH (ref 70–99)
GLUCOSE BLDC GLUCOMTR-MCNC: 180 MG/DL — HIGH (ref 70–99)
GLUCOSE SERPL-MCNC: 158 MG/DL — HIGH (ref 70–99)
GLUCOSE SERPL-MCNC: 170 MG/DL — HIGH (ref 70–99)
GLUCOSE SERPL-MCNC: 203 MG/DL — HIGH (ref 70–99)
HBA1C BLD-MCNC: 7.7 % — HIGH (ref 4–5.6)
HCT VFR BLD CALC: 28.1 % — LOW (ref 39–50)
HCT VFR BLD CALC: 29.9 % — LOW (ref 39–50)
HCT VFR BLD CALC: 30.6 % — LOW (ref 39–50)
HGB BLD-MCNC: 10.1 G/DL — LOW (ref 13–17)
HGB BLD-MCNC: 10.4 G/DL — LOW (ref 13–17)
HGB BLD-MCNC: 9.9 G/DL — LOW (ref 13–17)
INR BLD: 1.33 RATIO — HIGH (ref 0.88–1.16)
INR BLD: 1.39 RATIO — HIGH (ref 0.88–1.16)
MAGNESIUM SERPL-MCNC: 1.8 MG/DL — SIGNIFICANT CHANGE UP (ref 1.6–2.6)
MAGNESIUM SERPL-MCNC: 2.2 MG/DL — SIGNIFICANT CHANGE UP (ref 1.6–2.6)
MAGNESIUM SERPL-MCNC: 2.2 MG/DL — SIGNIFICANT CHANGE UP (ref 1.6–2.6)
MCHC RBC-ENTMCNC: 29.2 PG — SIGNIFICANT CHANGE UP (ref 27–34)
MCHC RBC-ENTMCNC: 29.3 PG — SIGNIFICANT CHANGE UP (ref 27–34)
MCHC RBC-ENTMCNC: 30.2 PG — SIGNIFICANT CHANGE UP (ref 27–34)
MCHC RBC-ENTMCNC: 33.9 GM/DL — SIGNIFICANT CHANGE UP (ref 32–36)
MCHC RBC-ENTMCNC: 34.1 GM/DL — SIGNIFICANT CHANGE UP (ref 32–36)
MCHC RBC-ENTMCNC: 35.3 GM/DL — SIGNIFICANT CHANGE UP (ref 32–36)
MCV RBC AUTO: 85.6 FL — SIGNIFICANT CHANGE UP (ref 80–100)
MCV RBC AUTO: 86.1 FL — SIGNIFICANT CHANGE UP (ref 80–100)
MCV RBC AUTO: 86.1 FL — SIGNIFICANT CHANGE UP (ref 80–100)
PHOSPHATE SERPL-MCNC: 2.6 MG/DL — SIGNIFICANT CHANGE UP (ref 2.5–4.5)
PHOSPHATE SERPL-MCNC: 2.7 MG/DL — SIGNIFICANT CHANGE UP (ref 2.5–4.5)
PHOSPHATE SERPL-MCNC: 3.2 MG/DL — SIGNIFICANT CHANGE UP (ref 2.5–4.5)
PLATELET # BLD AUTO: 202 K/UL — SIGNIFICANT CHANGE UP (ref 150–400)
PLATELET # BLD AUTO: 212 K/UL — SIGNIFICANT CHANGE UP (ref 150–400)
PLATELET # BLD AUTO: 215 K/UL — SIGNIFICANT CHANGE UP (ref 150–400)
POTASSIUM SERPL-MCNC: 4.3 MMOL/L — SIGNIFICANT CHANGE UP (ref 3.5–5.3)
POTASSIUM SERPL-MCNC: 4.4 MMOL/L — SIGNIFICANT CHANGE UP (ref 3.5–5.3)
POTASSIUM SERPL-MCNC: 5.1 MMOL/L — SIGNIFICANT CHANGE UP (ref 3.5–5.3)
POTASSIUM SERPL-SCNC: 4.3 MMOL/L — SIGNIFICANT CHANGE UP (ref 3.5–5.3)
POTASSIUM SERPL-SCNC: 4.4 MMOL/L — SIGNIFICANT CHANGE UP (ref 3.5–5.3)
POTASSIUM SERPL-SCNC: 5.1 MMOL/L — SIGNIFICANT CHANGE UP (ref 3.5–5.3)
PROT SERPL-MCNC: 6.8 G/DL — SIGNIFICANT CHANGE UP (ref 6–8.3)
PROTHROM AB SERPL-ACNC: 15.3 SEC — HIGH (ref 10–12.9)
PROTHROM AB SERPL-ACNC: 16.2 SEC — HIGH (ref 10–12.9)
RBC # BLD: 3.29 M/UL — LOW (ref 4.2–5.8)
RBC # BLD: 3.47 M/UL — LOW (ref 4.2–5.8)
RBC # BLD: 3.56 M/UL — LOW (ref 4.2–5.8)
RBC # FLD: 13.8 % — SIGNIFICANT CHANGE UP (ref 10.3–14.5)
RBC # FLD: 14.5 % — SIGNIFICANT CHANGE UP (ref 10.3–14.5)
RBC # FLD: 14.6 % — HIGH (ref 10.3–14.5)
RH IG SCN BLD-IMP: POSITIVE — SIGNIFICANT CHANGE UP
SODIUM SERPL-SCNC: 128 MMOL/L — LOW (ref 135–145)
SODIUM SERPL-SCNC: 131 MMOL/L — LOW (ref 135–145)
SODIUM SERPL-SCNC: 132 MMOL/L — LOW (ref 135–145)
SPECIMEN SOURCE: SIGNIFICANT CHANGE UP
WBC # BLD: 18 K/UL — HIGH (ref 3.8–10.5)
WBC # BLD: 18.9 K/UL — HIGH (ref 3.8–10.5)
WBC # BLD: 19.7 K/UL — HIGH (ref 3.8–10.5)
WBC # FLD AUTO: 18 K/UL — HIGH (ref 3.8–10.5)
WBC # FLD AUTO: 18.9 K/UL — HIGH (ref 3.8–10.5)
WBC # FLD AUTO: 19.7 K/UL — HIGH (ref 3.8–10.5)

## 2018-11-17 PROCEDURE — 71045 X-RAY EXAM CHEST 1 VIEW: CPT | Mod: 26

## 2018-11-17 PROCEDURE — 49406 IMAGE CATH FLUID PERI/RETRO: CPT

## 2018-11-17 PROCEDURE — 99222 1ST HOSP IP/OBS MODERATE 55: CPT | Mod: GC

## 2018-11-17 RX ORDER — ACETAMINOPHEN 500 MG
1000 TABLET ORAL ONCE
Qty: 0 | Refills: 0 | Status: COMPLETED | OUTPATIENT
Start: 2018-11-17 | End: 2018-11-17

## 2018-11-17 RX ORDER — SODIUM CHLORIDE 9 MG/ML
1000 INJECTION, SOLUTION INTRAVENOUS ONCE
Qty: 0 | Refills: 0 | Status: COMPLETED | OUTPATIENT
Start: 2018-11-17 | End: 2018-11-17

## 2018-11-17 RX ORDER — SODIUM CHLORIDE 9 MG/ML
500 INJECTION INTRAMUSCULAR; INTRAVENOUS; SUBCUTANEOUS ONCE
Qty: 0 | Refills: 0 | Status: COMPLETED | OUTPATIENT
Start: 2018-11-17 | End: 2018-11-17

## 2018-11-17 RX ORDER — INSULIN LISPRO 100/ML
VIAL (ML) SUBCUTANEOUS
Qty: 0 | Refills: 0 | Status: DISCONTINUED | OUTPATIENT
Start: 2018-11-17 | End: 2018-11-17

## 2018-11-17 RX ORDER — VANCOMYCIN HCL 1 G
VIAL (EA) INTRAVENOUS
Qty: 0 | Refills: 0 | Status: DISCONTINUED | OUTPATIENT
Start: 2018-11-17 | End: 2018-11-20

## 2018-11-17 RX ORDER — METOPROLOL TARTRATE 50 MG
5 TABLET ORAL EVERY 6 HOURS
Qty: 0 | Refills: 0 | Status: DISCONTINUED | OUTPATIENT
Start: 2018-11-17 | End: 2018-11-17

## 2018-11-17 RX ORDER — FLUTICASONE PROPIONATE 50 MCG
1 SPRAY, SUSPENSION NASAL
Qty: 0 | Refills: 0 | Status: DISCONTINUED | OUTPATIENT
Start: 2018-11-17 | End: 2018-11-23

## 2018-11-17 RX ORDER — INSULIN LISPRO 100/ML
VIAL (ML) SUBCUTANEOUS EVERY 4 HOURS
Qty: 0 | Refills: 0 | Status: DISCONTINUED | OUTPATIENT
Start: 2018-11-17 | End: 2018-11-18

## 2018-11-17 RX ORDER — VANCOMYCIN HCL 1 G
1000 VIAL (EA) INTRAVENOUS ONCE
Qty: 0 | Refills: 0 | Status: COMPLETED | OUTPATIENT
Start: 2018-11-17 | End: 2018-11-17

## 2018-11-17 RX ORDER — VANCOMYCIN HCL 1 G
1000 VIAL (EA) INTRAVENOUS EVERY 12 HOURS
Qty: 0 | Refills: 0 | Status: DISCONTINUED | OUTPATIENT
Start: 2018-11-17 | End: 2018-11-20

## 2018-11-17 RX ORDER — MAGNESIUM SULFATE 500 MG/ML
2 VIAL (ML) INJECTION ONCE
Qty: 0 | Refills: 0 | Status: COMPLETED | OUTPATIENT
Start: 2018-11-17 | End: 2018-11-17

## 2018-11-17 RX ADMIN — Medication 1000 MILLIGRAM(S): at 18:20

## 2018-11-17 RX ADMIN — SODIUM CHLORIDE 1500 MILLILITER(S): 9 INJECTION INTRAMUSCULAR; INTRAVENOUS; SUBCUTANEOUS at 19:43

## 2018-11-17 RX ADMIN — MEROPENEM 100 MILLIGRAM(S): 1 INJECTION INTRAVENOUS at 13:55

## 2018-11-17 RX ADMIN — SODIUM CHLORIDE 1 GRAM(S): 9 INJECTION INTRAMUSCULAR; INTRAVENOUS; SUBCUTANEOUS at 13:55

## 2018-11-17 RX ADMIN — Medication 400 MILLIGRAM(S): at 06:38

## 2018-11-17 RX ADMIN — SODIUM CHLORIDE 1 GRAM(S): 9 INJECTION INTRAMUSCULAR; INTRAVENOUS; SUBCUTANEOUS at 05:53

## 2018-11-17 RX ADMIN — Medication 2 PUFF(S): at 01:40

## 2018-11-17 RX ADMIN — MEROPENEM 100 MILLIGRAM(S): 1 INJECTION INTRAVENOUS at 21:10

## 2018-11-17 RX ADMIN — Medication 250 MILLIGRAM(S): at 11:17

## 2018-11-17 RX ADMIN — Medication 50 GRAM(S): at 01:37

## 2018-11-17 RX ADMIN — GABAPENTIN 300 MILLIGRAM(S): 400 CAPSULE ORAL at 17:34

## 2018-11-17 RX ADMIN — Medication 1000 MILLIGRAM(S): at 11:43

## 2018-11-17 RX ADMIN — SODIUM CHLORIDE 100 MILLILITER(S): 9 INJECTION INTRAMUSCULAR; INTRAVENOUS; SUBCUTANEOUS at 17:35

## 2018-11-17 RX ADMIN — Medication 5 MILLIGRAM(S): at 05:53

## 2018-11-17 RX ADMIN — GABAPENTIN 300 MILLIGRAM(S): 400 CAPSULE ORAL at 05:52

## 2018-11-17 RX ADMIN — Medication 250 MILLIGRAM(S): at 22:23

## 2018-11-17 RX ADMIN — SODIUM CHLORIDE 6000 MILLILITER(S): 9 INJECTION, SOLUTION INTRAVENOUS at 09:25

## 2018-11-17 RX ADMIN — Medication 1 SPRAY(S): at 17:33

## 2018-11-17 RX ADMIN — MEROPENEM 100 MILLIGRAM(S): 1 INJECTION INTRAVENOUS at 06:38

## 2018-11-17 RX ADMIN — Medication 400 MILLIGRAM(S): at 18:05

## 2018-11-17 RX ADMIN — Medication 2: at 05:54

## 2018-11-17 RX ADMIN — SODIUM CHLORIDE 100 MILLILITER(S): 9 INJECTION INTRAMUSCULAR; INTRAVENOUS; SUBCUTANEOUS at 09:25

## 2018-11-17 RX ADMIN — Medication 2: at 13:55

## 2018-11-17 RX ADMIN — SODIUM CHLORIDE 1 GRAM(S): 9 INJECTION INTRAMUSCULAR; INTRAVENOUS; SUBCUTANEOUS at 21:11

## 2018-11-17 NOTE — PROVIDER CONTACT NOTE (OTHER) - ASSESSMENT
Patient otherwise hemodynamically stable, with cuff pressure closely correlating with a line pressure. No complaints of pain or dizziness. Patient resting comfortably in bed. No excess drainage from SUDHIR or ostomy.

## 2018-11-17 NOTE — PROCEDURE NOTE - PROCEDURE
<<-----Click on this checkbox to enter Procedure Arterial cannulation using cutdown technique for monitoring  11/17/2018    Active  [id number]

## 2018-11-17 NOTE — PROGRESS NOTE ADULT - SUBJECTIVE AND OBJECTIVE BOX
Vascular & Interventional Radiology Post-Procedure Note    Pre-Procedure Diagnosis: iliopsoas abscess  Post-Procedure Diagnosis: Same as pre.  Indications for Procedure: sepsis    Attending: Dr. Roblero,   Resident: Dr. Omer     Procedure Details/Findings: Abscess targeted with CT, 10F drain placed with CT guidance.   Access (if applicable): RLQ    Complications: None  Estimated Blood Loss: Minimal  Specimen: 125cc of yellow/red fluid.   Contrast: none  Sedation: Anesthesiologist  Patient Condition/Disposition: stable, back to SICU    Plan: Monitor output. Flush daily with 10cc NS to prevent clogging of the tube.

## 2018-11-17 NOTE — PROGRESS NOTE ADULT - ASSESSMENT
59 yo M with history of DM, HTN, DVT, ileocolic rsxn with ileostomy for cecal perforation presents with sepsis secondary to recurrent intraabdominal infection after IR drain fell out on 11/5    - NPO/IVF  - IV Abx  - stool cultures and Cdiff  - IR consult for drainage of pelvic abscess  - SICU consult  - glucose control, home medications  - hold eliquis for possible IR     ATP [phone]

## 2018-11-17 NOTE — PROCEDURE NOTE - NSPROCDETAILS_GEN_ALL_CORE
positive blood return obtained via catheter/connected to a pressurized flush line/hemostasis with direct pressure, dressing applied/Seldinger technique/location identified, draped/prepped, sterile technique used, needle inserted/introduced/sutured in place/all materials/supplies accounted for at end of procedure

## 2018-11-17 NOTE — PROGRESS NOTE ADULT - SUBJECTIVE AND OBJECTIVE BOX
ATP SURGERY    Pt seen and examined on AM rounds. No acute events overnight. Admitted with sepsis secondary to recurrent intraabdominal collection after drain fell out .     Vital Signs Last 24 Hrs  T(C): 38.9 (2018 04:00), Max: 39.6 (2018 22:10)  T(F): 102 (2018 04:00), Max: 103.3 (2018 22:10)  HR: 110 (2018 06:00) (102 - 137)  BP: 110/52 (2018 06:00) (109/55 - 157/64)  BP(mean): 75 (2018 06:00) (75 - 108)  RR: 23 (:00) (16 - 27)  SpO2: 99% (:) (97% - 100%)    General Appearance: awake, alert, NAD  Neck: NCAT, MMM  Chest: equal chest rise  CV: tachycardic  Abdomen: soft, ND, RLQ tenderness with guarding  Extremities: REYNOLDS, well perfused    I&O's Summary    2018 07:01  -  2018 07:00  --------------------------------------------------------  IN: 1100 mL / OUT: 1500 mL / NET: -400 mL      I&O's Detail    2018 07:01  -  2018 07:00  --------------------------------------------------------  IN:    IV PiggyBack: 300 mL    sodium chloride 0.9%.: 800 mL  Total IN: 1100 mL    OUT:    Ileostomy: 500 mL    Voided: 1000 mL  Total OUT: 1500 mL    Total NET: -400 mL          MEDICATIONS  (STANDING):  gabapentin 300 milliGRAM(s) Oral every 12 hours  insulin lispro (HumaLOG) corrective regimen sliding scale   SubCutaneous three times a day before meals  meropenem  IVPB 1000 milliGRAM(s) IV Intermittent every 8 hours  sodium chloride 1 Gram(s) Oral three times a day  sodium chloride 0.9%. 1000 milliLiter(s) (100 mL/Hr) IV Continuous <Continuous>    MEDICATIONS  (PRN):      LABS:                        10.4   18.9  )-----------( 215      ( 2018 23:54 )             30.6         132<L>  |  103  |  16  ----------------------------<  170<H>  4.4   |  17<L>  |  0.80    Ca    8.6      2018 06:57  Phos  2.7       Mg     2.2         TPro  6.8  /  Alb  3.4  /  TBili  0.4  /  DBili  x   /  AST  11  /  ALT  7<L>  /  AlkPhos  54      PT/INR - ( 2018 23:54 )   PT: 16.2 sec;   INR: 1.39 ratio         PTT - ( 2018 23:54 )  PTT:32.1 sec  Urinalysis Basic - ( 2018 20:26 )    Color: Light Yellow / Appearance: Clear / S.032 / pH: x  Gluc: x / Ketone: Negative  / Bili: Negative / Urobili: Negative   Blood: x / Protein: Trace / Nitrite: Negative   Leuk Esterase: Negative / RBC: 1 /hpf / WBC 2 /hpf   Sq Epi: x / Non Sq Epi: 0 /hpf / Bacteria: Negative        RADIOLOGY & ADDITIONAL STUDIES:

## 2018-11-17 NOTE — PROGRESS NOTE ADULT - SUBJECTIVE AND OBJECTIVE BOX
Vascular & Interventional Radiology Pre-Procedure Note    Procedure Name: CT guided drainage    HPI: 58y Male with abscess reoccurrence from perforated appendicitis.      Allergies:   Medications:  meropenem  IVPB: 100 mL/Hr IV Intermittent (11-17 @ 06:38)  metoprolol tartrate: 25 milliGRAM(s) Oral (11-16 @ 21:37)  metoprolol tartrate Injectable: 5 milliGRAM(s) IV Push (11-17 @ 05:53)  vancomycin  IVPB: 250 mL/Hr IV Intermittent (11-16 @ 20:08)  vancomycin  IVPB: 250 mL/Hr IV Intermittent (11-17 @ 11:17)    Data:  182.88  65.2  T(C): 37.1  HR: 85  BP: 96/52  RR: 13  SpO2: 100%    -WBC 19.7 / HgB 9.9 / Hct 28.1 / Plt 212  -Na 132 / Cl 103 / BUN 16 / Glucose 170  -K 4.4 / CO2 17 / Cr 0.80  -ALT 7 / Alk Phos 54 / T.Bili 0.4  -INR1.33    Imaging: CT a/p showing iliopsoas abscess.     Plan:   -58y Male presents for CT guided drainage of iliopsoas abscess.   -Risks/Benefits/alternatives explained with the patient and/or healthcare proxy and witnessed informed consent obtained.

## 2018-11-17 NOTE — CONSULT NOTE ADULT - ASSESSMENT
ASSESSMENT:  58y Male ***    PLAN:    Neurologic:   - No acute concerns, will continue to monitor    Respiratory: tachypnea  - Fluticasone (home med, unclear medical history)  - AM CXR     Cardiovascular: HTN  - Metoprolol 25 BID PO held prior to procedure  - Will give metoprolol 5 q6 IV     Gastrointestinal/Nutrition:  - NPO prior to possible procedure  - Planned for IR 11/17 AM  - Serial abdominal exams   - Pending stool cultures, cdif     Renal/Genitourinary:   - NS @ 125    Hematologic: h/o DVT, on apixiban  - Hold home apixiban prior to procedure  - Hold DVT prophylaxis, will restart post-IR procedure    Infectious Disease: sepsis, likely 2/2 abdominal fluid collection  - Meropenem   - Vancomycin    Endocrine: DM  - SSI + fingersticks     Tubes/Lines/Drains:   - PIV    Disposition: SICU ASSESSMENT:  59yo M with PMH of DM, HTN, and ileocecectomy for perforated cecum requiring washout and ileostomy for fecal peritonitis with multiple admissions for intra-abdominal collections s/p IR drainage presents with recurrent abdominal abscess. Patient admitted to SICU prior to planned IR intervention to drain abdominal fluid collection for resolution of sepsis.     PLAN:    Neurologic:   - No acute concerns, will continue to monitor    Respiratory: tachypnea  - Fluticasone (home med, unclear medical history)  - AM CXR     Cardiovascular: HTN  - Metoprolol 25 BID PO held prior to procedure  - Will give metoprolol 5 q6 IV     Gastrointestinal/Nutrition:  - NPO prior to possible procedure  - Planned for IR 11/17 AM  - Serial abdominal exams   - Pending stool cultures, cdif     Renal/Genitourinary:   - NS @ 125    Hematologic: h/o DVT, on apixiban  - Hold home apixiban prior to procedure  - Hold DVT prophylaxis, will restart post-IR procedure    Infectious Disease: sepsis, likely 2/2 abdominal fluid collection  - Meropenem   - Vancomycin    Endocrine: DM  - SSI + fingersticks     Tubes/Lines/Drains:   - PIV    Disposition: SICU

## 2018-11-17 NOTE — PROVIDER CONTACT NOTE (OTHER) - BACKGROUND
Patient s/p drainage of collection in IR and SUDHIR placement after feeling nauseated and diaphoretic at home. Change in ostomy output at home.

## 2018-11-17 NOTE — CONSULT NOTE ADULT - ASSESSMENT
59yo M with PMH of DM, HTN, and ileocecectomy for perforated cecum requiring washout and ileostomy for fecal peritonitis with multiple admissions for intra-abdominal collections s/p IR drainage p/w abd pain, found to have worsening R iliopsoas abscess.    FULL NOTE TO FOLLOW 57yo M with PMH of DM, HTN, and ileocecectomy for perforated cecum requiring washout and ileostomy for fecal peritonitis with multiple admissions for intra-abdominal collections s/p IR drainage p/w abd pain, found to have worsening R iliopsoas abscess.    Per discussion w surgery, eventual eval for ?fistula given recurrent abscesses once infection improves.    - Plan is for IR drain placement today- please obtain cultures  - follow up BCx x 2  - c/w vanco and meropenem for now      Plan discussed w SICU attending

## 2018-11-18 LAB
ALBUMIN SERPL ELPH-MCNC: 2.9 G/DL — LOW (ref 3.3–5)
ALP SERPL-CCNC: 51 U/L — SIGNIFICANT CHANGE UP (ref 40–120)
ALT FLD-CCNC: 6 U/L — LOW (ref 10–45)
ANION GAP SERPL CALC-SCNC: 11 MMOL/L — SIGNIFICANT CHANGE UP (ref 5–17)
ANION GAP SERPL CALC-SCNC: 12 MMOL/L — SIGNIFICANT CHANGE UP (ref 5–17)
AST SERPL-CCNC: 9 U/L — LOW (ref 10–40)
BILIRUB SERPL-MCNC: 0.2 MG/DL — SIGNIFICANT CHANGE UP (ref 0.2–1.2)
BUN SERPL-MCNC: 11 MG/DL — SIGNIFICANT CHANGE UP (ref 7–23)
BUN SERPL-MCNC: 15 MG/DL — SIGNIFICANT CHANGE UP (ref 7–23)
CALCIUM SERPL-MCNC: 7.2 MG/DL — LOW (ref 8.4–10.5)
CALCIUM SERPL-MCNC: 8.4 MG/DL — SIGNIFICANT CHANGE UP (ref 8.4–10.5)
CHLORIDE SERPL-SCNC: 100 MMOL/L — SIGNIFICANT CHANGE UP (ref 96–108)
CHLORIDE SERPL-SCNC: 113 MMOL/L — HIGH (ref 96–108)
CO2 SERPL-SCNC: 17 MMOL/L — LOW (ref 22–31)
CO2 SERPL-SCNC: 18 MMOL/L — LOW (ref 22–31)
CREAT SERPL-MCNC: 0.63 MG/DL — SIGNIFICANT CHANGE UP (ref 0.5–1.3)
CREAT SERPL-MCNC: 0.73 MG/DL — SIGNIFICANT CHANGE UP (ref 0.5–1.3)
GLUCOSE BLDC GLUCOMTR-MCNC: 118 MG/DL — HIGH (ref 70–99)
GLUCOSE BLDC GLUCOMTR-MCNC: 215 MG/DL — HIGH (ref 70–99)
GLUCOSE BLDC GLUCOMTR-MCNC: 93 MG/DL — SIGNIFICANT CHANGE UP (ref 70–99)
GLUCOSE SERPL-MCNC: 283 MG/DL — HIGH (ref 70–99)
GLUCOSE SERPL-MCNC: 81 MG/DL — SIGNIFICANT CHANGE UP (ref 70–99)
HBA1C BLD-MCNC: 7.6 % — HIGH (ref 4–5.6)
HCT VFR BLD CALC: 28.4 % — LOW (ref 39–50)
HGB BLD-MCNC: 9.4 G/DL — LOW (ref 13–17)
MAGNESIUM SERPL-MCNC: 1.6 MG/DL — SIGNIFICANT CHANGE UP (ref 1.6–2.6)
MAGNESIUM SERPL-MCNC: 2.2 MG/DL — SIGNIFICANT CHANGE UP (ref 1.6–2.6)
MCHC RBC-ENTMCNC: 29.2 PG — SIGNIFICANT CHANGE UP (ref 27–34)
MCHC RBC-ENTMCNC: 33.3 GM/DL — SIGNIFICANT CHANGE UP (ref 32–36)
MCV RBC AUTO: 87.7 FL — SIGNIFICANT CHANGE UP (ref 80–100)
PHOSPHATE SERPL-MCNC: 2.1 MG/DL — LOW (ref 2.5–4.5)
PHOSPHATE SERPL-MCNC: 3.3 MG/DL — SIGNIFICANT CHANGE UP (ref 2.5–4.5)
PLATELET # BLD AUTO: 194 K/UL — SIGNIFICANT CHANGE UP (ref 150–400)
POTASSIUM SERPL-MCNC: 3.7 MMOL/L — SIGNIFICANT CHANGE UP (ref 3.5–5.3)
POTASSIUM SERPL-MCNC: 4.5 MMOL/L — SIGNIFICANT CHANGE UP (ref 3.5–5.3)
POTASSIUM SERPL-SCNC: 3.7 MMOL/L — SIGNIFICANT CHANGE UP (ref 3.5–5.3)
POTASSIUM SERPL-SCNC: 4.5 MMOL/L — SIGNIFICANT CHANGE UP (ref 3.5–5.3)
PROT SERPL-MCNC: 6.1 G/DL — SIGNIFICANT CHANGE UP (ref 6–8.3)
RBC # BLD: 3.23 M/UL — LOW (ref 4.2–5.8)
RBC # FLD: 13.6 % — SIGNIFICANT CHANGE UP (ref 10.3–14.5)
SODIUM SERPL-SCNC: 130 MMOL/L — LOW (ref 135–145)
SODIUM SERPL-SCNC: 141 MMOL/L — SIGNIFICANT CHANGE UP (ref 135–145)
VANCOMYCIN TROUGH SERPL-MCNC: 14.3 UG/ML — SIGNIFICANT CHANGE UP (ref 10–20)
WBC # BLD: 12 K/UL — HIGH (ref 3.8–10.5)
WBC # FLD AUTO: 12 K/UL — HIGH (ref 3.8–10.5)

## 2018-11-18 PROCEDURE — 71045 X-RAY EXAM CHEST 1 VIEW: CPT | Mod: 26

## 2018-11-18 PROCEDURE — 99232 SBSQ HOSP IP/OBS MODERATE 35: CPT | Mod: GC

## 2018-11-18 RX ORDER — POTASSIUM PHOSPHATE, MONOBASIC POTASSIUM PHOSPHATE, DIBASIC 236; 224 MG/ML; MG/ML
30 INJECTION, SOLUTION INTRAVENOUS ONCE
Qty: 0 | Refills: 0 | Status: COMPLETED | OUTPATIENT
Start: 2018-11-18 | End: 2018-11-18

## 2018-11-18 RX ORDER — ACETAMINOPHEN 500 MG
650 TABLET ORAL ONCE
Qty: 0 | Refills: 0 | Status: COMPLETED | OUTPATIENT
Start: 2018-11-18 | End: 2018-11-18

## 2018-11-18 RX ORDER — SODIUM CHLORIDE 9 MG/ML
1000 INJECTION, SOLUTION INTRAVENOUS
Qty: 0 | Refills: 0 | Status: DISCONTINUED | OUTPATIENT
Start: 2018-11-18 | End: 2018-11-18

## 2018-11-18 RX ORDER — INSULIN LISPRO 100/ML
6 VIAL (ML) SUBCUTANEOUS ONCE
Qty: 0 | Refills: 0 | Status: COMPLETED | OUTPATIENT
Start: 2018-11-18 | End: 2018-11-18

## 2018-11-18 RX ORDER — MAGNESIUM SULFATE 500 MG/ML
2 VIAL (ML) INJECTION ONCE
Qty: 0 | Refills: 0 | Status: COMPLETED | OUTPATIENT
Start: 2018-11-18 | End: 2018-11-18

## 2018-11-18 RX ORDER — SODIUM CHLORIDE 9 MG/ML
1 INJECTION INTRAMUSCULAR; INTRAVENOUS; SUBCUTANEOUS EVERY 8 HOURS
Qty: 0 | Refills: 0 | Status: DISCONTINUED | OUTPATIENT
Start: 2018-11-18 | End: 2018-11-23

## 2018-11-18 RX ORDER — INSULIN LISPRO 100/ML
VIAL (ML) SUBCUTANEOUS AT BEDTIME
Qty: 0 | Refills: 0 | Status: DISCONTINUED | OUTPATIENT
Start: 2018-11-18 | End: 2018-11-23

## 2018-11-18 RX ORDER — ENOXAPARIN SODIUM 100 MG/ML
40 INJECTION SUBCUTANEOUS DAILY
Qty: 0 | Refills: 0 | Status: DISCONTINUED | OUTPATIENT
Start: 2018-11-18 | End: 2018-11-23

## 2018-11-18 RX ORDER — ACETAMINOPHEN 500 MG
650 TABLET ORAL EVERY 6 HOURS
Qty: 0 | Refills: 0 | Status: DISCONTINUED | OUTPATIENT
Start: 2018-11-18 | End: 2018-11-23

## 2018-11-18 RX ORDER — CHLORHEXIDINE GLUCONATE 213 G/1000ML
1 SOLUTION TOPICAL
Qty: 0 | Refills: 0 | Status: DISCONTINUED | OUTPATIENT
Start: 2018-11-18 | End: 2018-11-18

## 2018-11-18 RX ORDER — DIPHENHYDRAMINE HCL 50 MG
50 CAPSULE ORAL ONCE
Qty: 0 | Refills: 0 | Status: COMPLETED | OUTPATIENT
Start: 2018-11-18 | End: 2018-11-18

## 2018-11-18 RX ORDER — METOPROLOL TARTRATE 50 MG
25 TABLET ORAL
Qty: 0 | Refills: 0 | Status: DISCONTINUED | OUTPATIENT
Start: 2018-11-18 | End: 2018-11-23

## 2018-11-18 RX ORDER — INSULIN LISPRO 100/ML
VIAL (ML) SUBCUTANEOUS
Qty: 0 | Refills: 0 | Status: DISCONTINUED | OUTPATIENT
Start: 2018-11-18 | End: 2018-11-23

## 2018-11-18 RX ADMIN — Medication 1 SPRAY(S): at 05:35

## 2018-11-18 RX ADMIN — MEROPENEM 100 MILLIGRAM(S): 1 INJECTION INTRAVENOUS at 21:31

## 2018-11-18 RX ADMIN — SODIUM CHLORIDE 100 MILLILITER(S): 9 INJECTION INTRAMUSCULAR; INTRAVENOUS; SUBCUTANEOUS at 05:36

## 2018-11-18 RX ADMIN — SODIUM CHLORIDE 1 GRAM(S): 9 INJECTION INTRAMUSCULAR; INTRAVENOUS; SUBCUTANEOUS at 21:31

## 2018-11-18 RX ADMIN — Medication 250 MILLIGRAM(S): at 23:53

## 2018-11-18 RX ADMIN — GABAPENTIN 300 MILLIGRAM(S): 400 CAPSULE ORAL at 05:36

## 2018-11-18 RX ADMIN — Medication 50 GRAM(S): at 05:36

## 2018-11-18 RX ADMIN — GABAPENTIN 300 MILLIGRAM(S): 400 CAPSULE ORAL at 17:27

## 2018-11-18 RX ADMIN — MEROPENEM 100 MILLIGRAM(S): 1 INJECTION INTRAVENOUS at 05:36

## 2018-11-18 RX ADMIN — Medication 650 MILLIGRAM(S): at 03:18

## 2018-11-18 RX ADMIN — ENOXAPARIN SODIUM 40 MILLIGRAM(S): 100 INJECTION SUBCUTANEOUS at 11:46

## 2018-11-18 RX ADMIN — Medication 650 MILLIGRAM(S): at 03:48

## 2018-11-18 RX ADMIN — SODIUM CHLORIDE 1 GRAM(S): 9 INJECTION INTRAMUSCULAR; INTRAVENOUS; SUBCUTANEOUS at 05:36

## 2018-11-18 RX ADMIN — Medication 1 TABLET(S): at 11:42

## 2018-11-18 RX ADMIN — Medication 50 MILLIGRAM(S): at 06:20

## 2018-11-18 RX ADMIN — Medication 650 MILLIGRAM(S): at 18:33

## 2018-11-18 RX ADMIN — Medication 250 MILLIGRAM(S): at 10:29

## 2018-11-18 RX ADMIN — Medication 1 SPRAY(S): at 18:00

## 2018-11-18 RX ADMIN — Medication 6 UNIT(S): at 14:26

## 2018-11-18 RX ADMIN — MEROPENEM 100 MILLIGRAM(S): 1 INJECTION INTRAVENOUS at 13:17

## 2018-11-18 RX ADMIN — POTASSIUM PHOSPHATE, MONOBASIC POTASSIUM PHOSPHATE, DIBASIC 83.33 MILLIMOLE(S): 236; 224 INJECTION, SOLUTION INTRAVENOUS at 05:48

## 2018-11-18 NOTE — PHYSICAL THERAPY INITIAL EVALUATION ADULT - RISK REDUCTION/PREVENTION, PT EVAL
Patient has no hisotry of Dtap, Tdap or Td vaccines  Will give Tdap booster  Patient should have pneumococcal vaccine due to history of diabetes  Will give vaccine  Patient has not yet been screened for HIV  He agrees to do HIV test  Will order  risk factors

## 2018-11-18 NOTE — PHYSICAL THERAPY INITIAL EVALUATION ADULT - PLANNED THERAPY INTERVENTIONS, PT EVAL
strengthening/transfer training/stair training: GOAL: (to be met in 4 wks) negotiate 2 steps with bilateral rails and/or 1 rail with appropriate assistive device with step to step pattern/balance training/bed mobility training/gait training

## 2018-11-18 NOTE — PROGRESS NOTE ADULT - SUBJECTIVE AND OBJECTIVE BOX
Follow Up:  R iliopsoas abscess    Interval History: No fevers overnight. Went to IR yday for drain placement. Pt c/o some R hand swelling; IV was removed. Some abd pain surrounding drain site.    ROS:    All other systems negative    Constitutional: no fever, no chills  Head: no trauma  Eyes: no vision changes, no eye pain  ENT:  no sore throat, no rhinorrhea  Cardiovascular:  no chest pain, no palpitation  Respiratory:  no SOB, no cough  GI:  abd pain, no vomiting, no diarrhea  urinary: no dysuria  musculoskeletal:  no joint pain, no joint swelling  skin:  no rash  neurology:  no headache      Allergies  No Known Allergies        ANTIMICROBIALS:  meropenem  IVPB 1000 every 8 hours  vancomycin  IVPB    vancomycin  IVPB 1000 every 12 hours      OTHER MEDS:  chlorhexidine 4% Liquid 1 Application(s) Topical <User Schedule>  enoxaparin Injectable 40 milliGRAM(s) SubCutaneous daily  fluticasone propionate 50 MICROgram(s)/spray Nasal Spray 1 Spray(s) Both Nostrils two times a day  gabapentin 300 milliGRAM(s) Oral every 12 hours  multiple electrolytes Injection Type 1 1000 milliLiter(s) IV Continuous <Continuous>  multivitamin 1 Tablet(s) Oral daily  sodium chloride 1 Gram(s) Oral three times a day      Vital Signs Last 24 Hrs  T(C): 37 (2018 08:00), Max: 37.2 (2018 15:00)  T(F): 98.6 (2018 08:00), Max: 99 (2018 15:00)  HR: 91 (2018 09:00) (78 - 107)  BP: 136/64 (2018 09:00) (82/47 - 137/111)  BP(mean): 91 (2018 08:00) (61 - 121)  RR: 17 (2018 09:00) (13 - 21)  SpO2: 100% (2018 09:00) (83% - 100%)    Physical Exam:  General:    NAD,  non toxic, A&O x 3  Head: atraumatic, normocephalic  Eye: normal sclera and conjunctiva  ENT:    no oropharyngeal lesions,   no LAD,   neck supple  Cardio:     regular S1, S2,  no murmur  Respiratory:    clear b/l,    no wheezing  abd:  +colostomy, +IR drain on R abdomen  : no suprapubic tenderness  Musculoskeletal: R hand edema where PIV was   Skin:    no rash  Neurologic:   no focal deficit  psych: normal affect                        9.4    12.0  )-----------( 194      ( 2018 03:22 )             28.4           141  |  113<H>  |  11  ----------------------------<  81  3.7   |  17<L>  |  0.63    Ca    7.2<L>      2018 03:22  Phos  2.1       Mg     1.6         TPro  6.1  /  Alb  2.9<L>  /  TBili  0.2  /  DBili  x   /  AST  9<L>  /  ALT  6<L>  /  AlkPhos  51      Urinalysis Basic - ( 2018 20:26 )    Color: Light Yellow / Appearance: Clear / S.032 / pH: x  Gluc: x / Ketone: Negative  / Bili: Negative / Urobili: Negative   Blood: x / Protein: Trace / Nitrite: Negative   Leuk Esterase: Negative / RBC: 1 /hpf / WBC 2 /hpf   Sq Epi: x / Non Sq Epi: 0 /hpf / Bacteria: Negative      MICROBIOLOGY:    .Blood Blood-Peripheral  18   No growth to date.  --  --      .Urine Clean Catch (Midstream)  18   No growth  --  --      .Blood Blood-Peripheral  10-24-18   No growth at 5 days.  --  --      .Abscess Pelvis  10-22-18   Numerous Enterococcus faecium  Numerous Escherichia coli ESBL  Few Proteus mirabilis  Numerous Bacteroides fragilis "Susceptibilities not performed"  --  Enterococcus faecium  Escherichia coli ESBL  Proteus mirabilis      .Blood Blood-Peripheral  10-22-18   No growth at 5 days.  --  Blood Culture PCR      .Urine Clean Catch (Midstream)  10-22-18   Culture grew 3 or more types of organisms which indicate  collection contamination; consider recollection only if clinically  indicated.  --  --      Clostridium difficile GDH Toxins A&amp;B, EIA:   Negative (18 @ 14:37)  Clostridium difficile GDH Interpretation: Negative for toxigenic C. Difficile.  This specimen is negative for C.  Difficile glutamate dehydrogenase (GDH) antigen and negative for C.  Difficile Toxins A & B, by EIA.  GDH is a highly sensitive screening  marker for C. Difficile that is produced in large amounts by all C.  Difficile strains, both toxigenic and nontoxigenic.  This assay has not  been validated as a test of cure.  Repeat testing during the same episode  of diarrhea is of limited value and is discouraged.  The results of this  assay should always be interpreted in conjunction with pateint's clinical  history. (18 @ 14:37)      RADIOLOGY:    No new imaging

## 2018-11-18 NOTE — PHYSICAL THERAPY INITIAL EVALUATION ADULT - ADDITIONAL COMMENTS
prior to this hospital admission: pt required assist from family (wife/son) for wheelchair transfers due to non removable armrests & use of RLE knee brace  lives in private house with family with 2 steps to enter with bilateral rails

## 2018-11-18 NOTE — PHYSICAL THERAPY INITIAL EVALUATION ADULT - STRENGTHENING, PT EVAL
GOAL: (to be met in 4 wks) increased BUE/BLE strength to 5/5 to decrease assistance with functional activities

## 2018-11-18 NOTE — PROGRESS NOTE ADULT - SUBJECTIVE AND OBJECTIVE BOX
HISTORY  58y Male PMH of DM, HTN, and ileocecectomy for perforated cecum requiring washout and ileostomy for fecal peritonitis with multiple admissions for intra-abdominal collections s/p IR drainage. Pt was recently admitted and IR drain replaced. He was sent home with IV Abx via PICC line and IR Drain in place. The IR drain fell out 11/5 and he went to an outside hospital where a ct was done showing resolution of the collection. He was doing well at home and then started having fever, liquid yellow ostomy output, and nausea last night. He has abdominal pain in the RLQ.      In ED, pt received 2L crystalloid bolus for tachycardia and was started on meropenem and vancomycin. Patient had a CT scan which demonstrated interval increase of right iliopsoas fluid collection. Patient admitted to SICU prior to planned IR intervention to drain abdominal fluid collection for resolution of sepsis.       24 HOUR EVENTS: Patient went to IR for drainage of RP collection. 125cc of purulent fluid drained initially. Patient became hypotensive shortly before leaving for IR,  radial arterial line placed and 1 liter bolus given with good response. C. diff sent from ileostomy which was negative. Additional 500ml bolus of LR given in the evening for hypotension with good response.     SUBJECTIVE/ROS:  [ ] A ten-point review of systems was otherwise negative except as noted.  [ ] Due to altered mental status/intubation, subjective information were not able to be obtained from the patient. History was obtained, to the extent possible, from review of the chart and collateral sources of information.      NEURO  Exam: awake, alert, oriented  Meds: gabapentin 300 milliGRAM(s) Oral every 12 hours    [x] Adequacy of sedation and pain control has been assessed and adjusted      RESPIRATORY  RR: 16 (11-18-18 @ 01:00) (13 - 24)  SpO2: 100% (11-18-18 @ 01:00) (83% - 100%)  Exam: unlabored, clear to auscultation bilaterally  Mechanical Ventilation: none  ABG - ( 17 Nov 2018 14:38 )  pH: 7.40  /  pCO2: 34    /  pO2: 90    / HCO3: 21    / Base Excess: -3.0  /  SaO2: 97      Lactate: x      [N/A] Extubation Readiness Assessed  Meds: none      CARDIOVASCULAR  HR: 82 (11-18-18 @ 01:00) (78 - 111)  BP: 82/47 (11-17-18 @ 20:00) (82/47 - 112/53)  BP(mean): 61 (11-17-18 @ 20:00) (61 - 79)  ABP: 158/62 (11-18-18 @ 01:00) (103/45 - 166/65)  ABP(mean): 104 (11-18-18 @ 01:00) (59 - 107)  VBG - ( 16 Nov 2018 23:47 )  pH: 7.35  /  pCO2: 43    /  pO2: <50   / HCO3: 23    / Base Excess: -2.1  /  SaO2: 36     Lactate: 2.4    Exam: regular rate and rhythm  Cardiac Rhythm: sinus  Perfusion     [x]Adequate   [ ]Inadequate  Mentation   [x]Normal       [ ]Reduced  Extremities  [x]Warm         [ ]Cool  Volume Status [ ]Hypervolemic [x]Euvolemic [ ]Hypovolemic  Meds: none      GI/NUTRITION  Exam: soft, nontender, nondistended  Diet: CLD   Meds: none    GENITOURINARY  I&O's Detail    11-16 @ 07:01  -  11-17 @ 07:00  --------------------------------------------------------  IN:    IV PiggyBack: 300 mL    sodium chloride 0.9%.: 800 mL  Total IN: 1100 mL    OUT:    Ileostomy: 500 mL    Voided: 1000 mL  Total OUT: 1500 mL    Total NET: -400 mL      11-17 @ 07:01  -  11-18 @ 02:33  --------------------------------------------------------  IN:    IV PiggyBack: 500 mL    Lactated Ringers IV Bolus: 1000 mL    sodium chloride 0.9%.: 1700 mL  Total IN: 3200 mL    OUT:    Bulb: 100 mL    Ileostomy: 150 mL    Voided: 1250 mL  Total OUT: 1500 mL    Total NET: 1700 mL          11-17    131<L>  |  101  |  17  ----------------------------<  158<H>  4.3   |  19<L>  |  0.75    Ca    9.0      17 Nov 2018 14:37  Phos  3.2     11-17  Mg     2.2     11-17    TPro  6.8  /  Alb  3.4  /  TBili  0.4  /  DBili  x   /  AST  11  /  ALT  7<L>  /  AlkPhos  54  11-17    [ ] Coles catheter, indication: N/A  Meds: sodium chloride 1 Gram(s) Oral three times a day  sodium chloride 0.9%. 1000 milliLiter(s) IV Continuous <Continuous>        HEMATOLOGIC  Meds: none  [x] VTE Prophylaxis                        10.1   18.0  )-----------( 202      ( 17 Nov 2018 14:37 )             29.9     PT/INR - ( 17 Nov 2018 09:18 )   PT: 15.3 sec;   INR: 1.33 ratio         PTT - ( 17 Nov 2018 09:18 )  PTT:31.9 sec  Transfusion     [ ] PRBC   [ ] Platelets   [ ] FFP   [ ] Cryoprecipitate      INFECTIOUS DISEASES  WBC Count: 18.0 K/uL (11-17 @ 14:37)  WBC Count: 19.7 K/uL (11-17 @ 09:19)    RECENT CULTURES:  Specimen Source: .Blood Blood-Peripheral  Date/Time: 11-17 @ 00:21  Culture Results:   No growth to date.  Gram Stain: --  Organism: --  Specimen Source: .Urine Clean Catch (Midstream)  Date/Time: 11-17 @ 00:12  Culture Results:   No growth  Gram Stain: --  Organism: --    Meds: meropenem  IVPB 1000 milliGRAM(s) IV Intermittent every 8 hours  vancomycin  IVPB      vancomycin  IVPB 1000 milliGRAM(s) IV Intermittent every 12 hours        ENDOCRINE  CAPILLARY BLOOD GLUCOSE      POCT Blood Glucose.: 108 mg/dL (17 Nov 2018 21:10)  POCT Blood Glucose.: 111 mg/dL (17 Nov 2018 17:13)  POCT Blood Glucose.: 155 mg/dL (17 Nov 2018 13:45)  POCT Blood Glucose.: 180 mg/dL (17 Nov 2018 05:52)    Meds: insulin lispro (HumaLOG) corrective regimen sliding scale   SubCutaneous every 4 hours        ACCESS DEVICES:  [x] Peripheral IV  [ ] Central Venous Line	[ ] R	[ ] L	[ ] IJ	[ ] Fem	[ ] SC	Placed:   [ ] Arterial Line		[ ] R	[ ] L	[ ] Fem	[ ] Rad	[ ] Ax	Placed:   [ ] PICC:					[ ] Mediport  [ ] Urinary Catheter, Date Placed:   [x] Necessity of urinary, arterial, and venous catheters discussed    OTHER MEDICATIONS:  fluticasone propionate 50 MICROgram(s)/spray Nasal Spray 1 Spray(s) Both Nostrils two times a day      CODE STATUS: full code        IMAGING: < from: CT Abdomen and Pelvis w/ Oral Cont and w/ IV Cont (11.16.18 @ 18:45) >  IMPRESSION:  Right iliopsoas fluid collection as above, increased in size compared to   11/5/2018.    < end of copied text > HISTORY  58y Male PMH of DM, HTN, and ileocecectomy for perforated cecum requiring washout and ileostomy for fecal peritonitis with multiple admissions for intra-abdominal collections s/p IR drainage. Pt was recently admitted and IR drain replaced. He was sent home with IV Abx via PICC line and IR Drain in place. The IR drain fell out 11/5 and he went to an outside hospital where a ct was done showing resolution of the collection. He was doing well at home and then started having fever, liquid yellow ostomy output, and nausea last night. He has abdominal pain in the RLQ.      In ED, pt received 2L crystalloid bolus for tachycardia and was started on meropenem and vancomycin. Patient had a CT scan which demonstrated interval increase of right iliopsoas fluid collection. Patient admitted to SICU prior to planned IR intervention to drain abdominal fluid collection for resolution of sepsis.       24 HOUR EVENTS: Patient went to IR for drainage of RP collection. 125cc of purulent fluid drained initially. Patient became hypotensive shortly before leaving for IR,  radial arterial line placed and 1 liter bolus given with good response. C. diff sent from ileostomy which was negative. Additional 500ml bolus of LR given in the evening for hypotension with good response.     SUBJECTIVE/ROS:  [ ] A ten-point review of systems was otherwise negative except as noted.  [ ] Due to altered mental status/intubation, subjective information were not able to be obtained from the patient. History was obtained, to the extent possible, from review of the chart and collateral sources of information.      NEURO  Exam: awake, alert, oriented  Meds: gabapentin 300 milliGRAM(s) Oral every 12 hours    [x] Adequacy of sedation and pain control has been assessed and adjusted      RESPIRATORY  RR: 16 (11-18-18 @ 01:00) (13 - 24)  SpO2: 100% (11-18-18 @ 01:00) (83% - 100%)  Exam: unlabored, clear to auscultation bilaterally  Mechanical Ventilation: none  ABG - ( 17 Nov 2018 14:38 )  pH: 7.40  /  pCO2: 34    /  pO2: 90    / HCO3: 21    / Base Excess: -3.0  /  SaO2: 97      Lactate: x      [N/A] Extubation Readiness Assessed  Meds: none      CARDIOVASCULAR  HR: 82 (11-18-18 @ 01:00) (78 - 111)  BP: 82/47 (11-17-18 @ 20:00) (82/47 - 112/53)  BP(mean): 61 (11-17-18 @ 20:00) (61 - 79)  ABP: 158/62 (11-18-18 @ 01:00) (103/45 - 166/65)  ABP(mean): 104 (11-18-18 @ 01:00) (59 - 107)  VBG - ( 16 Nov 2018 23:47 )  pH: 7.35  /  pCO2: 43    /  pO2: <50   / HCO3: 23    / Base Excess: -2.1  /  SaO2: 36     Lactate: 2.4    Exam: regular rate and rhythm  Cardiac Rhythm: sinus  Perfusion     [x]Adequate   [ ]Inadequate  Mentation   [x]Normal       [ ]Reduced  Extremities  [x]Warm         [ ]Cool  Volume Status [ ]Hypervolemic [x]Euvolemic [ ]Hypovolemic  Meds: none      GI/NUTRITION  Exam: soft, nontender, nondistended  Diet: CLD   Meds: none    GENITOURINARY  I&O's Detail    11-16 @ 07:01  -  11-17 @ 07:00  --------------------------------------------------------  IN:    IV PiggyBack: 300 mL    sodium chloride 0.9%.: 800 mL  Total IN: 1100 mL    OUT:    Ileostomy: 500 mL    Voided: 1000 mL  Total OUT: 1500 mL    Total NET: -400 mL      11-17 @ 07:01  -  11-18 @ 02:33  --------------------------------------------------------  IN:    IV PiggyBack: 500 mL    Lactated Ringers IV Bolus: 1000 mL    sodium chloride 0.9%.: 1700 mL  Total IN: 3200 mL    OUT:    Bulb: 100 mL    Ileostomy: 150 mL    Voided: 1250 mL  Total OUT: 1500 mL    Total NET: 1700 mL                      9.4                  141  | 17   | 11           12.0  >-----------< 194     ------------------------< 81                    28.4                 3.7  | 113  | 0.63                                         Ca 7.2   Mg 1.6   Ph 2.1        [ ] Coles catheter, indication: N/A  Meds: sodium chloride 1 Gram(s) Oral three times a day  sodium chloride 0.9%. 1000 milliLiter(s) IV Continuous <Continuous>        HEMATOLOGIC  Meds: none  [x] VTE Prophylaxis      Transfusion     [ ] PRBC   [ ] Platelets   [ ] FFP   [ ] Cryoprecipitate      INFECTIOUS DISEASES  WBC Count: 18.0 K/uL (11-17 @ 14:37)  WBC Count: 19.7 K/uL (11-17 @ 09:19)    RECENT CULTURES:  Specimen Source: .Blood Blood-Peripheral  Date/Time: 11-17 @ 00:21  Culture Results:   No growth to date.  Gram Stain: --  Organism: --  Specimen Source: .Urine Clean Catch (Midstream)  Date/Time: 11-17 @ 00:12  Culture Results:   No growth  Gram Stain: --  Organism: --    Meds: meropenem  IVPB 1000 milliGRAM(s) IV Intermittent every 8 hours  vancomycin  IVPB      vancomycin  IVPB 1000 milliGRAM(s) IV Intermittent every 12 hours        ENDOCRINE  CAPILLARY BLOOD GLUCOSE      POCT Blood Glucose.: 108 mg/dL (17 Nov 2018 21:10)  POCT Blood Glucose.: 111 mg/dL (17 Nov 2018 17:13)  POCT Blood Glucose.: 155 mg/dL (17 Nov 2018 13:45)  POCT Blood Glucose.: 180 mg/dL (17 Nov 2018 05:52)    Meds: insulin lispro (HumaLOG) corrective regimen sliding scale   SubCutaneous every 4 hours        ACCESS DEVICES:  [x] Peripheral IV  [ ] Central Venous Line	[ ] R	[ ] L	[ ] IJ	[ ] Fem	[ ] SC	Placed:   [ ] Arterial Line		[ ] R	[ ] L	[ ] Fem	[ ] Rad	[ ] Ax	Placed:   [ ] PICC:					[ ] Mediport  [ ] Urinary Catheter, Date Placed:   [x] Necessity of urinary, arterial, and venous catheters discussed    OTHER MEDICATIONS:  fluticasone propionate 50 MICROgram(s)/spray Nasal Spray 1 Spray(s) Both Nostrils two times a day      CODE STATUS: full code        IMAGING: < from: CT Abdomen and Pelvis w/ Oral Cont and w/ IV Cont (11.16.18 @ 18:45) >  IMPRESSION:  Right iliopsoas fluid collection as above, increased in size compared to   11/5/2018.    < end of copied text >

## 2018-11-18 NOTE — PROGRESS NOTE ADULT - ASSESSMENT
59yo M with PMH of DM, HTN, and ileocecectomy for perforated cecum requiring washout and ileostomy for fecal peritonitis with multiple admissions for intra-abdominal collections s/p IR drainage p/w abd pain, found to have worsening R iliopsoas abscess.    Per discussion w surgery, eventual eval for ?fistula given recurrent abscesses once infection and inflammation improves.    Had IR drain placed yday 11/17    - follow up abscess cultures sent by IR  - follow up BCx x 2  - c/w vanco and meropenem for now - will narrow based on cx sensitivities  - abx duration to be determined based on cx results and clinical course

## 2018-11-18 NOTE — PROGRESS NOTE ADULT - ASSESSMENT
57yo M with PMH of DM, HTN, and ileocecectomy for perforated cecum requiring washout and ileostomy for fecal peritonitis with multiple admissions for intra-abdominal collections s/p IR drainage presents with recurrent abdominal abscess. Patient admitted to SICU prior to planned IR intervention to drain abdominal fluid collection for resolution of sepsis.     PLAN:    Neurologic:   - No acute concerns, will continue to monitor    Respiratory: tachypnea  - Fluticasone (home med, unclear medical history)  - AM CXR     Cardiovascular: hypotension, likely from sepsis  - Monitor vital signs continue a-line.     Gastrointestinal/Nutrition:  - NPO prior to possible procedure  - Planned for IR 11/17 AM  - Serial abdominal exams     Renal/Genitourinary: No active issues  - NS @ 125    Hematologic: h/o DVT, on apixiban  - Continue Lovenox     Infectious Disease: sepsis, likely 2/2 abdominal fluid collection  - Meropenem   - Vancomycin  - F/u drain culture     Endocrine: DM  - SSI + fingersticks     Tubes/Lines/Drains:   - PIV    Disposition: SICU    - Lucio Carmona PA-C

## 2018-11-18 NOTE — PROGRESS NOTE ADULT - ASSESSMENT
59 yo M with history of DM, HTN, DVT, ileocolic rsxn with ileostomy for cecal perforation presents with sepsis secondary to recurrent intraabdominal infection after IR drain fell out on 11/5. Now s/p IR drainage for recurrent intraabdominal collection    - CLD  - IV Abx  - SICU consult  - glucose control, home medications  - hold eliquis for possible IR     ATP [id number] 57 yo M with history of DM, HTN, DVT, ileocolic rsxn with ileostomy for cecal perforation presents with sepsis secondary to recurrent intraabdominal infection after IR drain fell out on 11/5. Now s/p IR drainage for recurrent intraabdominal collection    - CLD  - IV Abx  - SICU consult  - glucose control, home medications      ATP 2691

## 2018-11-18 NOTE — PHYSICAL THERAPY INITIAL EVALUATION ADULT - PERTINENT HX OF CURRENT PROBLEM, REHAB EVAL
PMHx: DM, HTN, & ileocecectomy for perforated cecum s/p washout & ileostomy for fecal peritonitis & multiple admissions for intra-abdominal collections s/p IR drainage. Recently admitted, IR drain replaced, sent home on IV Abx & IR Drain in place. IR drain fell out 11/5, outside hosp CT: resolution of collection. Went home, was doing & then c/o fever, liquid yellow ostomy output, & nausea last night with RLQ abdom pain. cont...

## 2018-11-18 NOTE — PROGRESS NOTE ADULT - ATTENDING COMMENTS
Patient seen and examined with DR. Nick  I agree with her interval history exam findings and plans as noted above    s/p IR drain placement with some improvement in pain and tenderness on exam  check Vanco trough  Continue Vanco and Meropenem until the drainage culture results are available  Will need prolonged antibiotics and intra-abdominal catheter drainage

## 2018-11-18 NOTE — PROGRESS NOTE ADULT - SUBJECTIVE AND OBJECTIVE BOX
SURGERY DAILY PROGRESS NOTE:     Subjective:    Patient was seen and examined this morning during morning rounds. Abdominal collection drained by IR yesterday which yielded 125cc of purulent fluid. Drain output since has been serosanguinous     Objective:    General Appearance: awake, alert, NAD  Neck: NCAT, MMM  Chest: equal chest rise  CV: tachycardic  Abdomen: soft, ND, RLQ tenderness with guarding  Extremities: REYNOLDS, well perfused    MEDICATIONS  (STANDING):  chlorhexidine 4% Liquid 1 Application(s) Topical <User Schedule>  enoxaparin Injectable 40 milliGRAM(s) SubCutaneous daily  fluticasone propionate 50 MICROgram(s)/spray Nasal Spray 1 Spray(s) Both Nostrils two times a day  gabapentin 300 milliGRAM(s) Oral every 12 hours  meropenem  IVPB 1000 milliGRAM(s) IV Intermittent every 8 hours  multiple electrolytes Injection Type 1 1000 milliLiter(s) (100 mL/Hr) IV Continuous <Continuous>  multivitamin 1 Tablet(s) Oral daily  sodium chloride 1 Gram(s) Oral three times a day  vancomycin  IVPB      vancomycin  IVPB 1000 milliGRAM(s) IV Intermittent every 12 hours    MEDICATIONS  (PRN):      Vital Signs Last 24 Hrs  T(C): 37.5 (2018 11:00), Max: 37.5 (2018 11:00)  T(F): 99.5 (2018 11:00), Max: 99.5 (2018 11:00)  HR: 100 (2018 11:00) (78 - 107)  BP: 109/56 (2018 11:00) (82/47 - 159/69)  BP(mean): 74 (2018 11:00) (61 - 121)  RR: 21 (2018 11:00) (14 - 21)  SpO2: 100% (2018 11:00) (83% - 100%)    I&O's Detail    2018 07:01  -  2018 07:00  --------------------------------------------------------  IN:    IV PiggyBack: 766.6 mL    Lactated Ringers IV Bolus: 1000 mL    sodium chloride 0.9%: 2300 mL  Total IN: 4066.6 mL    OUT:    Bulb: 150 mL    Ileostomy: 300 mL    Voided: 2100 mL  Total OUT: 2550 mL    Total NET: 1516.6 mL      2018 07:01  -  2018 11:36  --------------------------------------------------------  IN:    IV PiggyBack: 336 mL    multiple electrolytes Injection Type 1: 100 mL    Oral Fluid: 200 mL    Solution: 150 mL  Total IN: 786 mL    OUT:  Total OUT: 0 mL    Total NET: 786 mL          Daily     Daily Weight in k.9 (2018 05:00)    LABS:                        9.4    12.0  )-----------( 194      ( 2018 03:22 )             28.4     -18    141  |  113<H>  |  11  ----------------------------<  81  3.7   |  17<L>  |  0.63    Ca    7.2<L>      2018 03:22  Phos  2.1       Mg     1.6         TPro  6.1  /  Alb  2.9<L>  /  TBili  0.2  /  DBili  x   /  AST  9<L>  /  ALT  6<L>  /  AlkPhos  51  -18    PT/INR - ( 2018 09:18 )   PT: 15.3 sec;   INR: 1.33 ratio         PTT - ( 2018 09:18 )  PTT:31.9 sec  Urinalysis Basic - ( 2018 20:26 )    Color: Light Yellow / Appearance: Clear / S.032 / pH: x  Gluc: x / Ketone: Negative  / Bili: Negative / Urobili: Negative   Blood: x / Protein: Trace / Nitrite: Negative   Leuk Esterase: Negative / RBC: 1 /hpf / WBC 2 /hpf   Sq Epi: x / Non Sq Epi: 0 /hpf / Bacteria: Negative

## 2018-11-19 LAB
-  AMIKACIN: SIGNIFICANT CHANGE UP
-  AMIKACIN: SIGNIFICANT CHANGE UP
-  AMOXICILLIN/CLAVULANIC ACID: SIGNIFICANT CHANGE UP
-  AMOXICILLIN/CLAVULANIC ACID: SIGNIFICANT CHANGE UP
-  AMPICILLIN/SULBACTAM: SIGNIFICANT CHANGE UP
-  AMPICILLIN/SULBACTAM: SIGNIFICANT CHANGE UP
-  AMPICILLIN: SIGNIFICANT CHANGE UP
-  AMPICILLIN: SIGNIFICANT CHANGE UP
-  AZTREONAM: SIGNIFICANT CHANGE UP
-  AZTREONAM: SIGNIFICANT CHANGE UP
-  CEFAZOLIN: SIGNIFICANT CHANGE UP
-  CEFAZOLIN: SIGNIFICANT CHANGE UP
-  CEFEPIME: SIGNIFICANT CHANGE UP
-  CEFEPIME: SIGNIFICANT CHANGE UP
-  CEFOXITIN: SIGNIFICANT CHANGE UP
-  CEFOXITIN: SIGNIFICANT CHANGE UP
-  CEFTRIAXONE: SIGNIFICANT CHANGE UP
-  CEFTRIAXONE: SIGNIFICANT CHANGE UP
-  CIPROFLOXACIN: SIGNIFICANT CHANGE UP
-  CIPROFLOXACIN: SIGNIFICANT CHANGE UP
-  ERTAPENEM: SIGNIFICANT CHANGE UP
-  GENTAMICIN: SIGNIFICANT CHANGE UP
-  GENTAMICIN: SIGNIFICANT CHANGE UP
-  IMIPENEM: SIGNIFICANT CHANGE UP
-  LEVOFLOXACIN: SIGNIFICANT CHANGE UP
-  LEVOFLOXACIN: SIGNIFICANT CHANGE UP
-  MEROPENEM: SIGNIFICANT CHANGE UP
-  MEROPENEM: SIGNIFICANT CHANGE UP
-  PIPERACILLIN/TAZOBACTAM: SIGNIFICANT CHANGE UP
-  PIPERACILLIN/TAZOBACTAM: SIGNIFICANT CHANGE UP
-  TETRACYCLINE: SIGNIFICANT CHANGE UP
-  TOBRAMYCIN: SIGNIFICANT CHANGE UP
-  TOBRAMYCIN: SIGNIFICANT CHANGE UP
-  TRIMETHOPRIM/SULFAMETHOXAZOLE: SIGNIFICANT CHANGE UP
-  TRIMETHOPRIM/SULFAMETHOXAZOLE: SIGNIFICANT CHANGE UP
ANION GAP SERPL CALC-SCNC: 11 MMOL/L — SIGNIFICANT CHANGE UP (ref 5–17)
APTT BLD: 29.2 SEC — SIGNIFICANT CHANGE UP (ref 27.5–36.3)
BUN SERPL-MCNC: 14 MG/DL — SIGNIFICANT CHANGE UP (ref 7–23)
CALCIUM SERPL-MCNC: 9.5 MG/DL — SIGNIFICANT CHANGE UP (ref 8.4–10.5)
CHLORIDE SERPL-SCNC: 99 MMOL/L — SIGNIFICANT CHANGE UP (ref 96–108)
CO2 SERPL-SCNC: 22 MMOL/L — SIGNIFICANT CHANGE UP (ref 22–31)
CREAT SERPL-MCNC: 0.82 MG/DL — SIGNIFICANT CHANGE UP (ref 0.5–1.3)
CULTURE RESULTS: SIGNIFICANT CHANGE UP
CULTURE RESULTS: SIGNIFICANT CHANGE UP
GLUCOSE BLDC GLUCOMTR-MCNC: 150 MG/DL — HIGH (ref 70–99)
GLUCOSE BLDC GLUCOMTR-MCNC: 206 MG/DL — HIGH (ref 70–99)
GLUCOSE BLDC GLUCOMTR-MCNC: 207 MG/DL — HIGH (ref 70–99)
GLUCOSE BLDC GLUCOMTR-MCNC: 292 MG/DL — HIGH (ref 70–99)
GLUCOSE SERPL-MCNC: 140 MG/DL — HIGH (ref 70–99)
HCT VFR BLD CALC: 30.1 % — LOW (ref 39–50)
HGB BLD-MCNC: 10.2 G/DL — LOW (ref 13–17)
INR BLD: 1.08 RATIO — SIGNIFICANT CHANGE UP (ref 0.88–1.16)
MAGNESIUM SERPL-MCNC: 1.9 MG/DL — SIGNIFICANT CHANGE UP (ref 1.6–2.6)
MCHC RBC-ENTMCNC: 29.1 PG — SIGNIFICANT CHANGE UP (ref 27–34)
MCHC RBC-ENTMCNC: 33.9 GM/DL — SIGNIFICANT CHANGE UP (ref 32–36)
MCV RBC AUTO: 86 FL — SIGNIFICANT CHANGE UP (ref 80–100)
METHOD TYPE: SIGNIFICANT CHANGE UP
METHOD TYPE: SIGNIFICANT CHANGE UP
ORGANISM # SPEC MICROSCOPIC CNT: SIGNIFICANT CHANGE UP
PHOSPHATE SERPL-MCNC: 2.4 MG/DL — LOW (ref 2.5–4.5)
PLATELET # BLD AUTO: 264 K/UL — SIGNIFICANT CHANGE UP (ref 150–400)
POTASSIUM SERPL-MCNC: 4.1 MMOL/L — SIGNIFICANT CHANGE UP (ref 3.5–5.3)
POTASSIUM SERPL-SCNC: 4.1 MMOL/L — SIGNIFICANT CHANGE UP (ref 3.5–5.3)
PROTHROM AB SERPL-ACNC: 12.1 SEC — SIGNIFICANT CHANGE UP (ref 10–13.1)
RBC # BLD: 3.5 M/UL — LOW (ref 4.2–5.8)
RBC # FLD: 14.9 % — HIGH (ref 10.3–14.5)
SODIUM SERPL-SCNC: 132 MMOL/L — LOW (ref 135–145)
SPECIMEN SOURCE: SIGNIFICANT CHANGE UP
WBC # BLD: 9.96 K/UL — SIGNIFICANT CHANGE UP (ref 3.8–10.5)
WBC # FLD AUTO: 9.96 K/UL — SIGNIFICANT CHANGE UP (ref 3.8–10.5)

## 2018-11-19 PROCEDURE — 99232 SBSQ HOSP IP/OBS MODERATE 35: CPT | Mod: GC

## 2018-11-19 PROCEDURE — 99231 SBSQ HOSP IP/OBS SF/LOW 25: CPT

## 2018-11-19 PROCEDURE — 99232 SBSQ HOSP IP/OBS MODERATE 35: CPT

## 2018-11-19 PROCEDURE — 71045 X-RAY EXAM CHEST 1 VIEW: CPT | Mod: 26

## 2018-11-19 RX ORDER — POTASSIUM PHOSPHATE, MONOBASIC POTASSIUM PHOSPHATE, DIBASIC 236; 224 MG/ML; MG/ML
15 INJECTION, SOLUTION INTRAVENOUS ONCE
Qty: 0 | Refills: 0 | Status: COMPLETED | OUTPATIENT
Start: 2018-11-19 | End: 2018-11-19

## 2018-11-19 RX ORDER — DIPHENHYDRAMINE HCL 50 MG
25 CAPSULE ORAL EVERY 4 HOURS
Qty: 0 | Refills: 0 | Status: DISCONTINUED | OUTPATIENT
Start: 2018-11-19 | End: 2018-11-20

## 2018-11-19 RX ORDER — INSULIN GLARGINE 100 [IU]/ML
4 INJECTION, SOLUTION SUBCUTANEOUS AT BEDTIME
Qty: 0 | Refills: 0 | Status: DISCONTINUED | OUTPATIENT
Start: 2018-11-19 | End: 2018-11-20

## 2018-11-19 RX ADMIN — MEROPENEM 100 MILLIGRAM(S): 1 INJECTION INTRAVENOUS at 05:52

## 2018-11-19 RX ADMIN — SODIUM CHLORIDE 1 GRAM(S): 9 INJECTION INTRAMUSCULAR; INTRAVENOUS; SUBCUTANEOUS at 21:48

## 2018-11-19 RX ADMIN — Medication 650 MILLIGRAM(S): at 19:37

## 2018-11-19 RX ADMIN — Medication 650 MILLIGRAM(S): at 19:07

## 2018-11-19 RX ADMIN — GABAPENTIN 300 MILLIGRAM(S): 400 CAPSULE ORAL at 05:53

## 2018-11-19 RX ADMIN — Medication 1 SPRAY(S): at 17:19

## 2018-11-19 RX ADMIN — Medication 25 MILLIGRAM(S): at 17:20

## 2018-11-19 RX ADMIN — GABAPENTIN 300 MILLIGRAM(S): 400 CAPSULE ORAL at 17:19

## 2018-11-19 RX ADMIN — Medication 2: at 22:27

## 2018-11-19 RX ADMIN — Medication 4: at 19:06

## 2018-11-19 RX ADMIN — POTASSIUM PHOSPHATE, MONOBASIC POTASSIUM PHOSPHATE, DIBASIC 62.5 MILLIMOLE(S): 236; 224 INJECTION, SOLUTION INTRAVENOUS at 10:28

## 2018-11-19 RX ADMIN — SODIUM CHLORIDE 1 GRAM(S): 9 INJECTION INTRAMUSCULAR; INTRAVENOUS; SUBCUTANEOUS at 05:52

## 2018-11-19 RX ADMIN — Medication 650 MILLIGRAM(S): at 06:33

## 2018-11-19 RX ADMIN — Medication 25 MILLIGRAM(S): at 05:52

## 2018-11-19 RX ADMIN — Medication 650 MILLIGRAM(S): at 06:03

## 2018-11-19 RX ADMIN — Medication 1 TABLET(S): at 11:19

## 2018-11-19 RX ADMIN — Medication 25 MILLIGRAM(S): at 19:46

## 2018-11-19 RX ADMIN — Medication 250 MILLIGRAM(S): at 22:20

## 2018-11-19 RX ADMIN — MEROPENEM 100 MILLIGRAM(S): 1 INJECTION INTRAVENOUS at 21:45

## 2018-11-19 RX ADMIN — MEROPENEM 100 MILLIGRAM(S): 1 INJECTION INTRAVENOUS at 13:11

## 2018-11-19 RX ADMIN — Medication 4: at 13:22

## 2018-11-19 RX ADMIN — Medication 1 SPRAY(S): at 05:53

## 2018-11-19 RX ADMIN — SODIUM CHLORIDE 1 GRAM(S): 9 INJECTION INTRAMUSCULAR; INTRAVENOUS; SUBCUTANEOUS at 13:12

## 2018-11-19 RX ADMIN — Medication 250 MILLIGRAM(S): at 11:07

## 2018-11-19 RX ADMIN — ENOXAPARIN SODIUM 40 MILLIGRAM(S): 100 INJECTION SUBCUTANEOUS at 11:19

## 2018-11-19 NOTE — PROVIDER CONTACT NOTE (CRITICAL VALUE NOTIFICATION) - TEST AND RESULT REPORTED:
abxcess culture gm stain numerous escherichia coli Carbapenem resistant mirabelis  few proteus esblnunable to evaluate further due to proteus overgrowth

## 2018-11-19 NOTE — PROGRESS NOTE ADULT - ASSESSMENT
59yo M with PMH of DM, HTN, and ileocecectomy for perforated cecum requiring washout and ileostomy for fecal peritonitis with multiple admissions for intra-abdominal collections s/p IR drainage p/w abd pain, found to have worsening R iliopsoas abscess.    Per discussion w surgery, eventual eval for ?fistula given recurrent abscesses once infection and inflammation improves.    Had IR drain placed 11/17    - Abscess culture- E.coli, Proteus mirabilis- await sensitivities and final results  - follow up BCx x 2- neg to date  - c/w vanco and meropenem for now - will narrow based on cx sensitivities  - abx duration to be determined based on cx results and clinical course

## 2018-11-19 NOTE — PROGRESS NOTE ADULT - ATTENDING COMMENTS
Patient seen and examined with Dr. Nick  I agree with her interval history exam findings and plans as noted above    Continue Vanco/Meropenem  follow up abdominal culture results  maintain drainage

## 2018-11-19 NOTE — PROGRESS NOTE ADULT - SUBJECTIVE AND OBJECTIVE BOX
Interventional Radiology Follow- Up Note      58y Male with a hx of perforated cecum s/p ileocecectomy with intra abdominal fluid collection admitted with iliopsoas abcess  s/p drainage on 11/17 removed 125cc with Dr. Roblero. Pt reports RLQ pain that improves with tylenol.     Vitals: T(F): 98.4 (11-19-18 @ 04:43), Max: 99.5 (11-18-18 @ 11:00)  HR: 93 (11-19-18 @ 04:43) (86 - 105)  BP: 148/75 (11-19-18 @ 04:43) (103/68 - 171/77)  RR: 18 (11-19-18 @ 04:43) (17 - 31)  SpO2: 95% (11-19-18 @ 04:43) (95% - 100%)  Wt(kg): --    LABS:                        9.4    12.0  )-----------( 194      ( 18 Nov 2018 03:22 )             28.4     11-19    132<L>  |  99  |  14  ----------------------------<  140<H>  4.1   |  22  |  0.82    Ca    9.5      19 Nov 2018 06:48  Phos  2.4     11-19  Mg     1.9     11-19    TPro  6.1  /  Alb  2.9<L>  /  TBili  0.2  /  DBili  x   /  AST  9<L>  /  ALT  6<L>  /  AlkPhos  51  11-18    PT/INR - ( 17 Nov 2018 09:18 )   PT: 15.3 sec;   INR: 1.33 ratio         PTT - ( 17 Nov 2018 09:18 )  PTT:31.9 sec        11-18-18 @ 07:01  -  11-19-18 @ 07:00  --------------------------------------------------------  IN: 2246 mL / OUT: 4355 mL / NET: -2109 mL    Culture Results:   Numerous Escherichia coli  Few Proteus mirabilis (11.17.18 @ 22:13)    output : 80cc/24hrs from the drain     PHYSICAL EXAM:  General: Nontoxic, in NAD  Neuro:  Alert & oriented x 3  Abdomen: soft, distended. Mild ttp at the RLQ drain site. Drain site dressing c/d/i. Drain with light red output. Drain flushed with 5cc without difficulty.     Impression: 58y Male admitted with Iliopsoas abscess    Plan:  -continue to monitor out put    -Flush drain per doctor orders  -trend vitals, labs  -will discuss with IR attending     Please call IR at extension [phone] or [phone] with any questions, concerns, or issues regarding above.

## 2018-11-19 NOTE — PROGRESS NOTE ADULT - SUBJECTIVE AND OBJECTIVE BOX
Follow Up:  R iliopsoas abscess    Interval History: No fevers overnight. Pt feels well. R hand improving.    ROS:    All other systems negative    Constitutional: no fever, no chills  Head: no trauma  Eyes: no vision changes, no eye pain  ENT:  no sore throat, no rhinorrhea  Cardiovascular:  no chest pain, no palpitation  Respiratory:  no SOB, no cough  GI:  no abd pain, no vomiting  urinary: no dysuria  musculoskeletal:  no joint pain, no joint swelling  skin:  no rash  neurology:  no headache        Allergies  No Known Allergies        ANTIMICROBIALS:  meropenem  IVPB 1000 every 8 hours  vancomycin  IVPB    vancomycin  IVPB 1000 every 12 hours      OTHER MEDS:  acetaminophen   Tablet .. 650 milliGRAM(s) Oral every 6 hours PRN  enoxaparin Injectable 40 milliGRAM(s) SubCutaneous daily  fluticasone propionate 50 MICROgram(s)/spray Nasal Spray 1 Spray(s) Both Nostrils two times a day  gabapentin 300 milliGRAM(s) Oral every 12 hours  insulin lispro (HumaLOG) corrective regimen sliding scale   SubCutaneous three times a day before meals  insulin lispro (HumaLOG) corrective regimen sliding scale   SubCutaneous at bedtime  metoprolol tartrate 25 milliGRAM(s) Oral <User Schedule>  multivitamin 1 Tablet(s) Oral daily  sodium chloride 1 Gram(s) Oral every 8 hours      Vital Signs Last 24 Hrs  T(C): 36.8 (19 Nov 2018 10:15), Max: 37 (19 Nov 2018 00:27)  T(F): 98.2 (19 Nov 2018 10:15), Max: 98.6 (19 Nov 2018 00:27)  HR: 84 (19 Nov 2018 10:15) (84 - 105)  BP: 132/77 (19 Nov 2018 10:15) (103/68 - 171/77)  BP(mean): 100 (18 Nov 2018 14:00) (78 - 100)  RR: 18 (19 Nov 2018 10:15) (18 - 31)  SpO2: 96% (19 Nov 2018 10:15) (95% - 100%)    Physical Exam:  General: NAD, non toxic, A&O x 3  Head: atraumatic, normocephalic  Eye: normal sclera and conjunctiva  ENT:    no oropharyngeal lesions,   no LAD,   neck supple  Cardio:     regular S1, S2,  no murmur  Respiratory:    clear b/l,    no wheezing  abd:     +ostomy, +IR drain  Musculoskeletal: R hand w less swelling  Skin:    no rash  Neurologic:   no focal deficit                          10.2   9.96  )-----------( 264      ( 19 Nov 2018 09:05 )             30.1       11-19    132<L>  |  99  |  14  ----------------------------<  140<H>  4.1   |  22  |  0.82    Ca    9.5      19 Nov 2018 06:48  Phos  2.4     11-19  Mg     1.9     11-19    TPro  6.1  /  Alb  2.9<L>  /  TBili  0.2  /  DBili  x   /  AST  9<L>  /  ALT  6<L>  /  AlkPhos  51  11-18        MICROBIOLOGY:  Vancomycin Level, Trough: 14.3 ug/mL (11-18-18 @ 22:59)    .Abscess abdominal abscess  11-17-18   Numerous Escherichia coli  Few Proteus mirabilis  --  --      .Blood Blood-Arterial  11-17-18   No growth to date.  --  --      .Blood Blood-Peripheral  11-17-18   No growth to date.  --  --      .Blood Blood-Peripheral  11-17-18   No growth to date.  --  --      .Urine Clean Catch (Midstream)  11-17-18   No growth  --  --      .Blood Blood-Peripheral  10-24-18   No growth at 5 days.  --  --      .Abscess Pelvis  10-22-18   Numerous Enterococcus faecium  Numerous Escherichia coli ESBL  Few Proteus mirabilis  Numerous Bacteroides fragilis "Susceptibilities not performed"  --  Enterococcus faecium  Escherichia coli ESBL  Proteus mirabilis      .Blood Blood-Peripheral  10-22-18   No growth at 5 days.  --  Blood Culture PCR      .Urine Clean Catch (Midstream)  10-22-18   Culture grew 3 or more types of organisms which indicate  collection contamination; consider recollection only if clinically  indicated.  --  --      Clostridium difficile GDH Toxins A&amp;B, EIA:   Negative (11-17-18 @ 14:37)  Clostridium difficile GDH Interpretation: Negative for toxigenic C. Difficile.  This specimen is negative for C.  Difficile glutamate dehydrogenase (GDH) antigen and negative for C.  Difficile Toxins A & B, by EIA.  GDH is a highly sensitive screening  marker for C. Difficile that is produced in large amounts by all C.  Difficile strains, both toxigenic and nontoxigenic.  This assay has not  been validated as a test of cure.  Repeat testing during the same episode  of diarrhea is of limited value and is discouraged.  The results of this  assay should always be interpreted in conjunction with pateint's clinical  history. (11-17-18 @ 14:37)      RADIOLOGY:    cxr 11/19- No focal consolidation.

## 2018-11-19 NOTE — PROGRESS NOTE ADULT - ATTENDING COMMENTS
seen and examined 11-19-18 @ 1105    8/11/2018 - s/p ileocecectomy with temporary abdominal closure  8/13/2018 - s/p end ileostomy    soft / NT / ND    abscess Cx - E coli, P mirabilis    recurrent right retroperitoneal abscess s/p percutaneous drainage on 11/17/2018  -vancomycin, meropenem (prior ESBL on 10/22/2018)    There is no appendix included with 8/11/2018 pathology which raises suspicion that a retained appendix could be causing recurrent abscess. Will plan VARD after drain tract forms. seen and examined 11-19-18 @ 1105    8/11/2018 - s/p ileocecectomy with temporary abdominal closure  8/13/2018 - s/p end ileostomy    soft / NT / ND    abscess Cx - E coli, P mirabilis    recurrent right retroperitoneal abscess s/p percutaneous drainage on 11/17/2018  -vancomycin, meropenem (although E coli was carbapenem resistant on 8/25/2018, but then ESBL on 10/22/2018)    There is no appendix included with 8/11/2018 pathology which raises suspicion that a retained appendix could be causing recurrent abscess. Will plan VARD after drain tract forms.

## 2018-11-19 NOTE — PROGRESS NOTE ADULT - SUBJECTIVE AND OBJECTIVE BOX
GENERAL SURGERY DAILY PROGRESS NOTE:     Subjective: Patient seen and examined. Patient stable with no overnight events. Patient denies CP/ SOB/ Palpatations. Patient denies N/V. Reports No flatus and No BM. SUDHIR IR drain in place.     MEDICATIONS  (STANDING):  enoxaparin Injectable 40 milliGRAM(s) SubCutaneous daily  fluticasone propionate 50 MICROgram(s)/spray Nasal Spray 1 Spray(s) Both Nostrils two times a day  gabapentin 300 milliGRAM(s) Oral every 12 hours  insulin lispro (HumaLOG) corrective regimen sliding scale   SubCutaneous three times a day before meals  insulin lispro (HumaLOG) corrective regimen sliding scale   SubCutaneous at bedtime  meropenem  IVPB 1000 milliGRAM(s) IV Intermittent every 8 hours  metoprolol tartrate 25 milliGRAM(s) Oral <User Schedule>  multivitamin 1 Tablet(s) Oral daily  sodium chloride 1 Gram(s) Oral every 8 hours  vancomycin  IVPB      vancomycin  IVPB 1000 milliGRAM(s) IV Intermittent every 12 hours    MEDICATIONS  (PRN):  acetaminophen   Tablet .. 650 milliGRAM(s) Oral every 6 hours PRN Mild Pain (1 - 3), Moderate Pain (4 - 6)    Vital Signs Last 24 Hrs:   T(C): 36.8 (2018 10:15), Max: 37 (2018 00:27)  T(F): 98.2 (2018 10:15), Max: 98.6 (2018 00:27)  HR: 84 (2018 10:15) (84 - 105)  BP: 132/77 (2018 10:15) (103/68 - 171/77)  BP(mean): 100 (2018 14:00) (78 - 100)  RR: 18 (2018 10:15) (18 - 31)  SpO2: 96% (2018 10:15) (95% - 100%)    I&O's Detail  2018 07:01  -  2018 07:00  --------------------------------------------------------  IN:    IV PiggyBack: 386 mL    multiple electrolytes Injection Type 1multiple electrolytes Injection Type 1: 100 mL    Oral Fluid: 1160 mL    Solution: 100 mL    Solution: 250 mL    Solution: 250 mL  Total IN: 2246 mL    OUT:    Bulb: 80 mL    Ileostomy: 1475 mL    Voided: 2800 mL  Total OUT: 4355 mL  Total NET: -2109 mL    2018 07:01  -  2018 11:22  --------------------------------------------------------  IN:    Solution: 250 mL  Total IN: 250 mL  OUT:  Total OUT: 0 mL  Total NET: 250 mL  Daily Weight in k.5 (2018 07:02)    LABS:                        10.2   9.96  )-----------( 264      ( 2018 09:05 )             30.1         132<L>  |  99  |  14  ----------------------------<  140<H>  4.1   |  22  |  0.82    Ca    9.5      2018 06:48  Phos  2.4       Mg     1.9       TPro  6.1  /  Alb  2.9<L>  /  TBili  0.2  /  DBili  x   /  AST  9<L>  /  ALT  6<L>  /  AlkPhos  51  18  PT/INR - ( 2018 08:58 )   PT: 12.1 sec;   INR: 1.08 ratio    PTT - ( 2018 08:58 )  PTT:29.2 sec    Physical Exam:   Gen: NAD, AAO x3  Abdomen: soft, NT, ND   Incision: SUDHIR drain site: clean/ dry /intact   SUDHIR drain : serous     Assessment:    58y Male who presents with Iliopsoas abscess    Plan:  -DVT PPX  -Pain control   -OOB as tolerated with assistance   -Await Gi Fxn   -Dispo Planning     Louisa Anderson   #[id number] 18 @ 11:22

## 2018-11-20 ENCOUNTER — APPOINTMENT (OUTPATIENT)
Dept: COLORECTAL SURGERY | Facility: CLINIC | Age: 58
End: 2018-11-20

## 2018-11-20 ENCOUNTER — TRANSCRIPTION ENCOUNTER (OUTPATIENT)
Age: 58
End: 2018-11-20

## 2018-11-20 DIAGNOSIS — E87.1 HYPO-OSMOLALITY AND HYPONATREMIA: ICD-10-CM

## 2018-11-20 DIAGNOSIS — E11.9 TYPE 2 DIABETES MELLITUS WITHOUT COMPLICATIONS: ICD-10-CM

## 2018-11-20 DIAGNOSIS — I10 ESSENTIAL (PRIMARY) HYPERTENSION: ICD-10-CM

## 2018-11-20 DIAGNOSIS — K68.12 PSOAS MUSCLE ABSCESS: ICD-10-CM

## 2018-11-20 DIAGNOSIS — I82.622 ACUTE EMBOLISM AND THROMBOSIS OF DEEP VEINS OF LEFT UPPER EXTREMITY: ICD-10-CM

## 2018-11-20 DIAGNOSIS — Z79.899 OTHER LONG TERM (CURRENT) DRUG THERAPY: ICD-10-CM

## 2018-11-20 LAB
-  CEFTAZIDIME/AVIBACTAM: SIGNIFICANT CHANGE UP
ANION GAP SERPL CALC-SCNC: 13 MMOL/L — SIGNIFICANT CHANGE UP (ref 5–17)
BUN SERPL-MCNC: 12 MG/DL — SIGNIFICANT CHANGE UP (ref 7–23)
CALCIUM SERPL-MCNC: 9.8 MG/DL — SIGNIFICANT CHANGE UP (ref 8.4–10.5)
CHLORIDE SERPL-SCNC: 97 MMOL/L — SIGNIFICANT CHANGE UP (ref 96–108)
CO2 SERPL-SCNC: 22 MMOL/L — SIGNIFICANT CHANGE UP (ref 22–31)
CREAT SERPL-MCNC: 0.72 MG/DL — SIGNIFICANT CHANGE UP (ref 0.5–1.3)
GLUCOSE BLDC GLUCOMTR-MCNC: 143 MG/DL — HIGH (ref 70–99)
GLUCOSE BLDC GLUCOMTR-MCNC: 188 MG/DL — HIGH (ref 70–99)
GLUCOSE BLDC GLUCOMTR-MCNC: 290 MG/DL — HIGH (ref 70–99)
GLUCOSE BLDC GLUCOMTR-MCNC: 342 MG/DL — HIGH (ref 70–99)
GLUCOSE BLDC GLUCOMTR-MCNC: 371 MG/DL — HIGH (ref 70–99)
GLUCOSE SERPL-MCNC: 176 MG/DL — HIGH (ref 70–99)
HCT VFR BLD CALC: 32.9 % — LOW (ref 39–50)
HGB BLD-MCNC: 10.9 G/DL — LOW (ref 13–17)
MAGNESIUM SERPL-MCNC: 1.8 MG/DL — SIGNIFICANT CHANGE UP (ref 1.6–2.6)
MCHC RBC-ENTMCNC: 28.4 PG — SIGNIFICANT CHANGE UP (ref 27–34)
MCHC RBC-ENTMCNC: 33.1 GM/DL — SIGNIFICANT CHANGE UP (ref 32–36)
MCV RBC AUTO: 85.7 FL — SIGNIFICANT CHANGE UP (ref 80–100)
METHOD TYPE: SIGNIFICANT CHANGE UP
ORGANISM # SPEC MICROSCOPIC CNT: SIGNIFICANT CHANGE UP
PHOSPHATE SERPL-MCNC: 2.8 MG/DL — SIGNIFICANT CHANGE UP (ref 2.5–4.5)
PLATELET # BLD AUTO: 345 K/UL — SIGNIFICANT CHANGE UP (ref 150–400)
POTASSIUM SERPL-MCNC: 4.5 MMOL/L — SIGNIFICANT CHANGE UP (ref 3.5–5.3)
POTASSIUM SERPL-SCNC: 4.5 MMOL/L — SIGNIFICANT CHANGE UP (ref 3.5–5.3)
RBC # BLD: 3.84 M/UL — LOW (ref 4.2–5.8)
RBC # FLD: 14.3 % — SIGNIFICANT CHANGE UP (ref 10.3–14.5)
SODIUM SERPL-SCNC: 132 MMOL/L — LOW (ref 135–145)
WBC # BLD: 11.03 K/UL — HIGH (ref 3.8–10.5)
WBC # FLD AUTO: 11.03 K/UL — HIGH (ref 3.8–10.5)

## 2018-11-20 PROCEDURE — 99232 SBSQ HOSP IP/OBS MODERATE 35: CPT | Mod: GC

## 2018-11-20 PROCEDURE — 99232 SBSQ HOSP IP/OBS MODERATE 35: CPT

## 2018-11-20 PROCEDURE — 99223 1ST HOSP IP/OBS HIGH 75: CPT

## 2018-11-20 RX ORDER — INSULIN GLARGINE 100 [IU]/ML
4 INJECTION, SOLUTION SUBCUTANEOUS AT BEDTIME
Qty: 0 | Refills: 0 | Status: DISCONTINUED | OUTPATIENT
Start: 2018-11-20 | End: 2018-11-21

## 2018-11-20 RX ORDER — CHLORHEXIDINE GLUCONATE 213 G/1000ML
1 SOLUTION TOPICAL
Qty: 0 | Refills: 0 | Status: DISCONTINUED | OUTPATIENT
Start: 2018-11-20 | End: 2018-11-23

## 2018-11-20 RX ORDER — INSULIN GLARGINE 100 [IU]/ML
2 INJECTION, SOLUTION SUBCUTANEOUS ONCE
Qty: 0 | Refills: 0 | Status: COMPLETED | OUTPATIENT
Start: 2018-11-20 | End: 2018-11-20

## 2018-11-20 RX ORDER — AMIKACIN SULFATE 250 MG/ML
800 INJECTION, SOLUTION INTRAMUSCULAR; INTRAVENOUS DAILY
Qty: 0 | Refills: 0 | Status: DISCONTINUED | OUTPATIENT
Start: 2018-11-20 | End: 2018-11-23

## 2018-11-20 RX ADMIN — Medication 1 TABLET(S): at 11:01

## 2018-11-20 RX ADMIN — SODIUM CHLORIDE 1 GRAM(S): 9 INJECTION INTRAMUSCULAR; INTRAVENOUS; SUBCUTANEOUS at 13:41

## 2018-11-20 RX ADMIN — Medication 1 SPRAY(S): at 05:53

## 2018-11-20 RX ADMIN — GABAPENTIN 300 MILLIGRAM(S): 400 CAPSULE ORAL at 05:52

## 2018-11-20 RX ADMIN — Medication 2: at 17:36

## 2018-11-20 RX ADMIN — AMIKACIN SULFATE 253.2 MILLIGRAM(S): 250 INJECTION, SOLUTION INTRAMUSCULAR; INTRAVENOUS at 20:58

## 2018-11-20 RX ADMIN — Medication 6: at 21:37

## 2018-11-20 RX ADMIN — MEROPENEM 100 MILLIGRAM(S): 1 INJECTION INTRAVENOUS at 13:40

## 2018-11-20 RX ADMIN — Medication 1 SPRAY(S): at 17:29

## 2018-11-20 RX ADMIN — Medication 25 MILLIGRAM(S): at 05:53

## 2018-11-20 RX ADMIN — SODIUM CHLORIDE 1 GRAM(S): 9 INJECTION INTRAMUSCULAR; INTRAVENOUS; SUBCUTANEOUS at 05:53

## 2018-11-20 RX ADMIN — CHLORHEXIDINE GLUCONATE 1 APPLICATION(S): 213 SOLUTION TOPICAL at 13:42

## 2018-11-20 RX ADMIN — ENOXAPARIN SODIUM 40 MILLIGRAM(S): 100 INJECTION SUBCUTANEOUS at 11:01

## 2018-11-20 RX ADMIN — SODIUM CHLORIDE 1 GRAM(S): 9 INJECTION INTRAMUSCULAR; INTRAVENOUS; SUBCUTANEOUS at 21:37

## 2018-11-20 RX ADMIN — INSULIN GLARGINE 4 UNIT(S): 100 INJECTION, SOLUTION SUBCUTANEOUS at 21:37

## 2018-11-20 RX ADMIN — Medication 250 MILLIGRAM(S): at 10:39

## 2018-11-20 RX ADMIN — INSULIN GLARGINE 2 UNIT(S): 100 INJECTION, SOLUTION SUBCUTANEOUS at 23:06

## 2018-11-20 RX ADMIN — SODIUM CHLORIDE 1 GRAM(S): 9 INJECTION INTRAMUSCULAR; INTRAVENOUS; SUBCUTANEOUS at 17:29

## 2018-11-20 RX ADMIN — Medication 25 MILLIGRAM(S): at 17:32

## 2018-11-20 RX ADMIN — MEROPENEM 100 MILLIGRAM(S): 1 INJECTION INTRAVENOUS at 05:52

## 2018-11-20 RX ADMIN — MEROPENEM 100 MILLIGRAM(S): 1 INJECTION INTRAVENOUS at 21:36

## 2018-11-20 RX ADMIN — GABAPENTIN 300 MILLIGRAM(S): 400 CAPSULE ORAL at 17:30

## 2018-11-20 NOTE — PROGRESS NOTE ADULT - ATTENDING COMMENTS
Patient seen and examined with Dr. Nick  I agree with her interval history exam findings and plans as noted above    Given the multiple recurrence of his symptoms and improvement with repeat drainage and IV antibiotics I think it is best to place a PICC line and continue outpatient Meropenem and will add Amikacin 800mg/d  He will need repeat imaging in a few weeks to see if the abscess is resolving    James Cherry MD  [phone]  After 5pm/weekends [phone]

## 2018-11-20 NOTE — PROGRESS NOTE ADULT - ATTENDING COMMENTS
seen and examined 11-20-18 @ 0857    8/11/2018 - s/p ileocecectomy with temporary abdominal closure  8/13/2018 - s/p end ileostomy    soft / NT / ND    abscess Cx - carbapenem resistant E coli, ESBL P mirabilis    recurrent right retroperitoneal abscess s/p percutaneous drainage on 11/17/2018  -discuss ABx plan for discharge with ID      There is no appendix included with 8/11/2018 pathology which raises suspicion that a retained appendix could be causing recurrent abscess. Will plan elective VARD after drain tract forms (2-3 weeks). I discussed this plan with the patient.

## 2018-11-20 NOTE — DISCHARGE NOTE ADULT - ADDITIONAL INSTRUCTIONS
WOUND CARE: You will be discharged with a SUDHIR drain. You will need to empty them and record outputs accurately. This will be taught to you by the nursing staff. Please do not remove the SUDHIR drain. SUDHIR will be removed in the follow up appointment with IR. Please bring to the office accurate records of output recorded per 24 hours period. Please monitor any changes in color or texture.   BATHING: Please do not submerge wound underwater. You may shower and/or sponge bathe.  ACTIVITY: No heavy lifting or straining. Otherwise, you may return to your usual level of physical activity. If you are taking narcotic pain medication (such as Percocet), do NOT drive a car, operate machinery or make important decisions.  DIET: Return to your usual diet.  NOTIFY YOUR SURGEON IF: You have any bleeding that does not stop, any pus draining from your wound, any fever (over 100.4 F) or chills, persistent nausea/vomiting, persistent diarrhea, or if your pain is not controlled on your discharge pain medications.  FOLLOW-UP:  1. Please call to make a follow-up appointment within one week of discharge with Dr. Lyle. You will require a discussion in regards to your future care and possible OR intervention at a later date.   2. Please follow up with your primary care physician in one week regarding your hospitalization.  3. Please follow up with Dr. James Cherry from infectious disease in terms of your IV abx regimen. WOUND CARE: You will be discharged with a SUDHIR drain. You will need to empty them and record outputs accurately. This will be taught to you by the nursing staff. Please do not remove the SUDHIR drain. SUDHIR will be removed in the follow up appointment with IR. Please bring to the office accurate records of output recorded per 24 hours period. Please monitor any changes in color or texture.   BATHING: Please do not submerge wound underwater. You may shower and/or sponge bathe.  ACTIVITY: No heavy lifting or straining. Otherwise, you may return to your usual level of physical activity. If you are taking narcotic pain medication (such as Percocet), do NOT drive a car, operate machinery or make important decisions.  DIET: Return to your usual diet.  NOTIFY YOUR SURGEON IF: You have any bleeding that does not stop, any pus draining from your wound, any fever (over 100.4 F) or chills, persistent nausea/vomiting, persistent diarrhea, or if your pain is not controlled on your discharge pain medications.  FOLLOW-UP:  1. Please call to make a follow-up appointment within one week of discharge with Dr. Lyle. You will require a discussion in regards to your future care and possible OR intervention at a later date.   2. Please follow up with your primary care physician in one week regarding your hospitalization.  3. Please follow up with Dr. James Cherry from infectious disease in terms of your IV abx regimen.    Please notify your Attending Physician if after discharge you have fever, chills, abdominal pain, purulent drainage from your incision, in ability to pass flatus, not tolerating PO diet, abdominal bloating/distention, [place] and or vomiting. Additionally any acute changes as well. Please report back to the Emergency Department if unable to reach Physician. WOUND CARE: You will be discharged with a SUDHIR drain. You will need to empty them and record outputs accurately. This will be taught to you by the nursing staff. Please do not remove the SUDHIR drain. SUDHIR will be removed in the follow up appointment with IR. Please bring to the office accurate records of output recorded per 24 hours period. Please monitor any changes in color or texture.   BATHING: Please do not submerge wound underwater. You may shower and/or sponge bathe.  ACTIVITY: No heavy lifting or straining. Otherwise, you may return to your usual level of physical activity. If you are taking narcotic pain medication (such as Percocet), do NOT drive a car, operate machinery or make important decisions.  DIET: Return to your usual diet.  NOTIFY YOUR SURGEON IF: You have any bleeding that does not stop, any pus draining from your wound, any fever (over 100.4 F) or chills, persistent nausea/vomiting, persistent diarrhea, or if your pain is not controlled on your discharge pain medications.  FOLLOW-UP:  1. Please call to make a follow-up appointment within one week of discharge with Dr. Lyle. You will require a discussion in regards to your future care and possible OR intervention at a later date.   2. Please follow up with your primary care physician in one week regarding your hospitalization.  3. Please follow up with Dr. Cherry from infectious disease in terms of your IV abx regimen.    Please notify your Attending Physician if after discharge you have fever, chills, abdominal pain, purulent drainage from your incision, in ability to pass flatus, not tolerating PO diet, abdominal bloating/distention, [place] and or vomiting. Additionally any acute changes as well. Please report back to the Emergency Department if unable to reach Physician.

## 2018-11-20 NOTE — PROGRESS NOTE ADULT - SUBJECTIVE AND OBJECTIVE BOX
Follow Up:  r iliopsoas abscess    Interval History: No fevers overnight. pt feels well. no abd pain.    ROS:    All other systems negative    Constitutional: no fever, no chills  Head: no trauma  Eyes: no vision changes, no eye pain  ENT:  no sore throat, no rhinorrhea  Cardiovascular:  no chest pain, no palpitation  Respiratory:  no SOB, no cough  GI:  no abd pain, no vomiting, no diarrhea  urinary: no dysuria  musculoskeletal:  no joint pain, no joint swelling  skin:  no rash  neurology:  no headache        Allergies  No Known Allergies        ANTIMICROBIALS:  amiKACIN  IVPB 800 daily  meropenem  IVPB 1000 every 8 hours      OTHER MEDS:  acetaminophen   Tablet .. 650 milliGRAM(s) Oral every 6 hours PRN  chlorhexidine 4% Liquid 1 Application(s) Topical <User Schedule>  enoxaparin Injectable 40 milliGRAM(s) SubCutaneous daily  fluticasone propionate 50 MICROgram(s)/spray Nasal Spray 1 Spray(s) Both Nostrils two times a day  gabapentin 300 milliGRAM(s) Oral every 12 hours  insulin glargine Injectable (LANTUS) 4 Unit(s) SubCutaneous at bedtime  insulin lispro (HumaLOG) corrective regimen sliding scale   SubCutaneous three times a day before meals  insulin lispro (HumaLOG) corrective regimen sliding scale   SubCutaneous at bedtime  metoprolol tartrate 25 milliGRAM(s) Oral <User Schedule>  multivitamin 1 Tablet(s) Oral daily  sodium chloride 1 Gram(s) Oral every 8 hours      Vital Signs Last 24 Hrs  T(C): 36.7 (20 Nov 2018 12:57), Max: 37.6 (20 Nov 2018 05:51)  T(F): 98 (20 Nov 2018 12:57), Max: 99.7 (20 Nov 2018 05:51)  HR: 93 (20 Nov 2018 12:57) (82 - 104)  BP: 119/75 (20 Nov 2018 12:57) (110/70 - 144/84)  BP(mean): --  RR: 18 (20 Nov 2018 12:57) (18 - 18)  SpO2: 99% (20 Nov 2018 12:57) (95% - 100%)    Physical Exam:  General:  NAD,  non toxic, A&O x 3  Head: atraumatic, normocephalic  Eye: normal sclera and conjunctiva  ENT:    no oropharyngeal lesions  Cardio:     regular S1, S2  Respiratory:    clear b/l no wheezing  abd:     soft,   BS +,   no tenderness,    no organomegaly, +ostomy, +IR drain  Musculoskeletal:   no edema  vascular: normal pulses  Skin:    no rash  Neurologic:  no focal deficit  psych: normal affect                          10.9   11.03 )-----------( 345      ( 20 Nov 2018 07:53 )             32.9       11-20    132<L>  |  97  |  12  ----------------------------<  176<H>  4.5   |  22  |  0.72    Ca    9.8      20 Nov 2018 07:21  Phos  2.8     11-20  Mg     1.8     11-20            MICROBIOLOGY:    .Abscess abdominal abscess  11-17-18   Numerous Escherichia coli (Carbapenem Resistant)  Few Proteus mirabilis ESBL  Unable to evaluate further due to Proteus overgrowth  --  Escherichia coli (Carbapenem Resistant)  Proteus mirabilis ESBL      .Blood Blood-Arterial  11-17-18   No growth to date.  --  --      .Blood Blood-Peripheral  11-17-18   No growth to date.  --  --      .Blood Blood-Peripheral  11-17-18   No growth to date.  --  --      .Urine Clean Catch (Midstream)  11-17-18   No growth  --  --      .Blood Blood-Peripheral  10-24-18   No growth at 5 days.  --  --      .Abscess Pelvis  10-22-18   Numerous Enterococcus faecium  Numerous Escherichia coli ESBL  Few Proteus mirabilis  Numerous Bacteroides fragilis "Susceptibilities not performed"  --  Enterococcus faecium  Escherichia coli ESBL  Proteus mirabilis      .Blood Blood-Peripheral  10-22-18   No growth at 5 days.  --  Blood Culture PCR      .Urine Clean Catch (Midstream)  10-22-18   Culture grew 3 or more types of organisms which indicate  collection contamination; consider recollection only if clinically  indicated.  --  --    Clostridium difficile GDH Toxins A&amp;B, EIA:   Negative (11-17-18 @ 14:37)  Clostridium difficile GDH Interpretation: Negative for toxigenic C. Difficile.  This specimen is negative for C.  Difficile glutamate dehydrogenase (GDH) antigen and negative for C.  Difficile Toxins A & B, by EIA.  GDH is a highly sensitive screening  marker for C. Difficile that is produced in large amounts by all C.  Difficile strains, both toxigenic and nontoxigenic.  This assay has not  been validated as a test of cure.  Repeat testing during the same episode  of diarrhea is of limited value and is discouraged.  The results of this  assay should always be interpreted in conjunction with pateint's clinical  history. (11-17-18 @ 14:37)      RADIOLOGY:    no new imaging

## 2018-11-20 NOTE — CONSULT NOTE ADULT - ATTENDING COMMENTS
Patient seen and examined with Dr. Nick  I agree with her history, exam findings and plans as noted above      57yo M with PMH of DM, HTN, and ileocecectomy for perforated cecum requiring washout and ileostomy for fecal peritonitis with multiple admissions for intra-abdominal collections s/p IR drainage p/w abd pain, found to have worsening R iliopsoas abscess.  He most likely has an underlying enteric leak, but per conversation with surgery the area remains very inflammed and unable to proceed with surgical intervention until the inflammation improves.  Continue Vancomycin and Meropenem pending repeat blood and IR drainage cultures  Check Vancomycin trough prior to fourth dose    James Cherry MD  [phone]  After 5pm/weekends [phone]
pt seen and examined, agree with above  Close HD monitoring  Iv hydration  Monitor fever, follow up cultures  IV antibiotics  DVt/GI prophylaxis  IR intervention
Maty Cummings MD  Division of [facility]  Spectra: [phone]

## 2018-11-20 NOTE — CONSULT NOTE ADULT - PROBLEM SELECTOR RECOMMENDATION 9
s/p IR drain 11/17  - ID following, c/w vanco and ceci. monitor vanco trough  - as per surgery  - monitor wbc, fever  - to have outpatient elective VARD

## 2018-11-20 NOTE — CONSULT NOTE ADULT - ASSESSMENT
59 y/o M PMH DM2 not on insulin, HTN, hx RUE DVT on eliquis (resolved 10/2018), hyponatremia, s/p ileocecectomy with temporary abdominal closure w/ end ileostomy 8/2018, hx of abdominal collection w/ IR drain fell out p/w abd pain adm w/ sepsis 2/2 iliopsoas collection s/p IR drain 11/17

## 2018-11-20 NOTE — CONSULT NOTE ADULT - PROBLEM SELECTOR RECOMMENDATION 2
now resolved per recent RUE u/s 10/2018, still on eliquis at home  - currently on dvt ppx lovenox  - holding eliquis - ? if patient will need eliquis on d/c as dvt resolved and may have been provoked.. will need to clarify

## 2018-11-20 NOTE — DISCHARGE NOTE ADULT - PATIENT PORTAL LINK FT
You can access the [facility] Patient Portal, offered by [facility], by registering with the following website: [email or web address]

## 2018-11-20 NOTE — CONSULT NOTE ADULT - PROBLEM SELECTOR RECOMMENDATION 3
on oral meds at home  a1c ~7.7  - holding home metformin, glipizide, januvia  - start lantus 4 u bedtime  - c/w ISS TIDACBED  - c/w gabapentin for neuropathy

## 2018-11-20 NOTE — DISCHARGE NOTE ADULT - PLAN OF CARE
S/P replacement of IR drain after dislodgement -DVT PPX  -pain control   -IV ABX  -As per medicine no need for Eliquis repeat Duplex shows resolution of Right Upper Extremity DVT  -Continue with IR drainage be sure to secure it at the insertion site and pin to clothing to ensure it does not get dislodged. -pain control   -IV ABX for at least 2 weeks (you were prescribed 6 weeks).  Repeat CT scan at this 2 week interval. ABX may be discontinued as per ID after CT scan reviewed depending on results.   -Continue with IR drainage be sure to secure it at the insertion site and pin to clothing to ensure it does not get dislodged.  Please follow up with Dr. Lyle in 2 weeks. Contact info below.   Please follow up with Dr. Cherry from Infectious disease within 2 weeks. You will need weekly labs (CBC, CMP, Amikacin trough).   Please follow up with Interventional radiology Dr. Roblero in 2-3 weeks. Contact info below. Resolved DVT of RUE. Continue Eliquis as prescribed and follow up with your hematologist who is prescribing it.

## 2018-11-20 NOTE — DISCHARGE NOTE ADULT - CARE PROVIDERS DIRECT ADDRESSES
[email or web address]

## 2018-11-20 NOTE — DISCHARGE NOTE ADULT - CARE PROVIDER_API CALL
Chase Lyle), Surgery; Surgical Critical Care  [address] [place] [id number]  Phone: [phone]  Fax: [phone]    Claudia Cherry), Infectious Disease; Internal Medicine  [address] [place] [address]  Phone: [phone]  Fax: [phone] Chase Lyle), Surgery; Surgical Critical Care  [address] [id number]  Phone: [phone]  Fax: [phone]    Claudia Cherry), Infectious Disease; Internal Medicine  [address] [place] [id number]  Phone: [phone]  Fax: [phone]    Grzegorz Roblero), VascularIntervent Radiology  [address] [place] [address]  Phone: [phone]  Fax: [phone]

## 2018-11-20 NOTE — DISCHARGE NOTE ADULT - MEDICATION SUMMARY - MEDICATIONS TO TAKE
I will START or STAY ON the medications listed below when I get home from the hospital:    amikacin  -- 800 milligram(s) intravenous once a day  -- Indication: For Iliopsoas abscess    acetaminophen 325 mg oral tablet  -- 2 tab(s) by mouth every 6 hours  -- Indication: For Pain    apixaban 5 mg oral tablet  -- 2 tab(s) by mouth every 12 hours  -- Indication: For DVT (deep venous thrombosis)    gabapentin 300 mg oral tablet  -- 1 tab(s) by mouth 2 times a day  -- Indication: For Pain    Januvia 100 mg oral tablet  -- 1 tab(s) by mouth once a day  -- Indication: For Diabetes    metFORMIN 1000 mg oral tablet  -- 1 tab(s) by mouth 2 times a day  -- Indication: For Diabetes    glipiZIDE 10 mg oral tablet  -- 1 tab(s) by mouth once a day  -- Indication: For Diabetes    metoprolol tartrate 25 mg oral tablet  -- 1 tab(s) by mouth 2 times a day  -- Indication: For Hypertension    meropenem 1000 mg intravenous injection  -- 1000 milligram(s) intravenous every 8 hours  -- Indication: For Iliopsoas abscess    sodium chloride 1 g oral tablet  -- 1 tab(s) by mouth 3 times a day  -- Indication: For Hyponatremia    fluticasone 50 mcg/inh inhalation powder  -- 1 puff(s) inhaled 2 times a day  -- Indication: For Home med    Multiple Vitamins oral tablet  -- 1 tab(s) by mouth once a day  -- Indication: For Nutrition

## 2018-11-20 NOTE — CONSULT NOTE ADULT - SUBJECTIVE AND OBJECTIVE BOX
HPI: 59yo M with PMH of DM, HTN, and ileocecectomy for perforated cecum requiring washout and ileostomy for fecal peritonitis with multiple admissions for intra-abdominal collections s/p IR drainage p/w abd pain. Pt lethargic, unable to provide hx so hx from chart. Pt originally admitted for similar problem  to  when he had a colonic perforation. He was originially in [place] when he had abd pain, told he needed surgery but didn't want surgery there and came to [facility] for second opinion. On  pt had ileocecal resection for his colonic perforation and wound vac placement. Had abd washout on . His stay was c/b ileiopsoas abscess that necessitated IR drain placement on . Cultures at that time grew GNR, bacteroids, CRE Ecoli and pt was discharged on flagyl and amikacin ( to 10/14). Pt readmitted 10/22 to 10/27 for recurrence of same abscess once again requiring IR intervention. Abscess cultures at that time showed E. Faecium, ESBL Ecoli, proteus. Pt also bacteremia w bacteroides. Pt was discharged on imipenem for total 14 days. Pt then presented on  to [facility] ED because his IR drain fell out. CT a/p at that time showed almost complete resolution of the R iliopsoas abscess so pt discharged home to finish abx course. Pt now comes in w fever, liquid yellow ostomy output and nausea as well as abdominal pain.    CT a/p whosing R iliopsoas abscess 7.9 x 4.5 cm, increased in size from CT a/p on . Discussed w surgical resident, pt to go for IR drainage today.       PAST MEDICAL & SURGICAL HISTORY:  DVT (deep venous thrombosis)  Necrotizing fasciitis  Diabetes  Hypertension  H/O ileostomy      Allergies    No Known Allergies    Intolerances        ANTIMICROBIALS:  meropenem  IVPB 1000 every 8 hours      OTHER MEDS:  gabapentin 300 milliGRAM(s) Oral every 12 hours  insulin lispro (HumaLOG) corrective regimen sliding scale   SubCutaneous three times a day before meals  lactated ringers Bolus 1000 milliLiter(s) IV Bolus once  sodium chloride 1 Gram(s) Oral three times a day  sodium chloride 0.9%. 1000 milliLiter(s) IV Continuous <Continuous>      SOCIAL HISTORY:  Tobacco Usage:  no      FAMILY HISTORY:  No pertinent family history in first degree relatives      ROS:  Unobtainable because: lethargic, not answering questions    Physical Exam:    General:   lethargic  Head: atraumatic, normocephalic  Eyes: normal sclera and conjunctiva  ENT:   no oropharyngeal lesions  Cardio:    regular S1,S2  Respiratory:   clear b/l, no wheezing  abd:   +ostomy  Musculoskeletal : no edema  Skin:    no rash  vascular:  normal pulses  Neurologic: difficult to arouse  psych: unable to assess      Drug Dosing Weight  Height (cm): 182.88 (2018 15:57)  Weight (kg): 65.2 (2018 23:00)  BMI (kg/m2): 19.5 (2018 23:00)  BSA (m2): 1.85 (2018 23:00)    Vital Signs Last 24 Hrs  T(F): 102 (18 @ 04:00), Max: 103.3 (18 @ 22:10)    Vital Signs Last 24 Hrs  HR: 110 (18 @ 06:00) (102 - 137)  BP: 110/52 (18 @ 06:00) (109/55 - 157/64)  RR: 23 (18 @ 06:00)  SpO2: 99% (18 @ 06:00) (97% - 100%)  Wt(kg): --                          10.4   18.9  )-----------( 215      ( 2018 23:54 )             30.6       -17    132<L>  |  103  |  16  ----------------------------<  170<H>  4.4   |  17<L>  |  0.80    Ca    8.6      2018 06:57  Phos  2.7       Mg     2.2         TPro  6.8  /  Alb  3.4  /  TBili  0.4  /  DBili  x   /  AST  11  /  ALT  7<L>  /  AlkPhos  54        Urinalysis Basic - ( 2018 20:26 )    Color: Light Yellow / Appearance: Clear / S.032 / pH: x  Gluc: x / Ketone: Negative  / Bili: Negative / Urobili: Negative   Blood: x / Protein: Trace / Nitrite: Negative   Leuk Esterase: Negative / RBC: 1 /hpf / WBC 2 /hpf   Sq Epi: x / Non Sq Epi: 0 /hpf / Bacteria: Negative        MICROBIOLOGY:    BCx x 2 sent and pending    .Blood Blood-Peripheral  10-24-18   No growth at 5 days.  --  --      .Abscess Pelvis  10-22-18   Numerous Enterococcus faecium  Numerous Escherichia coli ESBL  Few Proteus mirabilis  Numerous Bacteroides fragilis "Susceptibilities not performed"  --  Enterococcus faecium  Escherichia coli ESBL  Proteus mirabilis      .Blood Blood-Peripheral  10-22-18   No growth at 5 days.  --  Blood Culture PCR      .Urine Clean Catch (Midstream)  10-22-18   Culture grew 3 or more types of organisms which indicate  collection contamination; consider recollection only if clinically  indicated.  --  --      RADIOLOGY:    CT a/p- Right iliopsoas fluid collection as above, increased in size compared to   2018.
SICU Consultation Note  =====================================================  HISTORY OF PRESENT ILLNESS:  57yo M with PMH of DM, HTN, and ileocecectomy for perforated cecum requiring washout and ileostomy for fecal peritonitis with multiple admissions for intra-abdominal collections s/p IR drainage. Pt was recently admitted and IR drain replaced. He was sent home with IV Abx via PICC line and IR Drain in place. The IR drain fell out 11/5 and he went to an outside hospital where a ct was done showing resolution of the collection. He was doing well at home and then started having fever, liquid yellow ostomy output, and nausea last night. He has abdominal pain in the RLQ.      In ED, pt received 2L crystalloid bolus for tachycardia and was started on meropenem and vancomycin. Patient had a CT scan which demonstrated interval increase of right iliopsoas fluid collection. Patient admitted to SICU prior to planned IR intervention to drain abdominal fluid collection for resolution of sepsis.     PAST MEDICAL HISTORY: DVT (deep venous thrombosis)  Necrotizing fasciitis  Diabetes  Hypertension    PAST SURGICAL HISTORY:   H/O ileostomy    FAMILY HISTORY: No pertinent family history in first degree relatives    CODE STATUS:     HOME MEDICATIONS:  Home Medications:  acetaminophen 325 mg oral tablet: 2 tab(s) orally every 6 hours (16 Nov 2018 21:02)  apixaban 5 mg oral tablet: 2 tab(s) orally every 12 hours (16 Nov 2018 21:02)  fluticasone 50 mcg/inh inhalation powder: 1 puff(s) inhaled 2 times a day (16 Nov 2018 21:02)  gabapentin 300 mg oral tablet: 1 tab(s) orally 2 times a day (16 Nov 2018 21:02)  metoprolol tartrate 25 mg oral tablet: 1 tab(s) orally 2 times a day (16 Nov 2018 21:02)  Multiple Vitamins oral tablet: 1 tab(s) orally once a day (16 Nov 2018 21:02)  sodium chloride 1 g oral tablet: 1 tab(s) orally 3 times a day (16 Nov 2018 21:02)    ALLERGIES: No Known Allergies    VITAL SIGNS:  ICU Vital Signs Last 24 Hrs  T(C): 39.6 (16 Nov 2018 22:10), Max: 39.6 (16 Nov 2018 22:10)  T(F): 103.3 (16 Nov 2018 22:10), Max: 103.3 (16 Nov 2018 22:10)  HR: 113 (17 Nov 2018 00:00) (102 - 137)  BP: 157/64 (17 Nov 2018 00:00) (110/66 - 157/64)  BP(mean): 92 (17 Nov 2018 00:00) (92 - 108)  ABP: --  ABP(mean): --  RR: 27 (17 Nov 2018 00:00) (16 - 27)  SpO2: 99% (17 Nov 2018 00:00) (98% - 100%)    NEURO  Exam: awake and alert, oriented  Meds: gabapentin 300 milliGRAM(s) Oral every 12 hours    RESPIRATORY  Exam: tachypneic, moderately shallow breaths  Meds:fluticasone  propionate  44 MICROgram(s) HFA Inhaler - Peds 2 Puff(s) Inhalation Once    CARDIOVASCULAR  VBG - ( 16 Nov 2018 23:47 )  pH: 7.35  /  pCO2: 43    /  pO2: <50   / HCO3: 23    / Base Excess: -2.1  /  SaO2: 36     Lactate: 2.4      Exam: hypertensive to 150-160's  Cardiac Rhythm: tachycardic 110-130's  Meds:     GI/NUTRITION  Exam: soft, tender to palp RLQ, ostomy w/ yellow thin fluid  Diet: NPO prior to procedure    GENITOURINARY/RENAL  Meds: sodium chloride 1 Gram(s) Oral three times a day  sodium chloride 0.9%. 1000 milliLiter(s) IV Continuous <Continuous>    11-16 @ 07:01  -  11-17 @ 00:40  --------------------------------------------------------  IN:  Total IN: 0 mL    OUT:    Ileostomy: 200 mL    Voided: 200 mL  Total OUT: 400 mL    Total NET: -400 mL    11-16    128<L>  |  96  |  20  ----------------------------<  203<H>  5.1   |  21<L>  |  0.84    Ca    9.5      16 Nov 2018 23:54  Phos  2.6     11-16  Mg     1.8     11-16    TPro  8.1  /  Alb  3.8  /  TBili  0.3  /  DBili  x   /  AST  11  /  ALT  10  /  AlkPhos  61  11-16    [ ] Coles catheter, indication: urine output monitoring in critically ill patient    HEMATOLOGIC  [x] VTE Prophylaxis: held                        10.4   18.9  )-----------( 215      ( 16 Nov 2018 23:54 )             30.6     PT/INR - ( 16 Nov 2018 23:54 )   PT: 16.2 sec;   INR: 1.39 ratio      PTT - ( 16 Nov 2018 23:54 )  PTT:32.1 sec  Transfusion: [ ] PRBC	[ ] Platelets	[ ] FFP	[ ] Cryoprecipitate    INFECTIOUS DISEASES  Meds: meropenem  IVPB 1000 milliGRAM(s) IV Intermittent every 8 hours    RECENT CULTURES:    ENDOCRINE  Meds:  CAPILLARY BLOOD GLUCOSE    PATIENT CARE ACCESS DEVICES:  [x Peripheral IV  [ ] Central Venous Line	[ ] R	[ ] L	[ ] IJ	[ ] Fem	[ ] SC	Placed:   [ ] Arterial Line		[ ] R	[ ] L	[ ] Fem	[ ] Rad	[ ] Ax	Placed:   [ ] PICC:					[ ] Mediport  [ ] Urinary Catheter, Date Placed:   [x] Necessity of urinary, arterial, and venous catheters discussed    OTHER MEDICATIONS:     IMAGING STUDIES:  CT A/P:   < from: CT Abdomen and Pelvis w/ Oral Cont and w/ IV Cont (11.16.18 @ 18:45) >  LOWER CHEST: Within normal limits.    LIVER: Within normal limits.  BILE DUCTS: Normal caliber.  GALLBLADDER: Contracted.  SPLEEN: Within normal limits.  PANCREAS: Within normal limits.  ADRENALS: Within normal limits.  KIDNEYS/URETERS: Left cortical renal scarring. Right kidney iswithin   normal limits.    BLADDER: Within normal limits.  REPRODUCTIVE ORGANS: The prostate is mildly enlarged.    BOWEL/ABDOMINAL WALL: Status post right hemicolectomy and right lower   quadrant ileostomy. No bowel obstruction.   PERITONEUM: A right iliopsoas fluid collection measuring 7.9 x 4.5 cm,   which has increased in size compared to prior exam of 11/5/2018, and   abuts the ileal loops in the right lower quadrant.  VESSELS:  Atheromatous disease of the abdominal aorta and branching   vessels.  RETROPERITONEUM: No lymphadenopathy.    BONES: Within normal limits.    IMPRESSION:  Right iliopsoas fluid collection as above, increased in size compared to   11/5/2018.    GRAYSON MÉNDEZ M.D., RADIOLOGY RESIDENT  This document has been electronically signed.  COOPER CLARK M.D., ATTENDING RADIOLOGIST    < end of copied text >
TRAUMA COMANAGEMENT MEDICINE ATTENDING INITIAL CONSULT NOTE    HPI:  57yo M with PMH of DM, HTN, and ileocecectomy for perforated cecum requiring washout and ileostomy for fecal peritonitis with multiple admissions for intra-abdominal collections s/p IR drainage. Pt was recently admitted and IR drain replaced. He was sent home with IV Abx via PICC line and IR Drain in place. The IR drain fell out 11/5 and he went to an outside hospital where a ct was done showing resolution of the collection. He was doing well at home and then started having fever, liquid yellow ostomy output, and nausea last night. He has abdominal pain in the RLQ. (16 Nov 2018 21:01)      SUBJECTIVE:   overnight no acute events.  states still has some abd pain 7/10. denies n/v/f/chills  States his legs are weak and not walking much.  d/w RN who stated pt was refusing to be walked yesterday, but pt is now agreeable to get out of bed to chair and ambulate as tolerates.    Home Medications:  acetaminophen 325 mg oral tablet: 2 tab(s) orally every 6 hours (20 Nov 2018 12:13)  apixaban 5 mg oral tablet: 2 tab(s) orally every 12 hours (20 Nov 2018 12:13)  fluticasone 50 mcg/inh inhalation powder: 1 puff(s) inhaled 2 times a day (20 Nov 2018 12:13)  gabapentin 300 mg oral tablet: 1 tab(s) orally 2 times a day (20 Nov 2018 12:13)  glipiZIDE 10 mg oral tablet: 1 tab(s) orally once a day (20 Nov 2018 12:13)  Januvia 100 mg oral tablet: 1 tab(s) orally once a day (20 Nov 2018 12:13)  metFORMIN 1000 mg oral tablet: 1 tab(s) orally 2 times a day (20 Nov 2018 12:13)  metoprolol tartrate 25 mg oral tablet: 1 tab(s) orally 2 times a day (20 Nov 2018 12:13)  Multiple Vitamins oral tablet: 1 tab(s) orally once a day (20 Nov 2018 12:13)  sodium chloride 1 g oral tablet: 1 tab(s) orally 3 times a day (20 Nov 2018 12:13)    PAST MEDICAL & SURGICAL HISTORY:  DVT (deep venous thrombosis)  Necrotizing fasciitis  Diabetes  Hypertension  H/O ileostomy    Review of Systems:   CONSTITUTIONAL: No fever, weight loss, or fatigue  EYES: No eye pain, visual disturbances, or discharge  ENMT:  No difficulty hearing, tinnitus, vertigo; No sinus or throat pain  NECK: No pain or stiffness  BREASTS: No pain, masses, or nipple discharge  RESPIRATORY: No cough, wheezing, chills or hemoptysis; No shortness of breath  CARDIOVASCULAR: No chest pain, palpitations, dizziness, or leg swelling  GASTROINTESTINAL: +abd pain. No nausea, vomiting, or hematemesis  GENITOURINARY: No dysuria, frequency, hematuria, or incontinence  NEUROLOGICAL: No headaches, memory loss, loss of strength, numbness, or tremors  SKIN: No itching, burning, rashes, or lesions   LYMPH NODES: No enlarged glands  ENDOCRINE: No heat or cold intolerance; No hair loss  MUSCULOSKELETAL: No joint pain or swelling; No muscle, back, or extremity pain  PSYCHIATRIC: No depression, anxiety, mood swings, or difficulty sleeping    Allergies    No Known Allergies    Intolerances      Social History:   denies smoking, etoh.    FAMILY HISTORY:  No pertinent family history in first degree relatives   Noncontributory  denies fam hx of abscess    Vital Signs Last 24 Hrs  T(C): 36.5 (20 Nov 2018 09:27), Max: 37.6 (20 Nov 2018 05:51)  T(F): 97.7 (20 Nov 2018 09:27), Max: 99.7 (20 Nov 2018 05:51)  HR: 82 (20 Nov 2018 09:27) (82 - 104)  BP: 110/70 (20 Nov 2018 09:27) (110/70 - 146/77)  BP(mean): --  RR: 18 (20 Nov 2018 09:27) (18 - 18)  SpO2: 100% (20 Nov 2018 09:27) (95% - 100%)  CAPILLARY BLOOD GLUCOSE    POCT Blood Glucose.: 143 mg/dL (20 Nov 2018 10:41)  POCT Blood Glucose.: 292 mg/dL (19 Nov 2018 22:25)  POCT Blood Glucose.: 207 mg/dL (19 Nov 2018 19:03)  POCT Blood Glucose.: 206 mg/dL (19 Nov 2018 13:19)    PHYSICAL EXAM:  GENERAL: NAD, well-developed  HEAD:  NCAT  NECK: Supple  CHEST/LUNG: Clear to auscultation bilaterally; No wheeze  HEART: Reg rate. No M/R/G.  ABDOMEN: Soft, minimal ttp throughout, w/ ostomy in place  EXTREMITIES:  2+ Peripheral Pulses, No clubbing, cyanosis, or edema  rle atrophy  PSYCH: flat affect    LABS:                        10.9   11.03 )-----------( 345      ( 20 Nov 2018 07:53 )             32.9     11-20    132<L>  |  97  |  12  ----------------------------<  176<H>  4.5   |  22  |  0.72    Ca    9.8      20 Nov 2018 07:21  Phos  2.8     11-20  Mg     1.8     11-20      PT/INR - ( 19 Nov 2018 08:58 )   PT: 12.1 sec;   INR: 1.08 ratio         PTT - ( 19 Nov 2018 08:58 )  PTT:29.2 sec    Culture - Abscess with Gram Stain (collected 17 Nov 2018 22:13)  Source: .Abscess abdominal abscess  Final Report (19 Nov 2018 17:21):    Numerous Escherichia coli (Carbapenem Resistant)    Few Proteus mirabilis ESBL    Unable to evaluate further due to Proteus overgrowth  Organism: Escherichia coli (Carbapenem Resistant)  Proteus mirabilis ESBL (19 Nov 2018 17:21)  Organism: Proteus mirabilis ESBL (19 Nov 2018 17:21)  Organism: Escherichia coli (Carbapenem Resistant) (19 Nov 2018 17:21)    Culture - Blood (collected 17 Nov 2018 15:46)  Source: .Blood Blood-Arterial  Preliminary Report (18 Nov 2018 16:01):    No growth to date.    MEDICATIONS  (STANDING):  chlorhexidine 4% Liquid 1 Application(s) Topical <User Schedule>  enoxaparin Injectable 40 milliGRAM(s) SubCutaneous daily  fluticasone propionate 50 MICROgram(s)/spray Nasal Spray 1 Spray(s) Both Nostrils two times a day  gabapentin 300 milliGRAM(s) Oral every 12 hours  insulin lispro (HumaLOG) corrective regimen sliding scale   SubCutaneous three times a day before meals  insulin lispro (HumaLOG) corrective regimen sliding scale   SubCutaneous at bedtime  meropenem  IVPB 1000 milliGRAM(s) IV Intermittent every 8 hours  metoprolol tartrate 25 milliGRAM(s) Oral <User Schedule>  multivitamin 1 Tablet(s) Oral daily  sodium chloride 1 Gram(s) Oral every 8 hours  vancomycin  IVPB      vancomycin  IVPB 1000 milliGRAM(s) IV Intermittent every 12 hours    MEDICATIONS  (PRN):  acetaminophen   Tablet .. 650 milliGRAM(s) Oral every 6 hours PRN Mild Pain (1 - 3), Moderate Pain (4 - 6)

## 2018-11-20 NOTE — DISCHARGE NOTE ADULT - HOME CARE AGENCY
[facility]  [facility]   [phone]  scheduled  for  start of care  today  11/23/18  approximately   2 pm   for  PICC,  IVABX.    Agency will contact  you  to schedule  appointment  and  agency  will contact  you  regarding  delivery of  meds,  supplies.   If you have any questions to same  you may contact  agency.      [facility]  ([facility])  [phone]   scheduled for   start of  care   11/24/18    SN  eval and reinforcement of ostomy,  drain care,   PT  eval.       Agency  will contact you to set up time of visit.   IF  you have any questions to same  you  may contact agency.

## 2018-11-20 NOTE — DISCHARGE NOTE ADULT - HOSPITAL COURSE
59yo M with PMH of DM, HTN, and ileocecectomy for perforated cecum requiring washout and ileostomy for fecal peritonitis with multiple admissions for intra-abdominal collections s/p IR drainage. Pt was recently admitted and IR drain replaced. He was sent home with IV Abx via PICC line and IR Drain in place. The IR drain fell out 11/5 and he went to an outside hospital where a ct was done showing resolution of the collection. He was doing well at home and then started having fever, liquid yellow ostomy output, and nausea last night. He has abdominal pain in the RLQ.   (Above as per admitting resident) CT scan performed on admission showed < from: CT Abdomen and Pelvis w/ Oral Cont and w/ IV Cont (11.16.18 @ 18:45) >IMPRESSION: Right iliopsoas fluid collection as above, increased in size compared to 11/5/2018.< end of copied text >        During the hospital course, patient had lab work performed on a daily basis. Prior to discharge lab work was noted to be within normal. A normal WBC was noted to be down trending, and a HCT was noted within normal limits. Vitals were checked on a four hour basis, and patient was noted to be normotensive and afebrile upon discharge. Patient had a normal heart rate and adequate oxygen saturations. SOB/FARFAN was denied. Pain medication was given prior to discharge with no allergic reaction, and reported adequate pain control.  At the time of discharge, the patient was hemodynamically stable, was tolerating PO diet, was voiding urine and passing stool, was ambulating, and was comfortable with adequate pain control. The patient was instructed to follow up with Dr. Lyle, and Dr. Cherry within 1 week after discharge from the hospital. The patient felt comfortable with discharge. The patient was discharged to home/rehab. The patient had no other issues. 59yo M with PMH of DM, HTN, and ileocecectomy for perforated cecum requiring washout and ileostomy for fecal peritonitis with multiple admissions for intra-abdominal collections s/p IR drainage. Pt was recently admitted and IR drain replaced. He was sent home with IV Abx via PICC line and IR Drain in place. The IR drain fell out 11/5 and he went to an outside hospital where a ct was done showing resolution of the collection. He was doing well at home and then started having fever, liquid yellow ostomy output, and nausea last night. He has abdominal pain in the RLQ.   (Above as per admitting resident) CT scan performed on admission showed < from: CT Abdomen and Pelvis w/ Oral Cont and w/ IV Cont (11.16.18 @ 18:45) >IMPRESSION: Right iliopsoas fluid collection as above, increased in size compared to 11/5/2018.< End of copied text >  IR procedure performed and drain reinserted.   Patient followed by ID and cultures were sent. Cultures noted to be positive and antibiotics were tailored appropriately. Due to previous failure with oral regimen, patient should be sent on IV abx. The patient had PICC inserted. Long term antibiotics regimen as per ID. ID will follow as outpatient.     During the hospital course, patient had lab work performed on a daily basis. Prior to discharge lab work was noted to be within normal. A normal WBC was noted to be down trending, and a HCT was noted within normal limits. Vitals were checked on a four hour basis, and patient was noted to be normotensive and afebrile upon discharge. Patient had a normal heart rate and adequate oxygen saturations. SOB/FARFAN was denied. Pain medication was given prior to discharge with no allergic reaction, and reported adequate pain control.  At the time of discharge, the patient was hemodynamically stable, was tolerating PO diet, was voiding urine and passing stool, was ambulating, and was comfortable with adequate pain control. The patient was instructed to follow up with Dr. Lyle, and Dr. Cherry within 1 week after discharge from the hospital. The patient felt comfortable with discharge. The patient was discharged to home/rehab. The patient had no other issues. 59yo M with PMH of DM, HTN, and ileocecectomy for perforated cecum requiring washout and ileostomy for fecal peritonitis with multiple admissions for intra-abdominal collections s/p IR drainage. Pt was recently admitted and IR drain replaced. He was sent home with IV Abx via PICC line and IR Drain in place. The IR drain fell out 11/5 and he went to an outside hospital where a ct was done showing resolution of the collection. He was doing well at home and then started having fever, liquid yellow ostomy output, and nausea last night. He has abdominal pain in the RLQ.   (Above as per admitting resident) CT scan performed on admission showed < from: CT Abdomen and Pelvis w/ Oral Cont and w/ IV Cont (11.16.18 @ 18:45) >IMPRESSION: Right iliopsoas fluid collection as above, increased in size compared to 11/5/2018.< End of copied text >  IR procedure performed and drain reinserted.   Patient followed by ID and cultures were sent. Cultures noted to be positive and antibiotics were tailored appropriately. Due to previous failure with oral regimen, patient should be sent on IV abx. The patient had PICC inserted. Long term antibiotics regimen as per ID. ID will follow as outpatient.   As per medicine attending patient was on Eliqiuis for DVT treatment. The right upper extremity was noted to have a DVT. The patient had repeat duplex which noted resolution of the DVT. < from: [facility] Duplex Upper Ext Vein Scan, Right (10.24.18 @ 18:11) >IMPRESSION: No evidence of right upper extremity deep venous thrombosis. The previously seen deep venous thrombosis involving the mid to distal right brachial vein and proximal right radial vein is no longer identified. Superficial thrombophlebitis involving the right cephalic vein.< end of copied text >    During the hospital course, patient had lab work performed on a daily basis. Prior to discharge lab work was noted to be within normal. A normal WBC was noted to be down trending, and a HCT was noted within normal limits. Vitals were checked on a four hour basis, and patient was noted to be normotensive and afebrile upon discharge. Patient had a normal heart rate and adequate oxygen saturations. SOB/FARFAN was denied. Pain medication was given prior to discharge with no allergic reaction, and reported adequate pain control.    At the time of discharge, the patient was hemodynamically stable, was tolerating PO diet, was voiding urine and passing stool, was ambulating, and was comfortable with adequate pain control. The patient was instructed to follow up with Dr. Lyle, and Dr. Cherry within 1 week after discharge from the hospital. The patient felt comfortable with discharge. The patient was discharged to home/rehab. The patient had no other issues. 59yo M with PMH of DM, HTN, and ileocecectomy for perforated cecum requiring washout and ileostomy for fecal peritonitis with multiple admissions for intra-abdominal collections s/p IR drainage. Pt was recently admitted and IR drain replaced. He was sent home with IV Abx via PICC line and IR Drain in place. The IR drain fell out 11/5 and he went to an outside hospital where a ct was done showing resolution of the collection. He was doing well at home and then started having fever, liquid yellow ostomy output, and nausea last night. He has abdominal pain in the RLQ.   (Above as per admitting resident) CT scan performed on admission showed < from: CT Abdomen and Pelvis w/ Oral Cont and w/ IV Cont (11.16.18 @ 18:45) >IMPRESSION: Right iliopsoas fluid collection as above, increased in size compared to 11/5/2018.< End of copied text >  IR procedure performed and drain reinserted.   Patient followed by ID and cultures were sent. Cultures noted to be positive and antibiotics were tailored appropriately. Due to previous failure with oral regimen, patient should be sent on IV abx. The patient had PICC inserted. Long term antibiotics regimen as per ID. ID will follow as outpatient.   As per medicine attending patient was on Eliqiuis for DVT treatment. The right upper extremity was noted to have a DVT. The patient had repeat duplex which noted resolution of the DVT. Eliquis resumed as outpatient as previously prescribed. Instructed to follow up with prescribing physician. < from: [facility] Duplex Upper Ext Vein Scan, Right (10.24.18 @ 18:11) >IMPRESSION: No evidence of right upper extremity deep venous thrombosis. The previously seen deep venous thrombosis involving the mid to distal right brachial vein and proximal right radial vein is no longer identified. Superficial thrombophlebitis involving the right cephalic vein.< end of copied text >    During the hospital course, patient had lab work performed on a daily basis. Prior to discharge lab work was noted to be within normal. A normal WBC was noted to be down trending, and a HCT was noted within normal limits. Vitals were checked on a four hour basis, and patient was noted to be normotensive and afebrile upon discharge. Patient had a normal heart rate and adequate oxygen saturations. SOB/FARFAN was denied. Pain medication was given prior to discharge with no allergic reaction, and reported adequate pain control.    At the time of discharge, the patient was hemodynamically stable, was tolerating PO diet, was voiding urine and passing stool, was ambulating, and was comfortable with adequate pain control. The patient was instructed to follow up with Dr. Lyle, and Dr. Cherry within 1-2 weeks after discharge from the hospital. The patient felt comfortable with discharge. The patient was discharged to home. The patient had no other issues. 59yo M with PMH of DM, HTN, and ileocecectomy for perforated cecum requiring washout and ileostomy for fecal peritonitis with multiple admissions for intra-abdominal collections s/p IR drainage. Pt was recently admitted and IR drain replaced. He was sent home with IV Abx via PICC line and IR Drain in place. The IR drain fell out 11/5 and he went to an outside hospital where a ct was done showing resolution of the collection. He was doing well at home and then started having fever, liquid yellow ostomy output, and nausea last night. He has abdominal pain in the RLQ.   (Above as per admitting resident) CT scan performed on admission showed < from: CT Abdomen and Pelvis w/ Oral Cont and w/ IV Cont (11.16.18 @ 18:45) >IMPRESSION: Right iliopsoas fluid collection as above, increased in size compared to 11/5/2018.< End of copied text >  IR procedure performed and drain reinserted.   Patient followed by ID and cultures were sent. Cultures noted to be positive and antibiotics were tailored appropriately. Due to previous failure with oral regimen, patient should be sent on IV abx. The patient had PICC inserted. Long term antibiotics regimen as per ID. ID will follow as outpatient.   As per medicine attending patient was on Eliqiuis for DVT treatment. The right upper extremity was noted to have a DVT. The patient had repeat duplex which noted resolution of the DVT. Eliquis resumed as outpatient as previously prescribed. Instructed to follow up with prescribing physician. < from: VA Duplex Upper Ext Vein Scan, Right (10.24.18 @ 18:11) >IMPRESSION: No evidence of right upper extremity deep venous thrombosis. The previously seen deep venous thrombosis involving the mid to distal right brachial vein and proximal right radial vein is no longer identified. Superficial thrombophlebitis involving the right cephalic vein.< end of copied text >    During the hospital course, patient had lab work performed on a daily basis. Prior to discharge lab work was noted to be within normal. A normal WBC was noted to be down trending, and a HCT was noted within normal limits. Vitals were checked on a four hour basis, and patient was noted to be normotensive and afebrile upon discharge. Patient had a normal heart rate and adequate oxygen saturations. SOB/FARFAN was denied. Pain medication was given prior to discharge with no allergic reaction, and reported adequate pain control.    At the time of discharge, the patient was hemodynamically stable, was tolerating PO diet, was voiding urine and passing stool, was ambulating, and was comfortable with adequate pain control. The patient was instructed to follow up with Dr. Lyle, and Dr. Cherry within 1-2 weeks after discharge from the hospital. The patient felt comfortable with discharge. The patient was discharged to home with VNS and home PT. The patient had no other issues.

## 2018-11-20 NOTE — DISCHARGE NOTE ADULT - INSTRUCTIONS
Regular diet Regular diet  PICC Care  Activity as tolerated   Flush Ir drain with 10cc of Normal saline daily. Do not aspirate back. record outputs. Regular diet  LUE PICC Care  Activity as tolerated   Flush Ir drain with 10cc of Normal saline daily. Do not aspirate back. record outputs.

## 2018-11-20 NOTE — PROGRESS NOTE ADULT - SUBJECTIVE AND OBJECTIVE BOX
GENERAL SURGERY DAILY PROGRESS NOTE:     Subjective: Patient seen and examined. Patient stable with no overnight events. Patient denies CP/ SOB/ Palpatations. Patient denies N/V. Reports flatus and BM.     MEDICATIONS  (STANDING):  diphenhydrAMINE 25 milliGRAM(s) Oral every 4 hours  enoxaparin Injectable 40 milliGRAM(s) SubCutaneous daily  fluticasone propionate 50 MICROgram(s)/spray Nasal Spray 1 Spray(s) Both Nostrils two times a day  gabapentin 300 milliGRAM(s) Oral every 12 hours  insulin glargine Injectable (LANTUS) 4 Unit(s) SubCutaneous at bedtime  insulin lispro (HumaLOG) corrective regimen sliding scale   SubCutaneous three times a day before meals  insulin lispro (HumaLOG) corrective regimen sliding scale   SubCutaneous at bedtime  meropenem  IVPB 1000 milliGRAM(s) IV Intermittent every 8 hours  metoprolol tartrate 25 milliGRAM(s) Oral <User Schedule>  multivitamin 1 Tablet(s) Oral daily  sodium chloride 1 Gram(s) Oral every 8 hours  vancomycin  IVPB      vancomycin  IVPB 1000 milliGRAM(s) IV Intermittent every 12 hours    MEDICATIONS  (PRN):  acetaminophen   Tablet .. 650 milliGRAM(s) Oral every 6 hours PRN Mild Pain (1 - 3), Moderate Pain (4 - 6)      Vital Signs Last 24 Hrs  T(C): 37.6 (20 Nov 2018 05:51), Max: 37.6 (20 Nov 2018 05:51)  T(F): 99.7 (20 Nov 2018 05:51), Max: 99.7 (20 Nov 2018 05:51)  HR: 104 (20 Nov 2018 05:51) (84 - 104)  BP: 122/74 (20 Nov 2018 05:51) (118/72 - 146/77)  RR: 18 (20 Nov 2018 05:51) (18 - 18)  SpO2: 95% (20 Nov 2018 05:51) (95% - 99%)    I&O's Detail:   19 Nov 2018 07:01  -  20 Nov 2018 07:00  --------------------------------------------------------  IN:    Oral Fluid: 2220 mL    Solution: 750 mL    Solution: 150 mL  Total IN: 3120 mL    OUT:    Bulb: 25 mL    Ileostomy: 1500 mL    Voided: 6600 mL  Total OUT: 8125 mL  Total NET: -5005 mL    LABS:                        10.2   9.96  )-----------( 264      ( 19 Nov 2018 09:05 )             30.1     11-19    132<L>  |  99  |  14  ----------------------------<  140<H>  4.1   |  22  |  0.82    Ca    9.5      19 Nov 2018 06:48  Phos  2.4     11-19  Mg     1.9     11-19    PT/INR - ( 19 Nov 2018 08:58 )   PT: 12.1 sec;   INR: 1.08 ratio    PTT - ( 19 Nov 2018 08:58 )  PTT:29.2 sec      Physical Exam:   Gen: NAD, AAOx3  Abdomen: soft, NT, ND  SUDHIR drain: serous     Assessment:     58y Male who presents with Iliopsoas abscess    Plan:  -DVT PPX  -Pain control   -OOB as tolerated with assistance   -Confirm ABX with ID recommendations    -Diet as tolerated  -Await Gi Fxn   -Dispo Planning     Louisa Anderson   #[id number] 11-20-18 @ 07:07

## 2018-11-20 NOTE — DISCHARGE NOTE ADULT - PROVIDER TOKENS
TOKKRISTINE:'[id number]',CHARLEEN:'[id number]' TOKEN:'[id number]',TOKEN:'[id number]',TOKEN:'[id number]'

## 2018-11-20 NOTE — DISCHARGE NOTE ADULT - CARE PLAN
Principal Discharge DX:	Iliopsoas abscess on right  Goal:	S/P replacement of IR drain after dislodgement  Assessment and plan of treatment:	-DVT PPX  -pain control   -IV ABX  -As per medicine no need for Eliquis repeat Duplex shows resolution of Right Upper Extremity DVT  -Continue with IR drainage be sure to secure it at the insertion site and pin to clothing to ensure it does not get dislodged. Principal Discharge DX:	Iliopsoas abscess on right  Goal:	S/P replacement of IR drain after dislodgement  Assessment and plan of treatment:	-pain control   -IV ABX for at least 2 weeks (you were prescribed 6 weeks).  Repeat CT scan at this 2 week interval. ABX may be discontinued as per ID after CT scan reviewed depending on results.   -Continue with IR drainage be sure to secure it at the insertion site and pin to clothing to ensure it does not get dislodged.  Please follow up with Dr. Lyle in 2 weeks. Contact info below.   Please follow up with Dr. Cherry from Infectious disease within 2 weeks. You will need weekly labs (CBC, CMP, Amikacin trough).   Please follow up with Interventional radiology Dr. Roblero in 2-3 weeks. Contact info below.  Secondary Diagnosis:	DVT (deep venous thrombosis)  Goal:	Resolved DVT of RUE.  Assessment and plan of treatment:	Continue Eliquis as prescribed and follow up with your hematologist who is prescribing it.

## 2018-11-20 NOTE — DISCHARGE NOTE ADULT - NS AS ACTIVITY OBS
Do not make important decisions/Do not drive or operate machinery/No Heavy lifting/straining/Showering allowed/Not while taking pain medication

## 2018-11-20 NOTE — DISCHARGE NOTE ADULT - NSFTFSERV1RD_GEN_ALL_CORE
other:/rehabilitation services/wound care and assessment/observation and assessment/teaching and training/medication teaching and assessment/central venous access care

## 2018-11-20 NOTE — CONSULT NOTE ADULT - PROBLEM SELECTOR RECOMMENDATION 6
pmd Dr Dusty Munoz  pharmacy [facility] in [place], called and confirmed medications and updated med rec

## 2018-11-21 DIAGNOSIS — I82.629 ACUTE EMBOLISM AND THROMBOSIS OF DEEP VEINS OF UNSPECIFIED UPPER EXTREMITY: ICD-10-CM

## 2018-11-21 LAB
-  ERTAPENEM: SIGNIFICANT CHANGE UP
ANION GAP SERPL CALC-SCNC: 13 MMOL/L — SIGNIFICANT CHANGE UP (ref 5–17)
APTT BLD: 30.9 SEC — SIGNIFICANT CHANGE UP (ref 27.5–36.3)
BUN SERPL-MCNC: 17 MG/DL — SIGNIFICANT CHANGE UP (ref 7–23)
CALCIUM SERPL-MCNC: 10.6 MG/DL — HIGH (ref 8.4–10.5)
CHLORIDE SERPL-SCNC: 95 MMOL/L — LOW (ref 96–108)
CO2 SERPL-SCNC: 25 MMOL/L — SIGNIFICANT CHANGE UP (ref 22–31)
CREAT SERPL-MCNC: 0.75 MG/DL — SIGNIFICANT CHANGE UP (ref 0.5–1.3)
GLUCOSE BLDC GLUCOMTR-MCNC: 144 MG/DL — HIGH (ref 70–99)
GLUCOSE BLDC GLUCOMTR-MCNC: 189 MG/DL — HIGH (ref 70–99)
GLUCOSE BLDC GLUCOMTR-MCNC: 243 MG/DL — HIGH (ref 70–99)
GLUCOSE BLDC GLUCOMTR-MCNC: 331 MG/DL — HIGH (ref 70–99)
GLUCOSE SERPL-MCNC: 143 MG/DL — HIGH (ref 70–99)
HCT VFR BLD CALC: 32.4 % — LOW (ref 39–50)
HGB BLD-MCNC: 11.1 G/DL — LOW (ref 13–17)
INR BLD: 1.08 RATIO — SIGNIFICANT CHANGE UP (ref 0.88–1.16)
MAGNESIUM SERPL-MCNC: 1.9 MG/DL — SIGNIFICANT CHANGE UP (ref 1.6–2.6)
MCHC RBC-ENTMCNC: 28.5 PG — SIGNIFICANT CHANGE UP (ref 27–34)
MCHC RBC-ENTMCNC: 34.3 GM/DL — SIGNIFICANT CHANGE UP (ref 32–36)
MCV RBC AUTO: 83.3 FL — SIGNIFICANT CHANGE UP (ref 80–100)
PHOSPHATE SERPL-MCNC: 3.5 MG/DL — SIGNIFICANT CHANGE UP (ref 2.5–4.5)
PLATELET # BLD AUTO: 373 K/UL — SIGNIFICANT CHANGE UP (ref 150–400)
POTASSIUM SERPL-MCNC: 4.5 MMOL/L — SIGNIFICANT CHANGE UP (ref 3.5–5.3)
POTASSIUM SERPL-SCNC: 4.5 MMOL/L — SIGNIFICANT CHANGE UP (ref 3.5–5.3)
PROTHROM AB SERPL-ACNC: 12.1 SEC — SIGNIFICANT CHANGE UP (ref 10–13.1)
RBC # BLD: 3.89 M/UL — LOW (ref 4.2–5.8)
RBC # FLD: 14.3 % — SIGNIFICANT CHANGE UP (ref 10.3–14.5)
SODIUM SERPL-SCNC: 133 MMOL/L — LOW (ref 135–145)
WBC # BLD: 12.21 K/UL — HIGH (ref 3.8–10.5)
WBC # FLD AUTO: 12.21 K/UL — HIGH (ref 3.8–10.5)

## 2018-11-21 PROCEDURE — 99231 SBSQ HOSP IP/OBS SF/LOW 25: CPT

## 2018-11-21 PROCEDURE — 99232 SBSQ HOSP IP/OBS MODERATE 35: CPT | Mod: GC

## 2018-11-21 PROCEDURE — 99233 SBSQ HOSP IP/OBS HIGH 50: CPT

## 2018-11-21 PROCEDURE — 71045 X-RAY EXAM CHEST 1 VIEW: CPT | Mod: 26

## 2018-11-21 RX ORDER — NYSTATIN CREAM 100000 [USP'U]/G
1 CREAM TOPICAL
Qty: 0 | Refills: 0 | Status: DISCONTINUED | OUTPATIENT
Start: 2018-11-21 | End: 2018-11-23

## 2018-11-21 RX ORDER — INSULIN GLARGINE 100 [IU]/ML
7 INJECTION, SOLUTION SUBCUTANEOUS AT BEDTIME
Qty: 0 | Refills: 0 | Status: DISCONTINUED | OUTPATIENT
Start: 2018-11-21 | End: 2018-11-22

## 2018-11-21 RX ORDER — MEROPENEM 1 G/30ML
1000 INJECTION INTRAVENOUS
Qty: 0 | Refills: 0 | DISCHARGE
Start: 2018-11-21

## 2018-11-21 RX ADMIN — SODIUM CHLORIDE 1 GRAM(S): 9 INJECTION INTRAMUSCULAR; INTRAVENOUS; SUBCUTANEOUS at 15:24

## 2018-11-21 RX ADMIN — Medication 1 SPRAY(S): at 06:19

## 2018-11-21 RX ADMIN — Medication 8: at 15:22

## 2018-11-21 RX ADMIN — AMIKACIN SULFATE 253.2 MILLIGRAM(S): 250 INJECTION, SOLUTION INTRAMUSCULAR; INTRAVENOUS at 13:35

## 2018-11-21 RX ADMIN — GABAPENTIN 300 MILLIGRAM(S): 400 CAPSULE ORAL at 06:19

## 2018-11-21 RX ADMIN — SODIUM CHLORIDE 1 GRAM(S): 9 INJECTION INTRAMUSCULAR; INTRAVENOUS; SUBCUTANEOUS at 06:19

## 2018-11-21 RX ADMIN — Medication 4: at 19:38

## 2018-11-21 RX ADMIN — Medication 1 TABLET(S): at 13:36

## 2018-11-21 RX ADMIN — MEROPENEM 100 MILLIGRAM(S): 1 INJECTION INTRAVENOUS at 22:12

## 2018-11-21 RX ADMIN — INSULIN GLARGINE 7 UNIT(S): 100 INJECTION, SOLUTION SUBCUTANEOUS at 22:13

## 2018-11-21 RX ADMIN — GABAPENTIN 300 MILLIGRAM(S): 400 CAPSULE ORAL at 18:43

## 2018-11-21 RX ADMIN — SODIUM CHLORIDE 1 GRAM(S): 9 INJECTION INTRAMUSCULAR; INTRAVENOUS; SUBCUTANEOUS at 22:12

## 2018-11-21 RX ADMIN — ENOXAPARIN SODIUM 40 MILLIGRAM(S): 100 INJECTION SUBCUTANEOUS at 13:36

## 2018-11-21 RX ADMIN — CHLORHEXIDINE GLUCONATE 1 APPLICATION(S): 213 SOLUTION TOPICAL at 15:25

## 2018-11-21 RX ADMIN — Medication 1 SPRAY(S): at 18:44

## 2018-11-21 RX ADMIN — Medication 25 MILLIGRAM(S): at 06:25

## 2018-11-21 RX ADMIN — Medication 25 MILLIGRAM(S): at 18:43

## 2018-11-21 RX ADMIN — MEROPENEM 100 MILLIGRAM(S): 1 INJECTION INTRAVENOUS at 06:19

## 2018-11-21 RX ADMIN — MEROPENEM 100 MILLIGRAM(S): 1 INJECTION INTRAVENOUS at 15:25

## 2018-11-21 NOTE — PROGRESS NOTE ADULT - PROBLEM SELECTOR PLAN 5
on oral meds at home  a1c ~7.7  - holding home metformin, glipizide, januvia - on d/c can continue  - while inpatient, incr lantus to  7 u bedtime  - c/w ISS TIDACBED  - c/w gabapentin for neuropathy.

## 2018-11-21 NOTE — PROGRESS NOTE ADULT - SUBJECTIVE AND OBJECTIVE BOX
Trauma Surgery Progress Note    Subjective:   Pt seen & examined at bedside. Pain is well controlled. No new complaints. No F/C, N/V, CP/SOB.  Passing flatus/stool.  Awaiting PICC line placement    Medications:  amiKACIN  IVPB 800  meropenem  IVPB 1000  amiKACIN  IVPB 800  enoxaparin Injectable 40  meropenem  IVPB 1000  metoprolol tartrate 25      Vital Signs Last 24 Hrs  T(C): 36.7 (2018 06:15), Max: 36.9 (2018 22:48)  T(F): 98.1 (2018 06:15), Max: 98.4 (2018 22:48)  HR: 105 (2018 06:15) (82 - 105)  BP: 155/92 (2018 06:15) (110/70 - 155/92)  BP(mean): --  RR: 20 (2018 06:15) (18 - 20)  SpO2: 99% (2018 06:15) (97% - 100%)    I&O's Summary    2018 07:01  -  2018 07:00  --------------------------------------------------------  IN: 1390 mL / OUT: 5785 mL / NET: -4395 mL      Physical Exam:  Gen: NAD, resting comfortably in bed.  Contact isolation.  Resp: No increased WOB  Abd: soft, nontender, nondistended. SUDHIR with SS output: 5/10  Extr: WWP      LABS:                        11.1   12. )-----------( 373      ( 2018 08:11 )             32.4     11    133<L>  |  95<L>  |  17  ----------------------------<  143<H>  4.5   |  25  |  0.75    Ca    10.6<H>      2018 07:07  Phos  3.5     11-  Mg     1.9           PT/INR - ( 2018 08:58 )   PT: 12.1 sec;   INR: 1.08 ratio         PTT - ( 2018 08:58 )  PTT:29.2 sec    FS, 371, 290

## 2018-11-21 NOTE — PROGRESS NOTE ADULT - PROBLEM SELECTOR PLAN 1
s/p IR drain 11/17  - ID following, was on vanco, now off. c/w ceci and amikacin per ID. f/u ID outpatient and routine labs and repeat ctap in 3-4 weeks  - as per surgery  - monitor wbc, fever  - to have outpatient elective VARD.

## 2018-11-21 NOTE — PROGRESS NOTE ADULT - ASSESSMENT
57yo M with PMH of DM, HTN, and ileocecectomy for perforated cecum requiring washout and ileostomy for fecal peritonitis with multiple admissions for intra-abdominal collections s/p IR drainage p/w abd pain, found to have worsening R iliopsoas abscess.    Per discussion w surgery, eventual eval for ?fistula given recurrent abscesses once infection and inflammation improves.    Had IR drain placed 11/17    - Abscess culture- E.coli, Proteus mirabilis- sensitivities reviewed  - c/w meropenem 1 g iv q8h  - c/w amikacin 800 mg IV q daily  - pt will need a PICC line and will likely need to c/w abx until definitive surgery occurs which, per surgery team, will be at end of december; pt will need repeat ct a/p prior to surgery  - Needs outpt ID follow up after repeat ct a/p in 3-4 weeks  - will need weekly labs: cbc, cmp, amikacin trough sent to ID office Dr. Cherry- Fax: [phone]  - follow up BCx x 2- neg to date

## 2018-11-21 NOTE — PROGRESS NOTE ADULT - ATTENDING COMMENTS
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

## 2018-11-21 NOTE — PROGRESS NOTE ADULT - SUBJECTIVE AND OBJECTIVE BOX
Follow Up:  R iliopsoas abscess    Interval History: No fevers overnight. Pt feels well w no abd pain, n/v.     ROS:    All other systems negative    Constitutional: no fever, no chills  Head: no trauma  Eyes: no vision changes  ENT:  no sore throat, no rhinorrhea  Cardiovascular:  no chest pain, no palpitation  Respiratory:  no SOB, no cough  GI:  no abd pain, no vomiting  urinary: no dysuria  skin:  no rash  neurology:  no headache      Allergies  No Known Allergies        ANTIMICROBIALS:  amiKACIN  IVPB 800 daily  meropenem  IVPB 1000 every 8 hours      OTHER MEDS:  acetaminophen   Tablet .. 650 milliGRAM(s) Oral every 6 hours PRN  chlorhexidine 4% Liquid 1 Application(s) Topical <User Schedule>  enoxaparin Injectable 40 milliGRAM(s) SubCutaneous daily  fluticasone propionate 50 MICROgram(s)/spray Nasal Spray 1 Spray(s) Both Nostrils two times a day  gabapentin 300 milliGRAM(s) Oral every 12 hours  insulin glargine Injectable (LANTUS) 7 Unit(s) SubCutaneous at bedtime  insulin lispro (HumaLOG) corrective regimen sliding scale   SubCutaneous three times a day before meals  insulin lispro (HumaLOG) corrective regimen sliding scale   SubCutaneous at bedtime  metoprolol tartrate 25 milliGRAM(s) Oral <User Schedule>  multivitamin 1 Tablet(s) Oral daily  sodium chloride 1 Gram(s) Oral every 8 hours      Vital Signs Last 24 Hrs  T(C): 36.6 (21 Nov 2018 09:46), Max: 36.9 (20 Nov 2018 22:48)  T(F): 97.8 (21 Nov 2018 09:46), Max: 98.4 (20 Nov 2018 22:48)  HR: 83 (21 Nov 2018 09:46) (83 - 105)  BP: 151/88 (21 Nov 2018 09:46) (119/70 - 155/92)  BP(mean): --  RR: 18 (21 Nov 2018 09:46) (18 - 20)  SpO2: 99% (21 Nov 2018 09:46) (97% - 99%)    Physical Exam:  General:  NAD,  non toxic, A&O x 3  Head: atraumatic, normocephalic  Eye: normal sclera and conjunctiva  ENT:    no oropharyngeal lesions  Cardio:     regular S1, S2  Respiratory:    clear b/l,    no wheezing  abd:     soft,   BS +,   no tenderness,    no organomegaly, +ostomy, +IR drain  Musculoskeletal:   no edema  vascular: normal pulses  Skin:   no rash  Neurologic:   no focal deficit  psych: normal affect                          11.1   12.21 )-----------( 373      ( 21 Nov 2018 08:11 )             32.4       11-21    133<L>  |  95<L>  |  17  ----------------------------<  143<H>  4.5   |  25  |  0.75    Ca    10.6<H>      21 Nov 2018 07:07  Phos  3.5     11-21  Mg     1.9     11-21      MICROBIOLOGY:    .Abscess abdominal abscess  11-17-18   Numerous Escherichia coli (Carbapenem Resistant)  Few Proteus mirabilis ESBL  Unable to evaluate further due to Proteus overgrowth  --  Escherichia coli (Carbapenem Resistant)  Proteus mirabilis ESBL      .Blood Blood-Arterial  11-17-18   No growth to date.  --  --      .Blood Blood-Peripheral  11-17-18   No growth to date.  --  --      .Blood Blood-Peripheral  11-17-18   No growth to date.  --  --      .Urine Clean Catch (Midstream)  11-17-18   No growth  --  --      .Blood Blood-Peripheral  10-24-18   No growth at 5 days.  --  --      .Abscess Pelvis  10-22-18   Numerous Enterococcus faecium  Numerous Escherichia coli ESBL  Few Proteus mirabilis  Numerous Bacteroides fragilis "Susceptibilities not performed"  --  Enterococcus faecium  Escherichia coli ESBL  Proteus mirabilis      Clostridium difficile GDH Toxins A&amp;B, EIA:   Negative (11-17-18 @ 14:37)  Clostridium difficile GDH Interpretation: Negative for toxigenic C. Difficile.  This specimen is negative for C.  Difficile glutamate dehydrogenase (GDH) antigen and negative for C.  Difficile Toxins A & B, by EIA.  GDH is a highly sensitive screening  marker for C. Difficile that is produced in large amounts by all C.  Difficile strains, both toxigenic and nontoxigenic.  This assay has not  been validated as a test of cure.  Repeat testing during the same episode  of diarrhea is of limited value and is discouraged.  The results of this  assay should always be interpreted in conjunction with pateint's clinical  history. (11-17-18 @ 14:37)      RADIOLOGY:    The heart is normal in size. The lungs are clear. A PICC line was placed   on the left and the tip is in superior vena cava. No pneumothorax.

## 2018-11-21 NOTE — PROVIDER CONTACT NOTE (OTHER) - ACTION/TREATMENT ORDERED:
MARJORIE Mendoza notified and made aware. MARJORIE Mendoza states there is no evidence of DVT on RA, NO doppler was ever done. All pts IVs have been in the left arm only. Will continue to monitor.

## 2018-11-21 NOTE — PROGRESS NOTE ADULT - PROBLEM SELECTOR PLAN 2
now resolved per recent RUE u/s 10/2018, still on eliquis at home. RUE DVT was in 8/15/18 and ideally pt should be on a/c for 3-6 months for provoked dvt. given no evidence of bleeding on exam, pt tolerated eliquis previously, and to receive PICC and at risk for recurrence of dvt, will c/w a/c for now and have pt follow up with PMD or hematology re: duration of a/c.  - restart eliquis on d/c

## 2018-11-21 NOTE — PROGRESS NOTE ADULT - ASSESSMENT
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  Possible concern for retained appendix as no appendix included in 8/11 pathology report.     Plan:  -awaiting PICC line LUE (hx DVT RUE)  - FU FSGs, currently poorly controlled, increased lantus dose to 6u last night  -DVT PPX  -Pain control   -OOB as tolerated with assistance   -Confirm ABX with ID recommendations    -Diet as tolerated  -Dispo Planning - pt refusing rehab, will need VNS for picc and home PT

## 2018-11-21 NOTE — PROGRESS NOTE ADULT - ATTENDING COMMENTS
Patient seen and examined with DR. Nick  I agree with her interval history exam findings and plans as noted above    Clinically stable on current combination of antibiotics with IR drain in right retroperitoneal space  CHECK Amikacin trough   PICC line placed in LUE    will plan to discharge home on Meropenem and Amikacin  plan discussed with surgical service    James Cherry MD  [phone]  After 5pm/weekends [phone]

## 2018-11-21 NOTE — PROVIDER CONTACT NOTE (OTHER) - SITUATION
In clinical summary it states PT has DVT in Left extremity. Pt has IV in left forearm and there is an Provider to RN to not use Right arm for hx of DVT

## 2018-11-21 NOTE — PROGRESS NOTE ADULT - PROBLEM SELECTOR PLAN 6
pmd Dr Dusty Munoz  pharmacy [facility] in [place], called and confirmed medications and updated med rec.

## 2018-11-21 NOTE — PROGRESS NOTE ADULT - SUBJECTIVE AND OBJECTIVE BOX
TRAUMA SURGERY [facility] MEDICINE PROGRESS NOTE    Patient is a 58y old  Male who presents with a chief complaint of abscess (21 Nov 2018 08:46)    SUBJECTIVE / OVERNIGHT EVENTS:  overnight no acute events.  denies complaints - states still has some abd pain but tylenol is helping.  states he needs help with ambulation.  denies n/v/f/chills, cp, sob,  passing flatus and bms    CAPILLARY BLOOD GLUCOSE  POCT Blood Glucose.: 144 mg/dL (21 Nov 2018 10:34)  POCT Blood Glucose.: 290 mg/dL (20 Nov 2018 23:03)  POCT Blood Glucose.: 371 mg/dL (20 Nov 2018 21:35)  POCT Blood Glucose.: 342 mg/dL (20 Nov 2018 20:14)  POCT Blood Glucose.: 188 mg/dL (20 Nov 2018 17:26)    I&O's Summary    20 Nov 2018 07:01  -  21 Nov 2018 07:00  --------------------------------------------------------  IN: 1390 mL / OUT: 5785 mL / NET: -4395 mL    21 Nov 2018 07:01  -  21 Nov 2018 12:24  --------------------------------------------------------  IN: 200 mL / OUT: 400 mL / NET: -200 mL    Vital Signs Last 24 Hrs  T(C): 36.6 (21 Nov 2018 09:46), Max: 36.9 (20 Nov 2018 22:48)  T(F): 97.8 (21 Nov 2018 09:46), Max: 98.4 (20 Nov 2018 22:48)  HR: 83 (21 Nov 2018 09:46) (83 - 105)  BP: 151/88 (21 Nov 2018 09:46) (119/70 - 155/92)  BP(mean): --  RR: 18 (21 Nov 2018 09:46) (18 - 20)  SpO2: 99% (21 Nov 2018 09:46) (97% - 99%)    PHYSICAL EXAM:  GENERAL: NAD, well-developed  HEAD:  NCAT  NECK: Supple  CHEST/LUNG: Clear to auscultation bilaterally; No wheeze  HEART: Reg rate. No M/R/G.  ABDOMEN: Soft, NT, ND, w/ ostomy in place  EXTREMITIES:  2+ Peripheral Pulses, No clubbing, cyanosis, or edema  rle atrophy  PSYCH: flat affect    LABS:                        11.1   12.21 )-----------( 373      ( 21 Nov 2018 08:11 )             32.4     11-21    133<L>  |  95<L>  |  17  ----------------------------<  143<H>  4.5   |  25  |  0.75    Ca    10.6<H>      21 Nov 2018 07:07  Phos  3.5     11-21  Mg     1.9     11-21      PT/INR - ( 21 Nov 2018 08:14 )   PT: 12.1 sec;   INR: 1.08 ratio         PTT - ( 21 Nov 2018 08:14 )  PTT:30.9 sec    MEDICATIONS  (STANDING):  amiKACIN  IVPB 800 milliGRAM(s) IV Intermittent daily  chlorhexidine 4% Liquid 1 Application(s) Topical <User Schedule>  enoxaparin Injectable 40 milliGRAM(s) SubCutaneous daily  fluticasone propionate 50 MICROgram(s)/spray Nasal Spray 1 Spray(s) Both Nostrils two times a day  gabapentin 300 milliGRAM(s) Oral every 12 hours  insulin glargine Injectable (LANTUS) 7 Unit(s) SubCutaneous at bedtime  insulin lispro (HumaLOG) corrective regimen sliding scale   SubCutaneous three times a day before meals  insulin lispro (HumaLOG) corrective regimen sliding scale   SubCutaneous at bedtime  meropenem  IVPB 1000 milliGRAM(s) IV Intermittent every 8 hours  metoprolol tartrate 25 milliGRAM(s) Oral <User Schedule>  multivitamin 1 Tablet(s) Oral daily  sodium chloride 1 Gram(s) Oral every 8 hours    MEDICATIONS  (PRN):  acetaminophen   Tablet .. 650 milliGRAM(s) Oral every 6 hours PRN Mild Pain (1 - 3), Moderate Pain (4 - 6)

## 2018-11-21 NOTE — PROGRESS NOTE ADULT - ASSESSMENT
57 y/o M PMH DM2 not on insulin, HTN, hx RUE DVT on eliquis (resolved 10/2018), hyponatremia, s/p ileocecectomy with temporary abdominal closure w/ end ileostomy 8/2018, hx of abdominal collection w/ IR drain fell out p/w abd pain adm w/ sepsis 2/2 iliopsoas collection s/p IR drain 11/17, now pending PICC and d/c planning.

## 2018-11-22 LAB
AMIKACIN TROUGH SERPL-MCNC: 2.4 UG/ML — SIGNIFICANT CHANGE UP
CULTURE RESULTS: SIGNIFICANT CHANGE UP
GLUCOSE BLDC GLUCOMTR-MCNC: 144 MG/DL — HIGH (ref 70–99)
GLUCOSE BLDC GLUCOMTR-MCNC: 159 MG/DL — HIGH (ref 70–99)
GLUCOSE BLDC GLUCOMTR-MCNC: 309 MG/DL — HIGH (ref 70–99)
GLUCOSE BLDC GLUCOMTR-MCNC: 358 MG/DL — HIGH (ref 70–99)
SPECIMEN SOURCE: SIGNIFICANT CHANGE UP

## 2018-11-22 PROCEDURE — 99231 SBSQ HOSP IP/OBS SF/LOW 25: CPT

## 2018-11-22 RX ORDER — INSULIN GLARGINE 100 [IU]/ML
10 INJECTION, SOLUTION SUBCUTANEOUS AT BEDTIME
Qty: 0 | Refills: 0 | Status: DISCONTINUED | OUTPATIENT
Start: 2018-11-22 | End: 2018-11-23

## 2018-11-22 RX ADMIN — Medication 650 MILLIGRAM(S): at 18:00

## 2018-11-22 RX ADMIN — Medication 8: at 14:11

## 2018-11-22 RX ADMIN — Medication 25 MILLIGRAM(S): at 17:58

## 2018-11-22 RX ADMIN — SODIUM CHLORIDE 1 GRAM(S): 9 INJECTION INTRAMUSCULAR; INTRAVENOUS; SUBCUTANEOUS at 06:03

## 2018-11-22 RX ADMIN — SODIUM CHLORIDE 1 GRAM(S): 9 INJECTION INTRAMUSCULAR; INTRAVENOUS; SUBCUTANEOUS at 21:22

## 2018-11-22 RX ADMIN — MEROPENEM 100 MILLIGRAM(S): 1 INJECTION INTRAVENOUS at 14:11

## 2018-11-22 RX ADMIN — Medication 1 SPRAY(S): at 17:59

## 2018-11-22 RX ADMIN — NYSTATIN CREAM 1 APPLICATION(S): 100000 CREAM TOPICAL at 18:02

## 2018-11-22 RX ADMIN — GABAPENTIN 300 MILLIGRAM(S): 400 CAPSULE ORAL at 17:59

## 2018-11-22 RX ADMIN — Medication 1 TABLET(S): at 14:10

## 2018-11-22 RX ADMIN — Medication 650 MILLIGRAM(S): at 18:01

## 2018-11-22 RX ADMIN — Medication 25 MILLIGRAM(S): at 06:04

## 2018-11-22 RX ADMIN — Medication 1 SPRAY(S): at 06:03

## 2018-11-22 RX ADMIN — AMIKACIN SULFATE 253.2 MILLIGRAM(S): 250 INJECTION, SOLUTION INTRAMUSCULAR; INTRAVENOUS at 14:11

## 2018-11-22 RX ADMIN — GABAPENTIN 300 MILLIGRAM(S): 400 CAPSULE ORAL at 06:03

## 2018-11-22 RX ADMIN — MEROPENEM 100 MILLIGRAM(S): 1 INJECTION INTRAVENOUS at 06:04

## 2018-11-22 RX ADMIN — NYSTATIN CREAM 1 APPLICATION(S): 100000 CREAM TOPICAL at 06:04

## 2018-11-22 RX ADMIN — MEROPENEM 100 MILLIGRAM(S): 1 INJECTION INTRAVENOUS at 21:22

## 2018-11-22 RX ADMIN — ENOXAPARIN SODIUM 40 MILLIGRAM(S): 100 INJECTION SUBCUTANEOUS at 14:48

## 2018-11-22 RX ADMIN — CHLORHEXIDINE GLUCONATE 1 APPLICATION(S): 213 SOLUTION TOPICAL at 14:18

## 2018-11-22 RX ADMIN — SODIUM CHLORIDE 1 GRAM(S): 9 INJECTION INTRAMUSCULAR; INTRAVENOUS; SUBCUTANEOUS at 18:21

## 2018-11-22 RX ADMIN — Medication 10: at 17:59

## 2018-11-22 NOTE — PROGRESS NOTE ADULT - ASSESSMENT
Assessment:     59yo M with hx of DM, HTN, and ileocolic resection with ileostomy for cecal perf presenting with sepsis 2/2 recurrent intraabdominal infection after IR drain fell out on 11/5.  Possible concern for retained appendix as no appendix included in 8/11 pathology report.     Plan:  -reg diet  -c/w amikacin, meropenem  -DVT PPX  -Pain control   -OOB as tolerated with assistance   -lantus qhs for BG control    Dispo: home with VNS for IV abx, won'ts tart until Friday, needs to stay until set up    [facility]  [id number]

## 2018-11-22 NOTE — PROGRESS NOTE ADULT - SUBJECTIVE AND OBJECTIVE BOX
Trauma Surgery Progress Note    Subjective:   - PICC placed, receiving IV abx through line  - awaiting home care for IV abx      OBJECTIVE:    Vital Signs Last 24 Hrs  T(C): 36.6 (22 Nov 2018 09:22), Max: 36.8 (22 Nov 2018 01:25)  T(F): 97.8 (22 Nov 2018 09:22), Max: 98.2 (22 Nov 2018 01:25)  HR: 76 (22 Nov 2018 09:22) (76 - 99)  BP: 144/78 (22 Nov 2018 09:22) (125/70 - 170/64)  BP(mean): --  RR: 18 (22 Nov 2018 09:22) (16 - 20)  SpO2: 97% (22 Nov 2018 09:22) (97% - 100%)    I&O's Detail    21 Nov 2018 07:01  -  22 Nov 2018 07:00  --------------------------------------------------------  IN:    Oral Fluid: 1860 mL    Solution: 250 mL    Solution: 100 mL  Total IN: 2210 mL    OUT:    Bulb: 30 mL    Ileostomy: 825 mL    Voided: 3550 mL  Total OUT: 4405 mL    Total NET: -2195 mL      22 Nov 2018 07:01  -  22 Nov 2018 09:32  --------------------------------------------------------  IN:  Total IN: 0 mL    OUT:    Bulb: 3 mL    Ileostomy: 50 mL    Voided: 400 mL  Total OUT: 453 mL    Total NET: -453 mL        LABS:                        11.1   12.21 )-----------( 373      ( 21 Nov 2018 08:11 )             32.4     11-21    133<L>  |  95<L>  |  17  ----------------------------<  143<H>  4.5   |  25  |  0.75    Ca    10.6<H>      21 Nov 2018 07:07  Phos  3.5     11-21  Mg     1.9     11-21      PT/INR - ( 21 Nov 2018 08:14 )   PT: 12.1 sec;   INR: 1.08 ratio         PTT - ( 21 Nov 2018 08:14 )  PTT:30.9 sec      EXAM:  Gen: NAD, resting comfortably in bed.  Contact isolation.  Resp: No increased WOB  Abd: soft, nontender, nondistended. SUDHIR with SS output, draining  Extr: WWP

## 2018-11-22 NOTE — PROVIDER CONTACT NOTE (MEDICATION) - ASSESSMENT
No complaints of pain or distress Pt stated he did not feel comfortable taking insulin because his sugar was 159 at bedtime.

## 2018-11-23 VITALS
RESPIRATION RATE: 18 BRPM | OXYGEN SATURATION: 99 % | TEMPERATURE: 97 F | HEART RATE: 75 BPM | DIASTOLIC BLOOD PRESSURE: 75 MMHG | SYSTOLIC BLOOD PRESSURE: 145 MMHG

## 2018-11-23 LAB
ANION GAP SERPL CALC-SCNC: 12 MMOL/L — SIGNIFICANT CHANGE UP (ref 5–17)
BUN SERPL-MCNC: 25 MG/DL — HIGH (ref 7–23)
CALCIUM SERPL-MCNC: 10.5 MG/DL — SIGNIFICANT CHANGE UP (ref 8.4–10.5)
CHLORIDE SERPL-SCNC: 97 MMOL/L — SIGNIFICANT CHANGE UP (ref 96–108)
CO2 SERPL-SCNC: 25 MMOL/L — SIGNIFICANT CHANGE UP (ref 22–31)
CREAT SERPL-MCNC: 0.86 MG/DL — SIGNIFICANT CHANGE UP (ref 0.5–1.3)
GLUCOSE BLDC GLUCOMTR-MCNC: 118 MG/DL — HIGH (ref 70–99)
GLUCOSE BLDC GLUCOMTR-MCNC: 239 MG/DL — HIGH (ref 70–99)
GLUCOSE SERPL-MCNC: 121 MG/DL — HIGH (ref 70–99)
HCT VFR BLD CALC: 30.5 % — LOW (ref 39–50)
HGB BLD-MCNC: 10.3 G/DL — LOW (ref 13–17)
MAGNESIUM SERPL-MCNC: 1.9 MG/DL — SIGNIFICANT CHANGE UP (ref 1.6–2.6)
MCHC RBC-ENTMCNC: 28.9 PG — SIGNIFICANT CHANGE UP (ref 27–34)
MCHC RBC-ENTMCNC: 33.8 GM/DL — SIGNIFICANT CHANGE UP (ref 32–36)
MCV RBC AUTO: 85.4 FL — SIGNIFICANT CHANGE UP (ref 80–100)
PHOSPHATE SERPL-MCNC: 3.8 MG/DL — SIGNIFICANT CHANGE UP (ref 2.5–4.5)
PLATELET # BLD AUTO: 368 K/UL — SIGNIFICANT CHANGE UP (ref 150–400)
POTASSIUM SERPL-MCNC: 4.2 MMOL/L — SIGNIFICANT CHANGE UP (ref 3.5–5.3)
POTASSIUM SERPL-SCNC: 4.2 MMOL/L — SIGNIFICANT CHANGE UP (ref 3.5–5.3)
RBC # BLD: 3.57 M/UL — LOW (ref 4.2–5.8)
RBC # FLD: 14.7 % — HIGH (ref 10.3–14.5)
SODIUM SERPL-SCNC: 134 MMOL/L — LOW (ref 135–145)
WBC # BLD: 9.6 K/UL — SIGNIFICANT CHANGE UP (ref 3.8–10.5)
WBC # FLD AUTO: 9.6 K/UL — SIGNIFICANT CHANGE UP (ref 3.8–10.5)

## 2018-11-23 PROCEDURE — 99232 SBSQ HOSP IP/OBS MODERATE 35: CPT

## 2018-11-23 PROCEDURE — 99233 SBSQ HOSP IP/OBS HIGH 50: CPT

## 2018-11-23 RX ADMIN — SODIUM CHLORIDE 1 GRAM(S): 9 INJECTION INTRAMUSCULAR; INTRAVENOUS; SUBCUTANEOUS at 06:01

## 2018-11-23 RX ADMIN — Medication 4: at 11:56

## 2018-11-23 RX ADMIN — Medication 1 TABLET(S): at 11:38

## 2018-11-23 RX ADMIN — GABAPENTIN 300 MILLIGRAM(S): 400 CAPSULE ORAL at 06:01

## 2018-11-23 RX ADMIN — MEROPENEM 100 MILLIGRAM(S): 1 INJECTION INTRAVENOUS at 05:59

## 2018-11-23 RX ADMIN — ENOXAPARIN SODIUM 40 MILLIGRAM(S): 100 INJECTION SUBCUTANEOUS at 11:38

## 2018-11-23 RX ADMIN — NYSTATIN CREAM 1 APPLICATION(S): 100000 CREAM TOPICAL at 06:05

## 2018-11-23 RX ADMIN — CHLORHEXIDINE GLUCONATE 1 APPLICATION(S): 213 SOLUTION TOPICAL at 11:34

## 2018-11-23 RX ADMIN — Medication 25 MILLIGRAM(S): at 06:01

## 2018-11-23 RX ADMIN — Medication 1 SPRAY(S): at 06:01

## 2018-11-23 NOTE — PROGRESS NOTE ADULT - REASON FOR ADMISSION
abscess

## 2018-11-23 NOTE — PROGRESS NOTE ADULT - SUBJECTIVE AND OBJECTIVE BOX
Trauma Surgery Progress Note    Subjective:   - PICC placed, receiving IV abx through line  - awaiting home care for IV abx      OBJECTIVE:    Vital Signs Last 24 Hrs  T(C): 36.3 (23 Nov 2018 04:42), Max: 37.2 (22 Nov 2018 17:31)  T(F): 97.4 (23 Nov 2018 04:42), Max: 98.9 (22 Nov 2018 17:31)  HR: 76 (23 Nov 2018 04:42) (75 - 101)  BP: 134/75 (23 Nov 2018 04:42) (121/74 - 144/78)  BP(mean): --  RR: 18 (23 Nov 2018 04:42) (18 - 18)  SpO2: 100% (23 Nov 2018 04:42) (97% - 100%)    I&O's Detail    21 Nov 2018 07:01  -  22 Nov 2018 07:00  --------------------------------------------------------  IN:    Oral Fluid: 1860 mL    Solution: 100 mL    Solution: 250 mL  Total IN: 2210 mL    OUT:    Bulb: 30 mL    Ileostomy: 825 mL    Voided: 3550 mL  Total OUT: 4405 mL    Total NET: -2195 mL      22 Nov 2018 07:01  -  23 Nov 2018 06:12  --------------------------------------------------------  IN:    Oral Fluid: 1440 mL    Solution: 250 mL    Solution: 200 mL  Total IN: 1890 mL    OUT:    Bulb: 43 mL    Ileostomy: 900 mL    Voided: 4400 mL  Total OUT: 5343 mL    Total NET: -3453 mL        LABS:                        11.1   12.21 )-----------( 373      ( 21 Nov 2018 08:11 )             32.4     11-21    133<L>  |  95<L>  |  17  ----------------------------<  143<H>  4.5   |  25  |  0.75    Ca    10.6<H>      21 Nov 2018 07:07  Phos  3.5     11-21  Mg     1.9     11-21      PT/INR - ( 21 Nov 2018 08:14 )   PT: 12.1 sec;   INR: 1.08 ratio         PTT - ( 21 Nov 2018 08:14 )  PTT:30.9 sec      EXAM:  Gen: NAD, resting comfortably in bed.  Contact isolation.  Resp: No increased WOB  Abd: soft, nontender, nondistended. SUDHIR with SS output, draining  Extr: WWP

## 2018-11-23 NOTE — PROGRESS NOTE ADULT - PROBLEM SELECTOR PLAN 5
on oral meds at home  a1c ~7.7  - holding home metformin, glipizide, januvia - on d/c can continue  - c/w insulin as above  - c/w ISS TIDACBED  - c/w gabapentin for neuropathy.

## 2018-11-23 NOTE — PROGRESS NOTE ADULT - ASSESSMENT
59yo M with PMH of DM, HTN, and ileocecectomy for perforated cecum requiring washout and ileostomy for fecal peritonitis with multiple admissions for intra-abdominal collections s/p IR drainage p/w abd pain, found to have worsening R iliopsoas abscess.  Cx polymicrobial including CRea nd ESBL  Antimicrobial options limited    Stable  Improving   Being discharged home today on OPAT  Plan as detailed in Dr mora note (11/21)-Monitor CBC CMP amikacin levels as OP-please ensure faxed to Dr torres at [phone]  patient advised about s/s ototoxicity other advesre effects  Surgical plan per Dr lr  To follow in ID clinic with Dr torres after discharge  Case d/w Surgical team

## 2018-11-23 NOTE — PROGRESS NOTE ADULT - PROBLEM SELECTOR PLAN 2
now resolved per recent RUE u/s 10/2018, still on eliquis at home. RUE DVT was in 8/15/18 and ideally pt should be on a/c for 3-6 months for provoked dvt. given no evidence of bleeding on exam, pt tolerated eliquis previously, and received PICC and at risk for recurrence of dvt, will c/w a/c for now and have pt follow up with PMD or hematology re: duration of a/c.  - restart eliquis on d/c

## 2018-11-23 NOTE — PROGRESS NOTE ADULT - PROBLEM SELECTOR PLAN 6
pmd Dr Dusty Munoz  pharmacy [facility] in [place], called and confirmed medications and updated med rec.

## 2018-11-23 NOTE — PROGRESS NOTE ADULT - ASSESSMENT
57 y/o M PMH DM2 not on insulin, HTN, hx RUE DVT on eliquis (resolved 10/2018), hyponatremia, s/p ileocecectomy with temporary abdominal closure w/ end ileostomy 8/2018, hx of abdominal collection w/ IR drain fell out p/w abd pain adm w/ sepsis 2/2 iliopsoas collection s/p IR drain 11/17, now s/p PICC and d/c planning.

## 2018-11-23 NOTE — PROGRESS NOTE ADULT - SUBJECTIVE AND OBJECTIVE BOX
Patient is a 58y old  Male who presents with a chief complaint of abscess (23 Nov 2018 11:25)        SUBJECTIVE / OVERNIGHT EVENTS:      CAPILLARY BLOOD GLUCOSE      POCT Blood Glucose.: 239 mg/dL (23 Nov 2018 11:46)  POCT Blood Glucose.: 118 mg/dL (23 Nov 2018 10:35)  POCT Blood Glucose.: 159 mg/dL (22 Nov 2018 22:22)  POCT Blood Glucose.: 358 mg/dL (22 Nov 2018 17:55)  POCT Blood Glucose.: 309 mg/dL (22 Nov 2018 14:02)    I&O's Summary    22 Nov 2018 07:01  -  23 Nov 2018 07:00  --------------------------------------------------------  IN: 1890 mL / OUT: 5343 mL / NET: -3453 mL    23 Nov 2018 07:01  -  23 Nov 2018 12:20  --------------------------------------------------------  IN: 280 mL / OUT: 505 mL / NET: -225 mL        Vital Signs Last 24 Hrs  T(C): 36.3 (23 Nov 2018 09:46), Max: 37.2 (22 Nov 2018 17:31)  T(F): 97.3 (23 Nov 2018 09:46), Max: 98.9 (22 Nov 2018 17:31)  HR: 75 (23 Nov 2018 09:46) (75 - 101)  BP: 145/75 (23 Nov 2018 09:46) (121/74 - 145/75)  BP(mean): --  RR: 18 (23 Nov 2018 09:46) (18 - 18)  SpO2: 99% (23 Nov 2018 09:46) (97% - 100%)    PHYSICAL EXAM:  GENERAL:  Well appearing, in NAD  HEAD:  NCAT  EYES: PERRLA, conjunctiva clear  NECK: Supple, No JVD  CHEST/LUNG: CTA B/L. No w/r/r.  HEART: Reg rate. Normal S1, S2. No m/r/g.   ABDOMEN: SNTND. Bowel sounds present  EXTREMITIES:  2+ Peripheral Pulses, No clubbing, cyanosis, edema.  PSYCH: AAOx3, appropriate affect  NEUROLOGY: grossly non-focal  SKIN: No rashes or lesions    LABS:                        10.3   9.60  )-----------( 368      ( 23 Nov 2018 08:17 )             30.5     11-23    134<L>  |  97  |  25<H>  ----------------------------<  121<H>  4.2   |  25  |  0.86    Ca    10.5      23 Nov 2018 06:52  Phos  3.8     11-23  Mg     1.9     11-23    MEDICATIONS  (STANDING):  amiKACIN  IVPB 800 milliGRAM(s) IV Intermittent daily  chlorhexidine 4% Liquid 1 Application(s) Topical <User Schedule>  enoxaparin Injectable 40 milliGRAM(s) SubCutaneous daily  fluticasone propionate 50 MICROgram(s)/spray Nasal Spray 1 Spray(s) Both Nostrils two times a day  gabapentin 300 milliGRAM(s) Oral every 12 hours  insulin glargine Injectable (LANTUS) 10 Unit(s) SubCutaneous at bedtime  insulin lispro (HumaLOG) corrective regimen sliding scale   SubCutaneous three times a day before meals  insulin lispro (HumaLOG) corrective regimen sliding scale   SubCutaneous at bedtime  meropenem  IVPB 1000 milliGRAM(s) IV Intermittent every 8 hours  metoprolol tartrate 25 milliGRAM(s) Oral <User Schedule>  multivitamin 1 Tablet(s) Oral daily  nystatin Powder 1 Application(s) Topical two times a day  sodium chloride 1 Gram(s) Oral every 8 hours    MEDICATIONS  (PRN):  acetaminophen   Tablet .. 650 milliGRAM(s) Oral every 6 hours PRN Mild Pain (1 - 3), Moderate Pain (4 - 6) Patient is a 58y old  Male who presents with a chief complaint of abscess (23 Nov 2018 11:25)        SUBJECTIVE / OVERNIGHT EVENTS:  overnight no acute events.  denies complaints.  denies n/v/f/chills, cp, sob. states abd pain is controlled.    CAPILLARY BLOOD GLUCOSE      POCT Blood Glucose.: 239 mg/dL (23 Nov 2018 11:46)  POCT Blood Glucose.: 118 mg/dL (23 Nov 2018 10:35)  POCT Blood Glucose.: 159 mg/dL (22 Nov 2018 22:22)  POCT Blood Glucose.: 358 mg/dL (22 Nov 2018 17:55)  POCT Blood Glucose.: 309 mg/dL (22 Nov 2018 14:02)    I&O's Summary    22 Nov 2018 07:01  -  23 Nov 2018 07:00  --------------------------------------------------------  IN: 1890 mL / OUT: 5343 mL / NET: -3453 mL    23 Nov 2018 07:01  -  23 Nov 2018 12:20  --------------------------------------------------------  IN: 280 mL / OUT: 505 mL / NET: -225 mL        Vital Signs Last 24 Hrs  T(C): 36.3 (23 Nov 2018 09:46), Max: 37.2 (22 Nov 2018 17:31)  T(F): 97.3 (23 Nov 2018 09:46), Max: 98.9 (22 Nov 2018 17:31)  HR: 75 (23 Nov 2018 09:46) (75 - 101)  BP: 145/75 (23 Nov 2018 09:46) (121/74 - 145/75)  BP(mean): --  RR: 18 (23 Nov 2018 09:46) (18 - 18)  SpO2: 99% (23 Nov 2018 09:46) (97% - 100%)    PHYSICAL EXAM:  GENERAL: NAD, well-developed  HEAD:  NCAT  NECK: Supple  CHEST/LUNG: Clear to auscultation bilaterally; No wheeze  HEART: Reg rate. No M/R/G.  ABDOMEN: Soft, NT, ND, w/ ostomy in place  EXTREMITIES:  2+ Peripheral Pulses, No clubbing, cyanosis, or edema  rle atrophy  PSYCH: flat affect    LABS:                        10.3   9.60  )-----------( 368      ( 23 Nov 2018 08:17 )             30.5     11-23    134<L>  |  97  |  25<H>  ----------------------------<  121<H>  4.2   |  25  |  0.86    Ca    10.5      23 Nov 2018 06:52  Phos  3.8     11-23  Mg     1.9     11-23    MEDICATIONS  (STANDING):  amiKACIN  IVPB 800 milliGRAM(s) IV Intermittent daily  chlorhexidine 4% Liquid 1 Application(s) Topical <User Schedule>  enoxaparin Injectable 40 milliGRAM(s) SubCutaneous daily  fluticasone propionate 50 MICROgram(s)/spray Nasal Spray 1 Spray(s) Both Nostrils two times a day  gabapentin 300 milliGRAM(s) Oral every 12 hours  insulin glargine Injectable (LANTUS) 10 Unit(s) SubCutaneous at bedtime  insulin lispro (HumaLOG) corrective regimen sliding scale   SubCutaneous three times a day before meals  insulin lispro (HumaLOG) corrective regimen sliding scale   SubCutaneous at bedtime  meropenem  IVPB 1000 milliGRAM(s) IV Intermittent every 8 hours  metoprolol tartrate 25 milliGRAM(s) Oral <User Schedule>  multivitamin 1 Tablet(s) Oral daily  nystatin Powder 1 Application(s) Topical two times a day  sodium chloride 1 Gram(s) Oral every 8 hours    MEDICATIONS  (PRN):  acetaminophen   Tablet .. 650 milliGRAM(s) Oral every 6 hours PRN Mild Pain (1 - 3), Moderate Pain (4 - 6)

## 2018-11-23 NOTE — PROGRESS NOTE ADULT - PROBLEM SELECTOR PLAN 1
s/p IR drain 11/17  - ID following, was on vanco, now off. c/w ceci and amikacin per ID. f/u ID outpatient and routine labs and repeat ctap in 3-4 weeks To f/u w/ Dr Cherry w/ labs.  - as per surgery  - monitor wbc, fever  - to have outpatient elective VARD.

## 2018-11-23 NOTE — PROGRESS NOTE ADULT - SUBJECTIVE AND OBJECTIVE BOX
ID Coverage    Patient is a 58y old  Male who presents with a chief complaint of abscess (23 Nov 2018 06:12)    Being followed by ID for abd abscess    Interval history:feels well  For DC home on IV abx  No acute events      ROS:  No cough,SOB,CP  No N/V/D./abd pain  No other complaints      Antimicrobials:    amiKACIN  IVPB 800 milliGRAM(s) IV Intermittent daily  meropenem  IVPB 1000 milliGRAM(s) IV Intermittent every 8 hours    Other medications reviewed    Vital Signs Last 24 Hrs  T(C): 36.3 (11-23-18 @ 09:46), Max: 37.2 (11-22-18 @ 17:31)  T(F): 97.3 (11-23-18 @ 09:46), Max: 98.9 (11-22-18 @ 17:31)  HR: 75 (11-23-18 @ 09:46) (75 - 101)  BP: 145/75 (11-23-18 @ 09:46) (121/74 - 145/75)  BP(mean): --  RR: 18 (11-23-18 @ 09:46) (18 - 18)  SpO2: 99% (11-23-18 @ 09:46) (97% - 100%)    Physical Exam:        HEENT PERRLA EOMI    No oral exudate or erythema    Chest Good AE,CTA    CVS RRR S1 S2 WNl No murmur or rub or gallop    Abd soft BS normal colectomy-SUDHIR harris   No tenderness     IV site no erythema tenderness or discharge    CNS AAO X 3 no focal    Lab Data:                          10.3   9.60  )-----------( 368      ( 23 Nov 2018 08:17 )             30.5       11-23    134<L>  |  97  |  25<H>  ----------------------------<  121<H>  4.2   |  25  |  0.86    Ca    10.5      23 Nov 2018 06:52  Phos  3.8     11-23  Mg     1.9     11-23            Clostridium difficile GDH Interpretation: Negative for toxigenic C. Difficile.  This specimen is negative for C.  Difficile glutamate dehydrogenase (GDH) antigen and negative for C.  Difficile Toxins A & B, by EIA.  GDH is a highly sensitive screening  marker for C. Difficile that is produced in large amounts by all C.  Difficile strains, both toxigenic and nontoxigenic.  This assay has not  been validated as a test of cure.  Repeat testing during the same episode  of diarrhea is of limited value and is discouraged.  The results of this  assay should always be interpreted in conjunction with pateint's clinical  history. (11-17-18 @ 14:37)        Amikacin Level, Trough - Prior to Next Dose (11.22.18 @ 16:48)    Amikacin Level, Trough: 2.4: Performed by: [facility], [id number] [place] [address] 42356 ug/mL

## 2018-11-30 ENCOUNTER — OUTPATIENT (OUTPATIENT)
Dept: OUTPATIENT SERVICES | Facility: HOSPITAL | Age: 58
LOS: 1 days | End: 2018-11-30
Payer: MEDICAID

## 2018-11-30 VITALS
OXYGEN SATURATION: 100 % | WEIGHT: 119.93 LBS | HEART RATE: 84 BPM | SYSTOLIC BLOOD PRESSURE: 141 MMHG | DIASTOLIC BLOOD PRESSURE: 92 MMHG | RESPIRATION RATE: 18 BRPM | HEIGHT: 72 IN | TEMPERATURE: 97 F

## 2018-11-30 DIAGNOSIS — K35.20 ACUTE APPENDICITIS WITH GENERALIZED PERITONITIS, WITHOUT ABSCESS: ICD-10-CM

## 2018-11-30 DIAGNOSIS — K65.9 PERITONITIS, UNSPECIFIED: ICD-10-CM

## 2018-11-30 DIAGNOSIS — Z98.890 OTHER SPECIFIED POSTPROCEDURAL STATES: Chronic | ICD-10-CM

## 2018-11-30 DIAGNOSIS — I82.409 ACUTE EMBOLISM AND THROMBOSIS OF UNSPECIFIED DEEP VEINS OF UNSPECIFIED LOWER EXTREMITY: ICD-10-CM

## 2018-11-30 DIAGNOSIS — Z01.818 ENCOUNTER FOR OTHER PREPROCEDURAL EXAMINATION: ICD-10-CM

## 2018-11-30 LAB
ANION GAP SERPL CALC-SCNC: 13 MMOL/L — SIGNIFICANT CHANGE UP (ref 5–17)
BLD GP AB SCN SERPL QL: NEGATIVE — SIGNIFICANT CHANGE UP
BUN SERPL-MCNC: 25 MG/DL — HIGH (ref 7–23)
CALCIUM SERPL-MCNC: 10.2 MG/DL — SIGNIFICANT CHANGE UP (ref 8.4–10.5)
CHLORIDE SERPL-SCNC: 94 MMOL/L — LOW (ref 96–108)
CO2 SERPL-SCNC: 25 MMOL/L — SIGNIFICANT CHANGE UP (ref 22–31)
CREAT SERPL-MCNC: 0.98 MG/DL — SIGNIFICANT CHANGE UP (ref 0.5–1.3)
GLUCOSE SERPL-MCNC: 166 MG/DL — HIGH (ref 70–99)
HCT VFR BLD CALC: 31.4 % — LOW (ref 39–50)
HGB BLD-MCNC: 10.3 G/DL — LOW (ref 13–17)
MCHC RBC-ENTMCNC: 28.7 PG — SIGNIFICANT CHANGE UP (ref 27–34)
MCHC RBC-ENTMCNC: 32.8 GM/DL — SIGNIFICANT CHANGE UP (ref 32–36)
MCV RBC AUTO: 87.5 FL — SIGNIFICANT CHANGE UP (ref 80–100)
PLATELET # BLD AUTO: 385 K/UL — SIGNIFICANT CHANGE UP (ref 150–400)
POTASSIUM SERPL-MCNC: 5.3 MMOL/L — SIGNIFICANT CHANGE UP (ref 3.5–5.3)
POTASSIUM SERPL-SCNC: 5.3 MMOL/L — SIGNIFICANT CHANGE UP (ref 3.5–5.3)
RBC # BLD: 3.59 M/UL — LOW (ref 4.2–5.8)
RBC # FLD: 15 % — HIGH (ref 10.3–14.5)
RH IG SCN BLD-IMP: POSITIVE — SIGNIFICANT CHANGE UP
SODIUM SERPL-SCNC: 132 MMOL/L — LOW (ref 135–145)
WBC # BLD: 11.18 K/UL — HIGH (ref 3.8–10.5)
WBC # FLD AUTO: 11.18 K/UL — HIGH (ref 3.8–10.5)

## 2018-11-30 PROCEDURE — 86901 BLOOD TYPING SEROLOGIC RH(D): CPT

## 2018-11-30 PROCEDURE — 86900 BLOOD TYPING SEROLOGIC ABO: CPT

## 2018-11-30 PROCEDURE — 85027 COMPLETE CBC AUTOMATED: CPT

## 2018-11-30 PROCEDURE — G0463: CPT

## 2018-11-30 PROCEDURE — 86850 RBC ANTIBODY SCREEN: CPT

## 2018-11-30 PROCEDURE — 80048 BASIC METABOLIC PNL TOTAL CA: CPT

## 2018-11-30 PROCEDURE — 83036 HEMOGLOBIN GLYCOSYLATED A1C: CPT

## 2018-11-30 RX ORDER — CEFOTETAN DISODIUM 1 G
2 VIAL (EA) INJECTION ONCE
Qty: 0 | Refills: 0 | Status: DISCONTINUED | OUTPATIENT
Start: 2018-12-06 | End: 2018-12-06

## 2018-11-30 NOTE — H&P PST ADULT - HISTORY OF PRESENT ILLNESS
59 y/o M PMH 57 y/o M PMH bowel perforation while visiting [place], [place], S/P open  ileocecectomy, S/P debridement and wash out of abdomen and ileostomy formation, S/P IR drain placement for recurrent fever, leukocytosis and fluid collection right hemipelvis, despite treatment with Vanco, fluconazole and Zosyn, c/o right hip pain and inability to walk and found to have psoas abscess, receiving Amikacin and ?meropenem via left PICC line, as per patient the colon perforation was believed to be caused by appendicitis. Patient has also had multiple DVT's, since September in left arm and right leg, and currently in right arm, is on Eliquis. Presents today for video assisted retroperitoneal exploration, appendectomy, possible exploratory laparotomy. 59 y/o M PMH bowel perforation while visiting [place], [place], S/P open  ileocecectomy, S/P debridement and wash out of abdomen and ileostomy formation, S/P IR drain placement for recurrent fever, leukocytosis and fluid collection right hemipelvis, despite treatment with Vanco, fluconazole and Zosyn, c/o right hip pain and inability to walk and found to have psoas abscess, receiving Amikacin and meropenem as per D/C   via left PICC line, as per patient the colon perforation was believed to be caused by appendicitis. Patient has also had multiple DVT's, since September in left arm and right leg, and currently in right arm, is on Eliquis. Presents today for video assisted retroperitoneal exploration, appendectomy, possible exploratory laparotomy. 57 y/o M PMH bowel perforation while visiting [place], [place], S/P open ileocecectomy, S/P debridement and wash out of abdomen and ileostomy formation, S/P IR drain placement for recurrent fever, leukocytosis and fluid collection right hemipelvis, despite treatment with Vanco, fluconazole and Zosyn, c/o right hip pain and inability to walk and found to have psoas abscess, receiving Amikacin and meropenem as per D/C   via left PICC line, as per patient the colon perforation was believed to be caused by appendicitis. Patient has also had multiple DVT's, since September in left arm and right leg, and currently in right arm, is on Eliquis. Presents today for video assisted retroperitoneal exploration, appendectomy, possible exploratory laparotomy.

## 2018-11-30 NOTE — H&P PST ADULT - PMH
Diabetes    DVT (deep venous thrombosis)    Hypertension    Necrotizing fasciitis Abscess  psoas muscle  Colon perforation    Diabetes    DVT (deep venous thrombosis)  currently right arm  Hypertension    Necrotizing fasciitis

## 2018-11-30 NOTE — H&P PST ADULT - ATTENDING COMMENTS
He stopped Eliquis 2 days ago.  I explained the surgery to the patient and his daughter. Specifically, I explained that I suspect a retained appendix since the abscess persisted despite multiple drainages, and there was no appendix in the right hemicolectomy pathology specimen. After discussing the risks (bleeding, infection, bowel injury, postop pain, nontherapeutic surgery), benefits and alternatives of video-assisted retroperitoneal exploration, possible appendectomy, possible laparotomy, written informed consent was obtained for surgery.

## 2018-11-30 NOTE — H&P PST ADULT - PROBLEM SELECTOR PLAN 2
Instructed to hold Eliquis 2 days prior to surgery, call placed to PMD to determine whether bridging would be needed.  PMD was gone for the day,  to page Instructed to hold Eliquis 2 days prior to surgery, call placed to PMD to determine whether bridging would be needed.  PMD was gone for the day,  to page  Addendum:  spoke with PMD who will bridge patient with Lovenox 40 mg BID, holding dose 12 hours before procedure

## 2018-11-30 NOTE — H&P PST ADULT - ACTIVITY
able to able to participate in moderate recreational activities and run short distances prior to August

## 2018-12-01 LAB — HBA1C BLD-MCNC: 8.4 % — HIGH (ref 4–5.6)

## 2018-12-03 PROBLEM — L02.91 CUTANEOUS ABSCESS, UNSPECIFIED: Chronic | Status: ACTIVE | Noted: 2018-11-30

## 2018-12-03 PROBLEM — K63.1 PERFORATION OF INTESTINE (NONTRAUMATIC): Chronic | Status: ACTIVE | Noted: 2018-11-30

## 2018-12-05 ENCOUNTER — TRANSCRIPTION ENCOUNTER (OUTPATIENT)
Age: 58
End: 2018-12-05

## 2018-12-05 VITALS — WEIGHT: 119.93 LBS | HEIGHT: 72 IN

## 2018-12-06 ENCOUNTER — RESULT REVIEW (OUTPATIENT)
Age: 58
End: 2018-12-06

## 2018-12-06 ENCOUNTER — APPOINTMENT (OUTPATIENT)
Dept: TRAUMA SURGERY | Facility: HOSPITAL | Age: 58
End: 2018-12-06

## 2018-12-06 ENCOUNTER — OUTPATIENT (OUTPATIENT)
Dept: OUTPATIENT SERVICES | Facility: HOSPITAL | Age: 58
LOS: 1 days | End: 2018-12-06
Payer: MEDICAID

## 2018-12-06 VITALS
SYSTOLIC BLOOD PRESSURE: 167 MMHG | TEMPERATURE: 98 F | WEIGHT: 119.93 LBS | DIASTOLIC BLOOD PRESSURE: 90 MMHG | OXYGEN SATURATION: 100 % | HEIGHT: 72 IN | HEART RATE: 84 BPM | RESPIRATION RATE: 16 BRPM

## 2018-12-06 DIAGNOSIS — K35.21 ACUTE APPENDICITIS WITH GENERALIZED PERITONITIS, WITH ABSCESS: ICD-10-CM

## 2018-12-06 DIAGNOSIS — Z98.890 OTHER SPECIFIED POSTPROCEDURAL STATES: Chronic | ICD-10-CM

## 2018-12-06 PROCEDURE — 88304 TISSUE EXAM BY PATHOLOGIST: CPT | Mod: 26

## 2018-12-06 PROCEDURE — 88311 DECALCIFY TISSUE: CPT | Mod: 26

## 2018-12-06 PROCEDURE — 20245 BONE BIOPSY OPEN DEEP: CPT | Mod: GC

## 2018-12-06 PROCEDURE — 49060 DRAIN OPEN RETROPERI ABSCESS: CPT | Mod: GC

## 2018-12-06 RX ORDER — MEROPENEM 1 G/30ML
1000 INJECTION INTRAVENOUS EVERY 8 HOURS
Qty: 0 | Refills: 0 | Status: DISCONTINUED | OUTPATIENT
Start: 2018-12-06 | End: 2018-12-21

## 2018-12-06 RX ORDER — DEXTROSE 50 % IN WATER 50 %
25 SYRINGE (ML) INTRAVENOUS ONCE
Qty: 0 | Refills: 0 | Status: DISCONTINUED | OUTPATIENT
Start: 2018-12-06 | End: 2018-12-21

## 2018-12-06 RX ORDER — SODIUM CHLORIDE 9 MG/ML
1000 INJECTION, SOLUTION INTRAVENOUS
Qty: 0 | Refills: 0 | Status: DISCONTINUED | OUTPATIENT
Start: 2018-12-06 | End: 2018-12-21

## 2018-12-06 RX ORDER — LIDOCAINE HCL 20 MG/ML
0.2 VIAL (ML) INJECTION ONCE
Qty: 0 | Refills: 0 | Status: DISCONTINUED | OUTPATIENT
Start: 2018-12-06 | End: 2018-12-06

## 2018-12-06 RX ORDER — DEXTROSE 50 % IN WATER 50 %
12.5 SYRINGE (ML) INTRAVENOUS ONCE
Qty: 0 | Refills: 0 | Status: DISCONTINUED | OUTPATIENT
Start: 2018-12-06 | End: 2018-12-21

## 2018-12-06 RX ORDER — MORPHINE SULFATE 50 MG/1
4 CAPSULE, EXTENDED RELEASE ORAL ONCE
Qty: 0 | Refills: 0 | Status: DISCONTINUED | OUTPATIENT
Start: 2018-12-06 | End: 2018-12-06

## 2018-12-06 RX ORDER — OXYCODONE HYDROCHLORIDE 5 MG/1
10 TABLET ORAL EVERY 4 HOURS
Qty: 0 | Refills: 0 | Status: DISCONTINUED | OUTPATIENT
Start: 2018-12-06 | End: 2018-12-07

## 2018-12-06 RX ORDER — HYDROMORPHONE HYDROCHLORIDE 2 MG/ML
0.5 INJECTION INTRAMUSCULAR; INTRAVENOUS; SUBCUTANEOUS
Qty: 0 | Refills: 0 | Status: DISCONTINUED | OUTPATIENT
Start: 2018-12-06 | End: 2018-12-06

## 2018-12-06 RX ORDER — SODIUM CHLORIDE 9 MG/ML
1000 INJECTION, SOLUTION INTRAVENOUS
Qty: 0 | Refills: 0 | Status: DISCONTINUED | OUTPATIENT
Start: 2018-12-06 | End: 2018-12-07

## 2018-12-06 RX ORDER — ONDANSETRON 8 MG/1
4 TABLET, FILM COATED ORAL ONCE
Qty: 0 | Refills: 0 | Status: DISCONTINUED | OUTPATIENT
Start: 2018-12-06 | End: 2018-12-07

## 2018-12-06 RX ORDER — INSULIN LISPRO 100/ML
VIAL (ML) SUBCUTANEOUS AT BEDTIME
Qty: 0 | Refills: 0 | Status: DISCONTINUED | OUTPATIENT
Start: 2018-12-06 | End: 2018-12-21

## 2018-12-06 RX ORDER — ACETAMINOPHEN 500 MG
650 TABLET ORAL EVERY 6 HOURS
Qty: 0 | Refills: 0 | Status: DISCONTINUED | OUTPATIENT
Start: 2018-12-06 | End: 2018-12-21

## 2018-12-06 RX ORDER — DEXTROSE 50 % IN WATER 50 %
15 SYRINGE (ML) INTRAVENOUS ONCE
Qty: 0 | Refills: 0 | Status: DISCONTINUED | OUTPATIENT
Start: 2018-12-06 | End: 2018-12-21

## 2018-12-06 RX ORDER — GLUCAGON INJECTION, SOLUTION 0.5 MG/.1ML
1 INJECTION, SOLUTION SUBCUTANEOUS ONCE
Qty: 0 | Refills: 0 | Status: DISCONTINUED | OUTPATIENT
Start: 2018-12-06 | End: 2018-12-21

## 2018-12-06 RX ORDER — SODIUM CHLORIDE 9 MG/ML
3 INJECTION INTRAMUSCULAR; INTRAVENOUS; SUBCUTANEOUS EVERY 8 HOURS
Qty: 0 | Refills: 0 | Status: DISCONTINUED | OUTPATIENT
Start: 2018-12-06 | End: 2018-12-06

## 2018-12-06 RX ORDER — OXYCODONE AND ACETAMINOPHEN 5; 325 MG/1; MG/1
1 TABLET ORAL EVERY 4 HOURS
Qty: 0 | Refills: 0 | Status: DISCONTINUED | OUTPATIENT
Start: 2018-12-06 | End: 2018-12-07

## 2018-12-06 RX ORDER — ENOXAPARIN SODIUM 100 MG/ML
40 INJECTION SUBCUTANEOUS DAILY
Qty: 0 | Refills: 0 | Status: DISCONTINUED | OUTPATIENT
Start: 2018-12-06 | End: 2018-12-21

## 2018-12-06 RX ORDER — AMIKACIN SULFATE 250 MG/ML
800 INJECTION, SOLUTION INTRAMUSCULAR; INTRAVENOUS EVERY 24 HOURS
Qty: 0 | Refills: 0 | Status: DISCONTINUED | OUTPATIENT
Start: 2018-12-06 | End: 2018-12-21

## 2018-12-06 RX ORDER — INSULIN LISPRO 100/ML
VIAL (ML) SUBCUTANEOUS
Qty: 0 | Refills: 0 | Status: DISCONTINUED | OUTPATIENT
Start: 2018-12-06 | End: 2018-12-21

## 2018-12-06 RX ADMIN — MORPHINE SULFATE 4 MILLIGRAM(S): 50 CAPSULE, EXTENDED RELEASE ORAL at 12:01

## 2018-12-06 RX ADMIN — HYDROMORPHONE HYDROCHLORIDE 0.5 MILLIGRAM(S): 2 INJECTION INTRAMUSCULAR; INTRAVENOUS; SUBCUTANEOUS at 11:15

## 2018-12-06 RX ADMIN — MEROPENEM 100 MILLIGRAM(S): 1 INJECTION INTRAVENOUS at 21:30

## 2018-12-06 RX ADMIN — HYDROMORPHONE HYDROCHLORIDE 0.5 MILLIGRAM(S): 2 INJECTION INTRAMUSCULAR; INTRAVENOUS; SUBCUTANEOUS at 11:05

## 2018-12-06 RX ADMIN — OXYCODONE AND ACETAMINOPHEN 1 TABLET(S): 5; 325 TABLET ORAL at 13:14

## 2018-12-06 RX ADMIN — AMIKACIN SULFATE 253.2 MILLIGRAM(S): 250 INJECTION, SOLUTION INTRAMUSCULAR; INTRAVENOUS at 17:54

## 2018-12-06 RX ADMIN — ENOXAPARIN SODIUM 40 MILLIGRAM(S): 100 INJECTION SUBCUTANEOUS at 21:50

## 2018-12-06 RX ADMIN — OXYCODONE AND ACETAMINOPHEN 1 TABLET(S): 5; 325 TABLET ORAL at 12:46

## 2018-12-06 RX ADMIN — HYDROMORPHONE HYDROCHLORIDE 0.5 MILLIGRAM(S): 2 INJECTION INTRAMUSCULAR; INTRAVENOUS; SUBCUTANEOUS at 11:30

## 2018-12-06 RX ADMIN — OXYCODONE AND ACETAMINOPHEN 1 TABLET(S): 5; 325 TABLET ORAL at 20:25

## 2018-12-06 RX ADMIN — MORPHINE SULFATE 4 MILLIGRAM(S): 50 CAPSULE, EXTENDED RELEASE ORAL at 11:38

## 2018-12-06 RX ADMIN — OXYCODONE AND ACETAMINOPHEN 1 TABLET(S): 5; 325 TABLET ORAL at 21:25

## 2018-12-06 RX ADMIN — SODIUM CHLORIDE 75 MILLILITER(S): 9 INJECTION, SOLUTION INTRAVENOUS at 13:04

## 2018-12-06 RX ADMIN — Medication 4: at 16:06

## 2018-12-06 RX ADMIN — HYDROMORPHONE HYDROCHLORIDE 0.5 MILLIGRAM(S): 2 INJECTION INTRAMUSCULAR; INTRAVENOUS; SUBCUTANEOUS at 11:18

## 2018-12-06 NOTE — CHART NOTE - NSCHARTNOTEFT_GEN_A_CORE
Trauma Surgery Progress Note    Subjective:   Pt seen & examined at bedside s/p retroperitoneal exploration with iliac crest biopsy for perforated appendicitis with prior IR drain.  Patient complaining of severe abdominal pain, does not feel safe going home today.  No nausea/vomiting.     Medications:  amiKACIN  IVPB 800  meropenem  IVPB 1000  amiKACIN  IVPB 800  enoxaparin Injectable 40  meropenem  IVPB 1000      Objective:  Vital Signs Last 24 Hrs  T(C): 36.3 (06 Dec 2018 15:00), Max: 36.7 (06 Dec 2018 06:19)  T(F): 97.3 (06 Dec 2018 15:00), Max: 98.1 (06 Dec 2018 06:19)  HR: 96 (06 Dec 2018 17:00) (84 - 99)  BP: 96/51 (06 Dec 2018 17:00) (90/53 - 167/90)  BP(mean): 69 (06 Dec 2018 17:00) (65 - 94)  RR: 17 (06 Dec 2018 17:00) (15 - 18)  SpO2: 100% (06 Dec 2018 17:00) (96% - 100%)    I&O's Summary    06 Dec 2018 07:01  -  06 Dec 2018 18:44  --------------------------------------------------------  IN: 1125 mL / OUT: 580 mL / NET: 545 mL        Physical Exam:  Gen: NAD, appears uncomfortable  Resp: No increased WOB on RA  Abd: nondistended, moderately TTP, soft, compressible.  Dressings c/d/i RLQ.  SUDHIR drain in place draining SS fluid.  Ileostomy in place, patent.  Extr: WWP distally, cap refill <2s    Assessment: 58 year old man with PMHx of HTN, DM, colonic perforation s/p ileostomy presenting with history of perforated appendicitis with IR placement of RLQ drain for recurrent fluid collection and fevers, leukocytosis.    Plan:  - continue outpatient antibiotics (amikacin 800 QD, meropenem 1000 Q8h)  - ID consult to comment on antibiotic choices  - PT/OOB  - IS  - pain control  - 23h monitoring in PACU, then reassess pain/willingness to go home

## 2018-12-06 NOTE — ASU DISCHARGE PLAN (ADULT/PEDIATRIC). - MEDICATION SUMMARY - MEDICATIONS TO STOP TAKING
I will STOP taking the medications listed below when I get home from the hospital:    acetaminophen 325 mg oral tablet  -- 2 tab(s) by mouth every 6 hours

## 2018-12-06 NOTE — ASU DISCHARGE PLAN (ADULT/PEDIATRIC). - SPECIAL INSTRUCTIONS
RLQ Wound Care Instructions: Incision in RLQ with wet to dry packing with gauze. Change packing daily & assess wound RLQ Wound Care Instructions: Incision in RLQ with wet to dry packing with gauze. Change packing daily & assess wound.    Continue meropenem 1000 mg q8 hours.

## 2018-12-06 NOTE — ASU DISCHARGE PLAN (ADULT/PEDIATRIC). - MEDICATION SUMMARY - MEDICATIONS TO TAKE
I will START or STAY ON the medications listed below when I get home from the hospital:    amikacin  -- 800 milligram(s) intravenous once a day  -- Indication: For Perforated appendicitis    Percocet 5/325 oral tablet  -- 1-2 tab(s) by mouth every 4 hours, As Needed MDD:6 tabs  -- Caution federal law prohibits the transfer of this drug to any person other  than the person for whom it was prescribed.  May cause drowsiness.  Alcohol may intensify this effect.  Use care when operating dangerous machinery.  This prescription cannot be refilled.  This product contains acetaminophen.  Do not use  with any other product containing acetaminophen to prevent possible liver damage.  Using more of this medication than prescribed may cause serious breathing problems.    -- Indication: For As needed for psot-op pain    apixaban 5 mg oral tablet  -- 2 tab(s) by mouth every 12 hours  -- Indication: For Home medication    gabapentin 300 mg oral tablet  -- 1 tab(s) by mouth 2 times a day  -- Indication: For Home medication    Januvia 100 mg oral tablet  -- 1 tab(s) by mouth once a day  -- Indication: For Home medication    metFORMIN 1000 mg oral tablet  -- 1 tab(s) by mouth 2 times a day  -- Indication: For Home medication    meropenem 1000 mg intravenous injection  -- 1000 milligram(s) intravenous every 8 hours  -- Indication: For Perforated appendicitis    sodium chloride 1 g oral tablet  -- 1 tab(s) by mouth 3 times a day  -- Indication: For Home medication    Multiple Vitamins oral tablet  -- 1 tab(s) by mouth once a day  -- Indication: For Home medication

## 2018-12-06 NOTE — BRIEF OPERATIVE NOTE - OPERATION/FINDINGS
Video assisted retroperitoneal exploration with conversion to open exploration via cutdown on prior IR drain tract (initially placed for perforated appendicitis). Appendiceal remnant was not identified. Bx of adjacent bony pelvis was taken to evaluate for osteomyelitis. 19Fr Jefferson drain left in retroperitoneum

## 2018-12-07 VITALS
HEART RATE: 98 BPM | OXYGEN SATURATION: 100 % | TEMPERATURE: 97 F | RESPIRATION RATE: 15 BRPM | DIASTOLIC BLOOD PRESSURE: 67 MMHG | SYSTOLIC BLOOD PRESSURE: 142 MMHG

## 2018-12-07 LAB
ANION GAP SERPL CALC-SCNC: 15 MMOL/L — SIGNIFICANT CHANGE UP (ref 5–17)
BUN SERPL-MCNC: 16 MG/DL — SIGNIFICANT CHANGE UP (ref 7–23)
CALCIUM SERPL-MCNC: 9 MG/DL — SIGNIFICANT CHANGE UP (ref 8.4–10.5)
CHLORIDE SERPL-SCNC: 98 MMOL/L — SIGNIFICANT CHANGE UP (ref 96–108)
CO2 SERPL-SCNC: 24 MMOL/L — SIGNIFICANT CHANGE UP (ref 22–31)
CREAT SERPL-MCNC: 0.93 MG/DL — SIGNIFICANT CHANGE UP (ref 0.5–1.3)
GLUCOSE SERPL-MCNC: 191 MG/DL — HIGH (ref 70–99)
GRAM STN FLD: SIGNIFICANT CHANGE UP
HCT VFR BLD CALC: 29.5 % — LOW (ref 39–50)
HGB BLD-MCNC: 10 G/DL — LOW (ref 13–17)
MAGNESIUM SERPL-MCNC: 1.9 MG/DL — SIGNIFICANT CHANGE UP (ref 1.6–2.6)
MCHC RBC-ENTMCNC: 29.4 PG — SIGNIFICANT CHANGE UP (ref 27–34)
MCHC RBC-ENTMCNC: 33.9 GM/DL — SIGNIFICANT CHANGE UP (ref 32–36)
MCV RBC AUTO: 86.8 FL — SIGNIFICANT CHANGE UP (ref 80–100)
NIGHT BLUE STAIN TISS: SIGNIFICANT CHANGE UP
PHOSPHATE SERPL-MCNC: 2.9 MG/DL — SIGNIFICANT CHANGE UP (ref 2.5–4.5)
PLATELET # BLD AUTO: 246 K/UL — SIGNIFICANT CHANGE UP (ref 150–400)
POTASSIUM SERPL-MCNC: 4.4 MMOL/L — SIGNIFICANT CHANGE UP (ref 3.5–5.3)
POTASSIUM SERPL-SCNC: 4.4 MMOL/L — SIGNIFICANT CHANGE UP (ref 3.5–5.3)
RBC # BLD: 3.4 M/UL — LOW (ref 4.2–5.8)
RBC # FLD: 14.1 % — SIGNIFICANT CHANGE UP (ref 10.3–14.5)
SODIUM SERPL-SCNC: 137 MMOL/L — SIGNIFICANT CHANGE UP (ref 135–145)
SPECIMEN SOURCE: SIGNIFICANT CHANGE UP
SPECIMEN SOURCE: SIGNIFICANT CHANGE UP
WBC # BLD: 11.6 K/UL — HIGH (ref 3.8–10.5)
WBC # FLD AUTO: 11.6 K/UL — HIGH (ref 3.8–10.5)

## 2018-12-07 PROCEDURE — 83735 ASSAY OF MAGNESIUM: CPT

## 2018-12-07 PROCEDURE — 87015 SPECIMEN INFECT AGNT CONCNTJ: CPT

## 2018-12-07 PROCEDURE — 87206 SMEAR FLUORESCENT/ACID STAI: CPT

## 2018-12-07 PROCEDURE — 88304 TISSUE EXAM BY PATHOLOGIST: CPT

## 2018-12-07 PROCEDURE — 80048 BASIC METABOLIC PNL TOTAL CA: CPT

## 2018-12-07 PROCEDURE — 88311 DECALCIFY TISSUE: CPT

## 2018-12-07 PROCEDURE — 85027 COMPLETE CBC AUTOMATED: CPT

## 2018-12-07 PROCEDURE — 49010 EXPLORATION BEHIND ABDOMEN: CPT

## 2018-12-07 PROCEDURE — 84100 ASSAY OF PHOSPHORUS: CPT

## 2018-12-07 PROCEDURE — 87186 SC STD MICRODIL/AGAR DIL: CPT

## 2018-12-07 PROCEDURE — 20245 BONE BIOPSY OPEN DEEP: CPT

## 2018-12-07 PROCEDURE — 87102 FUNGUS ISOLATION CULTURE: CPT

## 2018-12-07 PROCEDURE — 87116 MYCOBACTERIA CULTURE: CPT

## 2018-12-07 PROCEDURE — 82962 GLUCOSE BLOOD TEST: CPT

## 2018-12-07 PROCEDURE — 87070 CULTURE OTHR SPECIMN AEROBIC: CPT

## 2018-12-07 RX ADMIN — Medication 650 MILLIGRAM(S): at 07:14

## 2018-12-07 RX ADMIN — SODIUM CHLORIDE 75 MILLILITER(S): 9 INJECTION, SOLUTION INTRAVENOUS at 03:13

## 2018-12-07 RX ADMIN — OXYCODONE AND ACETAMINOPHEN 1 TABLET(S): 5; 325 TABLET ORAL at 01:25

## 2018-12-07 RX ADMIN — MEROPENEM 100 MILLIGRAM(S): 1 INJECTION INTRAVENOUS at 13:51

## 2018-12-07 RX ADMIN — OXYCODONE AND ACETAMINOPHEN 1 TABLET(S): 5; 325 TABLET ORAL at 00:25

## 2018-12-07 RX ADMIN — OXYCODONE HYDROCHLORIDE 10 MILLIGRAM(S): 5 TABLET ORAL at 08:05

## 2018-12-07 RX ADMIN — OXYCODONE HYDROCHLORIDE 10 MILLIGRAM(S): 5 TABLET ORAL at 12:30

## 2018-12-07 RX ADMIN — OXYCODONE HYDROCHLORIDE 10 MILLIGRAM(S): 5 TABLET ORAL at 05:00

## 2018-12-07 RX ADMIN — Medication 650 MILLIGRAM(S): at 08:00

## 2018-12-07 RX ADMIN — OXYCODONE HYDROCHLORIDE 10 MILLIGRAM(S): 5 TABLET ORAL at 11:57

## 2018-12-07 RX ADMIN — MEROPENEM 100 MILLIGRAM(S): 1 INJECTION INTRAVENOUS at 05:51

## 2018-12-07 RX ADMIN — Medication 2: at 11:27

## 2018-12-07 RX ADMIN — OXYCODONE HYDROCHLORIDE 10 MILLIGRAM(S): 5 TABLET ORAL at 04:30

## 2018-12-07 RX ADMIN — OXYCODONE HYDROCHLORIDE 10 MILLIGRAM(S): 5 TABLET ORAL at 08:52

## 2018-12-09 LAB
-  AMIKACIN: SIGNIFICANT CHANGE UP
-  AMIKACIN: SIGNIFICANT CHANGE UP
-  AMOXICILLIN/CLAVULANIC ACID: SIGNIFICANT CHANGE UP
-  AMPICILLIN/SULBACTAM: SIGNIFICANT CHANGE UP
-  AMPICILLIN: SIGNIFICANT CHANGE UP
-  AZTREONAM: SIGNIFICANT CHANGE UP
-  AZTREONAM: SIGNIFICANT CHANGE UP
-  CEFAZOLIN: SIGNIFICANT CHANGE UP
-  CEFEPIME: SIGNIFICANT CHANGE UP
-  CEFEPIME: SIGNIFICANT CHANGE UP
-  CEFOXITIN: SIGNIFICANT CHANGE UP
-  CEFTAZIDIME: SIGNIFICANT CHANGE UP
-  CEFTRIAXONE: SIGNIFICANT CHANGE UP
-  CIPROFLOXACIN: SIGNIFICANT CHANGE UP
-  CIPROFLOXACIN: SIGNIFICANT CHANGE UP
-  GENTAMICIN: SIGNIFICANT CHANGE UP
-  GENTAMICIN: SIGNIFICANT CHANGE UP
-  IMIPENEM: SIGNIFICANT CHANGE UP
-  IMIPENEM: SIGNIFICANT CHANGE UP
-  LEVOFLOXACIN: SIGNIFICANT CHANGE UP
-  LEVOFLOXACIN: SIGNIFICANT CHANGE UP
-  MEROPENEM: SIGNIFICANT CHANGE UP
-  MEROPENEM: SIGNIFICANT CHANGE UP
-  PIPERACILLIN/TAZOBACTAM: SIGNIFICANT CHANGE UP
-  PIPERACILLIN/TAZOBACTAM: SIGNIFICANT CHANGE UP
-  TOBRAMYCIN: SIGNIFICANT CHANGE UP
-  TOBRAMYCIN: SIGNIFICANT CHANGE UP
-  TRIMETHOPRIM/SULFAMETHOXAZOLE: SIGNIFICANT CHANGE UP
METHOD TYPE: SIGNIFICANT CHANGE UP
METHOD TYPE: SIGNIFICANT CHANGE UP

## 2018-12-11 LAB
CULTURE RESULTS: SIGNIFICANT CHANGE UP
ORGANISM # SPEC MICROSCOPIC CNT: SIGNIFICANT CHANGE UP
SPECIMEN SOURCE: SIGNIFICANT CHANGE UP

## 2018-12-26 PROCEDURE — 96375 TX/PRO/DX INJ NEW DRUG ADDON: CPT

## 2018-12-26 PROCEDURE — 83735 ASSAY OF MAGNESIUM: CPT

## 2018-12-26 PROCEDURE — C1751: CPT

## 2018-12-26 PROCEDURE — 93005 ELECTROCARDIOGRAM TRACING: CPT

## 2018-12-26 PROCEDURE — 80048 BASIC METABOLIC PNL TOTAL CA: CPT

## 2018-12-26 PROCEDURE — 85730 THROMBOPLASTIN TIME PARTIAL: CPT

## 2018-12-26 PROCEDURE — 71045 X-RAY EXAM CHEST 1 VIEW: CPT

## 2018-12-26 PROCEDURE — 99285 EMERGENCY DEPT VISIT HI MDM: CPT | Mod: 25

## 2018-12-26 PROCEDURE — 87040 BLOOD CULTURE FOR BACTERIA: CPT

## 2018-12-26 PROCEDURE — 94640 AIRWAY INHALATION TREATMENT: CPT

## 2018-12-26 PROCEDURE — 85610 PROTHROMBIN TIME: CPT

## 2018-12-26 PROCEDURE — 86850 RBC ANTIBODY SCREEN: CPT

## 2018-12-26 PROCEDURE — C1729: CPT

## 2018-12-26 PROCEDURE — 87086 URINE CULTURE/COLONY COUNT: CPT

## 2018-12-26 PROCEDURE — 87070 CULTURE OTHR SPECIMN AEROBIC: CPT

## 2018-12-26 PROCEDURE — 80202 ASSAY OF VANCOMYCIN: CPT

## 2018-12-26 PROCEDURE — 80053 COMPREHEN METABOLIC PANEL: CPT

## 2018-12-26 PROCEDURE — 87184 SC STD DISK METHOD PER PLATE: CPT

## 2018-12-26 PROCEDURE — 84295 ASSAY OF SERUM SODIUM: CPT

## 2018-12-26 PROCEDURE — 84100 ASSAY OF PHOSPHORUS: CPT

## 2018-12-26 PROCEDURE — 96372 THER/PROPH/DIAG INJ SC/IM: CPT | Mod: XU

## 2018-12-26 PROCEDURE — 82330 ASSAY OF CALCIUM: CPT

## 2018-12-26 PROCEDURE — 97162 PT EVAL MOD COMPLEX 30 MIN: CPT

## 2018-12-26 PROCEDURE — 96374 THER/PROPH/DIAG INJ IV PUSH: CPT | Mod: XU

## 2018-12-26 PROCEDURE — 82435 ASSAY OF BLOOD CHLORIDE: CPT

## 2018-12-26 PROCEDURE — 82947 ASSAY GLUCOSE BLOOD QUANT: CPT

## 2018-12-26 PROCEDURE — 86900 BLOOD TYPING SEROLOGIC ABO: CPT

## 2018-12-26 PROCEDURE — 81001 URINALYSIS AUTO W/SCOPE: CPT

## 2018-12-26 PROCEDURE — 87324 CLOSTRIDIUM AG IA: CPT

## 2018-12-26 PROCEDURE — 82565 ASSAY OF CREATININE: CPT

## 2018-12-26 PROCEDURE — 84132 ASSAY OF SERUM POTASSIUM: CPT

## 2018-12-26 PROCEDURE — 83605 ASSAY OF LACTIC ACID: CPT

## 2018-12-26 PROCEDURE — 49406 IMAGE CATH FLUID PERI/RETRO: CPT

## 2018-12-26 PROCEDURE — 36569 INSJ PICC 5 YR+ W/O IMAGING: CPT

## 2018-12-26 PROCEDURE — 87205 SMEAR GRAM STAIN: CPT

## 2018-12-26 PROCEDURE — 80150 ASSAY OF AMIKACIN: CPT

## 2018-12-26 PROCEDURE — 82803 BLOOD GASES ANY COMBINATION: CPT

## 2018-12-26 PROCEDURE — 87449 NOS EACH ORGANISM AG IA: CPT

## 2018-12-26 PROCEDURE — 85014 HEMATOCRIT: CPT

## 2018-12-26 PROCEDURE — 82962 GLUCOSE BLOOD TEST: CPT

## 2018-12-26 PROCEDURE — 86901 BLOOD TYPING SEROLOGIC RH(D): CPT

## 2018-12-26 PROCEDURE — 85027 COMPLETE CBC AUTOMATED: CPT

## 2018-12-26 PROCEDURE — 74177 CT ABD & PELVIS W/CONTRAST: CPT

## 2018-12-26 PROCEDURE — C1769: CPT

## 2018-12-26 PROCEDURE — 83036 HEMOGLOBIN GLYCOSYLATED A1C: CPT

## 2018-12-26 PROCEDURE — 87186 SC STD MICRODIL/AGAR DIL: CPT

## 2018-12-27 ENCOUNTER — APPOINTMENT (OUTPATIENT)
Dept: TRAUMA SURGERY | Facility: CLINIC | Age: 58
End: 2018-12-27
Payer: MEDICAID

## 2018-12-27 VITALS
HEART RATE: 86 BPM | WEIGHT: 125 LBS | TEMPERATURE: 98.2 F | HEIGHT: 72 IN | SYSTOLIC BLOOD PRESSURE: 122 MMHG | DIASTOLIC BLOOD PRESSURE: 78 MMHG | BODY MASS INDEX: 16.93 KG/M2

## 2018-12-27 PROCEDURE — 99024 POSTOP FOLLOW-UP VISIT: CPT

## 2019-01-04 ENCOUNTER — LABORATORY RESULT (OUTPATIENT)
Age: 59
End: 2019-01-04

## 2019-01-04 ENCOUNTER — OUTPATIENT (OUTPATIENT)
Dept: OUTPATIENT SERVICES | Facility: HOSPITAL | Age: 59
LOS: 1 days | End: 2019-01-04
Payer: MEDICAID

## 2019-01-04 ENCOUNTER — APPOINTMENT (OUTPATIENT)
Dept: INFECTIOUS DISEASE | Facility: CLINIC | Age: 59
End: 2019-01-04
Payer: MEDICAID

## 2019-01-04 VITALS
OXYGEN SATURATION: 99 % | TEMPERATURE: 97.4 F | HEART RATE: 85 BPM | BODY MASS INDEX: 19.64 KG/M2 | DIASTOLIC BLOOD PRESSURE: 54 MMHG | HEIGHT: 72 IN | WEIGHT: 145 LBS | SYSTOLIC BLOOD PRESSURE: 93 MMHG

## 2019-01-04 DIAGNOSIS — B97.89 OTHER VIRAL AGENTS AS THE CAUSE OF DISEASES CLASSIFIED ELSEWHERE: ICD-10-CM

## 2019-01-04 DIAGNOSIS — Z98.890 OTHER SPECIFIED POSTPROCEDURAL STATES: Chronic | ICD-10-CM

## 2019-01-04 LAB
ALBUMIN SERPL ELPH-MCNC: 4.3 G/DL — SIGNIFICANT CHANGE UP (ref 3.3–5)
ALP SERPL-CCNC: 68 U/L — SIGNIFICANT CHANGE UP (ref 40–120)
ALT FLD-CCNC: 9 U/L — LOW (ref 10–45)
ANION GAP SERPL CALC-SCNC: 12 MMOL/L — SIGNIFICANT CHANGE UP (ref 5–17)
AST SERPL-CCNC: 22 U/L — SIGNIFICANT CHANGE UP (ref 10–40)
BILIRUB SERPL-MCNC: 0.2 MG/DL — SIGNIFICANT CHANGE UP (ref 0.2–1.2)
BUN SERPL-MCNC: 22 MG/DL — SIGNIFICANT CHANGE UP (ref 7–23)
CALCIUM SERPL-MCNC: 10.1 MG/DL — SIGNIFICANT CHANGE UP (ref 8.4–10.5)
CHLORIDE SERPL-SCNC: 99 MMOL/L — SIGNIFICANT CHANGE UP (ref 96–108)
CO2 SERPL-SCNC: 22 MMOL/L — SIGNIFICANT CHANGE UP (ref 22–31)
CREAT SERPL-MCNC: 1.62 MG/DL — HIGH (ref 0.5–1.3)
CRP SERPL-MCNC: 0.22 MG/DL — SIGNIFICANT CHANGE UP (ref 0–0.4)
ERYTHROCYTE [SEDIMENTATION RATE] IN BLOOD: 13 MM/HR — SIGNIFICANT CHANGE UP (ref 0–20)
GLUCOSE SERPL-MCNC: 123 MG/DL — HIGH (ref 70–99)
HBV SURFACE AB SER-ACNC: SIGNIFICANT CHANGE UP
HBV SURFACE AG SER-ACNC: SIGNIFICANT CHANGE UP
HCT VFR BLD CALC: 32.1 % — LOW (ref 39–50)
HCV AB S/CO SERPL IA: 0.11 S/CO — SIGNIFICANT CHANGE UP
HCV AB SERPL-IMP: SIGNIFICANT CHANGE UP
HGB BLD-MCNC: 10.4 G/DL — LOW (ref 13–17)
HIV 1+2 AB+HIV1 P24 AG SERPL QL IA: SIGNIFICANT CHANGE UP
MCHC RBC-ENTMCNC: 28.1 PG — SIGNIFICANT CHANGE UP (ref 27–34)
MCHC RBC-ENTMCNC: 32.4 GM/DL — SIGNIFICANT CHANGE UP (ref 32–36)
MCV RBC AUTO: 86.8 FL — SIGNIFICANT CHANGE UP (ref 80–100)
PLATELET # BLD AUTO: 319 K/UL — SIGNIFICANT CHANGE UP (ref 150–400)
POTASSIUM SERPL-MCNC: 5.5 MMOL/L — HIGH (ref 3.5–5.3)
POTASSIUM SERPL-SCNC: 5.5 MMOL/L — HIGH (ref 3.5–5.3)
PROT SERPL-MCNC: 8.3 G/DL — SIGNIFICANT CHANGE UP (ref 6–8.3)
RBC # BLD: 3.7 M/UL — LOW (ref 4.2–5.8)
RBC # FLD: 14.9 % — HIGH (ref 10.3–14.5)
SODIUM SERPL-SCNC: 133 MMOL/L — LOW (ref 135–145)
WBC # BLD: 10.26 K/UL — SIGNIFICANT CHANGE UP (ref 3.8–10.5)
WBC # FLD AUTO: 10.26 K/UL — SIGNIFICANT CHANGE UP (ref 3.8–10.5)

## 2019-01-04 PROCEDURE — 99215 OFFICE O/P EST HI 40 MIN: CPT

## 2019-01-04 PROCEDURE — 86803 HEPATITIS C AB TEST: CPT

## 2019-01-04 PROCEDURE — 85652 RBC SED RATE AUTOMATED: CPT

## 2019-01-04 PROCEDURE — G0008: CPT

## 2019-01-04 PROCEDURE — 87340 HEPATITIS B SURFACE AG IA: CPT

## 2019-01-04 PROCEDURE — 87389 HIV-1 AG W/HIV-1&-2 AB AG IA: CPT

## 2019-01-04 PROCEDURE — 86706 HEP B SURFACE ANTIBODY: CPT

## 2019-01-04 PROCEDURE — 86140 C-REACTIVE PROTEIN: CPT

## 2019-01-04 PROCEDURE — 90686 IIV4 VACC NO PRSV 0.5 ML IM: CPT

## 2019-01-04 PROCEDURE — G0463: CPT | Mod: 25

## 2019-01-04 PROCEDURE — 90670 PCV13 VACCINE IM: CPT

## 2019-01-04 PROCEDURE — 80053 COMPREHEN METABOLIC PANEL: CPT

## 2019-01-04 PROCEDURE — 85027 COMPLETE CBC AUTOMATED: CPT

## 2019-01-04 PROCEDURE — 87040 BLOOD CULTURE FOR BACTERIA: CPT

## 2019-01-04 PROCEDURE — 90472 IMMUNIZATION ADMIN EACH ADD: CPT

## 2019-01-04 RX ORDER — ACETAMINOPHEN 160 MG
TABLET,DISINTEGRATING ORAL
Qty: 60 | Refills: 0 | Status: ACTIVE | COMMUNITY
Start: 2019-01-04 | End: 1900-01-01

## 2019-01-04 NOTE — HISTORY OF PRESENT ILLNESS
[FreeTextEntry1] : 57 yo man with DM, HTN, whose history of present illness dates back to his visit to [facility] summer 2018 when he developed fever,weakness, abdominal pain and had a perforated appendix?/bowel. and abdominal abscess. He refused surgery in [place] and returned home for surgery.  Since that time he has required multpiple hospitalizations and admissions for percutaneous drainage of right retropeeritoneal abscess and ileocectomy with end ileostomy for [erforated appendix and retroperitoneal abscess. 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

## 2019-01-04 NOTE — ASSESSMENT
[Treatment Education] : treatment education [Treatment Adherence] : treatment adherence [Rx Dose / Side Effects] : Rx dose/side effects [Risk Reduction] : risk reduction [Nutritional / Food Issues] : nutritional/food issues [Universal Precautions] : universal precautions [Medical Care Issues] : medical care issues [Drug Interactions / Side Effects] : drug interactions/side effects [FreeTextEntry1] : 25minutes+ spent counseling the patient and his daughters about his illness and prgonosis for curing the recurrent infections

## 2019-01-04 NOTE — PHYSICAL EXAM
[General Appearance - Alert] : alert [General Appearance - In No Acute Distress] : in no acute distress [Sclera] : the sclera and conjunctiva were normal [PERRL With Normal Accommodation] : pupils were equal in size, round, reactive to light [Extraocular Movements] : extraocular movements were intact [Outer Ear] : the ears and nose were normal in appearance [Oropharynx] : the oropharynx was normal with no thrush [Neck Appearance] : the appearance of the neck was normal [Neck Cervical Mass (___cm)] : no neck mass was observed [Jugular Venous Distention Increased] : there was no jugular-venous distention [Thyroid Diffuse Enlargement] : the thyroid was not enlarged [Auscultation Breath Sounds / Voice Sounds] : lungs were clear to auscultation bilaterally [Heart Rate And Rhythm] : heart rate was normal and rhythm regular [Heart Sounds] : normal S1 and S2 [Heart Sounds Gallop] : no gallops [Murmurs] : no murmurs [Heart Sounds Pericardial Friction Rub] : no pericardial rub [Full Pulse] : the pedal pulses are present [Edema] : there was no peripheral edema [Bowel Sounds] : normal bowel sounds [Abdomen Soft] : soft [Abdomen Tenderness] : non-tender [Abdomen Mass (___ Cm)] : no abdominal mass palpated [Costovertebral Angle Tenderness] : no CVA tenderness [No Palpable Adenopathy] : no palpable adenopathy [Musculoskeletal - Swelling] : no joint swelling [Nail Clubbing] : no clubbing  or cyanosis of the fingernails [Motor Tone] : muscle strength and tone were normal [Skin Color & Pigmentation] : normal skin color and pigmentation [] : no rash [Deep Tendon Reflexes (DTR)] : deep tendon reflexes were 2+ and symmetric [Sensation] : the sensory exam was normal to light touch and pinprick [No Focal Deficits] : no focal deficits [Oriented To Time, Place, And Person] : oriented to person, place, and time [Affect] : the affect was normal [FreeTextEntry1] : LUE with PICC line site appears no erythema

## 2019-01-05 LAB
CULTURE RESULTS: SIGNIFICANT CHANGE UP
SPECIMEN SOURCE: SIGNIFICANT CHANGE UP

## 2019-01-09 LAB
CULTURE RESULTS: SIGNIFICANT CHANGE UP
SPECIMEN SOURCE: SIGNIFICANT CHANGE UP

## 2019-01-10 DIAGNOSIS — Z23 ENCOUNTER FOR IMMUNIZATION: ICD-10-CM

## 2019-01-10 DIAGNOSIS — M86.9 OSTEOMYELITIS, UNSPECIFIED: ICD-10-CM

## 2019-01-10 DIAGNOSIS — B96.89 OTHER SPECIFIED BACTERIAL AGENTS AS THE CAUSE OF DISEASES CLASSIFIED ELSEWHERE: ICD-10-CM

## 2019-01-10 DIAGNOSIS — K35.32 ACUTE APPENDICITIS WITH PERFORATION, LOCALIZED PERITONITIS, AND GANGRENE, WITHOUT ABSCESS: ICD-10-CM

## 2019-01-10 DIAGNOSIS — Z98.890 OTHER SPECIFIED POSTPROCEDURAL STATES: ICD-10-CM

## 2019-01-14 ENCOUNTER — LABORATORY RESULT (OUTPATIENT)
Age: 59
End: 2019-01-14

## 2019-01-18 DIAGNOSIS — E87.5 HYPERKALEMIA: ICD-10-CM

## 2019-01-18 RX ORDER — SODIUM POLYSTYRENE SULFONATE 15 G/60ML
15 SUSPENSION ORAL; RECTAL
Qty: 1 | Refills: 0 | Status: ACTIVE | COMMUNITY
Start: 2019-01-18 | End: 1900-01-01

## 2019-01-26 LAB
CULTURE RESULTS: SIGNIFICANT CHANGE UP
SPECIMEN SOURCE: SIGNIFICANT CHANGE UP

## 2019-01-28 ENCOUNTER — RX RENEWAL (OUTPATIENT)
Age: 59
End: 2019-01-28

## 2019-02-03 ENCOUNTER — FORM ENCOUNTER (OUTPATIENT)
Age: 59
End: 2019-02-03

## 2019-02-04 ENCOUNTER — APPOINTMENT (OUTPATIENT)
Dept: CT IMAGING | Facility: CLINIC | Age: 59
End: 2019-02-04
Payer: MEDICAID

## 2019-02-04 ENCOUNTER — OUTPATIENT (OUTPATIENT)
Dept: OUTPATIENT SERVICES | Facility: HOSPITAL | Age: 59
LOS: 1 days | End: 2019-02-04
Payer: MEDICAID

## 2019-02-04 DIAGNOSIS — B96.89 OTHER SPECIFIED BACTERIAL AGENTS AS THE CAUSE OF DISEASES CLASSIFIED ELSEWHERE: ICD-10-CM

## 2019-02-04 DIAGNOSIS — Z98.890 OTHER SPECIFIED POSTPROCEDURAL STATES: Chronic | ICD-10-CM

## 2019-02-04 DIAGNOSIS — M86.9 OSTEOMYELITIS, UNSPECIFIED: ICD-10-CM

## 2019-02-04 PROCEDURE — 74177 CT ABD & PELVIS W/CONTRAST: CPT

## 2019-02-04 PROCEDURE — 74177 CT ABD & PELVIS W/CONTRAST: CPT | Mod: 26

## 2019-02-14 ENCOUNTER — APPOINTMENT (OUTPATIENT)
Dept: TRAUMA SURGERY | Facility: CLINIC | Age: 59
End: 2019-02-14
Payer: MEDICAID

## 2019-02-14 VITALS
BODY MASS INDEX: 20.99 KG/M2 | TEMPERATURE: 98 F | HEART RATE: 98 BPM | WEIGHT: 155 LBS | DIASTOLIC BLOOD PRESSURE: 62 MMHG | SYSTOLIC BLOOD PRESSURE: 102 MMHG | HEIGHT: 72 IN

## 2019-02-14 PROCEDURE — 99024 POSTOP FOLLOW-UP VISIT: CPT

## 2019-02-14 NOTE — PHYSICAL EXAM
[Abdomen Tenderness] : ~T ~M No abdominal tenderness [de-identified] : non-distended. scant amount of dark green drainage in SUDHIR bulb.

## 2019-02-14 NOTE — HISTORY OF PRESENT ILLNESS
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 He is completing ceftolazone/tazobactarm treatment with Dr Claudia Cherry. CT abd/pelvis (2/4/2019) - no residual collection. [de-identified] : tolerating regular diet w/o nausea or vomiting. no diarrhea. no fevers or chills.\par right hip weakness is improving, but he is still using a walker to ambulate.\par

## 2019-02-14 NOTE — PLAN
[FreeTextEntry1] : drain study and possible removal by IR on 2/20/2019.\par return to the office next month to reassess.\par will need colonoscopy prior to ileostomy takedown.

## 2019-02-19 ENCOUNTER — FORM ENCOUNTER (OUTPATIENT)
Age: 59
End: 2019-02-19

## 2019-02-20 ENCOUNTER — OUTPATIENT (OUTPATIENT)
Dept: OUTPATIENT SERVICES | Facility: HOSPITAL | Age: 59
LOS: 1 days | End: 2019-02-20
Payer: MEDICAID

## 2019-02-20 DIAGNOSIS — K65.1 PERITONEAL ABSCESS: ICD-10-CM

## 2019-02-20 DIAGNOSIS — Z98.890 OTHER SPECIFIED POSTPROCEDURAL STATES: Chronic | ICD-10-CM

## 2019-02-20 PROCEDURE — 49424 ASSESS CYST CONTRAST INJECT: CPT

## 2019-02-20 PROCEDURE — 76080 X-RAY EXAM OF FISTULA: CPT

## 2019-02-20 PROCEDURE — 76080 X-RAY EXAM OF FISTULA: CPT | Mod: 26

## 2019-02-22 DIAGNOSIS — Z43.4 ENCOUNTER FOR ATTENTION TO OTHER ARTIFICIAL OPENINGS OF DIGESTIVE TRACT: ICD-10-CM

## 2019-04-08 ENCOUNTER — APPOINTMENT (OUTPATIENT)
Dept: TRAUMA SURGERY | Facility: CLINIC | Age: 59
End: 2019-04-08
Payer: MEDICAID

## 2019-04-08 VITALS
BODY MASS INDEX: 21.26 KG/M2 | DIASTOLIC BLOOD PRESSURE: 71 MMHG | HEIGHT: 72 IN | HEART RATE: 103 BPM | WEIGHT: 157 LBS | TEMPERATURE: 98.2 F | SYSTOLIC BLOOD PRESSURE: 127 MMHG

## 2019-04-08 DIAGNOSIS — M86.9 OSTEOMYELITIS, UNSPECIFIED: ICD-10-CM

## 2019-04-08 DIAGNOSIS — Z16.24 OTHER BACTERIAL INFECTIONS OF UNSPECIFIED SITE: ICD-10-CM

## 2019-04-08 DIAGNOSIS — A49.8 OTHER BACTERIAL INFECTIONS OF UNSPECIFIED SITE: ICD-10-CM

## 2019-04-08 PROCEDURE — 99212 OFFICE O/P EST SF 10 MIN: CPT

## 2019-04-08 NOTE — PHYSICAL EXAM
[de-identified] : soft / NT / ND.\par end ileostomy with a good fitting appliance.\par right flank incision is well-healed. [de-identified] : walks with cane. unable to extend right knee against gravity.

## 2019-04-08 NOTE — HISTORY OF PRESENT ILLNESS
[de-identified] : last seen in the office on 2/14.\par was being treated with ceftolozane/tazobactam by Dr Claudia Cherry (ID) for carbapenem resistant Pseudomonas and E coli. ABx were stopped on 2/15.\par SUDHIR drain was removed by IR on 2/20.\par  [de-identified] : tolerating regular diet without nausea, vomiting or abdominal pain.\par no fevers or chills.\par gaining weight (was 180 lbs at baseline, currently 157 lbs)

## 2019-04-23 ENCOUNTER — APPOINTMENT (OUTPATIENT)
Dept: COLORECTAL SURGERY | Facility: CLINIC | Age: 59
End: 2019-04-23
Payer: MEDICAID

## 2019-04-23 VITALS
HEIGHT: 71 IN | BODY MASS INDEX: 21.98 KG/M2 | RESPIRATION RATE: 14 BRPM | DIASTOLIC BLOOD PRESSURE: 72 MMHG | WEIGHT: 157 LBS | SYSTOLIC BLOOD PRESSURE: 123 MMHG | HEART RATE: 88 BPM

## 2019-04-23 DIAGNOSIS — Z86.79 PERSONAL HISTORY OF OTHER DISEASES OF THE CIRCULATORY SYSTEM: ICD-10-CM

## 2019-04-23 DIAGNOSIS — Z86.39 PERSONAL HISTORY OF OTHER ENDOCRINE, NUTRITIONAL AND METABOLIC DISEASE: ICD-10-CM

## 2019-04-23 DIAGNOSIS — Z83.3 FAMILY HISTORY OF DIABETES MELLITUS: ICD-10-CM

## 2019-04-23 PROCEDURE — 99213 OFFICE O/P EST LOW 20 MIN: CPT

## 2019-04-23 RX ORDER — METRONIDAZOLE 500 MG/1
500 TABLET ORAL
Refills: 0 | Status: DISCONTINUED | COMMUNITY
End: 2019-04-23

## 2019-04-23 RX ORDER — MULTIVITAMIN
TABLET ORAL
Refills: 0 | Status: ACTIVE | COMMUNITY

## 2019-04-23 RX ORDER — AMIKACIN SULFATE 250 MG/ML
1 INJECTION, SOLUTION INTRAMUSCULAR; INTRAVENOUS
Refills: 0 | Status: DISCONTINUED | COMMUNITY
End: 2019-04-23

## 2019-04-23 RX ORDER — SULFAMETHOXAZOLE AND TRIMETHOPRIM 400; 80 MG/1; MG/1
400-80 TABLET ORAL
Qty: 60 | Refills: 0 | Status: DISCONTINUED | COMMUNITY
Start: 2019-01-28 | End: 2019-04-23

## 2019-04-23 RX ORDER — OXYCODONE AND ACETAMINOPHEN 5; 325 MG/1; MG/1
5-325 TABLET ORAL
Refills: 0 | Status: DISCONTINUED | COMMUNITY
End: 2019-04-23

## 2019-04-23 RX ORDER — GABAPENTIN 300 MG
300 TABLET ORAL
Refills: 0 | Status: ACTIVE | COMMUNITY

## 2019-04-23 RX ORDER — GLIPIZIDE 10 MG/1
10 TABLET ORAL
Refills: 0 | Status: DISCONTINUED | COMMUNITY
End: 2019-04-23

## 2019-04-23 NOTE — REVIEW OF SYSTEMS
[Negative] : Neurological [Chest Pain] : no chest pain [Easy Bleeding] : no tendency for easy bleeding [Easy Bruising] : no tendency for easy bruising [FreeTextEntry2] : 30lb wt gain since lowest postop wt. [FreeTextEntry6] : occ SOB with exertion [FreeTextEntry9] : RLE weakness/burning.  Ambulates with quad cane [de-identified] : daily FS [FreeTextEntry7] : +ileostomy functioning

## 2019-04-23 NOTE — PHYSICAL EXAM
[Normal Breath Sounds] : Normal breath sounds [No Edema] : No edema [Normal Heart Sounds] : normal heart sounds [Normal Rate and Rhythm] : normal rate and rhythm [Alert] : alert [Oriented to Person] : oriented to person [Oriented to Place] : oriented to place [Oriented to Time] : oriented to time [Calm] : calm [de-identified] : NC/AT [de-identified] : round soft +BS NT/ND midline scar rt sided ostomy [de-identified] : RLE weakness ambulates with cane [de-identified] : intact

## 2019-04-23 NOTE — HISTORY OF PRESENT ILLNESS
[FreeTextEntry1] : 59yo male s/p right colectomy with creation of end ileostomy in 8/2018 secondary to perforated cecum from missed appendicitis.  Has required multiple retroperitoneal abscess drainages/long term ABX use for osteomyelitis. (quiescent since Feb 2019.\par \par Presents for consultation re reversal of ileostomy/pre op colonoscopy

## 2019-04-23 NOTE — ASSESSMENT
[FreeTextEntry1] : Likely perforated appendicitis complicated by retroperitoneal necrotizing infection.\par -Patient progressing well.\par -Recommended patient evaluated by vascular surgery or hematology to discuss the timeframe to discontinue anticoagulation\par -Right anticoagulation can be held, we will proceed with colonoscopy and reversal of ileostomy\par -All questions were answered\par -Patient will call the office after evaluation regarding anticoagulation

## 2019-06-18 ENCOUNTER — APPOINTMENT (OUTPATIENT)
Dept: COLORECTAL SURGERY | Facility: CLINIC | Age: 59
End: 2019-06-18
Payer: MEDICAID

## 2019-06-18 PROCEDURE — 99213 OFFICE O/P EST LOW 20 MIN: CPT

## 2019-06-18 NOTE — HISTORY OF PRESENT ILLNESS
[FreeTextEntry1] : 59-year-old male status post likely perforated appendicitis with necrotizing retroperitoneal infection. Patient now with end ileostomy presents for evaluation For ileostomy reversal. Currently denies pain. No fevers or chills. Patient was evaluated by vascular surgery who performed a repeat bilateral lower extremity duplex which was negative. The recommendation was for patient to come off anticoagulation in the perioperative period, the patient would wire repeat duplex within 48 hours of surgery.

## 2019-06-18 NOTE — ASSESSMENT
[FreeTextEntry1] : End ileostomy after perforated cecum\par -I recommended patient undergo exploratory laparotomy with reversal of an ileostomy and likely partial colon resection. Risks and benefits were discussed at length including bleeding, infection, risk of injury to nearby structures, possible anastomotic dehiscence and possible stoma creation\par -I will followup with vascular surgery to determine if repeat duplex is to be performed before or after surgery.\par -Patient will schedule colonoscopy the day before surgery to evaluate for any large masses\par -All questions answered\par -Medical clearance\par -Patient to schedule at his earliest convenience

## 2019-07-12 ENCOUNTER — OUTPATIENT (OUTPATIENT)
Dept: OUTPATIENT SERVICES | Facility: HOSPITAL | Age: 59
LOS: 1 days | End: 2019-07-12
Payer: MEDICAID

## 2019-07-12 VITALS
RESPIRATION RATE: 14 BRPM | DIASTOLIC BLOOD PRESSURE: 70 MMHG | HEART RATE: 81 BPM | WEIGHT: 158.07 LBS | SYSTOLIC BLOOD PRESSURE: 122 MMHG | TEMPERATURE: 97 F | HEIGHT: 69 IN | OXYGEN SATURATION: 98 %

## 2019-07-12 DIAGNOSIS — I82.409 ACUTE EMBOLISM AND THROMBOSIS OF UNSPECIFIED DEEP VEINS OF UNSPECIFIED LOWER EXTREMITY: ICD-10-CM

## 2019-07-12 DIAGNOSIS — Z98.890 OTHER SPECIFIED POSTPROCEDURAL STATES: Chronic | ICD-10-CM

## 2019-07-12 DIAGNOSIS — Z01.818 ENCOUNTER FOR OTHER PREPROCEDURAL EXAMINATION: ICD-10-CM

## 2019-07-12 DIAGNOSIS — K35.30 ACUTE APPENDICITIS WITH LOCALIZED PERITONITIS, WITHOUT PERFORATION OR GANGRENE: ICD-10-CM

## 2019-07-12 DIAGNOSIS — A41.9 SEPSIS, UNSPECIFIED ORGANISM: ICD-10-CM

## 2019-07-12 DIAGNOSIS — E11.9 TYPE 2 DIABETES MELLITUS WITHOUT COMPLICATIONS: ICD-10-CM

## 2019-07-12 DIAGNOSIS — Z86.19 PERSONAL HISTORY OF OTHER INFECTIOUS AND PARASITIC DISEASES: Chronic | ICD-10-CM

## 2019-07-12 LAB
ANION GAP SERPL CALC-SCNC: 15 MMO/L — HIGH (ref 7–14)
BLD GP AB SCN SERPL QL: NEGATIVE — SIGNIFICANT CHANGE UP
BUN SERPL-MCNC: 24 MG/DL — HIGH (ref 7–23)
CALCIUM SERPL-MCNC: 10 MG/DL — SIGNIFICANT CHANGE UP (ref 8.4–10.5)
CHLORIDE SERPL-SCNC: 99 MMOL/L — SIGNIFICANT CHANGE UP (ref 98–107)
CO2 SERPL-SCNC: 23 MMOL/L — SIGNIFICANT CHANGE UP (ref 22–31)
CREAT SERPL-MCNC: 1.16 MG/DL — SIGNIFICANT CHANGE UP (ref 0.5–1.3)
GLUCOSE SERPL-MCNC: 139 MG/DL — HIGH (ref 70–99)
HBA1C BLD-MCNC: 6.8 % — HIGH (ref 4–5.6)
HCT VFR BLD CALC: 39.5 % — SIGNIFICANT CHANGE UP (ref 39–50)
HGB BLD-MCNC: 12.3 G/DL — LOW (ref 13–17)
MCHC RBC-ENTMCNC: 26.5 PG — LOW (ref 27–34)
MCHC RBC-ENTMCNC: 31.1 % — LOW (ref 32–36)
MCV RBC AUTO: 85.1 FL — SIGNIFICANT CHANGE UP (ref 80–100)
NRBC # FLD: 0 K/UL — SIGNIFICANT CHANGE UP (ref 0–0)
PLATELET # BLD AUTO: 257 K/UL — SIGNIFICANT CHANGE UP (ref 150–400)
PMV BLD: 11.4 FL — SIGNIFICANT CHANGE UP (ref 7–13)
POTASSIUM SERPL-MCNC: 4.7 MMOL/L — SIGNIFICANT CHANGE UP (ref 3.5–5.3)
POTASSIUM SERPL-SCNC: 4.7 MMOL/L — SIGNIFICANT CHANGE UP (ref 3.5–5.3)
RBC # BLD: 4.64 M/UL — SIGNIFICANT CHANGE UP (ref 4.2–5.8)
RBC # FLD: 15.9 % — HIGH (ref 10.3–14.5)
RH IG SCN BLD-IMP: POSITIVE — SIGNIFICANT CHANGE UP
SODIUM SERPL-SCNC: 137 MMOL/L — SIGNIFICANT CHANGE UP (ref 135–145)
WBC # BLD: 8.84 K/UL — SIGNIFICANT CHANGE UP (ref 3.8–10.5)
WBC # FLD AUTO: 8.84 K/UL — SIGNIFICANT CHANGE UP (ref 3.8–10.5)

## 2019-07-12 PROCEDURE — 93010 ELECTROCARDIOGRAM REPORT: CPT

## 2019-07-12 RX ORDER — SITAGLIPTIN 50 MG/1
1 TABLET, FILM COATED ORAL
Qty: 0 | Refills: 0 | DISCHARGE

## 2019-07-12 RX ORDER — AMIKACIN SULFATE 250 MG/ML
800 INJECTION, SOLUTION INTRAMUSCULAR; INTRAVENOUS
Qty: 0 | Refills: 0 | DISCHARGE

## 2019-07-12 RX ORDER — FLUTICASONE PROPIONATE 220 MCG
1 AEROSOL WITH ADAPTER (GRAM) INHALATION
Qty: 0 | Refills: 0 | DISCHARGE

## 2019-07-12 RX ORDER — GABAPENTIN 400 MG/1
1 CAPSULE ORAL
Qty: 0 | Refills: 0 | DISCHARGE

## 2019-07-12 RX ORDER — SODIUM CHLORIDE 9 MG/ML
1000 INJECTION, SOLUTION INTRAVENOUS
Refills: 0 | Status: DISCONTINUED | OUTPATIENT
Start: 2019-07-26 | End: 2019-07-27

## 2019-07-12 RX ORDER — METRONIDAZOLE 500 MG
1 TABLET ORAL
Qty: 0 | Refills: 0 | DISCHARGE

## 2019-07-12 RX ORDER — METFORMIN HYDROCHLORIDE 850 MG/1
1 TABLET ORAL
Qty: 0 | Refills: 0 | DISCHARGE

## 2019-07-12 NOTE — H&P PST ADULT - NSICDXPASTMEDICALHX_GEN_ALL_CORE_FT
PAST MEDICAL HISTORY:  Abscess psoas muscle    Colon perforation     Diabetes type II, diagnosed in 2009    DVT (deep venous thrombosis) currently right arm. Had dvt in left leg also    Hypertension     Necrotizing fasciitis PAST MEDICAL HISTORY:  Abscess psoas muscle    Colon perforation     Diabetes type II, diagnosed in 2009    DVT (deep venous thrombosis) currently right arm. Had DVT  in left arm and right leg also--seen by vascular surgeon with resolution of DVT as per the patient    Hypertension     Necrotizing fasciitis

## 2019-07-12 NOTE — H&P PST ADULT - ENDOCRINE COMMENTS
denies thyroid disease; DM type II -- finger sticks in am range 's; finger sticks in the pm range 150-170

## 2019-07-12 NOTE — H&P PST ADULT - HISTORY OF PRESENT ILLNESS
This is a 60 y/o M PMH bowel perforation while visiting [place], [place], S/P open ileocecectomy, S/P debridement and wash out of abdomen and ileostomy formation, S/P IR drain placement for recurrent fever, leukocytosis and fluid collection right hemipelvis, despite treatment with Vanco, fluconazole and Zosyn, c/o right hip pain and inability to walk and found to have psoas abscess, receiving Amikacin and meropenem  via left PICC line (has subsequently been removed), as per patient the colon perforation was believed to be caused by appendicitis. Patient has also had multiple DVT's, since September in left arm and right leg, and currently in right arm, is on Eliquis. Seen by vascular surgeon who told  patient the clot is resolved.  Underwent video assisted retroperitoneal exploration, appendectomy, possible exploratory laparotomy and ileostomy in Nov. 2018. Presents today (7-12-19) for open ileostomy reversal with possible colon resection laparotomy on 7-26-19

## 2019-07-12 NOTE — H&P PST ADULT - NSICDXPROBLEM_GEN_ALL_CORE_FT
PROBLEM DIAGNOSES  Problem: Sepsis  Assessment and Plan: This is a 60 y/o male who is scheduled for open ileostomy reversal with possible colon resection laparotomy on 7-26-19  * Given pre op instructions with good verbalized understanding    Problem: Diabetes mellitus  Assessment and Plan:     Problem: Deep vein thrombosis  Assessment and Plan: PROBLEM DIAGNOSES  Problem: Sepsis  Assessment and Plan: This is a 58 y/o male who is scheduled for open ileostomy reversal with possible colon resection laparotomy on 7-26-19  * Given pre op instructions with good verbalized understanding    Problem: Diabetes mellitus  Assessment and Plan: Await medical evaluation with pcp requested by surgeon and PAST due to h/o DM and major surgery  * Instructed to stop metformin 24 hours before surgery  * Instructed not to take Januvia or nateglinide the am of surgery  * Instructed to take normal am dose of gabapentin, metoprolol and sodium chloride the am of surgery    Problem: Deep vein thrombosis  Assessment and Plan: Await recent office note of vascular surgeon confirming that clot has resolved. MD instructed to stop Eliquis on 7-20-19 PROBLEM DIAGNOSES  Problem: Sepsis  Assessment and Plan: This is a 60 y/o male who is scheduled for open ileostomy reversal with possible colon resection laparotomy on 7-26-19  * Given pre op instructions with good verbalized understanding    Problem: Diabetes mellitus  Assessment and Plan: Await medical evaluation with pcp requested by surgeon and PAST due to h/o DM and major surgery  * Await old ekg for comparison from pcp  * Instructed to stop metformin 24 hours before surgery  * Instructed not to take Januvia or nateglinide the am of surgery  * Instructed to take normal am dose of gabapentin, metoprolol and sodium chloride the am of surgery  * Notified OR booking via fax of DM type II    Problem: Deep vein thrombosis  Assessment and Plan: Await recent office note of vascular surgeon confirming that clot has resolved. MD instructed to stop Eliquis on 7-20-19

## 2019-07-12 NOTE — H&P PST ADULT - NSICDXPASTSURGICALHX_GEN_ALL_CORE_FT
PAST SURGICAL HISTORY:  H/O ileostomy with debrjidement and wash out of abdomen , ileostomy formation    H/O sepsis video assisted retroperitoneal exploration, appendectomy and exploration lap, with ileostomy in November 2018 PAST SURGICAL HISTORY:  H/O ileostomy with debridement and wash out of abdomen , ileostomy formation    H/O sepsis video assisted retroperitoneal exploration, appendectomy and exploration lap, with ileostomy in November 2018 PAST SURGICAL HISTORY:  H/O drainage of abscess IR drain placed due to right hemipelvis collection    H/O ileostomy with debridement and wash out of abdomen , ileostomy formation    H/O sepsis video assisted retroperitoneal exploration, appendectomy and exploration lap, with ileostomy in November 2018

## 2019-07-25 ENCOUNTER — TRANSCRIPTION ENCOUNTER (OUTPATIENT)
Age: 59
End: 2019-07-25

## 2019-07-25 ENCOUNTER — APPOINTMENT (OUTPATIENT)
Dept: COLORECTAL SURGERY | Facility: CLINIC | Age: 59
End: 2019-07-25
Payer: MEDICAID

## 2019-07-25 DIAGNOSIS — K64.9 UNSPECIFIED HEMORRHOIDS: ICD-10-CM

## 2019-07-25 PROCEDURE — 99212 OFFICE O/P EST SF 10 MIN: CPT | Mod: 25

## 2019-07-25 PROCEDURE — 45378 DIAGNOSTIC COLONOSCOPY: CPT

## 2019-07-25 NOTE — ASU PATIENT PROFILE, ADULT - PSH
H/O drainage of abscess  IR drain placed due to right hemipelvis collection  H/O ileostomy  with debridement and wash out of abdomen , ileostomy formation  H/O sepsis  video assisted retroperitoneal exploration, appendectomy and exploration lap, with ileostomy in November 2018

## 2019-07-25 NOTE — ASU PATIENT PROFILE, ADULT - PMH
Abscess  psoas muscle  Colon perforation    Diabetes  type II, diagnosed in 2009  DVT (deep venous thrombosis)  currently right arm. Had DVT  in left arm and right leg also--seen by vascular surgeon with resolution of DVT as per the patient  Hypertension    Necrotizing fasciitis

## 2019-07-26 ENCOUNTER — INPATIENT (INPATIENT)
Facility: HOSPITAL | Age: 59
LOS: 8 days | Discharge: HOME HEALTH | End: 2019-08-04
Attending: STUDENT IN AN ORGANIZED HEALTH CARE EDUCATION/TRAINING PROGRAM | Admitting: STUDENT IN AN ORGANIZED HEALTH CARE EDUCATION/TRAINING PROGRAM
Payer: MEDICAID

## 2019-07-26 ENCOUNTER — RESULT REVIEW (OUTPATIENT)
Age: 59
End: 2019-07-26

## 2019-07-26 ENCOUNTER — APPOINTMENT (OUTPATIENT)
Dept: COLORECTAL SURGERY | Facility: HOSPITAL | Age: 59
End: 2019-07-26

## 2019-07-26 VITALS
DIASTOLIC BLOOD PRESSURE: 70 MMHG | TEMPERATURE: 97 F | WEIGHT: 160.06 LBS | SYSTOLIC BLOOD PRESSURE: 122 MMHG | OXYGEN SATURATION: 98 % | HEART RATE: 81 BPM | HEIGHT: 72 IN | RESPIRATION RATE: 14 BRPM

## 2019-07-26 DIAGNOSIS — Z98.890 OTHER SPECIFIED POSTPROCEDURAL STATES: Chronic | ICD-10-CM

## 2019-07-26 DIAGNOSIS — Z86.19 PERSONAL HISTORY OF OTHER INFECTIOUS AND PARASITIC DISEASES: Chronic | ICD-10-CM

## 2019-07-26 DIAGNOSIS — K35.30 ACUTE APPENDICITIS WITH LOCALIZED PERITONITIS, WITHOUT PERFORATION OR GANGRENE: ICD-10-CM

## 2019-07-26 LAB
GLUCOSE BLDC GLUCOMTR-MCNC: 164 MG/DL — HIGH (ref 70–99)
GLUCOSE BLDC GLUCOMTR-MCNC: 201 MG/DL — HIGH (ref 70–99)
GLUCOSE BLDC GLUCOMTR-MCNC: 222 MG/DL — HIGH (ref 70–99)
RH IG SCN BLD-IMP: POSITIVE — SIGNIFICANT CHANGE UP

## 2019-07-26 PROCEDURE — 88304 TISSUE EXAM BY PATHOLOGIST: CPT | Mod: 26

## 2019-07-26 PROCEDURE — 44140 PARTIAL REMOVAL OF COLON: CPT

## 2019-07-26 PROCEDURE — 88307 TISSUE EXAM BY PATHOLOGIST: CPT | Mod: 26

## 2019-07-26 PROCEDURE — 44625 REPAIR BOWEL OPENING: CPT

## 2019-07-26 PROCEDURE — 49560: CPT | Mod: 59

## 2019-07-26 PROCEDURE — 88302 TISSUE EXAM BY PATHOLOGIST: CPT | Mod: 26

## 2019-07-26 RX ORDER — NALOXONE HYDROCHLORIDE 4 MG/.1ML
0.1 SPRAY NASAL
Refills: 0 | Status: DISCONTINUED | OUTPATIENT
Start: 2019-07-26 | End: 2019-07-28

## 2019-07-26 RX ORDER — CELECOXIB 200 MG/1
400 CAPSULE ORAL ONCE
Refills: 0 | Status: COMPLETED | OUTPATIENT
Start: 2019-07-26 | End: 2019-07-26

## 2019-07-26 RX ORDER — DEXTROSE 50 % IN WATER 50 %
25 SYRINGE (ML) INTRAVENOUS ONCE
Refills: 0 | Status: DISCONTINUED | OUTPATIENT
Start: 2019-07-26 | End: 2019-08-04

## 2019-07-26 RX ORDER — ACETAMINOPHEN 500 MG
1000 TABLET ORAL ONCE
Refills: 0 | Status: COMPLETED | OUTPATIENT
Start: 2019-07-27 | End: 2019-07-27

## 2019-07-26 RX ORDER — SODIUM CHLORIDE 9 MG/ML
1 INJECTION INTRAMUSCULAR; INTRAVENOUS; SUBCUTANEOUS
Qty: 0 | Refills: 0 | DISCHARGE

## 2019-07-26 RX ORDER — LIDOCAINE HYDROCHLORIDE AND EPINEPHRINE 10; 10 MG/ML; UG/ML
5 INJECTION, SOLUTION INFILTRATION; PERINEURAL ONCE
Refills: 0 | Status: DISCONTINUED | OUTPATIENT
Start: 2019-07-26 | End: 2019-07-29

## 2019-07-26 RX ORDER — HYDROMORPHONE HYDROCHLORIDE 2 MG/ML
0.5 INJECTION INTRAMUSCULAR; INTRAVENOUS; SUBCUTANEOUS
Refills: 0 | Status: DISCONTINUED | OUTPATIENT
Start: 2019-07-26 | End: 2019-07-26

## 2019-07-26 RX ORDER — ONDANSETRON 8 MG/1
4 TABLET, FILM COATED ORAL EVERY 6 HOURS
Refills: 0 | Status: DISCONTINUED | OUTPATIENT
Start: 2019-07-26 | End: 2019-07-28

## 2019-07-26 RX ORDER — SODIUM CHLORIDE 9 MG/ML
1000 INJECTION, SOLUTION INTRAVENOUS
Refills: 0 | Status: DISCONTINUED | OUTPATIENT
Start: 2019-07-26 | End: 2019-08-02

## 2019-07-26 RX ORDER — METFORMIN HYDROCHLORIDE 850 MG/1
1 TABLET ORAL
Qty: 0 | Refills: 0 | DISCHARGE

## 2019-07-26 RX ORDER — METOPROLOL TARTRATE 50 MG
25 TABLET ORAL
Refills: 0 | Status: DISCONTINUED | OUTPATIENT
Start: 2019-07-26 | End: 2019-07-28

## 2019-07-26 RX ORDER — DEXTROSE 50 % IN WATER 50 %
15 SYRINGE (ML) INTRAVENOUS ONCE
Refills: 0 | Status: DISCONTINUED | OUTPATIENT
Start: 2019-07-26 | End: 2019-08-04

## 2019-07-26 RX ORDER — KETOROLAC TROMETHAMINE 30 MG/ML
30 SYRINGE (ML) INJECTION EVERY 6 HOURS
Refills: 0 | Status: DISCONTINUED | OUTPATIENT
Start: 2019-07-26 | End: 2019-07-26

## 2019-07-26 RX ORDER — ACETAMINOPHEN 500 MG
1000 TABLET ORAL ONCE
Refills: 0 | Status: COMPLETED | OUTPATIENT
Start: 2019-07-26 | End: 2019-07-27

## 2019-07-26 RX ORDER — HEPARIN SODIUM 5000 [USP'U]/ML
5000 INJECTION INTRAVENOUS; SUBCUTANEOUS EVERY 8 HOURS
Refills: 0 | Status: DISCONTINUED | OUTPATIENT
Start: 2019-07-26 | End: 2019-07-26

## 2019-07-26 RX ORDER — GABAPENTIN 400 MG/1
1 CAPSULE ORAL
Qty: 0 | Refills: 0 | DISCHARGE

## 2019-07-26 RX ORDER — INSULIN LISPRO 100/ML
VIAL (ML) SUBCUTANEOUS AT BEDTIME
Refills: 0 | Status: DISCONTINUED | OUTPATIENT
Start: 2019-07-26 | End: 2019-07-29

## 2019-07-26 RX ORDER — APIXABAN 2.5 MG/1
2 TABLET, FILM COATED ORAL
Qty: 0 | Refills: 0 | DISCHARGE

## 2019-07-26 RX ORDER — METOPROLOL TARTRATE 50 MG
1 TABLET ORAL
Qty: 0 | Refills: 0 | DISCHARGE

## 2019-07-26 RX ORDER — SITAGLIPTIN 50 MG/1
1 TABLET, FILM COATED ORAL
Qty: 0 | Refills: 0 | DISCHARGE

## 2019-07-26 RX ORDER — GABAPENTIN 400 MG/1
300 CAPSULE ORAL
Refills: 0 | Status: DISCONTINUED | OUTPATIENT
Start: 2019-07-26 | End: 2019-07-29

## 2019-07-26 RX ORDER — DEXTROSE 50 % IN WATER 50 %
12.5 SYRINGE (ML) INTRAVENOUS ONCE
Refills: 0 | Status: DISCONTINUED | OUTPATIENT
Start: 2019-07-26 | End: 2019-08-04

## 2019-07-26 RX ORDER — NATEGLINIDE 60 MG/1
1 TABLET, COATED ORAL
Qty: 0 | Refills: 0 | DISCHARGE

## 2019-07-26 RX ORDER — ACETAMINOPHEN 500 MG
1000 TABLET ORAL ONCE
Refills: 0 | Status: COMPLETED | OUTPATIENT
Start: 2019-07-26 | End: 2019-07-26

## 2019-07-26 RX ORDER — HEPARIN SODIUM 5000 [USP'U]/ML
5000 INJECTION INTRAVENOUS; SUBCUTANEOUS EVERY 12 HOURS
Refills: 0 | Status: DISCONTINUED | OUTPATIENT
Start: 2019-07-26 | End: 2019-07-28

## 2019-07-26 RX ORDER — ONDANSETRON 8 MG/1
4 TABLET, FILM COATED ORAL ONCE
Refills: 0 | Status: COMPLETED | OUTPATIENT
Start: 2019-07-26 | End: 2019-07-26

## 2019-07-26 RX ORDER — GLUCAGON INJECTION, SOLUTION 0.5 MG/.1ML
1 INJECTION, SOLUTION SUBCUTANEOUS ONCE
Refills: 0 | Status: DISCONTINUED | OUTPATIENT
Start: 2019-07-26 | End: 2019-08-04

## 2019-07-26 RX ORDER — INSULIN LISPRO 100/ML
VIAL (ML) SUBCUTANEOUS
Refills: 0 | Status: DISCONTINUED | OUTPATIENT
Start: 2019-07-26 | End: 2019-07-29

## 2019-07-26 RX ADMIN — ONDANSETRON 4 MILLIGRAM(S): 8 TABLET, FILM COATED ORAL at 12:28

## 2019-07-26 RX ADMIN — HYDROMORPHONE HYDROCHLORIDE 0.5 MILLIGRAM(S): 2 INJECTION INTRAMUSCULAR; INTRAVENOUS; SUBCUTANEOUS at 12:15

## 2019-07-26 RX ADMIN — Medication 1000 MILLIGRAM(S): at 18:41

## 2019-07-26 RX ADMIN — CELECOXIB 400 MILLIGRAM(S): 200 CAPSULE ORAL at 07:29

## 2019-07-26 RX ADMIN — CELECOXIB 400 MILLIGRAM(S): 200 CAPSULE ORAL at 07:28

## 2019-07-26 RX ADMIN — GABAPENTIN 300 MILLIGRAM(S): 400 CAPSULE ORAL at 18:10

## 2019-07-26 RX ADMIN — Medication 400 MILLIGRAM(S): at 18:11

## 2019-07-26 RX ADMIN — SODIUM CHLORIDE 40 MILLILITER(S): 9 INJECTION, SOLUTION INTRAVENOUS at 16:46

## 2019-07-26 RX ADMIN — HYDROMORPHONE HYDROCHLORIDE 0.5 MILLIGRAM(S): 2 INJECTION INTRAMUSCULAR; INTRAVENOUS; SUBCUTANEOUS at 12:30

## 2019-07-26 RX ADMIN — Medication 2: at 18:11

## 2019-07-26 RX ADMIN — Medication 25 MILLIGRAM(S): at 18:10

## 2019-07-26 RX ADMIN — HEPARIN SODIUM 5000 UNIT(S): 5000 INJECTION INTRAVENOUS; SUBCUTANEOUS at 18:10

## 2019-07-26 NOTE — PROGRESS NOTE ADULT - ASSESSMENT
ASSESSMENT  59y male s/p  open ileostomy reversal and R hemicolectomy.    PLAN  - IVF   - Diet: NPO  - Pain control with PCEA  - Continue home medications  - OOB, ambulate as tolerated  - continue chemical VTE ppx  - Will discuss with attending.

## 2019-07-26 NOTE — PROGRESS NOTE ADULT - SUBJECTIVE AND OBJECTIVE BOX
STATUS POST:    Ileostomy reversal and R hemicolectomy    SUBJECTIVE: Pt seen and examined bedside in the PACU. Patient states he is having abdominal pain and discomfort, states the PCEA pump is helping. Denies SOB, chest pain, dizziness, N/V/D.      ---------------------------------------------------------------------------------------------   VITALS  T(C): 36.6 (07-26-19 @ 15:00), Max: 36.7 (07-26-19 @ 13:00)  HR: 73 (07-26-19 @ 16:00) (55 - 81)  BP: 147/79 (07-26-19 @ 15:00) (111/50 - 160/70)  RR: 15 (07-26-19 @ 16:00) (12 - 18)  SpO2: 100% (07-26-19 @ 16:00) (97% - 100%)  CAPILLARY BLOOD GLUCOSE      POCT Blood Glucose.: 201 mg/dL (26 Jul 2019 06:59)      Is/Os    07-26 @ 07:01  -  07-26 @ 17:10  --------------------------------------------------------  IN:    lactated ringers.: 200 mL  Total IN: 200 mL    OUT:    Indwelling Catheter - Urethral: 555 mL  Total OUT: 555 mL    Total NET: -355 mL      ---------------------------------------------------------------------------------------------   PHYSICAL EXAM:   General: NAD, Lying in bed comfortably  Neuro: alert, oriented x3  HEENT: NC/AT, EOMI  Neck: Soft, supple  Cardio: RRR, nml S1/S2  Resp: Good effort, CTA b/l  GI/Abd: Soft, appropriately tender, moderately distended, midline dressing is C/D/I   Vascular: All 4 extremities warm.  Skin: Intact, no breakdown  Lymphatic/Nodes: No palpable lymphadenopathy  Musculoskeletal: All 4 extremities moving spontaneously, no limitations, no LE edema    ---------------------------------------------------------------------------------------------   MEDICATIONS (STANDING): acetaminophen  IVPB .. 1000 milliGRAM(s) IV Intermittent once  acetaminophen  IVPB .. 1000 milliGRAM(s) IV Intermittent once  dextrose 5%. 1000 milliLiter(s) IV Continuous <Continuous>  dextrose 50% Injectable 12.5 Gram(s) IV Push once  dextrose 50% Injectable 25 Gram(s) IV Push once  dextrose 50% Injectable 25 Gram(s) IV Push once  gabapentin 300 milliGRAM(s) Oral two times a day  heparin  Injectable 5000 Unit(s) SubCutaneous every 12 hours  HYDROmorphone (10 MICROgram(s)/mL) + BUpivacaine 0.0625% in 0.9% Sodium Chloride PCEA 250 milliLiter(s) Epidural PCA Continuous  insulin lispro (HumaLOG) corrective regimen sliding scale   SubCutaneous three times a day before meals  insulin lispro (HumaLOG) corrective regimen sliding scale   SubCutaneous at bedtime  lactated ringers. 1000 milliLiter(s) IV Continuous <Continuous>  lidocaine 2%/EPINEPHrine 1:100,000 Inj 5 milliLiter(s) Local Injection once  metoprolol tartrate 25 milliGRAM(s) Oral two times a day    MEDICATIONS (PRN):dextrose 40% Gel 15 Gram(s) Oral once PRN Blood Glucose LESS THAN 70 milliGRAM(s)/deciliter  glucagon  Injectable 1 milliGRAM(s) IntraMuscular once PRN Glucose LESS THAN 70 milligrams/deciliter  HYDROmorphone  Injectable 0.5 milliGRAM(s) IV Push every 10 minutes PRN Severe Pain (7 - 10)  HYDROmorphone (10 MICROgram(s)/mL) + BUpivacaine 0.0625% in 0.9% Sodium Chloride PCEA Rescue Clinician  Bolus 5 milliLiter(s) Epidural every 15 minutes PRN for Pain Score greater than 6  naloxone Injectable 0.1 milliGRAM(s) IV Push every 3 minutes PRN For ANY of the following changes in patient status:  A. RR LESS THAN 10 breaths per minute, B. Oxygen saturation LESS THAN 90%, C. Sedation score of 6  ondansetron Injectable 4 milliGRAM(s) IV Push every 6 hours PRN Nausea      ---------------------------------------------------------------------------------------------   LABS

## 2019-07-26 NOTE — BRIEF OPERATIVE NOTE - OPERATION/FINDINGS
Open reversal of end ileostomy, completion right hemicolectomy performed. Previous midline laparotomy scar re-incised. Adhesiolysis performed to mobilize small bowel and remaining right hemicolon. Ileostomy taken down. Hepatic flexure mobilized. Side-to-side, anti-peristaltic ileocolic anastomosis performed, mesenteric defect closed. There was no evidence of an ongoing intra-abdominal infectious process.

## 2019-07-26 NOTE — BRIEF OPERATIVE NOTE - NSICDXBRIEFPROCEDURE_GEN_ALL_CORE_FT
PROCEDURES:  Right hemicolectomy 26-Jul-2019 12:23:25  Bogdan Arnold  Ileostomy removal 26-Jul-2019 12:23:02  Bogdan Arnold

## 2019-07-27 LAB
ANION GAP SERPL CALC-SCNC: 15 MMO/L — HIGH (ref 7–14)
APTT BLD: 28.1 SEC — SIGNIFICANT CHANGE UP (ref 27.5–36.3)
BUN SERPL-MCNC: 14 MG/DL — SIGNIFICANT CHANGE UP (ref 7–23)
CALCIUM SERPL-MCNC: 9.2 MG/DL — SIGNIFICANT CHANGE UP (ref 8.4–10.5)
CHLORIDE SERPL-SCNC: 92 MMOL/L — LOW (ref 98–107)
CO2 SERPL-SCNC: 23 MMOL/L — SIGNIFICANT CHANGE UP (ref 22–31)
CREAT SERPL-MCNC: 0.93 MG/DL — SIGNIFICANT CHANGE UP (ref 0.5–1.3)
GLUCOSE BLDC GLUCOMTR-MCNC: 126 MG/DL — HIGH (ref 70–99)
GLUCOSE BLDC GLUCOMTR-MCNC: 134 MG/DL — HIGH (ref 70–99)
GLUCOSE BLDC GLUCOMTR-MCNC: 159 MG/DL — HIGH (ref 70–99)
GLUCOSE BLDC GLUCOMTR-MCNC: 166 MG/DL — HIGH (ref 70–99)
GLUCOSE BLDC GLUCOMTR-MCNC: 169 MG/DL — HIGH (ref 70–99)
GLUCOSE SERPL-MCNC: 182 MG/DL — HIGH (ref 70–99)
HBA1C BLD-MCNC: 6.8 % — HIGH (ref 4–5.6)
HCT VFR BLD CALC: 35.9 % — LOW (ref 39–50)
HGB BLD-MCNC: 11.5 G/DL — LOW (ref 13–17)
INR BLD: 1.13 — SIGNIFICANT CHANGE UP (ref 0.88–1.17)
MAGNESIUM SERPL-MCNC: 1.9 MG/DL — SIGNIFICANT CHANGE UP (ref 1.6–2.6)
MCHC RBC-ENTMCNC: 27.1 PG — SIGNIFICANT CHANGE UP (ref 27–34)
MCHC RBC-ENTMCNC: 32 % — SIGNIFICANT CHANGE UP (ref 32–36)
MCV RBC AUTO: 84.5 FL — SIGNIFICANT CHANGE UP (ref 80–100)
NRBC # FLD: 0 K/UL — SIGNIFICANT CHANGE UP (ref 0–0)
PHOSPHATE SERPL-MCNC: 3.4 MG/DL — SIGNIFICANT CHANGE UP (ref 2.5–4.5)
PLATELET # BLD AUTO: 208 K/UL — SIGNIFICANT CHANGE UP (ref 150–400)
PMV BLD: 11.3 FL — SIGNIFICANT CHANGE UP (ref 7–13)
POTASSIUM SERPL-MCNC: 5 MMOL/L — SIGNIFICANT CHANGE UP (ref 3.5–5.3)
POTASSIUM SERPL-SCNC: 5 MMOL/L — SIGNIFICANT CHANGE UP (ref 3.5–5.3)
PROTHROM AB SERPL-ACNC: 12.6 SEC — SIGNIFICANT CHANGE UP (ref 9.8–13.1)
RBC # BLD: 4.25 M/UL — SIGNIFICANT CHANGE UP (ref 4.2–5.8)
RBC # FLD: 15.8 % — HIGH (ref 10.3–14.5)
SODIUM SERPL-SCNC: 130 MMOL/L — LOW (ref 135–145)
WBC # BLD: 14.54 K/UL — HIGH (ref 3.8–10.5)
WBC # FLD AUTO: 14.54 K/UL — HIGH (ref 3.8–10.5)

## 2019-07-27 RX ORDER — SODIUM CHLORIDE 9 MG/ML
1 INJECTION INTRAMUSCULAR; INTRAVENOUS; SUBCUTANEOUS ONCE
Refills: 0 | Status: COMPLETED | OUTPATIENT
Start: 2019-07-27 | End: 2019-07-27

## 2019-07-27 RX ORDER — SODIUM CHLORIDE 9 MG/ML
1000 INJECTION INTRAMUSCULAR; INTRAVENOUS; SUBCUTANEOUS
Refills: 0 | Status: DISCONTINUED | OUTPATIENT
Start: 2019-07-27 | End: 2019-07-28

## 2019-07-27 RX ORDER — ACETAMINOPHEN 500 MG
1000 TABLET ORAL ONCE
Refills: 0 | Status: COMPLETED | OUTPATIENT
Start: 2019-07-28 | End: 2019-07-28

## 2019-07-27 RX ORDER — MAGNESIUM OXIDE 400 MG ORAL TABLET 241.3 MG
1000 TABLET ORAL
Refills: 0 | Status: COMPLETED | OUTPATIENT
Start: 2019-07-27 | End: 2019-07-29

## 2019-07-27 RX ORDER — ACETAMINOPHEN 500 MG
1000 TABLET ORAL ONCE
Refills: 0 | Status: COMPLETED | OUTPATIENT
Start: 2019-07-27 | End: 2019-07-27

## 2019-07-27 RX ADMIN — Medication 25 MILLIGRAM(S): at 05:50

## 2019-07-27 RX ADMIN — Medication 400 MILLIGRAM(S): at 10:34

## 2019-07-27 RX ADMIN — Medication 400 MILLIGRAM(S): at 05:50

## 2019-07-27 RX ADMIN — ONDANSETRON 4 MILLIGRAM(S): 8 TABLET, FILM COATED ORAL at 21:11

## 2019-07-27 RX ADMIN — GABAPENTIN 300 MILLIGRAM(S): 400 CAPSULE ORAL at 05:50

## 2019-07-27 RX ADMIN — Medication 400 MILLIGRAM(S): at 19:10

## 2019-07-27 RX ADMIN — Medication 1: at 12:32

## 2019-07-27 RX ADMIN — HEPARIN SODIUM 5000 UNIT(S): 5000 INJECTION INTRAVENOUS; SUBCUTANEOUS at 19:10

## 2019-07-27 RX ADMIN — Medication 1: at 09:29

## 2019-07-27 RX ADMIN — SODIUM CHLORIDE 50 MILLILITER(S): 9 INJECTION INTRAMUSCULAR; INTRAVENOUS; SUBCUTANEOUS at 19:10

## 2019-07-27 RX ADMIN — SODIUM CHLORIDE 40 MILLILITER(S): 9 INJECTION, SOLUTION INTRAVENOUS at 09:29

## 2019-07-27 RX ADMIN — Medication 400 MILLIGRAM(S): at 00:15

## 2019-07-27 RX ADMIN — SODIUM CHLORIDE 1 GRAM(S): 9 INJECTION INTRAMUSCULAR; INTRAVENOUS; SUBCUTANEOUS at 19:10

## 2019-07-27 RX ADMIN — Medication 25 MILLIGRAM(S): at 19:10

## 2019-07-27 RX ADMIN — HEPARIN SODIUM 5000 UNIT(S): 5000 INJECTION INTRAVENOUS; SUBCUTANEOUS at 05:51

## 2019-07-27 RX ADMIN — Medication 1000 MILLIGRAM(S): at 06:20

## 2019-07-27 RX ADMIN — GABAPENTIN 300 MILLIGRAM(S): 400 CAPSULE ORAL at 19:10

## 2019-07-27 NOTE — PROGRESS NOTE ADULT - SUBJECTIVE AND OBJECTIVE BOX
STATUS POST:  POD 2  Ileostomy reversal and R hemicolectomy    SUBJECTIVE: Patient is clinically stable with no acute events overnight.       ---------------------------------------------------------------------------------------------   VITALS  T(C): 36.6 (07-26-19 @ 15:00), Max: 36.7 (07-26-19 @ 13:00)  HR: 73 (07-26-19 @ 16:00) (55 - 81)  BP: 147/79 (07-26-19 @ 15:00) (111/50 - 160/70)  RR: 15 (07-26-19 @ 16:00) (12 - 18)  SpO2: 100% (07-26-19 @ 16:00) (97% - 100%)  CAPILLARY BLOOD GLUCOSE      POCT Blood Glucose.: 201 mg/dL (26 Jul 2019 06:59)      Is/Os    07-26 @ 07:01  -  07-26 @ 17:10  --------------------------------------------------------  IN:    lactated ringers.: 200 mL  Total IN: 200 mL    OUT:    Indwelling Catheter - Urethral: 555 mL  Total OUT: 555 mL    Total NET: -355 mL      ---------------------------------------------------------------------------------------------   PHYSICAL EXAM:   General: NAD, Lying in bed comfortably  Neuro: alert, oriented x3  HEENT: NC/AT, EOMI  Neck: Soft, supple  Cardio: RRR, nml S1/S2  Resp: Good effort, CTA b/l  GI/Abd: Soft, appropriately tender, moderately distended, midline dressing is C/D/I   Vascular: All 4 extremities warm.  Skin: Intact, no breakdown  Lymphatic/Nodes: No palpable lymphadenopathy  Musculoskeletal: All 4 extremities moving spontaneously, no limitations, no LE edema    ---------------------------------------------------------------------------------------------   MEDICATIONS (STANDING): acetaminophen  IVPB .. 1000 milliGRAM(s) IV Intermittent once  acetaminophen  IVPB .. 1000 milliGRAM(s) IV Intermittent once  dextrose 5%. 1000 milliLiter(s) IV Continuous <Continuous>  dextrose 50% Injectable 12.5 Gram(s) IV Push once  dextrose 50% Injectable 25 Gram(s) IV Push once  dextrose 50% Injectable 25 Gram(s) IV Push once  gabapentin 300 milliGRAM(s) Oral two times a day  heparin  Injectable 5000 Unit(s) SubCutaneous every 12 hours  HYDROmorphone (10 MICROgram(s)/mL) + BUpivacaine 0.0625% in 0.9% Sodium Chloride PCEA 250 milliLiter(s) Epidural PCA Continuous  insulin lispro (HumaLOG) corrective regimen sliding scale   SubCutaneous three times a day before meals  insulin lispro (HumaLOG) corrective regimen sliding scale   SubCutaneous at bedtime  lactated ringers. 1000 milliLiter(s) IV Continuous <Continuous>  lidocaine 2%/EPINEPHrine 1:100,000 Inj 5 milliLiter(s) Local Injection once  metoprolol tartrate 25 milliGRAM(s) Oral two times a day    MEDICATIONS (PRN):dextrose 40% Gel 15 Gram(s) Oral once PRN Blood Glucose LESS THAN 70 milliGRAM(s)/deciliter  glucagon  Injectable 1 milliGRAM(s) IntraMuscular once PRN Glucose LESS THAN 70 milligrams/deciliter  HYDROmorphone  Injectable 0.5 milliGRAM(s) IV Push every 10 minutes PRN Severe Pain (7 - 10)  HYDROmorphone (10 MICROgram(s)/mL) + BUpivacaine 0.0625% in 0.9% Sodium Chloride PCEA Rescue Clinician  Bolus 5 milliLiter(s) Epidural every 15 minutes PRN for Pain Score greater than 6  naloxone Injectable 0.1 milliGRAM(s) IV Push every 3 minutes PRN For ANY of the following changes in patient status:  A. RR LESS THAN 10 breaths per minute, B. Oxygen saturation LESS THAN 90%, C. Sedation score of 6  ondansetron Injectable 4 milliGRAM(s) IV Push every 6 hours PRN Nausea      ---------------------------------------------------------------------------------------------   LABS

## 2019-07-27 NOTE — PROGRESS NOTE ADULT - SUBJECTIVE AND OBJECTIVE BOX
ANESTHESIA POSTOP CHECK    59y Male POSTOP DAY 1 S/P     Vital Signs Last 24 Hrs  T(C): 37.3 (27 Jul 2019 05:53), Max: 37.3 (27 Jul 2019 05:53)  T(F): 99.2 (27 Jul 2019 05:53), Max: 99.2 (27 Jul 2019 05:53)  HR: 93 (27 Jul 2019 05:53) (55 - 93)  BP: 132/68 (27 Jul 2019 05:53) (111/50 - 161/93)  BP(mean): 111 (26 Jul 2019 16:00) (63 - 111)  RR: 17 (27 Jul 2019 05:53) (12 - 18)  SpO2: 98% (27 Jul 2019 05:53) (96% - 100%)  I&O's Summary    26 Jul 2019 07:01  -  27 Jul 2019 07:00  --------------------------------------------------------  IN: 1280 mL / OUT: 3055 mL / NET: -1775 mL        [X ] NO APPARENT ANESTHESIA COMPLICATIONS      Comments:

## 2019-07-27 NOTE — PROGRESS NOTE ADULT - SUBJECTIVE AND OBJECTIVE BOX
Anesthesia Pain Management Service: Day __ of Epidural    SUBJECTIVE: Patient doing well with PCEA and no problems.  Pain Scale Score:   Refer to charted pain scores    THERAPY:  [x ] Epidural Bupivacaine 0.0625% and Hydromorphone  		[ ] 10 micrograms/mL	[ ] 5 micrograms/mL  [ ] Epidural Bupivacaine 0.0625% and Fentanyl - 2 micrograms/mL  [ ] Epidural Ropivacaine 0.1% plain – 1 mg/mL  [ ] Patient Controlled Regional Anesthesia (PCRA) Ropivacaine  		[ ] 0.2%			[ ] 0.1%    Demand dose __3_ lockout __15_ (minutes) Continuous Rate ___ Total: 108.80___ Daily      MEDICATIONS  (STANDING):  acetaminophen  IVPB .. 1000 milliGRAM(s) IV Intermittent once  dextrose 5%. 1000 milliLiter(s) (50 mL/Hr) IV Continuous <Continuous>  dextrose 50% Injectable 12.5 Gram(s) IV Push once  dextrose 50% Injectable 25 Gram(s) IV Push once  dextrose 50% Injectable 25 Gram(s) IV Push once  gabapentin 300 milliGRAM(s) Oral two times a day  heparin  Injectable 5000 Unit(s) SubCutaneous every 12 hours  HYDROmorphone (10 MICROgram(s)/mL) + BUpivacaine 0.0625% in 0.9% Sodium Chloride PCEA 250 milliLiter(s) Epidural PCA Continuous  insulin lispro (HumaLOG) corrective regimen sliding scale   SubCutaneous three times a day before meals  insulin lispro (HumaLOG) corrective regimen sliding scale   SubCutaneous at bedtime  lactated ringers. 1000 milliLiter(s) (40 mL/Hr) IV Continuous <Continuous>  lidocaine 2%/EPINEPHrine 1:100,000 Inj 5 milliLiter(s) Local Injection once  metoprolol tartrate 25 milliGRAM(s) Oral two times a day    MEDICATIONS  (PRN):  dextrose 40% Gel 15 Gram(s) Oral once PRN Blood Glucose LESS THAN 70 milliGRAM(s)/deciliter  glucagon  Injectable 1 milliGRAM(s) IntraMuscular once PRN Glucose LESS THAN 70 milligrams/deciliter  HYDROmorphone (10 MICROgram(s)/mL) + BUpivacaine 0.0625% in 0.9% Sodium Chloride PCEA Rescue Clinician  Bolus 5 milliLiter(s) Epidural every 15 minutes PRN for Pain Score greater than 6  naloxone Injectable 0.1 milliGRAM(s) IV Push every 3 minutes PRN For ANY of the following changes in patient status:  A. RR LESS THAN 10 breaths per minute, B. Oxygen saturation LESS THAN 90%, C. Sedation score of 6  ondansetron Injectable 4 milliGRAM(s) IV Push every 6 hours PRN Nausea      OBJECTIVE:    Assessment of Catheter Site:	[ ] Left	[ ] Right  [x ] Epidural 	[ ] Femoral	      [ ] Saphenous   [ ] Supraclavicular   [ ] Other:    [x ] Dressing intact	[x ] Site non-tender	[ x] Site without erythema, discharge, edema  [x ] Epidural tubing and connection checked	[x] Gross neurological exam within normal limits  [ ] Catheter removed – tip intact		[x ] Afebrile	[ ] Febrile: ___    PT/INR - ( 27 Jul 2019 06:07 )   PT: 12.6 SEC;   INR: 1.13          PTT - ( 27 Jul 2019 06:07 )  PTT:28.1 SEC                      11.5   14.54 )-----------( 208      ( 27 Jul 2019 06:07 )             35.9     Vital Signs Last 24 Hrs  T(C): 37.3 (07-27-19 @ 05:53), Max: 37.3 (07-27-19 @ 05:53)  T(F): 99.2 (07-27-19 @ 05:53), Max: 99.2 (07-27-19 @ 05:53)  HR: 93 (07-27-19 @ 05:53) (55 - 93)  BP: 132/68 (07-27-19 @ 05:53) (111/50 - 161/93)  BP(mean): 111 (07-26-19 @ 16:00) (63 - 111)  RR: 17 (07-27-19 @ 05:53) (12 - 18)  SpO2: 98% (07-27-19 @ 05:53) (96% - 100%)      Sedation Score:	[x ] Alert	[ ] Drowsy	[ ] Arousable	[ ] Asleep	[ ] Unresponsive    Side Effects:	[x ] None	[ ] Nausea	[ ] Vomiting	[ ] Pruritus  		[ ] Weakness		[ ] Numbness	[ ] Other:    ASSESSMENT/ PLAN:    Therapy to  be:	[x ] Continue   [ ] Discontinued   [ ] Change to prn Analgesics    Documentation and Verification of current medications:  [ X ] Done	[ ] Not done, not eligible, reason:    Comments: Doing OK with epidural and may continue.

## 2019-07-28 LAB
ANION GAP SERPL CALC-SCNC: 12 MMO/L — SIGNIFICANT CHANGE UP (ref 7–14)
ANION GAP SERPL CALC-SCNC: 14 MMO/L — SIGNIFICANT CHANGE UP (ref 7–14)
APTT BLD: 28.2 SEC — SIGNIFICANT CHANGE UP (ref 27.5–36.3)
BASOPHILS # BLD AUTO: 0.01 K/UL — SIGNIFICANT CHANGE UP (ref 0–0.2)
BASOPHILS NFR BLD AUTO: 0.1 % — SIGNIFICANT CHANGE UP (ref 0–2)
BUN SERPL-MCNC: 10 MG/DL — SIGNIFICANT CHANGE UP (ref 7–23)
BUN SERPL-MCNC: 8 MG/DL — SIGNIFICANT CHANGE UP (ref 7–23)
CALCIUM SERPL-MCNC: 8.7 MG/DL — SIGNIFICANT CHANGE UP (ref 8.4–10.5)
CALCIUM SERPL-MCNC: 9 MG/DL — SIGNIFICANT CHANGE UP (ref 8.4–10.5)
CHLORIDE SERPL-SCNC: 93 MMOL/L — LOW (ref 98–107)
CHLORIDE SERPL-SCNC: 94 MMOL/L — LOW (ref 98–107)
CO2 SERPL-SCNC: 23 MMOL/L — SIGNIFICANT CHANGE UP (ref 22–31)
CO2 SERPL-SCNC: 26 MMOL/L — SIGNIFICANT CHANGE UP (ref 22–31)
CREAT SERPL-MCNC: 0.91 MG/DL — SIGNIFICANT CHANGE UP (ref 0.5–1.3)
CREAT SERPL-MCNC: 0.93 MG/DL — SIGNIFICANT CHANGE UP (ref 0.5–1.3)
EOSINOPHIL # BLD AUTO: 0.01 K/UL — SIGNIFICANT CHANGE UP (ref 0–0.5)
EOSINOPHIL NFR BLD AUTO: 0.1 % — SIGNIFICANT CHANGE UP (ref 0–6)
GLUCOSE BLDC GLUCOMTR-MCNC: 141 MG/DL — HIGH (ref 70–99)
GLUCOSE BLDC GLUCOMTR-MCNC: 146 MG/DL — HIGH (ref 70–99)
GLUCOSE BLDC GLUCOMTR-MCNC: 192 MG/DL — HIGH (ref 70–99)
GLUCOSE BLDC GLUCOMTR-MCNC: 219 MG/DL — HIGH (ref 70–99)
GLUCOSE SERPL-MCNC: 146 MG/DL — HIGH (ref 70–99)
GLUCOSE SERPL-MCNC: 195 MG/DL — HIGH (ref 70–99)
HCT VFR BLD CALC: 35.4 % — LOW (ref 39–50)
HCT VFR BLD CALC: 35.5 % — LOW (ref 39–50)
HGB BLD-MCNC: 11.7 G/DL — LOW (ref 13–17)
HGB BLD-MCNC: 11.7 G/DL — LOW (ref 13–17)
IMM GRANULOCYTES NFR BLD AUTO: 0.4 % — SIGNIFICANT CHANGE UP (ref 0–1.5)
INR BLD: 1.08 — SIGNIFICANT CHANGE UP (ref 0.88–1.17)
LYMPHOCYTES # BLD AUTO: 1.16 K/UL — SIGNIFICANT CHANGE UP (ref 1–3.3)
LYMPHOCYTES # BLD AUTO: 10.6 % — LOW (ref 13–44)
MAGNESIUM SERPL-MCNC: 1.7 MG/DL — SIGNIFICANT CHANGE UP (ref 1.6–2.6)
MAGNESIUM SERPL-MCNC: 1.7 MG/DL — SIGNIFICANT CHANGE UP (ref 1.6–2.6)
MCHC RBC-ENTMCNC: 26.9 PG — LOW (ref 27–34)
MCHC RBC-ENTMCNC: 28.1 PG — SIGNIFICANT CHANGE UP (ref 27–34)
MCHC RBC-ENTMCNC: 33 % — SIGNIFICANT CHANGE UP (ref 32–36)
MCHC RBC-ENTMCNC: 33.1 % — SIGNIFICANT CHANGE UP (ref 32–36)
MCV RBC AUTO: 81.4 FL — SIGNIFICANT CHANGE UP (ref 80–100)
MCV RBC AUTO: 85.3 FL — SIGNIFICANT CHANGE UP (ref 80–100)
MONOCYTES # BLD AUTO: 0.93 K/UL — HIGH (ref 0–0.9)
MONOCYTES NFR BLD AUTO: 8.5 % — SIGNIFICANT CHANGE UP (ref 2–14)
NEUTROPHILS # BLD AUTO: 8.81 K/UL — HIGH (ref 1.8–7.4)
NEUTROPHILS NFR BLD AUTO: 80.3 % — HIGH (ref 43–77)
NRBC # FLD: 0 K/UL — SIGNIFICANT CHANGE UP (ref 0–0)
NRBC # FLD: 0 K/UL — SIGNIFICANT CHANGE UP (ref 0–0)
PHOSPHATE SERPL-MCNC: 2.4 MG/DL — LOW (ref 2.5–4.5)
PHOSPHATE SERPL-MCNC: 2.9 MG/DL — SIGNIFICANT CHANGE UP (ref 2.5–4.5)
PLATELET # BLD AUTO: 200 K/UL — SIGNIFICANT CHANGE UP (ref 150–400)
PLATELET # BLD AUTO: 201 K/UL — SIGNIFICANT CHANGE UP (ref 150–400)
PMV BLD: 10.7 FL — SIGNIFICANT CHANGE UP (ref 7–13)
PMV BLD: 11.4 FL — SIGNIFICANT CHANGE UP (ref 7–13)
POTASSIUM SERPL-MCNC: 4.1 MMOL/L — SIGNIFICANT CHANGE UP (ref 3.5–5.3)
POTASSIUM SERPL-MCNC: 4.3 MMOL/L — SIGNIFICANT CHANGE UP (ref 3.5–5.3)
POTASSIUM SERPL-SCNC: 4.1 MMOL/L — SIGNIFICANT CHANGE UP (ref 3.5–5.3)
POTASSIUM SERPL-SCNC: 4.3 MMOL/L — SIGNIFICANT CHANGE UP (ref 3.5–5.3)
PROTHROM AB SERPL-ACNC: 12.3 SEC — SIGNIFICANT CHANGE UP (ref 9.8–13.1)
RBC # BLD: 4.16 M/UL — LOW (ref 4.2–5.8)
RBC # BLD: 4.35 M/UL — SIGNIFICANT CHANGE UP (ref 4.2–5.8)
RBC # FLD: 15.5 % — HIGH (ref 10.3–14.5)
RBC # FLD: 15.8 % — HIGH (ref 10.3–14.5)
SODIUM SERPL-SCNC: 130 MMOL/L — LOW (ref 135–145)
SODIUM SERPL-SCNC: 132 MMOL/L — LOW (ref 135–145)
WBC # BLD: 10.96 K/UL — HIGH (ref 3.8–10.5)
WBC # BLD: 12.23 K/UL — HIGH (ref 3.8–10.5)
WBC # FLD AUTO: 10.96 K/UL — HIGH (ref 3.8–10.5)
WBC # FLD AUTO: 12.23 K/UL — HIGH (ref 3.8–10.5)

## 2019-07-28 PROCEDURE — 93010 ELECTROCARDIOGRAM REPORT: CPT

## 2019-07-28 PROCEDURE — 74018 RADEX ABDOMEN 1 VIEW: CPT | Mod: 26

## 2019-07-28 PROCEDURE — 71045 X-RAY EXAM CHEST 1 VIEW: CPT | Mod: 26,76

## 2019-07-28 RX ORDER — ACETAMINOPHEN 500 MG
1000 TABLET ORAL ONCE
Refills: 0 | Status: COMPLETED | OUTPATIENT
Start: 2019-07-28 | End: 2019-07-28

## 2019-07-28 RX ORDER — SODIUM CHLORIDE 9 MG/ML
1000 INJECTION INTRAMUSCULAR; INTRAVENOUS; SUBCUTANEOUS
Refills: 0 | Status: DISCONTINUED | OUTPATIENT
Start: 2019-07-28 | End: 2019-07-30

## 2019-07-28 RX ORDER — METOPROLOL TARTRATE 50 MG
5 TABLET ORAL EVERY 6 HOURS
Refills: 0 | Status: DISCONTINUED | OUTPATIENT
Start: 2019-07-28 | End: 2019-07-28

## 2019-07-28 RX ORDER — HYDROMORPHONE HYDROCHLORIDE 2 MG/ML
0.5 INJECTION INTRAMUSCULAR; INTRAVENOUS; SUBCUTANEOUS EVERY 4 HOURS
Refills: 0 | Status: DISCONTINUED | OUTPATIENT
Start: 2019-07-28 | End: 2019-07-28

## 2019-07-28 RX ORDER — MORPHINE SULFATE 50 MG/1
4 CAPSULE, EXTENDED RELEASE ORAL ONCE
Refills: 0 | Status: DISCONTINUED | OUTPATIENT
Start: 2019-07-28 | End: 2019-07-28

## 2019-07-28 RX ORDER — ONDANSETRON 8 MG/1
4 TABLET, FILM COATED ORAL ONCE
Refills: 0 | Status: COMPLETED | OUTPATIENT
Start: 2019-07-28 | End: 2019-07-28

## 2019-07-28 RX ORDER — METOPROLOL TARTRATE 50 MG
5 TABLET ORAL EVERY 6 HOURS
Refills: 0 | Status: DISCONTINUED | OUTPATIENT
Start: 2019-07-28 | End: 2019-07-30

## 2019-07-28 RX ADMIN — ONDANSETRON 4 MILLIGRAM(S): 8 TABLET, FILM COATED ORAL at 22:14

## 2019-07-28 RX ADMIN — Medication 1000 MILLIGRAM(S): at 15:39

## 2019-07-28 RX ADMIN — GABAPENTIN 300 MILLIGRAM(S): 400 CAPSULE ORAL at 06:05

## 2019-07-28 RX ADMIN — Medication 1000 MILLIGRAM(S): at 22:00

## 2019-07-28 RX ADMIN — Medication 1000 MILLIGRAM(S): at 00:45

## 2019-07-28 RX ADMIN — Medication 2: at 18:35

## 2019-07-28 RX ADMIN — Medication 400 MILLIGRAM(S): at 00:33

## 2019-07-28 RX ADMIN — Medication 400 MILLIGRAM(S): at 07:51

## 2019-07-28 RX ADMIN — HEPARIN SODIUM 5000 UNIT(S): 5000 INJECTION INTRAVENOUS; SUBCUTANEOUS at 06:05

## 2019-07-28 RX ADMIN — Medication 5 MILLIGRAM(S): at 22:14

## 2019-07-28 RX ADMIN — Medication 85 MILLIMOLE(S): at 09:31

## 2019-07-28 RX ADMIN — Medication 25 MILLIGRAM(S): at 06:05

## 2019-07-28 RX ADMIN — Medication 400 MILLIGRAM(S): at 21:30

## 2019-07-28 RX ADMIN — Medication 1000 MILLIGRAM(S): at 01:03

## 2019-07-28 RX ADMIN — Medication 400 MILLIGRAM(S): at 15:19

## 2019-07-28 RX ADMIN — MAGNESIUM OXIDE 400 MG ORAL TABLET 1000 MILLIGRAM(S): 241.3 TABLET ORAL at 08:25

## 2019-07-28 NOTE — PROGRESS NOTE ADULT - SUBJECTIVE AND OBJECTIVE BOX
Anesthesia Pain Management Service: Day __ of Epidural    SUBJECTIVE: Patient doing well with PCEA and no problems.  Pain Scale Score:   Refer to charted pain scores    THERAPY:  [x ] Epidural Bupivacaine 0.0625% and Hydromorphone  		[ ] 10 micrograms/mL	[ ] 5 micrograms/mL  [ ] Epidural Bupivacaine 0.0625% and Fentanyl - 2 micrograms/mL  [ ] Epidural Ropivacaine 0.1% plain – 1 mg/mL  [ ] Patient Controlled Regional Anesthesia (PCRA) Ropivacaine  		[ ] 0.2%			[ ] 0.1%    Demand dose __3_ lockout __15_ (minutes) Continuous Rate ___ Total: _120.2__ Daily      MEDICATIONS  (STANDING):  dextrose 5%. 1000 milliLiter(s) (50 mL/Hr) IV Continuous <Continuous>  dextrose 50% Injectable 12.5 Gram(s) IV Push once  dextrose 50% Injectable 25 Gram(s) IV Push once  dextrose 50% Injectable 25 Gram(s) IV Push once  gabapentin 300 milliGRAM(s) Oral two times a day  heparin  Injectable 5000 Unit(s) SubCutaneous every 12 hours  HYDROmorphone (10 MICROgram(s)/mL) + BUpivacaine 0.0625% in 0.9% Sodium Chloride PCEA 250 milliLiter(s) Epidural PCA Continuous  insulin lispro (HumaLOG) corrective regimen sliding scale   SubCutaneous three times a day before meals  insulin lispro (HumaLOG) corrective regimen sliding scale   SubCutaneous at bedtime  lidocaine 2%/EPINEPHrine 1:100,000 Inj 5 milliLiter(s) Local Injection once  magnesium oxide 1000 milliGRAM(s) Oral two times a day with meals  metoprolol tartrate 25 milliGRAM(s) Oral two times a day  sodium phosphate IVPB 30 milliMole(s) IV Intermittent once    MEDICATIONS  (PRN):  dextrose 40% Gel 15 Gram(s) Oral once PRN Blood Glucose LESS THAN 70 milliGRAM(s)/deciliter  glucagon  Injectable 1 milliGRAM(s) IntraMuscular once PRN Glucose LESS THAN 70 milligrams/deciliter  HYDROmorphone (10 MICROgram(s)/mL) + BUpivacaine 0.0625% in 0.9% Sodium Chloride PCEA Rescue Clinician  Bolus 5 milliLiter(s) Epidural every 15 minutes PRN for Pain Score greater than 6  naloxone Injectable 0.1 milliGRAM(s) IV Push every 3 minutes PRN For ANY of the following changes in patient status:  A. RR LESS THAN 10 breaths per minute, B. Oxygen saturation LESS THAN 90%, C. Sedation score of 6  ondansetron Injectable 4 milliGRAM(s) IV Push every 6 hours PRN Nausea      OBJECTIVE:    Assessment of Catheter Site:	[ ] Left	[ ] Right  [x ] Epidural 	[ ] Femoral	      [ ] Saphenous   [ ] Supraclavicular   [ ] Other:    [x ] Dressing intact	[x ] Site non-tender	[ x] Site without erythema, discharge, edema  [x ] Epidural tubing and connection checked	[x] Gross neurological exam within normal limits  [ ] Catheter removed – tip intact		[x ] Afebrile	[ ] Febrile: ___    PT/INR - ( 28 Jul 2019 06:17 )   PT: 12.3 SEC;   INR: 1.08          PTT - ( 28 Jul 2019 06:17 )  PTT:28.2 SEC                      11.7   10.96 )-----------( 200      ( 28 Jul 2019 06:17 )             35.5     Vital Signs Last 24 Hrs  T(C): 37.4 (07-28-19 @ 06:02), Max: 37.4 (07-28-19 @ 06:02)  T(F): 99.3 (07-28-19 @ 06:02), Max: 99.3 (07-28-19 @ 06:02)  HR: 98 (07-28-19 @ 06:20) (78 - 100)  BP: 158/83 (07-28-19 @ 06:02) (109/61 - 158/83)  BP(mean): --  RR: 17 (07-28-19 @ 06:02) (16 - 18)  SpO2: 98% (07-28-19 @ 06:02) (91% - 99%)      Sedation Score:	[x ] Alert	[ ] Drowsy	[ ] Arousable	[ ] Asleep	[ ] Unresponsive    Side Effects:	[x ] None	[ ] Nausea	[ ] Vomiting	[ ] Pruritus  		[ ] Weakness		[ ] Numbness	[ ] Other:    ASSESSMENT/ PLAN:    Therapy to  be:	[x ] Continue   [ ] Discontinued   [ ] Change to prn Analgesics    Documentation and Verification of current medications:  [ X ] Done	[ ] Not done, not eligible, reason:    Comments: Doing OK with epidural and may continue.

## 2019-07-28 NOTE — PROVIDER CONTACT NOTE (OTHER) - ASSESSMENT
A&Ox4, OOB with assistance, [place]es draining adequate amounts of clear, yellow urine. Midline incision with dressing clean, dry and intact. Temp 102.9 oral, Tachycardic , nauseous.

## 2019-07-28 NOTE — CHART NOTE - NSCHARTNOTEFT_GEN_A_CORE
Patient is POD9 s/p loop ileostomy reversal. Patient's PCEA was dislodged and was removed by Anesthesia when evaluated this afternoon. Almost immediately after, patient started complaining of 10/10 abdominal pain in the midline epigastric region. Patient was administered IV Tylenol, which did not improve the pain.    Vital Signs Last 24 Hrs  T(C): 36.7 (28 Jul 2019 18:18), Max: 38.2 (28 Jul 2019 13:23)  T(F): 98 (28 Jul 2019 18:18), Max: 100.7 (28 Jul 2019 13:23)  HR: 109 (28 Jul 2019 18:18) (88 - 109)  BP: 124/58 (28 Jul 2019 18:18) (109/61 - 158/83)  BP(mean): --  RR: 18 (28 Jul 2019 18:18) (16 - 18)  SpO2: 94% (28 Jul 2019 18:18) (91% - 98%)    Physical Exam:  Genera: Patient in mild distress, profusely sweating  Respiratory: CTA bilaterally, equal chest excursion bilaterally,   CV: RRR, S1 S2 present, no m/r/g  GI: abdomen firm, distended, voluntary guarding present    Plan:  CXR and AXR ordered, f/u  CBC ordered, f/u  IV morphine given for pain  Maintenance fluids restarted  Patient made NPO  Serial exams overnight

## 2019-07-28 NOTE — PROGRESS NOTE ADULT - ASSESSMENT
Patient is a 59y old  Male who presents with a chief complaint of Reversal of ileostomy     Plan:  Advance to [facility] for dinner  Contact Pain Management about de-escalating PCEA  ToV if PCEA pulled

## 2019-07-28 NOTE — PROGRESS NOTE ADULT - SUBJECTIVE AND OBJECTIVE BOX
Morning Surgical Progress Note  Patient is a 59y old  Male who presents with a chief complaint of Reversal of ileostomy (27 Jul 2019 02:53)      SUBJECTIVE: Patient seen and examined at bedside with surgical team, patient without complaints. Patient has slight nausea when eating, but has not vomited. Patient has passed flatus but has not had a bowel movement since surgery. Patient denies abdominal pain.    Vital Signs Last 24 Hrs  T(C): 38.2 (28 Jul 2019 13:23), Max: 38.2 (28 Jul 2019 13:23)  T(F): 100.7 (28 Jul 2019 13:23), Max: 100.7 (28 Jul 2019 13:23)  HR: 101 (28 Jul 2019 13:23) (78 - 101)  BP: 134/74 (28 Jul 2019 13:23) (109/61 - 158/83)  BP(mean): --  RR: 17 (28 Jul 2019 13:25) (16 - 18)  SpO2: 96% (28 Jul 2019 13:25) (91% - 99%)I&O's Detail    27 Jul 2019 07:01  -  28 Jul 2019 07:00  --------------------------------------------------------  IN:    Oral Fluid: 300 mL    sodium chloride 0.9%: 600 mL  Total IN: 900 mL    OUT:    Indwelling Catheter - Urethral: 4250 mL  Total OUT: 4250 mL    Total NET: -3350 mL      28 Jul 2019 07:01  -  28 Jul 2019 13:52  --------------------------------------------------------  IN:  Total IN: 0 mL    OUT:    Indwelling Catheter - Urethral: 250 mL  Total OUT: 250 mL    Total NET: -250 mL      MEDICATIONS  (STANDING):  dextrose 5%. 1000 milliLiter(s) (50 mL/Hr) IV Continuous <Continuous>  dextrose 50% Injectable 12.5 Gram(s) IV Push once  dextrose 50% Injectable 25 Gram(s) IV Push once  dextrose 50% Injectable 25 Gram(s) IV Push once  gabapentin 300 milliGRAM(s) Oral two times a day  heparin  Injectable 5000 Unit(s) SubCutaneous every 12 hours  HYDROmorphone (10 MICROgram(s)/mL) + BUpivacaine 0.0625% in 0.9% Sodium Chloride PCEA 250 milliLiter(s) Epidural PCA Continuous  insulin lispro (HumaLOG) corrective regimen sliding scale   SubCutaneous three times a day before meals  insulin lispro (HumaLOG) corrective regimen sliding scale   SubCutaneous at bedtime  lidocaine 2%/EPINEPHrine 1:100,000 Inj 5 milliLiter(s) Local Injection once  magnesium oxide 1000 milliGRAM(s) Oral two times a day with meals  metoprolol tartrate 25 milliGRAM(s) Oral two times a day    MEDICATIONS  (PRN):  dextrose 40% Gel 15 Gram(s) Oral once PRN Blood Glucose LESS THAN 70 milliGRAM(s)/deciliter  glucagon  Injectable 1 milliGRAM(s) IntraMuscular once PRN Glucose LESS THAN 70 milligrams/deciliter  HYDROmorphone (10 MICROgram(s)/mL) + BUpivacaine 0.0625% in 0.9% Sodium Chloride PCEA Rescue Clinician  Bolus 5 milliLiter(s) Epidural every 15 minutes PRN for Pain Score greater than 6  naloxone Injectable 0.1 milliGRAM(s) IV Push every 3 minutes PRN For ANY of the following changes in patient status:  A. RR LESS THAN 10 breaths per minute, B. Oxygen saturation LESS THAN 90%, C. Sedation score of 6  ondansetron Injectable 4 milliGRAM(s) IV Push every 6 hours PRN Nausea      Physical Exam  Constitutional: A&Ox3, NAD  Respiratory: CTA bilaterally, equal chest excursion bilaterally  Gastrointestinal: soft, distended, nontender; dressings cdi    LABS:                        11.7   10.96 )-----------( 200      ( 28 Jul 2019 06:17 )             35.5     07-28    132<L>  |  94<L>  |  10  ----------------------------<  146<H>  4.3   |  26  |  0.93    Ca    9.0      28 Jul 2019 06:17  Phos  2.4     07-28  Mg     1.7     07-28      PT/INR - ( 28 Jul 2019 06:17 )   PT: 12.3 SEC;   INR: 1.08          PTT - ( 28 Jul 2019 06:17 )  PTT:28.2 SEC

## 2019-07-28 NOTE — PROGRESS NOTE ADULT - SUBJECTIVE AND OBJECTIVE BOX
Epidural catheter disconnected from pump.   epidural catheter connector piece removed/missing.   Epidural catheter removed. Tip intact.     Rec: PO/IV pain meds PRN.   reconsult for PCA should pt require.

## 2019-07-28 NOTE — PROGRESS NOTE ADULT - ATTENDING COMMENTS
I have personally interviewed and examined this patient, reviewed pertinent labs and imaging, and discussed the case with colleagues, residents, and physician assistants on rounds.
I have personally interviewed and examined this patient, reviewed pertinent labs and imaging, and discussed the case with colleagues, residents, and physician assistants on rounds.

## 2019-07-29 LAB
ANION GAP SERPL CALC-SCNC: 15 MMO/L — HIGH (ref 7–14)
APPEARANCE UR: CLEAR — SIGNIFICANT CHANGE UP
APTT BLD: 26.8 SEC — LOW (ref 27.5–36.3)
BACTERIA # UR AUTO: SIGNIFICANT CHANGE UP
BASE EXCESS BLDV CALC-SCNC: 1.7 MMOL/L — SIGNIFICANT CHANGE UP
BILIRUB UR-MCNC: NEGATIVE — SIGNIFICANT CHANGE UP
BLOOD UR QL VISUAL: SIGNIFICANT CHANGE UP
BUN SERPL-MCNC: 10 MG/DL — SIGNIFICANT CHANGE UP (ref 7–23)
CALCIUM SERPL-MCNC: 9 MG/DL — SIGNIFICANT CHANGE UP (ref 8.4–10.5)
CHLORIDE SERPL-SCNC: 93 MMOL/L — LOW (ref 98–107)
CO2 SERPL-SCNC: 23 MMOL/L — SIGNIFICANT CHANGE UP (ref 22–31)
COLOR SPEC: SIGNIFICANT CHANGE UP
CREAT SERPL-MCNC: 0.92 MG/DL — SIGNIFICANT CHANGE UP (ref 0.5–1.3)
GAS PNL BLDV: 127 MMOL/L — LOW (ref 136–146)
GLUCOSE BLDC GLUCOMTR-MCNC: 130 MG/DL — HIGH (ref 70–99)
GLUCOSE BLDC GLUCOMTR-MCNC: 187 MG/DL — HIGH (ref 70–99)
GLUCOSE BLDC GLUCOMTR-MCNC: 218 MG/DL — HIGH (ref 70–99)
GLUCOSE BLDC GLUCOMTR-MCNC: 231 MG/DL — HIGH (ref 70–99)
GLUCOSE BLDV-MCNC: 215 MG/DL — HIGH (ref 70–99)
GLUCOSE SERPL-MCNC: 222 MG/DL — HIGH (ref 70–99)
GLUCOSE UR-MCNC: 100 — SIGNIFICANT CHANGE UP
HCO3 BLDV-SCNC: 26 MMOL/L — SIGNIFICANT CHANGE UP (ref 20–27)
HCT VFR BLD CALC: 35.3 % — LOW (ref 39–50)
HCT VFR BLDV CALC: 37.3 % — LOW (ref 39–51)
HGB BLD-MCNC: 11.7 G/DL — LOW (ref 13–17)
HGB BLDV-MCNC: 12.1 G/DL — LOW (ref 13–17)
INR BLD: 1.16 — SIGNIFICANT CHANGE UP (ref 0.88–1.17)
KETONES UR-MCNC: 40 — SIGNIFICANT CHANGE UP
LACTATE BLDV-MCNC: 1.4 MMOL/L — SIGNIFICANT CHANGE UP (ref 0.5–2)
LEUKOCYTE ESTERASE UR-ACNC: NEGATIVE — SIGNIFICANT CHANGE UP
MAGNESIUM SERPL-MCNC: 1.9 MG/DL — SIGNIFICANT CHANGE UP (ref 1.6–2.6)
MCHC RBC-ENTMCNC: 26.8 PG — LOW (ref 27–34)
MCHC RBC-ENTMCNC: 33.1 % — SIGNIFICANT CHANGE UP (ref 32–36)
MCV RBC AUTO: 81 FL — SIGNIFICANT CHANGE UP (ref 80–100)
NITRITE UR-MCNC: NEGATIVE — SIGNIFICANT CHANGE UP
NRBC # FLD: 0 K/UL — SIGNIFICANT CHANGE UP (ref 0–0)
PCO2 BLDV: 37 MMHG — LOW (ref 41–51)
PH BLDV: 7.45 PH — HIGH (ref 7.32–7.43)
PH UR: 6.5 — SIGNIFICANT CHANGE UP (ref 5–8)
PHOSPHATE SERPL-MCNC: 2.7 MG/DL — SIGNIFICANT CHANGE UP (ref 2.5–4.5)
PLATELET # BLD AUTO: 213 K/UL — SIGNIFICANT CHANGE UP (ref 150–400)
PMV BLD: 10.2 FL — SIGNIFICANT CHANGE UP (ref 7–13)
PO2 BLDV: 79 MMHG — HIGH (ref 35–40)
POTASSIUM BLDV-SCNC: 4 MMOL/L — SIGNIFICANT CHANGE UP (ref 3.4–4.5)
POTASSIUM SERPL-MCNC: 4.3 MMOL/L — SIGNIFICANT CHANGE UP (ref 3.5–5.3)
POTASSIUM SERPL-SCNC: 4.3 MMOL/L — SIGNIFICANT CHANGE UP (ref 3.5–5.3)
PROT UR-MCNC: 50 — SIGNIFICANT CHANGE UP
PROTHROM AB SERPL-ACNC: 12.9 SEC — SIGNIFICANT CHANGE UP (ref 9.8–13.1)
RBC # BLD: 4.36 M/UL — SIGNIFICANT CHANGE UP (ref 4.2–5.8)
RBC # FLD: 15.5 % — HIGH (ref 10.3–14.5)
RBC CASTS # UR COMP ASSIST: SIGNIFICANT CHANGE UP (ref 0–?)
SAO2 % BLDV: 96.6 % — HIGH (ref 60–85)
SODIUM SERPL-SCNC: 131 MMOL/L — LOW (ref 135–145)
SP GR SPEC: > 1.04 — HIGH (ref 1–1.04)
UROBILINOGEN FLD QL: NORMAL — SIGNIFICANT CHANGE UP
WBC # BLD: 13.37 K/UL — HIGH (ref 3.8–10.5)
WBC # FLD AUTO: 13.37 K/UL — HIGH (ref 3.8–10.5)
WBC UR QL: SIGNIFICANT CHANGE UP (ref 0–?)

## 2019-07-29 PROCEDURE — 74177 CT ABD & PELVIS W/CONTRAST: CPT | Mod: 26

## 2019-07-29 PROCEDURE — 71045 X-RAY EXAM CHEST 1 VIEW: CPT | Mod: 26

## 2019-07-29 RX ORDER — ACETAMINOPHEN 500 MG
1000 TABLET ORAL ONCE
Refills: 0 | Status: COMPLETED | OUTPATIENT
Start: 2019-07-29 | End: 2019-07-29

## 2019-07-29 RX ORDER — INSULIN LISPRO 100/ML
VIAL (ML) SUBCUTANEOUS EVERY 6 HOURS
Refills: 0 | Status: DISCONTINUED | OUTPATIENT
Start: 2019-07-29 | End: 2019-07-29

## 2019-07-29 RX ORDER — INSULIN LISPRO 100/ML
VIAL (ML) SUBCUTANEOUS EVERY 6 HOURS
Refills: 0 | Status: DISCONTINUED | OUTPATIENT
Start: 2019-07-29 | End: 2019-08-01

## 2019-07-29 RX ORDER — INSULIN LISPRO 100/ML
VIAL (ML) SUBCUTANEOUS AT BEDTIME
Refills: 0 | Status: DISCONTINUED | OUTPATIENT
Start: 2019-07-29 | End: 2019-07-29

## 2019-07-29 RX ORDER — MORPHINE SULFATE 50 MG/1
2 CAPSULE, EXTENDED RELEASE ORAL ONCE
Refills: 0 | Status: DISCONTINUED | OUTPATIENT
Start: 2019-07-29 | End: 2019-07-29

## 2019-07-29 RX ORDER — SODIUM CHLORIDE 9 MG/ML
500 INJECTION, SOLUTION INTRAVENOUS ONCE
Refills: 0 | Status: COMPLETED | OUTPATIENT
Start: 2019-07-29 | End: 2019-07-29

## 2019-07-29 RX ORDER — PIPERACILLIN AND TAZOBACTAM 4; .5 G/20ML; G/20ML
3.38 INJECTION, POWDER, LYOPHILIZED, FOR SOLUTION INTRAVENOUS EVERY 8 HOURS
Refills: 0 | Status: COMPLETED | OUTPATIENT
Start: 2019-07-29 | End: 2019-07-30

## 2019-07-29 RX ORDER — PIPERACILLIN AND TAZOBACTAM 4; .5 G/20ML; G/20ML
3.38 INJECTION, POWDER, LYOPHILIZED, FOR SOLUTION INTRAVENOUS ONCE
Refills: 0 | Status: COMPLETED | OUTPATIENT
Start: 2019-07-29 | End: 2019-07-29

## 2019-07-29 RX ORDER — MAGNESIUM SULFATE 500 MG/ML
1 VIAL (ML) INJECTION ONCE
Refills: 0 | Status: COMPLETED | OUTPATIENT
Start: 2019-07-29 | End: 2019-07-29

## 2019-07-29 RX ORDER — SODIUM CHLORIDE 9 MG/ML
500 INJECTION INTRAMUSCULAR; INTRAVENOUS; SUBCUTANEOUS ONCE
Refills: 0 | Status: COMPLETED | OUTPATIENT
Start: 2019-07-29 | End: 2019-07-29

## 2019-07-29 RX ORDER — GABAPENTIN 400 MG/1
300 CAPSULE ORAL
Refills: 0 | Status: DISCONTINUED | OUTPATIENT
Start: 2019-07-29 | End: 2019-07-29

## 2019-07-29 RX ADMIN — Medication 1000 MILLIGRAM(S): at 11:30

## 2019-07-29 RX ADMIN — SODIUM CHLORIDE 500 MILLILITER(S): 9 INJECTION INTRAMUSCULAR; INTRAVENOUS; SUBCUTANEOUS at 13:03

## 2019-07-29 RX ADMIN — Medication 400 MILLIGRAM(S): at 17:15

## 2019-07-29 RX ADMIN — Medication 5 MILLIGRAM(S): at 17:15

## 2019-07-29 RX ADMIN — PIPERACILLIN AND TAZOBACTAM 200 GRAM(S): 4; .5 INJECTION, POWDER, LYOPHILIZED, FOR SOLUTION INTRAVENOUS at 10:39

## 2019-07-29 RX ADMIN — Medication 5 MILLIGRAM(S): at 05:00

## 2019-07-29 RX ADMIN — Medication 4: at 12:55

## 2019-07-29 RX ADMIN — Medication 5 MILLIGRAM(S): at 23:23

## 2019-07-29 RX ADMIN — Medication 1000 MILLIGRAM(S): at 23:52

## 2019-07-29 RX ADMIN — Medication 400 MILLIGRAM(S): at 05:00

## 2019-07-29 RX ADMIN — SODIUM CHLORIDE 1000 MILLILITER(S): 9 INJECTION, SOLUTION INTRAVENOUS at 17:24

## 2019-07-29 RX ADMIN — Medication 1000 MILLIGRAM(S): at 05:30

## 2019-07-29 RX ADMIN — Medication 63.75 MILLIMOLE(S): at 12:55

## 2019-07-29 RX ADMIN — SODIUM CHLORIDE 100 MILLILITER(S): 9 INJECTION INTRAMUSCULAR; INTRAVENOUS; SUBCUTANEOUS at 10:43

## 2019-07-29 RX ADMIN — Medication 1000 MILLIGRAM(S): at 17:45

## 2019-07-29 RX ADMIN — Medication 2: at 18:24

## 2019-07-29 RX ADMIN — Medication 2: at 09:22

## 2019-07-29 RX ADMIN — Medication 5 MILLIGRAM(S): at 11:00

## 2019-07-29 RX ADMIN — Medication 100 GRAM(S): at 11:53

## 2019-07-29 RX ADMIN — PIPERACILLIN AND TAZOBACTAM 25 GRAM(S): 4; .5 INJECTION, POWDER, LYOPHILIZED, FOR SOLUTION INTRAVENOUS at 18:30

## 2019-07-29 RX ADMIN — Medication 400 MILLIGRAM(S): at 11:00

## 2019-07-29 RX ADMIN — Medication 400 MILLIGRAM(S): at 23:22

## 2019-07-29 NOTE — PROGRESS NOTE ADULT - SUBJECTIVE AND OBJECTIVE BOX
GENERAL SURGERY DAILY PROGRESS NOTE:       Subjective: Patient examined at bedside.    febrile to 102 overnight with worsening abdominal pain and tachycardia.   endorse N/V   No BM or flatus   CT scan was performed     Objective:    Vital Signs Last 24 Hrs  T(C): 37.9 (29 Jul 2019 05:03), Max: 39.4 (28 Jul 2019 21:10)  T(F): 100.3 (29 Jul 2019 05:03), Max: 102.9 (28 Jul 2019 21:10)  HR: 116 (29 Jul 2019 05:03) (90 - 131)  BP: 163/93 (29 Jul 2019 05:03) (116/65 - 163/93)  BP(mean): --  RR: 18 (29 Jul 2019 05:03) (17 - 20)  SpO2: 96% (29 Jul 2019 05:03) (93% - 97%)    I&O's Detail    28 Jul 2019 07:01  -  29 Jul 2019 07:00  --------------------------------------------------------  IN:    IV PiggyBack: 500 mL    sodium chloride 0.9%.: 1700 mL  Total IN: 2200 mL    OUT:    Indwelling Catheter - Urethral: 2450 mL  Total OUT: 2450 mL    Total NET: -250 mL    General: NAD, well-nourished  HEENT: Atraumatic, EOMI  Resp: Breathing comfortably on RA  CV: Normal sinus rhythm  Abd: soft, distended, tender, midline incision without serosanguineous drainage.   Ext: ROMIx4, motor strength intact x 4      LABS:                        11.7   13.37 )-----------( 213      ( 29 Jul 2019 06:00 )             35.3     07-29    131<L>  |  93<L>  |  10  ----------------------------<  222<H>  4.3   |  23  |  0.92    Ca    9.0      29 Jul 2019 06:00  Phos  2.7     07-29  Mg     1.9     07-29      PT/INR - ( 29 Jul 2019 06:00 )   PT: 12.9 SEC;   INR: 1.16          PTT - ( 29 Jul 2019 06:00 )  PTT:26.8 SEC      RADIOLOGY & ADDITIONAL STUDIES:    MEDICATIONS  (STANDING):  acetaminophen  IVPB .. 1000 milliGRAM(s) IV Intermittent once  acetaminophen  IVPB .. 1000 milliGRAM(s) IV Intermittent once  acetaminophen  IVPB .. 1000 milliGRAM(s) IV Intermittent once  dextrose 5%. 1000 milliLiter(s) (50 mL/Hr) IV Continuous <Continuous>  dextrose 50% Injectable 12.5 Gram(s) IV Push once  dextrose 50% Injectable 25 Gram(s) IV Push once  dextrose 50% Injectable 25 Gram(s) IV Push once  gabapentin 300 milliGRAM(s) Oral two times a day  insulin lispro (HumaLOG) corrective regimen sliding scale   SubCutaneous three times a day before meals  insulin lispro (HumaLOG) corrective regimen sliding scale   SubCutaneous at bedtime  lidocaine 2%/EPINEPHrine 1:100,000 Inj 5 milliLiter(s) Local Injection once  magnesium oxide 1000 milliGRAM(s) Oral two times a day with meals  magnesium sulfate  IVPB 1 Gram(s) IV Intermittent once  metoprolol tartrate Injectable 5 milliGRAM(s) IV Push every 6 hours  morphine  - Injectable 2 milliGRAM(s) IV Push once  sodium chloride 0.9%. 1000 milliLiter(s) (100 mL/Hr) IV Continuous <Continuous>  sodium phosphate IVPB 15 milliMole(s) IV Intermittent once    MEDICATIONS  (PRN):  dextrose 40% Gel 15 Gram(s) Oral once PRN Blood Glucose LESS THAN 70 milliGRAM(s)/deciliter  glucagon  Injectable 1 milliGRAM(s) IntraMuscular once PRN Glucose LESS THAN 70 milligrams/deciliter

## 2019-07-29 NOTE — PROGRESS NOTE ADULT - ASSESSMENT
Patient is a 59y old  Male who presents with a chief complaint of Reversal of ileostomy     Plan:  - CT: ileus   - place NGT   - monitor for return of bowel function   - pain control

## 2019-07-30 LAB
ANION GAP SERPL CALC-SCNC: 12 MMO/L — SIGNIFICANT CHANGE UP (ref 7–14)
ANION GAP SERPL CALC-SCNC: 13 MMO/L — SIGNIFICANT CHANGE UP (ref 7–14)
BUN SERPL-MCNC: 10 MG/DL — SIGNIFICANT CHANGE UP (ref 7–23)
BUN SERPL-MCNC: 12 MG/DL — SIGNIFICANT CHANGE UP (ref 7–23)
CALCIUM SERPL-MCNC: 7.9 MG/DL — LOW (ref 8.4–10.5)
CALCIUM SERPL-MCNC: 8.3 MG/DL — LOW (ref 8.4–10.5)
CHLORIDE SERPL-SCNC: 97 MMOL/L — LOW (ref 98–107)
CHLORIDE SERPL-SCNC: 99 MMOL/L — SIGNIFICANT CHANGE UP (ref 98–107)
CO2 SERPL-SCNC: 24 MMOL/L — SIGNIFICANT CHANGE UP (ref 22–31)
CO2 SERPL-SCNC: 26 MMOL/L — SIGNIFICANT CHANGE UP (ref 22–31)
CREAT SERPL-MCNC: 0.82 MG/DL — SIGNIFICANT CHANGE UP (ref 0.5–1.3)
CREAT SERPL-MCNC: 0.9 MG/DL — SIGNIFICANT CHANGE UP (ref 0.5–1.3)
GLUCOSE BLDC GLUCOMTR-MCNC: 131 MG/DL — HIGH (ref 70–99)
GLUCOSE BLDC GLUCOMTR-MCNC: 131 MG/DL — HIGH (ref 70–99)
GLUCOSE BLDC GLUCOMTR-MCNC: 144 MG/DL — HIGH (ref 70–99)
GLUCOSE BLDC GLUCOMTR-MCNC: 145 MG/DL — HIGH (ref 70–99)
GLUCOSE SERPL-MCNC: 148 MG/DL — HIGH (ref 70–99)
GLUCOSE SERPL-MCNC: 149 MG/DL — HIGH (ref 70–99)
HCT VFR BLD CALC: 34.8 % — LOW (ref 39–50)
HGB BLD-MCNC: 11.4 G/DL — LOW (ref 13–17)
MAGNESIUM SERPL-MCNC: 2 MG/DL — SIGNIFICANT CHANGE UP (ref 1.6–2.6)
MAGNESIUM SERPL-MCNC: 2.2 MG/DL — SIGNIFICANT CHANGE UP (ref 1.6–2.6)
MCHC RBC-ENTMCNC: 27 PG — SIGNIFICANT CHANGE UP (ref 27–34)
MCHC RBC-ENTMCNC: 32.8 % — SIGNIFICANT CHANGE UP (ref 32–36)
MCV RBC AUTO: 82.3 FL — SIGNIFICANT CHANGE UP (ref 80–100)
NRBC # FLD: 0 K/UL — SIGNIFICANT CHANGE UP (ref 0–0)
PHOSPHATE SERPL-MCNC: 1.7 MG/DL — LOW (ref 2.5–4.5)
PHOSPHATE SERPL-MCNC: 2.5 MG/DL — SIGNIFICANT CHANGE UP (ref 2.5–4.5)
PLATELET # BLD AUTO: 248 K/UL — SIGNIFICANT CHANGE UP (ref 150–400)
PMV BLD: 10.4 FL — SIGNIFICANT CHANGE UP (ref 7–13)
POTASSIUM SERPL-MCNC: 3.5 MMOL/L — SIGNIFICANT CHANGE UP (ref 3.5–5.3)
POTASSIUM SERPL-MCNC: 3.7 MMOL/L — SIGNIFICANT CHANGE UP (ref 3.5–5.3)
POTASSIUM SERPL-SCNC: 3.5 MMOL/L — SIGNIFICANT CHANGE UP (ref 3.5–5.3)
POTASSIUM SERPL-SCNC: 3.7 MMOL/L — SIGNIFICANT CHANGE UP (ref 3.5–5.3)
RBC # BLD: 4.23 M/UL — SIGNIFICANT CHANGE UP (ref 4.2–5.8)
RBC # FLD: 15.8 % — HIGH (ref 10.3–14.5)
SODIUM SERPL-SCNC: 135 MMOL/L — SIGNIFICANT CHANGE UP (ref 135–145)
SODIUM SERPL-SCNC: 136 MMOL/L — SIGNIFICANT CHANGE UP (ref 135–145)
WBC # BLD: 10.39 K/UL — SIGNIFICANT CHANGE UP (ref 3.8–10.5)
WBC # FLD AUTO: 10.39 K/UL — SIGNIFICANT CHANGE UP (ref 3.8–10.5)

## 2019-07-30 RX ORDER — POTASSIUM CHLORIDE 20 MEQ
10 PACKET (EA) ORAL
Refills: 0 | Status: COMPLETED | OUTPATIENT
Start: 2019-07-30 | End: 2019-07-30

## 2019-07-30 RX ORDER — METOPROLOL TARTRATE 50 MG
10 TABLET ORAL EVERY 6 HOURS
Refills: 0 | Status: DISCONTINUED | OUTPATIENT
Start: 2019-07-30 | End: 2019-08-01

## 2019-07-30 RX ORDER — ACETAMINOPHEN 500 MG
1000 TABLET ORAL ONCE
Refills: 0 | Status: COMPLETED | OUTPATIENT
Start: 2019-07-30 | End: 2019-07-30

## 2019-07-30 RX ORDER — PIPERACILLIN AND TAZOBACTAM 4; .5 G/20ML; G/20ML
3.38 INJECTION, POWDER, LYOPHILIZED, FOR SOLUTION INTRAVENOUS EVERY 8 HOURS
Refills: 0 | Status: DISCONTINUED | OUTPATIENT
Start: 2019-07-30 | End: 2019-08-01

## 2019-07-30 RX ORDER — ACETAMINOPHEN 500 MG
1000 TABLET ORAL ONCE
Refills: 0 | Status: COMPLETED | OUTPATIENT
Start: 2019-07-31 | End: 2019-07-30

## 2019-07-30 RX ORDER — HEPARIN SODIUM 5000 [USP'U]/ML
5000 INJECTION INTRAVENOUS; SUBCUTANEOUS EVERY 8 HOURS
Refills: 0 | Status: DISCONTINUED | OUTPATIENT
Start: 2019-07-30 | End: 2019-08-04

## 2019-07-30 RX ORDER — DEXTROSE MONOHYDRATE, SODIUM CHLORIDE, AND POTASSIUM CHLORIDE 50; .745; 4.5 G/1000ML; G/1000ML; G/1000ML
1000 INJECTION, SOLUTION INTRAVENOUS
Refills: 0 | Status: DISCONTINUED | OUTPATIENT
Start: 2019-07-30 | End: 2019-07-31

## 2019-07-30 RX ADMIN — Medication 100 MILLIEQUIVALENT(S): at 11:21

## 2019-07-30 RX ADMIN — Medication 100 MILLIEQUIVALENT(S): at 10:31

## 2019-07-30 RX ADMIN — Medication 5 MILLIGRAM(S): at 12:00

## 2019-07-30 RX ADMIN — Medication 400 MILLIGRAM(S): at 23:57

## 2019-07-30 RX ADMIN — Medication 100 MILLIEQUIVALENT(S): at 13:45

## 2019-07-30 RX ADMIN — PIPERACILLIN AND TAZOBACTAM 25 GRAM(S): 4; .5 INJECTION, POWDER, LYOPHILIZED, FOR SOLUTION INTRAVENOUS at 02:08

## 2019-07-30 RX ADMIN — DEXTROSE MONOHYDRATE, SODIUM CHLORIDE, AND POTASSIUM CHLORIDE 125 MILLILITER(S): 50; .745; 4.5 INJECTION, SOLUTION INTRAVENOUS at 21:35

## 2019-07-30 RX ADMIN — HEPARIN SODIUM 5000 UNIT(S): 5000 INJECTION INTRAVENOUS; SUBCUTANEOUS at 13:45

## 2019-07-30 RX ADMIN — Medication 120 MILLIGRAM(S): at 23:08

## 2019-07-30 RX ADMIN — Medication 120 MILLIGRAM(S): at 17:53

## 2019-07-30 RX ADMIN — Medication 1000 MILLIGRAM(S): at 11:59

## 2019-07-30 RX ADMIN — Medication 400 MILLIGRAM(S): at 17:52

## 2019-07-30 RX ADMIN — Medication 5 MILLIGRAM(S): at 05:52

## 2019-07-30 RX ADMIN — HEPARIN SODIUM 5000 UNIT(S): 5000 INJECTION INTRAVENOUS; SUBCUTANEOUS at 21:35

## 2019-07-30 RX ADMIN — Medication 1000 MILLIGRAM(S): at 18:30

## 2019-07-30 RX ADMIN — PIPERACILLIN AND TAZOBACTAM 25 GRAM(S): 4; .5 INJECTION, POWDER, LYOPHILIZED, FOR SOLUTION INTRAVENOUS at 21:35

## 2019-07-30 RX ADMIN — Medication 400 MILLIGRAM(S): at 11:20

## 2019-07-30 RX ADMIN — Medication 63.75 MILLIMOLE(S): at 17:04

## 2019-07-30 NOTE — PROGRESS NOTE ADULT - SUBJECTIVE AND OBJECTIVE BOX
GENERAL SURGERY DAILY PROGRESS NOTE:     Subjective: Patient examined at bedside.    Patient resting comfortably, denies any complaints at this time. NG tube placed yesterday with 1200ml of immediate return. Passing flatus and BM  Objective:    Vital Signs Last 24 Hrs  T(C): 37.3 (07-30-19 @ 05:47), Max: 37.3 (07-29-19 @ 22:26)  HR: 98 (07-30-19 @ 05:47) (88 - 105)  BP: 162/93 (07-30-19 @ 05:47) (120/72 - 162/93)  BP(mean): --  ABP: --  ABP(mean): --  RR: 16 (07-30-19 @ 05:47) (16 - 18)  SpO2: 98% (07-30-19 @ 05:47) (96% - 98%)  CAPILLARY BLOOD GLUCOSE      POCT Blood Glucose.: 145 mg/dL (30 Jul 2019 06:17)  POCT Blood Glucose.: 130 mg/dL (29 Jul 2019 23:42)  POCT Blood Glucose.: 187 mg/dL (29 Jul 2019 18:02)  POCT Blood Glucose.: 231 mg/dL (29 Jul 2019 12:46)  POCT Blood Glucose.: 218 mg/dL (29 Jul 2019 09:09)   N/A      07-29 @ 07:01  -  07-30 @ 07:00  --------------------------------------------------------  IN:    IV PiggyBack: 1150 mL    Sodium Chloride 0.9% IV Bolus: 500 mL    sodium chloride 0.9%.: 1800 mL  Total IN: 3450 mL    OUT:    Indwelling Catheter - Urethral: 1175 mL    Nasoenteral Tube: 1950 mL  Total OUT: 3125 mL    Total NET: 325 mL    General: NAD, well-nourished  Resp: Breathing comfortably on RA  CV: Normal sinus rhythm  Abd: softly distended, some appropriate incisional tenderness, midline incision without serosanguineous drainage, wet to dry packing replaced to left abdominal wound    LABS:  CBC (07-30 @ 06:15)                              11.4<L>                         10.39   )----------------(  248        --    % Neutrophils, --    % Lymphocytes, ANC: --                                  34.8<L>  CBC (07-29 @ 06:00)                              11.7<L>                         13.37<H>  )----------------(  213        --    % Neutrophils, --    % Lymphocytes, ANC: --                                  35.3<L>    BMP (07-30 @ 06:15)             136     |  97<L>   |  10    		Ca++ --      Ca 8.3<L>             ---------------------------------( 148<H>		Mg 2.2                3.7     |  26      |  0.90  			Ph 1.7<L>  BMP (07-29 @ 06:00)             131<L>  |  93<L>   |  10    		Ca++ --      Ca 9.0                ---------------------------------( 222<H>		Mg 1.9                4.3     |  23      |  0.92  			Ph 2.7         Coags (07-29 @ 06:00)  aPTT 26.8<L> / INR 1.16 / PT 12.9      ABG (07-29 @ 06:00)      /  /  /  /  / %     Lactate:   1.4    VBG (07-29 @ 06:00)     7.45<H> / 37<L> / 79<H> / 26 / 1.7 / 96.6<H>%    Urinalysis (07-29 @ 07:55):     Color: LT.YELLOW / Appearance: CLEAR / SG: > 1.040<H> / pH: 6.5 / Gluc: 100 / Ketones: 40 / Bili: NEGATIVE / Urobili: NORMAL / Protein :50 / Nitrites: NEGATIVE / Leuk.Est: NEGATIVE / RBC: 2-5 / WBC: 2-5 / Sq Epi:  / Non Sq Epi:  / Bacteria FEW           Assessment and Plan:   · Assessment		  Patient is a 59y old  Male s/p open reversal of end ileostomy, right hemicolectomy    Plan:  - NPO/ NGT   - monitor bowel function   - pain control  - OOB

## 2019-07-31 LAB
ANION GAP SERPL CALC-SCNC: 12 MMO/L — SIGNIFICANT CHANGE UP (ref 7–14)
BUN SERPL-MCNC: 9 MG/DL — SIGNIFICANT CHANGE UP (ref 7–23)
CALCIUM SERPL-MCNC: 7.9 MG/DL — LOW (ref 8.4–10.5)
CHLORIDE SERPL-SCNC: 101 MMOL/L — SIGNIFICANT CHANGE UP (ref 98–107)
CO2 SERPL-SCNC: 23 MMOL/L — SIGNIFICANT CHANGE UP (ref 22–31)
CREAT SERPL-MCNC: 0.8 MG/DL — SIGNIFICANT CHANGE UP (ref 0.5–1.3)
GLUCOSE BLDC GLUCOMTR-MCNC: 110 MG/DL — HIGH (ref 70–99)
GLUCOSE BLDC GLUCOMTR-MCNC: 110 MG/DL — HIGH (ref 70–99)
GLUCOSE BLDC GLUCOMTR-MCNC: 115 MG/DL — HIGH (ref 70–99)
GLUCOSE BLDC GLUCOMTR-MCNC: 86 MG/DL — SIGNIFICANT CHANGE UP (ref 70–99)
GLUCOSE SERPL-MCNC: 121 MG/DL — HIGH (ref 70–99)
HCT VFR BLD CALC: 30.3 % — LOW (ref 39–50)
HGB BLD-MCNC: 9.8 G/DL — LOW (ref 13–17)
MAGNESIUM SERPL-MCNC: 2 MG/DL — SIGNIFICANT CHANGE UP (ref 1.6–2.6)
MCHC RBC-ENTMCNC: 26.8 PG — LOW (ref 27–34)
MCHC RBC-ENTMCNC: 32.3 % — SIGNIFICANT CHANGE UP (ref 32–36)
MCV RBC AUTO: 83 FL — SIGNIFICANT CHANGE UP (ref 80–100)
NRBC # FLD: 0 K/UL — SIGNIFICANT CHANGE UP (ref 0–0)
PHOSPHATE SERPL-MCNC: 1.8 MG/DL — LOW (ref 2.5–4.5)
PLATELET # BLD AUTO: 248 K/UL — SIGNIFICANT CHANGE UP (ref 150–400)
PMV BLD: 9.9 FL — SIGNIFICANT CHANGE UP (ref 7–13)
POTASSIUM SERPL-MCNC: 3.4 MMOL/L — LOW (ref 3.5–5.3)
POTASSIUM SERPL-SCNC: 3.4 MMOL/L — LOW (ref 3.5–5.3)
RBC # BLD: 3.65 M/UL — LOW (ref 4.2–5.8)
RBC # FLD: 15.9 % — HIGH (ref 10.3–14.5)
SODIUM SERPL-SCNC: 136 MMOL/L — SIGNIFICANT CHANGE UP (ref 135–145)
WBC # BLD: 8.35 K/UL — SIGNIFICANT CHANGE UP (ref 3.8–10.5)
WBC # FLD AUTO: 8.35 K/UL — SIGNIFICANT CHANGE UP (ref 3.8–10.5)

## 2019-07-31 RX ORDER — POTASSIUM PHOSPHATE, MONOBASIC POTASSIUM PHOSPHATE, DIBASIC 236; 224 MG/ML; MG/ML
15 INJECTION, SOLUTION INTRAVENOUS ONCE
Refills: 0 | Status: COMPLETED | OUTPATIENT
Start: 2019-07-31 | End: 2019-07-31

## 2019-07-31 RX ORDER — ACETAMINOPHEN 500 MG
1000 TABLET ORAL ONCE
Refills: 0 | Status: COMPLETED | OUTPATIENT
Start: 2019-07-31 | End: 2019-07-31

## 2019-07-31 RX ORDER — POTASSIUM CHLORIDE 20 MEQ
10 PACKET (EA) ORAL
Refills: 0 | Status: COMPLETED | OUTPATIENT
Start: 2019-07-31 | End: 2019-07-31

## 2019-07-31 RX ORDER — DEXTROSE MONOHYDRATE, SODIUM CHLORIDE, AND POTASSIUM CHLORIDE 50; .745; 4.5 G/1000ML; G/1000ML; G/1000ML
1000 INJECTION, SOLUTION INTRAVENOUS
Refills: 0 | Status: DISCONTINUED | OUTPATIENT
Start: 2019-07-31 | End: 2019-08-01

## 2019-07-31 RX ADMIN — HEPARIN SODIUM 5000 UNIT(S): 5000 INJECTION INTRAVENOUS; SUBCUTANEOUS at 05:10

## 2019-07-31 RX ADMIN — Medication 120 MILLIGRAM(S): at 05:10

## 2019-07-31 RX ADMIN — Medication 400 MILLIGRAM(S): at 06:47

## 2019-07-31 RX ADMIN — Medication 1000 MILLIGRAM(S): at 07:17

## 2019-07-31 RX ADMIN — POTASSIUM PHOSPHATE, MONOBASIC POTASSIUM PHOSPHATE, DIBASIC 62.5 MILLIMOLE(S): 236; 224 INJECTION, SOLUTION INTRAVENOUS at 13:56

## 2019-07-31 RX ADMIN — Medication 120 MILLIGRAM(S): at 12:36

## 2019-07-31 RX ADMIN — PIPERACILLIN AND TAZOBACTAM 25 GRAM(S): 4; .5 INJECTION, POWDER, LYOPHILIZED, FOR SOLUTION INTRAVENOUS at 13:56

## 2019-07-31 RX ADMIN — Medication 400 MILLIGRAM(S): at 18:55

## 2019-07-31 RX ADMIN — DEXTROSE MONOHYDRATE, SODIUM CHLORIDE, AND POTASSIUM CHLORIDE 125 MILLILITER(S): 50; .745; 4.5 INJECTION, SOLUTION INTRAVENOUS at 12:36

## 2019-07-31 RX ADMIN — Medication 100 MILLIEQUIVALENT(S): at 11:26

## 2019-07-31 RX ADMIN — HEPARIN SODIUM 5000 UNIT(S): 5000 INJECTION INTRAVENOUS; SUBCUTANEOUS at 13:56

## 2019-07-31 RX ADMIN — PIPERACILLIN AND TAZOBACTAM 25 GRAM(S): 4; .5 INJECTION, POWDER, LYOPHILIZED, FOR SOLUTION INTRAVENOUS at 05:10

## 2019-07-31 RX ADMIN — Medication 100 MILLIEQUIVALENT(S): at 09:45

## 2019-07-31 RX ADMIN — Medication 400 MILLIGRAM(S): at 12:35

## 2019-07-31 RX ADMIN — PIPERACILLIN AND TAZOBACTAM 25 GRAM(S): 4; .5 INJECTION, POWDER, LYOPHILIZED, FOR SOLUTION INTRAVENOUS at 22:04

## 2019-07-31 RX ADMIN — Medication 100 MILLIEQUIVALENT(S): at 08:36

## 2019-07-31 RX ADMIN — Medication 1000 MILLIGRAM(S): at 19:31

## 2019-07-31 RX ADMIN — Medication 1000 MILLIGRAM(S): at 13:05

## 2019-07-31 RX ADMIN — Medication 120 MILLIGRAM(S): at 18:09

## 2019-07-31 RX ADMIN — HEPARIN SODIUM 5000 UNIT(S): 5000 INJECTION INTRAVENOUS; SUBCUTANEOUS at 22:04

## 2019-07-31 RX ADMIN — Medication 1000 MILLIGRAM(S): at 00:27

## 2019-07-31 NOTE — PROVIDER CONTACT NOTE (OTHER) - ACTION/TREATMENT ORDERED:
as per MD since Eugene will likely be d/c'ed later today do not remove and reinsert new Eugene care done, eugene maintained and output monitored and recorded.. Notify MD if no urine noted in Eugene bag

## 2019-07-31 NOTE — PHYSICAL THERAPY INITIAL EVALUATION ADULT - PASSIVE RANGE OF MOTION EXAMINATION, REHAB EVAL
Patient called to convey her recent CT results  
Patient would like to know status of ct scan results. Patient had the ct scan on Monday. Please call patient at [phone].   
Right LE Passive ROM was WFL (within functional limits)

## 2019-07-31 NOTE — PHYSICAL THERAPY INITIAL EVALUATION ADULT - GAIT DEVIATIONS NOTED, PT EVAL
decreased step length/right/decreased weight-shifting ability/decreased vivian/footdrop/decreased velocity of limb motion

## 2019-07-31 NOTE — PROGRESS NOTE ADULT - SUBJECTIVE AND OBJECTIVE BOX
GENERAL SURGERY DAILY PROGRESS NOTE:     Subjective: Patient examined at bedside.    Patient resting comfortably, denies any complaints at this time. Passing flatus and BM  Objective:    Vital Signs Last 24 Hrs  T(C): 36.6 (07-31-19 @ 05:09), Max: 37.4 (07-30-19 @ 22:37)  HR: 72 (07-31-19 @ 05:59) (72 - 99)  BP: 154/75 (07-31-19 @ 05:59) (145/73 - 163/78)  BP(mean): --  ABP: --  ABP(mean): --  RR: 18 (07-31-19 @ 05:09) (16 - 19)  SpO2: 100% (07-31-19 @ 05:09) (96% - 100%)  Wt(kg): --  CVP(mm Hg): --  CI: --  CAPILLARY BLOOD GLUCOSE      POCT Blood Glucose.: 110 mg/dL (31 Jul 2019 06:00)  POCT Blood Glucose.: 131 mg/dL (30 Jul 2019 22:17)  POCT Blood Glucose.: 131 mg/dL (30 Jul 2019 17:14)  POCT Blood Glucose.: 144 mg/dL (30 Jul 2019 12:07)   N/A      07-30 @ 07:01  -  07-31 @ 07:00  --------------------------------------------------------  IN:    IV PiggyBack: 1520 mL    Lactated Ringers IV Bolus: 300 mL    sodium chloride 0.9%: 1200 mL    sodium chloride 0.9% with potassium chloride 20 mEq/L: 750 mL    sodium chloride 0.9% with potassium chloride 20 mEq/L: 500 mL  Total IN: 4270 mL    OUT:    Indwelling Catheter - Urethral: 2305 mL    Nasoenteral Tube: 250 mL  Total OUT: 2555 mL    Total NET: 1715 mL    General: NAD, well-nourished  Resp: Breathing comfortably on RA  CV: Normal sinus rhythm  Abd: softly distended, some appropriate incisional tenderness, midline incision without serosanguineous drainage, wet to dry packing replaced to left abdominal wound    LABS:  CBC (07-31 @ 06:05)                              9.8<L>                         8.35    )----------------(  248        --    % Neutrophils, --    % Lymphocytes, ANC: --                                  30.3<L>  CBC (07-30 @ 06:15)                              11.4<L>                         10.39   )----------------(  248        --    % Neutrophils, --    % Lymphocytes, ANC: --                                  34.8<L>    BMP (07-31 @ 06:05)             136     |  101     |  9     		Ca++ --      Ca 7.9<L>             ---------------------------------( 121<H>		Mg 2.0                3.4<L>  |  23      |  0.80  			Ph 1.8<L>  BMP (07-30 @ 21:50)             135     |  99      |  12    		Ca++ --      Ca 7.9<L>             ---------------------------------( 149<H>		Mg 2.0                3.5     |  24      |  0.82  			Ph 2.5                 Urinalysis (07-29 @ 07:55):     Color: LT.YELLOW / Appearance: CLEAR / SG: > 1.040<H> / pH: 6.5 / Gluc: 100 / Ketones: 40 / Bili: NEGATIVE / Urobili: NORMAL / Protein :50 / Nitrites: NEGATIVE / Leuk.Est: NEGATIVE / RBC: 2-5 / WBC: 2-5 / Sq Epi:  / Non Sq Epi:  / Bacteria FEW         Assessment and Plan:   · Assessment		  Patient is a 59y old  Male s/p open reversal of end ileostomy, right hemicolectomy    Plan:  - NPO/ NGTube clamp trial today  - monitor bowel function   - pain control  - OOB     [id number]

## 2019-07-31 NOTE — PHYSICAL THERAPY INITIAL EVALUATION ADULT - GENERAL OBSERVATIONS, REHAB EVAL
Patient received semi supine in bed, (+) eugene, (+) IV, (+) NG tube,(+)  right drop foot, RN Kellen at bedside.

## 2019-08-01 ENCOUNTER — OUTPATIENT (OUTPATIENT)
Dept: OUTPATIENT SERVICES | Facility: HOSPITAL | Age: 59
LOS: 1 days | End: 2019-08-01
Payer: MEDICAID

## 2019-08-01 DIAGNOSIS — Z86.19 PERSONAL HISTORY OF OTHER INFECTIOUS AND PARASITIC DISEASES: Chronic | ICD-10-CM

## 2019-08-01 DIAGNOSIS — Z98.890 OTHER SPECIFIED POSTPROCEDURAL STATES: Chronic | ICD-10-CM

## 2019-08-01 LAB
ANION GAP SERPL CALC-SCNC: 10 MMO/L — SIGNIFICANT CHANGE UP (ref 7–14)
BUN SERPL-MCNC: 6 MG/DL — LOW (ref 7–23)
CALCIUM SERPL-MCNC: 8.2 MG/DL — LOW (ref 8.4–10.5)
CHLORIDE SERPL-SCNC: 100 MMOL/L — SIGNIFICANT CHANGE UP (ref 98–107)
CO2 SERPL-SCNC: 26 MMOL/L — SIGNIFICANT CHANGE UP (ref 22–31)
CREAT SERPL-MCNC: 0.75 MG/DL — SIGNIFICANT CHANGE UP (ref 0.5–1.3)
GLUCOSE BLDC GLUCOMTR-MCNC: 127 MG/DL — HIGH (ref 70–99)
GLUCOSE BLDC GLUCOMTR-MCNC: 133 MG/DL — HIGH (ref 70–99)
GLUCOSE BLDC GLUCOMTR-MCNC: 206 MG/DL — HIGH (ref 70–99)
GLUCOSE BLDC GLUCOMTR-MCNC: 249 MG/DL — HIGH (ref 70–99)
GLUCOSE SERPL-MCNC: 136 MG/DL — HIGH (ref 70–99)
HCT VFR BLD CALC: 32.9 % — LOW (ref 39–50)
HGB BLD-MCNC: 10.8 G/DL — LOW (ref 13–17)
MAGNESIUM SERPL-MCNC: 1.8 MG/DL — SIGNIFICANT CHANGE UP (ref 1.6–2.6)
MCHC RBC-ENTMCNC: 27.6 PG — SIGNIFICANT CHANGE UP (ref 27–34)
MCHC RBC-ENTMCNC: 32.8 % — SIGNIFICANT CHANGE UP (ref 32–36)
MCV RBC AUTO: 84.1 FL — SIGNIFICANT CHANGE UP (ref 80–100)
NRBC # FLD: 0.02 K/UL — SIGNIFICANT CHANGE UP (ref 0–0)
PHOSPHATE SERPL-MCNC: 2.1 MG/DL — LOW (ref 2.5–4.5)
PLATELET # BLD AUTO: 273 K/UL — SIGNIFICANT CHANGE UP (ref 150–400)
PMV BLD: 9.6 FL — SIGNIFICANT CHANGE UP (ref 7–13)
POTASSIUM SERPL-MCNC: 3.8 MMOL/L — SIGNIFICANT CHANGE UP (ref 3.5–5.3)
POTASSIUM SERPL-SCNC: 3.8 MMOL/L — SIGNIFICANT CHANGE UP (ref 3.5–5.3)
RBC # BLD: 3.91 M/UL — LOW (ref 4.2–5.8)
RBC # FLD: 15.8 % — HIGH (ref 10.3–14.5)
SODIUM SERPL-SCNC: 136 MMOL/L — SIGNIFICANT CHANGE UP (ref 135–145)
WBC # BLD: 9.43 K/UL — SIGNIFICANT CHANGE UP (ref 3.8–10.5)
WBC # FLD AUTO: 9.43 K/UL — SIGNIFICANT CHANGE UP (ref 3.8–10.5)

## 2019-08-01 PROCEDURE — G9001: CPT

## 2019-08-01 RX ORDER — INSULIN LISPRO 100/ML
4 VIAL (ML) SUBCUTANEOUS ONCE
Refills: 0 | Status: COMPLETED | OUTPATIENT
Start: 2019-08-01 | End: 2019-08-01

## 2019-08-01 RX ORDER — ACETAMINOPHEN 500 MG
1000 TABLET ORAL ONCE
Refills: 0 | Status: COMPLETED | OUTPATIENT
Start: 2019-08-01 | End: 2019-08-01

## 2019-08-01 RX ORDER — INSULIN LISPRO 100/ML
VIAL (ML) SUBCUTANEOUS
Refills: 0 | Status: DISCONTINUED | OUTPATIENT
Start: 2019-08-01 | End: 2019-08-04

## 2019-08-01 RX ORDER — POTASSIUM CHLORIDE 20 MEQ
10 PACKET (EA) ORAL
Refills: 0 | Status: COMPLETED | OUTPATIENT
Start: 2019-08-01 | End: 2019-08-01

## 2019-08-01 RX ORDER — DEXTROSE MONOHYDRATE, SODIUM CHLORIDE, AND POTASSIUM CHLORIDE 50; .745; 4.5 G/1000ML; G/1000ML; G/1000ML
1000 INJECTION, SOLUTION INTRAVENOUS
Refills: 0 | Status: DISCONTINUED | OUTPATIENT
Start: 2019-08-01 | End: 2019-08-01

## 2019-08-01 RX ORDER — OXYCODONE HYDROCHLORIDE 5 MG/1
10 TABLET ORAL
Refills: 0 | Status: DISCONTINUED | OUTPATIENT
Start: 2019-08-01 | End: 2019-08-04

## 2019-08-01 RX ORDER — METOPROLOL TARTRATE 50 MG
25 TABLET ORAL
Refills: 0 | Status: DISCONTINUED | OUTPATIENT
Start: 2019-08-01 | End: 2019-08-04

## 2019-08-01 RX ORDER — OXYCODONE HYDROCHLORIDE 5 MG/1
5 TABLET ORAL EVERY 4 HOURS
Refills: 0 | Status: DISCONTINUED | OUTPATIENT
Start: 2019-08-01 | End: 2019-08-04

## 2019-08-01 RX ORDER — ACETAMINOPHEN 500 MG
650 TABLET ORAL EVERY 6 HOURS
Refills: 0 | Status: DISCONTINUED | OUTPATIENT
Start: 2019-08-01 | End: 2019-08-04

## 2019-08-01 RX ORDER — HYDROMORPHONE HYDROCHLORIDE 2 MG/ML
0.5 INJECTION INTRAMUSCULAR; INTRAVENOUS; SUBCUTANEOUS ONCE
Refills: 0 | Status: DISCONTINUED | OUTPATIENT
Start: 2019-08-01 | End: 2019-08-01

## 2019-08-01 RX ORDER — ACETAMINOPHEN 500 MG
650 TABLET ORAL EVERY 6 HOURS
Refills: 0 | Status: DISCONTINUED | OUTPATIENT
Start: 2019-08-01 | End: 2019-08-01

## 2019-08-01 RX ORDER — MAGNESIUM SULFATE 500 MG/ML
2 VIAL (ML) INJECTION ONCE
Refills: 0 | Status: COMPLETED | OUTPATIENT
Start: 2019-08-01 | End: 2019-08-01

## 2019-08-01 RX ADMIN — HYDROMORPHONE HYDROCHLORIDE 0.5 MILLIGRAM(S): 2 INJECTION INTRAMUSCULAR; INTRAVENOUS; SUBCUTANEOUS at 08:46

## 2019-08-01 RX ADMIN — PIPERACILLIN AND TAZOBACTAM 25 GRAM(S): 4; .5 INJECTION, POWDER, LYOPHILIZED, FOR SOLUTION INTRAVENOUS at 13:24

## 2019-08-01 RX ADMIN — Medication 50 GRAM(S): at 12:38

## 2019-08-01 RX ADMIN — Medication 400 MILLIGRAM(S): at 17:45

## 2019-08-01 RX ADMIN — Medication 120 MILLIGRAM(S): at 05:40

## 2019-08-01 RX ADMIN — HEPARIN SODIUM 5000 UNIT(S): 5000 INJECTION INTRAVENOUS; SUBCUTANEOUS at 13:07

## 2019-08-01 RX ADMIN — PIPERACILLIN AND TAZOBACTAM 25 GRAM(S): 4; .5 INJECTION, POWDER, LYOPHILIZED, FOR SOLUTION INTRAVENOUS at 05:40

## 2019-08-01 RX ADMIN — HEPARIN SODIUM 5000 UNIT(S): 5000 INJECTION INTRAVENOUS; SUBCUTANEOUS at 05:40

## 2019-08-01 RX ADMIN — Medication 4: at 21:59

## 2019-08-01 RX ADMIN — Medication 400 MILLIGRAM(S): at 02:19

## 2019-08-01 RX ADMIN — DEXTROSE MONOHYDRATE, SODIUM CHLORIDE, AND POTASSIUM CHLORIDE 100 MILLILITER(S): 50; .745; 4.5 INJECTION, SOLUTION INTRAVENOUS at 05:46

## 2019-08-01 RX ADMIN — Medication 1000 MILLIGRAM(S): at 02:49

## 2019-08-01 RX ADMIN — HYDROMORPHONE HYDROCHLORIDE 0.5 MILLIGRAM(S): 2 INJECTION INTRAMUSCULAR; INTRAVENOUS; SUBCUTANEOUS at 08:31

## 2019-08-01 RX ADMIN — Medication 120 MILLIGRAM(S): at 00:53

## 2019-08-01 RX ADMIN — OXYCODONE HYDROCHLORIDE 10 MILLIGRAM(S): 5 TABLET ORAL at 22:23

## 2019-08-01 RX ADMIN — OXYCODONE HYDROCHLORIDE 10 MILLIGRAM(S): 5 TABLET ORAL at 21:53

## 2019-08-01 RX ADMIN — Medication 4 UNIT(S): at 13:07

## 2019-08-01 RX ADMIN — Medication 100 MILLIEQUIVALENT(S): at 08:49

## 2019-08-01 RX ADMIN — Medication 25 MILLIGRAM(S): at 19:13

## 2019-08-01 RX ADMIN — DEXTROSE MONOHYDRATE, SODIUM CHLORIDE, AND POTASSIUM CHLORIDE 100 MILLILITER(S): 50; .745; 4.5 INJECTION, SOLUTION INTRAVENOUS at 08:49

## 2019-08-01 RX ADMIN — Medication 400 MILLIGRAM(S): at 10:25

## 2019-08-01 RX ADMIN — Medication 1000 MILLIGRAM(S): at 18:15

## 2019-08-01 RX ADMIN — Medication 120 MILLIGRAM(S): at 12:38

## 2019-08-01 RX ADMIN — Medication 63.75 MILLIMOLE(S): at 09:08

## 2019-08-01 RX ADMIN — HEPARIN SODIUM 5000 UNIT(S): 5000 INJECTION INTRAVENOUS; SUBCUTANEOUS at 21:53

## 2019-08-01 RX ADMIN — Medication 1000 MILLIGRAM(S): at 10:46

## 2019-08-01 RX ADMIN — Medication 100 MILLIEQUIVALENT(S): at 10:24

## 2019-08-01 NOTE — PROGRESS NOTE ADULT - SUBJECTIVE AND OBJECTIVE BOX
GENERAL SURGERY DAILY PROGRESS NOTE:     Subjective: Patient examined at bedside. No acute events overnight.   Given tylenol over night for pain.   Denies N/V   Having BM and flatus.     Objective:    Vital Signs Last 24 Hrs  T(C): 36.7 (01 Aug 2019 05:42), Max: 36.8 (31 Jul 2019 13:13)  T(F): 98.1 (01 Aug 2019 05:42), Max: 98.3 (31 Jul 2019 21:59)  HR: 77 (01 Aug 2019 08:46) (68 - 85)  BP: 155/72 (01 Aug 2019 08:46) (149/67 - 175/77)  BP(mean): --  RR: 16 (01 Aug 2019 05:42) (16 - 18)  SpO2: 100% (01 Aug 2019 05:42) (97% - 100%)    I&O's Detail    31 Jul 2019 07:01  -  01 Aug 2019 07:00  --------------------------------------------------------  IN:    dextrose 5% + sodium chloride 0.45% with potassium chloride 20 mEq/L: 400 mL    IV PiggyBack: 950 mL    sodium chloride 0.9% with potassium chloride 20 mEq/L: 2500 mL  Total IN: 3850 mL    OUT:    Indwelling Catheter - Urethral: 4125 mL  Total OUT: 4125 mL    Total NET: -275 mL      General: NAD, well-nourished  HEENT: Atraumatic, EOMI  Resp: Breathing comfortably on RA  CV: Normal sinus rhythm  Abd: soft, mildly distended, appropriately tender, midline abdominal incision c/d/i   Ext: ROMIx4, motor strength intact x 4      LABS:                        10.8   9.43  )-----------( 273      ( 01 Aug 2019 06:45 )             32.9     08-01    136  |  100  |  6<L>  ----------------------------<  136<H>  3.8   |  26  |  0.75    Ca    8.2<L>      01 Aug 2019 06:45  Phos  2.1     08-01  Mg     1.8     08-01            RADIOLOGY & ADDITIONAL STUDIES:    MEDICATIONS  (STANDING):  acetaminophen  IVPB .. 1000 milliGRAM(s) IV Intermittent once  dextrose 5% + sodium chloride 0.45% with potassium chloride 20 mEq/L 1000 milliLiter(s) (100 mL/Hr) IV Continuous <Continuous>  dextrose 5%. 1000 milliLiter(s) (50 mL/Hr) IV Continuous <Continuous>  dextrose 50% Injectable 12.5 Gram(s) IV Push once  dextrose 50% Injectable 25 Gram(s) IV Push once  dextrose 50% Injectable 25 Gram(s) IV Push once  heparin  Injectable 5000 Unit(s) SubCutaneous every 8 hours  insulin lispro (HumaLOG) corrective regimen sliding scale   SubCutaneous every 6 hours  magnesium sulfate  IVPB 2 Gram(s) IV Intermittent once  metoprolol tartrate IVPB 10 milliGRAM(s) IV Intermittent every 6 hours  piperacillin/tazobactam IVPB.. 3.375 Gram(s) IV Intermittent every 8 hours  potassium chloride  10 mEq/100 mL IVPB 10 milliEquivalent(s) IV Intermittent every 1 hour    MEDICATIONS  (PRN):  dextrose 40% Gel 15 Gram(s) Oral once PRN Blood Glucose LESS THAN 70 milliGRAM(s)/deciliter  glucagon  Injectable 1 milliGRAM(s) IntraMuscular once PRN Glucose LESS THAN 70 milligrams/deciliter

## 2019-08-01 NOTE — PROGRESS NOTE ADULT - ASSESSMENT
Patient is a 59y old  Male s/p open reversal of end ileostomy, right hemicolectomy hospital course complicated by ileus     Plan:  - NGT removed  - pain control  - OOB   - dispo planning   - PT: rehab

## 2019-08-01 NOTE — PROVIDER CONTACT NOTE (OTHER) - ASSESSMENT
Elevated Blood Pressure BP: 160/71 HR: 86. Patient HR elevated per baseline. Pt in no distress at this time.

## 2019-08-02 LAB
ANION GAP SERPL CALC-SCNC: 13 MMO/L — SIGNIFICANT CHANGE UP (ref 7–14)
APPEARANCE UR: CLEAR — SIGNIFICANT CHANGE UP
BILIRUB UR-MCNC: NEGATIVE — SIGNIFICANT CHANGE UP
BLOOD UR QL VISUAL: NEGATIVE — SIGNIFICANT CHANGE UP
BUN SERPL-MCNC: 6 MG/DL — LOW (ref 7–23)
CALCIUM SERPL-MCNC: 8.7 MG/DL — SIGNIFICANT CHANGE UP (ref 8.4–10.5)
CHLORIDE SERPL-SCNC: 97 MMOL/L — LOW (ref 98–107)
CO2 SERPL-SCNC: 23 MMOL/L — SIGNIFICANT CHANGE UP (ref 22–31)
COLOR SPEC: YELLOW — SIGNIFICANT CHANGE UP
CREAT SERPL-MCNC: 0.75 MG/DL — SIGNIFICANT CHANGE UP (ref 0.5–1.3)
GLUCOSE BLDC GLUCOMTR-MCNC: 133 MG/DL — HIGH (ref 70–99)
GLUCOSE BLDC GLUCOMTR-MCNC: 138 MG/DL — HIGH (ref 70–99)
GLUCOSE BLDC GLUCOMTR-MCNC: 142 MG/DL — HIGH (ref 70–99)
GLUCOSE BLDC GLUCOMTR-MCNC: 280 MG/DL — HIGH (ref 70–99)
GLUCOSE SERPL-MCNC: 143 MG/DL — HIGH (ref 70–99)
GLUCOSE UR-MCNC: >1000 — HIGH
HCT VFR BLD CALC: 31.7 % — LOW (ref 39–50)
HCT VFR BLD CALC: 35.8 % — LOW (ref 39–50)
HGB BLD-MCNC: 10.6 G/DL — LOW (ref 13–17)
HGB BLD-MCNC: 11.8 G/DL — LOW (ref 13–17)
KETONES UR-MCNC: SIGNIFICANT CHANGE UP
LEUKOCYTE ESTERASE UR-ACNC: NEGATIVE — SIGNIFICANT CHANGE UP
MAGNESIUM SERPL-MCNC: 1.9 MG/DL — SIGNIFICANT CHANGE UP (ref 1.6–2.6)
MCHC RBC-ENTMCNC: 27.1 PG — SIGNIFICANT CHANGE UP (ref 27–34)
MCHC RBC-ENTMCNC: 27.6 PG — SIGNIFICANT CHANGE UP (ref 27–34)
MCHC RBC-ENTMCNC: 33 % — SIGNIFICANT CHANGE UP (ref 32–36)
MCHC RBC-ENTMCNC: 33.4 % — SIGNIFICANT CHANGE UP (ref 32–36)
MCV RBC AUTO: 81.1 FL — SIGNIFICANT CHANGE UP (ref 80–100)
MCV RBC AUTO: 83.8 FL — SIGNIFICANT CHANGE UP (ref 80–100)
NITRITE UR-MCNC: NEGATIVE — SIGNIFICANT CHANGE UP
NRBC # FLD: 0.03 K/UL — SIGNIFICANT CHANGE UP (ref 0–0)
NRBC # FLD: 0.05 K/UL — SIGNIFICANT CHANGE UP (ref 0–0)
PH UR: 6 — SIGNIFICANT CHANGE UP (ref 5–8)
PHOSPHATE SERPL-MCNC: 2.4 MG/DL — LOW (ref 2.5–4.5)
PLATELET # BLD AUTO: 305 K/UL — SIGNIFICANT CHANGE UP (ref 150–400)
PLATELET # BLD AUTO: 360 K/UL — SIGNIFICANT CHANGE UP (ref 150–400)
PMV BLD: 8.9 FL — SIGNIFICANT CHANGE UP (ref 7–13)
PMV BLD: 9.2 FL — SIGNIFICANT CHANGE UP (ref 7–13)
POTASSIUM SERPL-MCNC: 3.3 MMOL/L — LOW (ref 3.5–5.3)
POTASSIUM SERPL-SCNC: 3.3 MMOL/L — LOW (ref 3.5–5.3)
PROT UR-MCNC: 10 — SIGNIFICANT CHANGE UP
RBC # BLD: 3.91 M/UL — LOW (ref 4.2–5.8)
RBC # BLD: 4.27 M/UL — SIGNIFICANT CHANGE UP (ref 4.2–5.8)
RBC # FLD: 15.4 % — HIGH (ref 10.3–14.5)
RBC # FLD: 15.9 % — HIGH (ref 10.3–14.5)
SODIUM SERPL-SCNC: 133 MMOL/L — LOW (ref 135–145)
SP GR SPEC: 1.01 — SIGNIFICANT CHANGE UP (ref 1–1.04)
UROBILINOGEN FLD QL: NORMAL — SIGNIFICANT CHANGE UP
WBC # BLD: 11.13 K/UL — HIGH (ref 3.8–10.5)
WBC # BLD: 14.91 K/UL — HIGH (ref 3.8–10.5)
WBC # FLD AUTO: 11.13 K/UL — HIGH (ref 3.8–10.5)
WBC # FLD AUTO: 14.91 K/UL — HIGH (ref 3.8–10.5)

## 2019-08-02 PROCEDURE — 71045 X-RAY EXAM CHEST 1 VIEW: CPT | Mod: 26

## 2019-08-02 RX ORDER — PIPERACILLIN AND TAZOBACTAM 4; .5 G/20ML; G/20ML
3.38 INJECTION, POWDER, LYOPHILIZED, FOR SOLUTION INTRAVENOUS EVERY 8 HOURS
Refills: 0 | Status: DISCONTINUED | OUTPATIENT
Start: 2019-08-02 | End: 2019-08-04

## 2019-08-02 RX ORDER — MAGNESIUM SULFATE 500 MG/ML
2 VIAL (ML) INJECTION ONCE
Refills: 0 | Status: COMPLETED | OUTPATIENT
Start: 2019-08-02 | End: 2019-08-02

## 2019-08-02 RX ORDER — SODIUM CHLORIDE 9 MG/ML
1000 INJECTION, SOLUTION INTRAVENOUS
Refills: 0 | Status: DISCONTINUED | OUTPATIENT
Start: 2019-08-02 | End: 2019-08-04

## 2019-08-02 RX ORDER — SODIUM CHLORIDE 9 MG/ML
1000 INJECTION INTRAMUSCULAR; INTRAVENOUS; SUBCUTANEOUS ONCE
Refills: 0 | Status: COMPLETED | OUTPATIENT
Start: 2019-08-02 | End: 2019-08-02

## 2019-08-02 RX ORDER — POTASSIUM CHLORIDE 20 MEQ
40 PACKET (EA) ORAL EVERY 4 HOURS
Refills: 0 | Status: COMPLETED | OUTPATIENT
Start: 2019-08-02 | End: 2019-08-02

## 2019-08-02 RX ADMIN — Medication 40 MILLIEQUIVALENT(S): at 13:00

## 2019-08-02 RX ADMIN — HEPARIN SODIUM 5000 UNIT(S): 5000 INJECTION INTRAVENOUS; SUBCUTANEOUS at 13:00

## 2019-08-02 RX ADMIN — Medication 40 MILLIEQUIVALENT(S): at 10:11

## 2019-08-02 RX ADMIN — Medication 650 MILLIGRAM(S): at 04:39

## 2019-08-02 RX ADMIN — PIPERACILLIN AND TAZOBACTAM 25 GRAM(S): 4; .5 INJECTION, POWDER, LYOPHILIZED, FOR SOLUTION INTRAVENOUS at 13:01

## 2019-08-02 RX ADMIN — PIPERACILLIN AND TAZOBACTAM 25 GRAM(S): 4; .5 INJECTION, POWDER, LYOPHILIZED, FOR SOLUTION INTRAVENOUS at 22:08

## 2019-08-02 RX ADMIN — OXYCODONE HYDROCHLORIDE 10 MILLIGRAM(S): 5 TABLET ORAL at 16:49

## 2019-08-02 RX ADMIN — Medication 650 MILLIGRAM(S): at 05:09

## 2019-08-02 RX ADMIN — Medication 25 MILLIGRAM(S): at 07:00

## 2019-08-02 RX ADMIN — Medication 650 MILLIGRAM(S): at 19:27

## 2019-08-02 RX ADMIN — HEPARIN SODIUM 5000 UNIT(S): 5000 INJECTION INTRAVENOUS; SUBCUTANEOUS at 07:00

## 2019-08-02 RX ADMIN — Medication 6: at 13:00

## 2019-08-02 RX ADMIN — Medication 25 MILLIGRAM(S): at 17:35

## 2019-08-02 RX ADMIN — SODIUM CHLORIDE 1000 MILLILITER(S): 9 INJECTION INTRAMUSCULAR; INTRAVENOUS; SUBCUTANEOUS at 22:07

## 2019-08-02 RX ADMIN — OXYCODONE HYDROCHLORIDE 10 MILLIGRAM(S): 5 TABLET ORAL at 23:32

## 2019-08-02 RX ADMIN — OXYCODONE HYDROCHLORIDE 10 MILLIGRAM(S): 5 TABLET ORAL at 23:02

## 2019-08-02 RX ADMIN — HEPARIN SODIUM 5000 UNIT(S): 5000 INJECTION INTRAVENOUS; SUBCUTANEOUS at 22:07

## 2019-08-02 RX ADMIN — Medication 650 MILLIGRAM(S): at 18:57

## 2019-08-02 RX ADMIN — OXYCODONE HYDROCHLORIDE 10 MILLIGRAM(S): 5 TABLET ORAL at 07:31

## 2019-08-02 RX ADMIN — Medication 50 GRAM(S): at 10:11

## 2019-08-02 RX ADMIN — OXYCODONE HYDROCHLORIDE 10 MILLIGRAM(S): 5 TABLET ORAL at 07:01

## 2019-08-02 RX ADMIN — OXYCODONE HYDROCHLORIDE 10 MILLIGRAM(S): 5 TABLET ORAL at 17:19

## 2019-08-02 NOTE — DIETITIAN INITIAL EVALUATION ADULT. - PERTINENT MEDS FT
MEDICATIONS  (STANDING):  dextrose 50% Injectable 12.5 Gram(s) IV Push once  dextrose 50% Injectable 25 Gram(s) IV Push once  dextrose 50% Injectable 25 Gram(s) IV Push once  heparin  Injectable 5000 Unit(s) SubCutaneous every 8 hours  insulin lispro (HumaLOG) corrective regimen sliding scale   SubCutaneous Before meals and at bedtime  metoprolol tartrate 25 milliGRAM(s) Oral two times a day  piperacillin/tazobactam IVPB.. 3.375 Gram(s) IV Intermittent every 8 hours    MEDICATIONS  (PRN):  acetaminophen   Tablet .. 650 milliGRAM(s) Oral every 6 hours PRN Mild Pain (1 - 3)  dextrose 40% Gel 15 Gram(s) Oral once PRN Blood Glucose LESS THAN 70 milliGRAM(s)/deciliter  glucagon  Injectable 1 milliGRAM(s) IntraMuscular once PRN Glucose LESS THAN 70 milligrams/deciliter  oxyCODONE    IR 5 milliGRAM(s) Oral every 4 hours PRN Moderate Pain (4 - 6)  oxyCODONE    IR 10 milliGRAM(s) Oral four times a day PRN Severe Pain (7 - 10)

## 2019-08-02 NOTE — DIETITIAN INITIAL EVALUATION ADULT. - OTHER INFO
RD visited with patient for LOS.  Patient eating lunch at time of encounter, completed ~50% of tray.  Patient s/p GI surgery c/b ileus, which resolved.  Patient was NPO ~4 days and received clear liquids ~2 days.  Has transitioned to PO diet and is tolerating without issue.  Stated he was following diabetic diet recommendations PTA, reported previous diet education. Fiber restricted diet reviewed at bedside, good understanding verbalized.  No reported food allergies.  Usual body weight 161 pounds most recently PTA.  Weight 8/15/18 154.3 pounds (nutrition note 8/24/19).  Current weight 160 pounds 7/26/19.  Patient requested for soft foods for ease of digestion, denied chewing or swallowing difficulties; discussed potential acceptance to oral nutritional supplements which patient was amenable (Glucerna).

## 2019-08-02 NOTE — PROGRESS NOTE ADULT - SUBJECTIVE AND OBJECTIVE BOX
GENERAL SURGERY PROGRESS NOTE      Subjective:  No acute events overnight.         Objective:    PE:  Gen: NAD  Resp: Nonlabored  Abd: soft, mildly distended. Minimally TTP. Prior ostomy site healing well. Minimal drainage from distal aspect of midline wound     Vital Signs Last 24 Hrs  T(C): 36.8 (02 Aug 2019 10:00), Max: 37.4 (01 Aug 2019 17:33)  T(F): 98.2 (02 Aug 2019 10:00), Max: 99.4 (01 Aug 2019 17:33)  HR: 83 (02 Aug 2019 10:00) (67 - 86)  BP: 155/78 (02 Aug 2019 10:00) (147/61 - 172/84)  BP(mean): --  RR: 18 (02 Aug 2019 10:00) (16 - 18)  SpO2: 100% (02 Aug 2019 10:00) (98% - 100%)    I&O's Detail    01 Aug 2019 07:01  -  02 Aug 2019 07:00  --------------------------------------------------------  IN:    IV PiggyBack: 850 mL    Oral Fluid: 640 mL  Total IN: 1490 mL    OUT:    Indwelling Catheter - Urethral: 3850 mL    Voided: 700 mL  Total OUT: 4550 mL    Total NET: -3060 mL      02 Aug 2019 07:01  -  02 Aug 2019 12:17  --------------------------------------------------------  IN:    Oral Fluid: 240 mL  Total IN: 240 mL    OUT:    Voided: 400 mL  Total OUT: 400 mL    Total NET: -160 mL          Daily     Daily     MEDICATIONS  (STANDING):  dextrose 50% Injectable 12.5 Gram(s) IV Push once  dextrose 50% Injectable 25 Gram(s) IV Push once  dextrose 50% Injectable 25 Gram(s) IV Push once  heparin  Injectable 5000 Unit(s) SubCutaneous every 8 hours  insulin lispro (HumaLOG) corrective regimen sliding scale   SubCutaneous Before meals and at bedtime  metoprolol tartrate 25 milliGRAM(s) Oral two times a day  piperacillin/tazobactam IVPB.. 3.375 Gram(s) IV Intermittent every 8 hours  potassium chloride    Tablet ER 40 milliEquivalent(s) Oral every 4 hours    MEDICATIONS  (PRN):  acetaminophen   Tablet .. 650 milliGRAM(s) Oral every 6 hours PRN Mild Pain (1 - 3)  dextrose 40% Gel 15 Gram(s) Oral once PRN Blood Glucose LESS THAN 70 milliGRAM(s)/deciliter  glucagon  Injectable 1 milliGRAM(s) IntraMuscular once PRN Glucose LESS THAN 70 milligrams/deciliter  oxyCODONE    IR 5 milliGRAM(s) Oral every 4 hours PRN Moderate Pain (4 - 6)  oxyCODONE    IR 10 milliGRAM(s) Oral four times a day PRN Severe Pain (7 - 10)      LABS:                        10.6   11.13 )-----------( 305      ( 02 Aug 2019 05:37 )             31.7     08-02    133<L>  |  97<L>  |  6<L>  ----------------------------<  143<H>  3.3<L>   |  23  |  0.75    Ca    8.7      02 Aug 2019 05:37  Phos  2.4     08-02  Mg     1.9     08-02            RADIOLOGY & ADDITIONAL STUDIES:

## 2019-08-02 NOTE — CHART NOTE - NSCHARTNOTEFT_GEN_A_CORE
NUTRITION SERVICES     Upon Nutritional Assessment by the Registered Dietitian your patient was determined to meet criteria/ has evidence of the following diagnosis/diagnoses:  [ ] Mild Protein Calorie Malnutrition   [X ] Moderate Protein Calorie Malnutrition   [ ] Severe Protein Calorie Malnutrition   [ ] Unspecified Protein Calorie Malnutrition   [ ] Underweight / BMI <19  [ ] Morbid Obesity / BMI >40    Findings as based on:  •  Comprehensive nutritional assessment and consultation    Please refer to Initial Dietitian Evaluation via documents section of [employer] EMR for further recommendations.    Karen Hill, MS, RDN, CDN

## 2019-08-02 NOTE — PROGRESS NOTE ADULT - ASSESSMENT
59M PMH DM, perforated appendicitis s/p open ileocectomy left in discontinuity (8/2018), end ostomy, washout and wound closure (8/2018), laparoscopy with RP exploration (12/2018), now s/p end ileostomy reversal, completion right hemicolectomy, POC c/b ileus now resolved    - Continue regular diet  - Encourage OOB/IS  - Pain control PRN  - Trend WBC, uptrended this AM to 11.13 (from 9.43), monitor for fever, will check UA  - PT recommending Rehab, however pt would like to be discharged home    URBANO Salmeron PGY3  [facility] surgery  [id number]

## 2019-08-02 NOTE — DIETITIAN INITIAL EVALUATION ADULT. - PERTINENT LABORATORY DATA
08-02 Na133 mmol/L<L> Glu 143 mg/dL<H> K+ 3.3 mmol/L<L> Cr  0.75 mg/dL BUN 6 mg/dL<L> 08-02 Phos 2.4 mg/dL<L> 07-27 [id number] 6.8 %<H>    CAPILLARY BLOOD GLUCOSE  POCT Blood Glucose.: 280 mg/dL (02 Aug 2019 12:48)  POCT Blood Glucose.: 138 mg/dL (02 Aug 2019 08:41)  POCT Blood Glucose.: 206 mg/dL (01 Aug 2019 21:54)  POCT Blood Glucose.: 127 mg/dL (01 Aug 2019 17:51)

## 2019-08-02 NOTE — DIETITIAN INITIAL EVALUATION ADULT. - DIET TYPE
fiber/residue restricted/Add Glucerna Therapeutic Nutrition 240mls 3x daily (660kcals, 30g protein)/consistent carbohydrate (no snacks)/soft

## 2019-08-03 LAB
ANION GAP SERPL CALC-SCNC: 12 MMO/L — SIGNIFICANT CHANGE UP (ref 7–14)
BUN SERPL-MCNC: 8 MG/DL — SIGNIFICANT CHANGE UP (ref 7–23)
C DIFF TOX GENS STL QL NAA+PROBE: SIGNIFICANT CHANGE UP
CALCIUM SERPL-MCNC: 8.8 MG/DL — SIGNIFICANT CHANGE UP (ref 8.4–10.5)
CHLORIDE SERPL-SCNC: 100 MMOL/L — SIGNIFICANT CHANGE UP (ref 98–107)
CO2 SERPL-SCNC: 26 MMOL/L — SIGNIFICANT CHANGE UP (ref 22–31)
CREAT SERPL-MCNC: 0.79 MG/DL — SIGNIFICANT CHANGE UP (ref 0.5–1.3)
GLUCOSE BLDC GLUCOMTR-MCNC: 134 MG/DL — HIGH (ref 70–99)
GLUCOSE BLDC GLUCOMTR-MCNC: 147 MG/DL — HIGH (ref 70–99)
GLUCOSE BLDC GLUCOMTR-MCNC: 211 MG/DL — HIGH (ref 70–99)
GLUCOSE BLDC GLUCOMTR-MCNC: 220 MG/DL — HIGH (ref 70–99)
GLUCOSE SERPL-MCNC: 130 MG/DL — HIGH (ref 70–99)
HCT VFR BLD CALC: 32.7 % — LOW (ref 39–50)
HGB BLD-MCNC: 10.7 G/DL — LOW (ref 13–17)
MAGNESIUM SERPL-MCNC: 2 MG/DL — SIGNIFICANT CHANGE UP (ref 1.6–2.6)
MCHC RBC-ENTMCNC: 27 PG — SIGNIFICANT CHANGE UP (ref 27–34)
MCHC RBC-ENTMCNC: 32.7 % — SIGNIFICANT CHANGE UP (ref 32–36)
MCV RBC AUTO: 82.4 FL — SIGNIFICANT CHANGE UP (ref 80–100)
NRBC # FLD: 0 K/UL — SIGNIFICANT CHANGE UP (ref 0–0)
PHOSPHATE SERPL-MCNC: 2.9 MG/DL — SIGNIFICANT CHANGE UP (ref 2.5–4.5)
PLATELET # BLD AUTO: 346 K/UL — SIGNIFICANT CHANGE UP (ref 150–400)
PMV BLD: 9.4 FL — SIGNIFICANT CHANGE UP (ref 7–13)
POTASSIUM SERPL-MCNC: 4.1 MMOL/L — SIGNIFICANT CHANGE UP (ref 3.5–5.3)
POTASSIUM SERPL-SCNC: 4.1 MMOL/L — SIGNIFICANT CHANGE UP (ref 3.5–5.3)
RBC # BLD: 3.97 M/UL — LOW (ref 4.2–5.8)
RBC # FLD: 16 % — HIGH (ref 10.3–14.5)
SODIUM SERPL-SCNC: 138 MMOL/L — SIGNIFICANT CHANGE UP (ref 135–145)
WBC # BLD: 10.26 K/UL — SIGNIFICANT CHANGE UP (ref 3.8–10.5)
WBC # FLD AUTO: 10.26 K/UL — SIGNIFICANT CHANGE UP (ref 3.8–10.5)

## 2019-08-03 PROCEDURE — 74177 CT ABD & PELVIS W/CONTRAST: CPT | Mod: 26

## 2019-08-03 RX ADMIN — OXYCODONE HYDROCHLORIDE 10 MILLIGRAM(S): 5 TABLET ORAL at 06:06

## 2019-08-03 RX ADMIN — Medication 650 MILLIGRAM(S): at 10:25

## 2019-08-03 RX ADMIN — Medication 650 MILLIGRAM(S): at 21:01

## 2019-08-03 RX ADMIN — HEPARIN SODIUM 5000 UNIT(S): 5000 INJECTION INTRAVENOUS; SUBCUTANEOUS at 13:38

## 2019-08-03 RX ADMIN — OXYCODONE HYDROCHLORIDE 10 MILLIGRAM(S): 5 TABLET ORAL at 06:36

## 2019-08-03 RX ADMIN — Medication 650 MILLIGRAM(S): at 02:06

## 2019-08-03 RX ADMIN — Medication 25 MILLIGRAM(S): at 05:57

## 2019-08-03 RX ADMIN — Medication 650 MILLIGRAM(S): at 10:55

## 2019-08-03 RX ADMIN — HEPARIN SODIUM 5000 UNIT(S): 5000 INJECTION INTRAVENOUS; SUBCUTANEOUS at 05:57

## 2019-08-03 RX ADMIN — Medication 650 MILLIGRAM(S): at 21:31

## 2019-08-03 RX ADMIN — OXYCODONE HYDROCHLORIDE 10 MILLIGRAM(S): 5 TABLET ORAL at 15:49

## 2019-08-03 RX ADMIN — OXYCODONE HYDROCHLORIDE 10 MILLIGRAM(S): 5 TABLET ORAL at 16:14

## 2019-08-03 RX ADMIN — HEPARIN SODIUM 5000 UNIT(S): 5000 INJECTION INTRAVENOUS; SUBCUTANEOUS at 21:02

## 2019-08-03 RX ADMIN — Medication 25 MILLIGRAM(S): at 18:02

## 2019-08-03 RX ADMIN — PIPERACILLIN AND TAZOBACTAM 25 GRAM(S): 4; .5 INJECTION, POWDER, LYOPHILIZED, FOR SOLUTION INTRAVENOUS at 21:01

## 2019-08-03 RX ADMIN — OXYCODONE HYDROCHLORIDE 10 MILLIGRAM(S): 5 TABLET ORAL at 23:32

## 2019-08-03 RX ADMIN — Medication 650 MILLIGRAM(S): at 02:36

## 2019-08-03 RX ADMIN — Medication 4: at 18:02

## 2019-08-03 RX ADMIN — PIPERACILLIN AND TAZOBACTAM 25 GRAM(S): 4; .5 INJECTION, POWDER, LYOPHILIZED, FOR SOLUTION INTRAVENOUS at 07:21

## 2019-08-03 RX ADMIN — Medication 4: at 13:03

## 2019-08-03 NOTE — PROGRESS NOTE ADULT - ASSESSMENT
59M PMH DM, perforated appendicitis s/p open ileocectomy left in discontinuity (8/2018), end ostomy, washout and wound closure (8/2018), laparoscopy with RP exploration (12/2018), now s/p end ileostomy reversal, completion right hemicolectomy, POC c/b ileus now resolved    -dvt ppx  -f/u c. diff results  -trend wbc in am  -f/u CT abdomen pelvis with oral and IV contrast

## 2019-08-03 NOTE — PROGRESS NOTE ADULT - SUBJECTIVE AND OBJECTIVE BOX
SURGERY DAILY PROGRESS NOTE:       SUBJECTIVE/ROS: Patient examined at bedside, complains of mild abdominal pain, +/+  Denies nausea, vomiting, chest pain, shortness of breath         MEDICATIONS  (STANDING):  dextrose 50% Injectable 12.5 Gram(s) IV Push once  dextrose 50% Injectable 25 Gram(s) IV Push once  dextrose 50% Injectable 25 Gram(s) IV Push once  heparin  Injectable 5000 Unit(s) SubCutaneous every 8 hours  insulin lispro (HumaLOG) corrective regimen sliding scale   SubCutaneous Before meals and at bedtime  lactated ringers. 1000 milliLiter(s) (75 mL/Hr) IV Continuous <Continuous>  metoprolol tartrate 25 milliGRAM(s) Oral two times a day  piperacillin/tazobactam IVPB.. 3.375 Gram(s) IV Intermittent every 8 hours    MEDICATIONS  (PRN):  acetaminophen   Tablet .. 650 milliGRAM(s) Oral every 6 hours PRN Mild Pain (1 - 3)  dextrose 40% Gel 15 Gram(s) Oral once PRN Blood Glucose LESS THAN 70 milliGRAM(s)/deciliter  glucagon  Injectable 1 milliGRAM(s) IntraMuscular once PRN Glucose LESS THAN 70 milligrams/deciliter  oxyCODONE    IR 5 milliGRAM(s) Oral every 4 hours PRN Moderate Pain (4 - 6)  oxyCODONE    IR 10 milliGRAM(s) Oral four times a day PRN Severe Pain (7 - 10)      OBJECTIVE:    Vital Signs Last 24 Hrs  T(C): 36.4 (03 Aug 2019 05:55), Max: 37.4 (02 Aug 2019 22:00)  T(F): 97.6 (03 Aug 2019 05:55), Max: 99.3 (02 Aug 2019 22:00)  HR: 77 (03 Aug 2019 05:55) (77 - 98)  BP: 157/97 (03 Aug 2019 05:55) (140/71 - 160/79)  BP(mean): --  RR: 18 (03 Aug 2019 05:55) (16 - 18)  SpO2: 98% (03 Aug 2019 05:55) (98% - 100%)        I&O's Detail    02 Aug 2019 07:01  -  03 Aug 2019 07:00  --------------------------------------------------------  IN:    Lactated Ringers IV Bolus: 1000 mL    lactated ringers.: 375 mL    Oral Fluid: 1200 mL  Total IN: 2575 mL    OUT:    Voided: 2450 mL  Total OUT: 2450 mL    Total NET: 125 mL          Daily     Daily Weight in k.6 (02 Aug 2019 17:17)    LABS:                        10.7   10.26 )-----------( 346      ( 03 Aug 2019 06:59 )             32.7     08-02    133<L>  |  97<L>  |  6<L>  ----------------------------<  143<H>  3.3<L>   |  23  |  0.75    Ca    8.7      02 Aug 2019 05:37  Phos  2.4     08-02  Mg     1.9     08-02        Urinalysis Basic - ( 02 Aug 2019 13:55 )    Color: YELLOW / Appearance: CLEAR / S.015 / pH: 6.0  Gluc: >1000 / Ketone: SMALL  / Bili: NEGATIVE / Urobili: NORMAL   Blood: NEGATIVE / Protein: 10 / Nitrite: NEGATIVE   Leuk Esterase: NEGATIVE / RBC: x / WBC x   Sq Epi: x / Non Sq Epi: x / Bacteria: x                PHYSICAL EXAM:  Constitutional: well developed, well nourished, NAD  Eyes: anicteric  ENMT: normal facies, symmetric  Neck: supple  Respiratory: CTA bilaterally  Cardiovascular: RRR  Gastrointestinal: abdomen soft, nontender, nondistended. No obvious masses. No peritonitis  Extremities: FROM, warm  Neurological: intact, non-focal  Skin: no gross lesions  Lymph Nodes: no gross adenopathy  Musculoskeletal: equal strength bilateral upper and lower extremities  Psychiatric: oriented x 3; appropriate SURGERY DAILY PROGRESS NOTE:       SUBJECTIVE/ROS: Patient examined at bedside, complains of mild abdominal pain, +/+, bowel movement was more solid  Denies nausea, vomiting, chest pain, shortness of breath         MEDICATIONS  (STANDING):  dextrose 50% Injectable 12.5 Gram(s) IV Push once  dextrose 50% Injectable 25 Gram(s) IV Push once  dextrose 50% Injectable 25 Gram(s) IV Push once  heparin  Injectable 5000 Unit(s) SubCutaneous every 8 hours  insulin lispro (HumaLOG) corrective regimen sliding scale   SubCutaneous Before meals and at bedtime  lactated ringers. 1000 milliLiter(s) (75 mL/Hr) IV Continuous <Continuous>  metoprolol tartrate 25 milliGRAM(s) Oral two times a day  piperacillin/tazobactam IVPB.. 3.375 Gram(s) IV Intermittent every 8 hours    MEDICATIONS  (PRN):  acetaminophen   Tablet .. 650 milliGRAM(s) Oral every 6 hours PRN Mild Pain (1 - 3)  dextrose 40% Gel 15 Gram(s) Oral once PRN Blood Glucose LESS THAN 70 milliGRAM(s)/deciliter  glucagon  Injectable 1 milliGRAM(s) IntraMuscular once PRN Glucose LESS THAN 70 milligrams/deciliter  oxyCODONE    IR 5 milliGRAM(s) Oral every 4 hours PRN Moderate Pain (4 - 6)  oxyCODONE    IR 10 milliGRAM(s) Oral four times a day PRN Severe Pain (7 - 10)      OBJECTIVE:    Vital Signs Last 24 Hrs  T(C): 36.4 (03 Aug 2019 05:55), Max: 37.4 (02 Aug 2019 22:00)  T(F): 97.6 (03 Aug 2019 05:55), Max: 99.3 (02 Aug 2019 22:00)  HR: 77 (03 Aug 2019 05:55) (77 - 98)  BP: 157/97 (03 Aug 2019 05:55) (140/71 - 160/79)  BP(mean): --  RR: 18 (03 Aug 2019 05:55) (16 - 18)  SpO2: 98% (03 Aug 2019 05:55) (98% - 100%)        I&O's Detail    02 Aug 2019 07:01  -  03 Aug 2019 07:00  --------------------------------------------------------  IN:    Lactated Ringers IV Bolus: 1000 mL    lactated ringers.: 375 mL    Oral Fluid: 1200 mL  Total IN: 2575 mL    OUT:    Voided: 2450 mL  Total OUT: 2450 mL    Total NET: 125 mL          Daily     Daily Weight in k.6 (02 Aug 2019 17:17)    LABS:                        10.7   10.26 )-----------( 346      ( 03 Aug 2019 06:59 )             32.7     08-02    133<L>  |  97<L>  |  6<L>  ----------------------------<  143<H>  3.3<L>   |  23  |  0.75    Ca    8.7      02 Aug 2019 05:37  Phos  2.4     08-02  Mg     1.9     08-02        Urinalysis Basic - ( 02 Aug 2019 13:55 )    Color: YELLOW / Appearance: CLEAR / S.015 / pH: 6.0  Gluc: >1000 / Ketone: SMALL  / Bili: NEGATIVE / Urobili: NORMAL   Blood: NEGATIVE / Protein: 10 / Nitrite: NEGATIVE   Leuk Esterase: NEGATIVE / RBC: x / WBC x   Sq Epi: x / Non Sq Epi: x / Bacteria: x                PHYSICAL EXAM:  Constitutional: well developed, well nourished, NAD  Eyes: anicteric  ENMT: normal facies, symmetric  Neck: supple  Respiratory: breathing comfortably  Gastrointestinal: abdomen soft, nontender, nondistended. Abdominal incision with minimal amounts of pus on inferior aspect of stable line  Extremities: FROM, warm  Neurological: intact, non-focal

## 2019-08-04 ENCOUNTER — TRANSCRIPTION ENCOUNTER (OUTPATIENT)
Age: 59
End: 2019-08-04

## 2019-08-04 VITALS
HEART RATE: 84 BPM | DIASTOLIC BLOOD PRESSURE: 71 MMHG | OXYGEN SATURATION: 98 % | SYSTOLIC BLOOD PRESSURE: 151 MMHG | RESPIRATION RATE: 16 BRPM | TEMPERATURE: 99 F

## 2019-08-04 DIAGNOSIS — K91.89 OTHER POSTPROCEDURAL COMPLICATIONS AND DISORDERS OF DIGESTIVE SYSTEM: ICD-10-CM

## 2019-08-04 LAB
ANION GAP SERPL CALC-SCNC: 12 MMO/L — SIGNIFICANT CHANGE UP (ref 7–14)
BUN SERPL-MCNC: 8 MG/DL — SIGNIFICANT CHANGE UP (ref 7–23)
CALCIUM SERPL-MCNC: 9.2 MG/DL — SIGNIFICANT CHANGE UP (ref 8.4–10.5)
CHLORIDE SERPL-SCNC: 96 MMOL/L — LOW (ref 98–107)
CO2 SERPL-SCNC: 27 MMOL/L — SIGNIFICANT CHANGE UP (ref 22–31)
CREAT SERPL-MCNC: 0.84 MG/DL — SIGNIFICANT CHANGE UP (ref 0.5–1.3)
GLUCOSE BLDC GLUCOMTR-MCNC: 151 MG/DL — HIGH (ref 70–99)
GLUCOSE BLDC GLUCOMTR-MCNC: 206 MG/DL — HIGH (ref 70–99)
GLUCOSE BLDC GLUCOMTR-MCNC: 230 MG/DL — HIGH (ref 70–99)
GLUCOSE SERPL-MCNC: 146 MG/DL — HIGH (ref 70–99)
HCT VFR BLD CALC: 30.9 % — LOW (ref 39–50)
HGB BLD-MCNC: 10.2 G/DL — LOW (ref 13–17)
MAGNESIUM SERPL-MCNC: 1.8 MG/DL — SIGNIFICANT CHANGE UP (ref 1.6–2.6)
MCHC RBC-ENTMCNC: 27.1 PG — SIGNIFICANT CHANGE UP (ref 27–34)
MCHC RBC-ENTMCNC: 33 % — SIGNIFICANT CHANGE UP (ref 32–36)
MCV RBC AUTO: 82.2 FL — SIGNIFICANT CHANGE UP (ref 80–100)
NRBC # FLD: 0 K/UL — SIGNIFICANT CHANGE UP (ref 0–0)
PHOSPHATE SERPL-MCNC: 3.4 MG/DL — SIGNIFICANT CHANGE UP (ref 2.5–4.5)
PLATELET # BLD AUTO: 359 K/UL — SIGNIFICANT CHANGE UP (ref 150–400)
PMV BLD: 9.1 FL — SIGNIFICANT CHANGE UP (ref 7–13)
POTASSIUM SERPL-MCNC: 4.1 MMOL/L — SIGNIFICANT CHANGE UP (ref 3.5–5.3)
POTASSIUM SERPL-SCNC: 4.1 MMOL/L — SIGNIFICANT CHANGE UP (ref 3.5–5.3)
RBC # BLD: 3.76 M/UL — LOW (ref 4.2–5.8)
RBC # FLD: 16.2 % — HIGH (ref 10.3–14.5)
SODIUM SERPL-SCNC: 135 MMOL/L — SIGNIFICANT CHANGE UP (ref 135–145)
WBC # BLD: 8.69 K/UL — SIGNIFICANT CHANGE UP (ref 3.8–10.5)
WBC # FLD AUTO: 8.69 K/UL — SIGNIFICANT CHANGE UP (ref 3.8–10.5)

## 2019-08-04 RX ORDER — OXYCODONE HYDROCHLORIDE 5 MG/1
1 TABLET ORAL
Qty: 16 | Refills: 0
Start: 2019-08-04 | End: 2019-08-07

## 2019-08-04 RX ORDER — MAGNESIUM SULFATE 500 MG/ML
2 VIAL (ML) INJECTION ONCE
Refills: 0 | Status: COMPLETED | OUTPATIENT
Start: 2019-08-04 | End: 2019-08-04

## 2019-08-04 RX ORDER — ACETAMINOPHEN 500 MG
2 TABLET ORAL
Qty: 0 | Refills: 0 | DISCHARGE
Start: 2019-08-04

## 2019-08-04 RX ADMIN — HEPARIN SODIUM 5000 UNIT(S): 5000 INJECTION INTRAVENOUS; SUBCUTANEOUS at 13:00

## 2019-08-04 RX ADMIN — Medication 2: at 09:00

## 2019-08-04 RX ADMIN — Medication 650 MILLIGRAM(S): at 05:12

## 2019-08-04 RX ADMIN — OXYCODONE HYDROCHLORIDE 10 MILLIGRAM(S): 5 TABLET ORAL at 12:09

## 2019-08-04 RX ADMIN — OXYCODONE HYDROCHLORIDE 10 MILLIGRAM(S): 5 TABLET ORAL at 00:02

## 2019-08-04 RX ADMIN — HEPARIN SODIUM 5000 UNIT(S): 5000 INJECTION INTRAVENOUS; SUBCUTANEOUS at 05:12

## 2019-08-04 RX ADMIN — PIPERACILLIN AND TAZOBACTAM 25 GRAM(S): 4; .5 INJECTION, POWDER, LYOPHILIZED, FOR SOLUTION INTRAVENOUS at 05:11

## 2019-08-04 RX ADMIN — Medication 25 MILLIGRAM(S): at 05:12

## 2019-08-04 RX ADMIN — Medication 4: at 13:00

## 2019-08-04 RX ADMIN — PIPERACILLIN AND TAZOBACTAM 25 GRAM(S): 4; .5 INJECTION, POWDER, LYOPHILIZED, FOR SOLUTION INTRAVENOUS at 13:01

## 2019-08-04 RX ADMIN — Medication 650 MILLIGRAM(S): at 05:42

## 2019-08-04 RX ADMIN — OXYCODONE HYDROCHLORIDE 10 MILLIGRAM(S): 5 TABLET ORAL at 13:00

## 2019-08-04 RX ADMIN — Medication 50 GRAM(S): at 13:00

## 2019-08-04 NOTE — DISCHARGE NOTE PROVIDER - NSDCFUADDAPPT_GEN_ALL_CORE_FT
Please follow-up with Dr. Pascual on Thursday June 8th. Please follow-up with Dr. Pascual on Thursday August 8th.

## 2019-08-04 NOTE — DISCHARGE NOTE PROVIDER - NSDCFUADDINST_GEN_ALL_CORE_FT
Dampen gauze with saline solution. Place on or into wound. Cover with dry gauze and paper tape. Do not removed penrose from incision site.

## 2019-08-04 NOTE — DISCHARGE NOTE NURSING/CASE MANAGEMENT/SOCIAL WORK - NSDCPNINST_GEN_ALL_CORE
Instructed to notify in case of severe pain, nausea and vomiting, redness or dicharge at the incision site

## 2019-08-04 NOTE — DISCHARGE NOTE PROVIDER - CARE PROVIDER_API CALL
Dusty Pascual (MD)  Surgery  [address], [address]  [place] [address]  Phone: [phone]  Fax: [phone]  Follow Up Time: 1 week

## 2019-08-04 NOTE — PROGRESS NOTE ADULT - SUBJECTIVE AND OBJECTIVE BOX
GENERAL SURGERY DAILY PROGRESS NOTE:       Subjective: Patient examined at bedside. No acute events overnight.   Denies N/V. Having BM and flatus   would like to go home instead of rehab. has family member who can take care of him     Objective:    Vital Signs Last 24 Hrs  T(C): 37.1 (04 Aug 2019 09:45), Max: 37.1 (04 Aug 2019 09:45)  T(F): 98.7 (04 Aug 2019 09:45), Max: 98.7 (04 Aug 2019 09:45)  HR: 79 (04 Aug 2019 09:45) (75 - 86)  BP: 145/61 (04 Aug 2019 09:45) (145/61 - 170/83)  BP(mean): --  RR: 18 (04 Aug 2019 09:45) (17 - 18)  SpO2: 98% (04 Aug 2019 09:45) (97% - 100%)    I&O's Detail    03 Aug 2019 07:01  -  04 Aug 2019 07:00  --------------------------------------------------------  IN:    IV PiggyBack: 100 mL    lactated ringers.: 1500 mL    Oral Fluid: 640 mL  Total IN: 2240 mL    OUT:    Voided: 3550 mL  Total OUT: 3550 mL    Total NET: -1310 mL        General: NAD, well-nourished  HEENT: Atraumatic, EOMI  Resp: Breathing comfortably on RA  CV: Normal sinus rhythm  Abd: soft, compressible, tender around former ileostomy site, mild distension on right side of abdomen, penrose in place   Ext: ROMIx4, motor strength intact x 4      LABS:                        10.2   8.69  )-----------( 359      ( 04 Aug 2019 06:40 )             30.9     08-04    135  |  96<L>  |  8   ----------------------------<  146<H>  4.1   |  27  |  0.84    Ca    9.2      04 Aug 2019 06:40  Phos  3.4     08-04  Mg     1.8     08-04        Urinalysis Basic - ( 02 Aug 2019 13:55 )    Color: YELLOW / Appearance: CLEAR / S.015 / pH: 6.0  Gluc: >1000 / Ketone: SMALL  / Bili: NEGATIVE / Urobili: NORMAL   Blood: NEGATIVE / Protein: 10 / Nitrite: NEGATIVE   Leuk Esterase: NEGATIVE / RBC: x / WBC x   Sq Epi: x / Non Sq Epi: x / Bacteria: x        RADIOLOGY & ADDITIONAL STUDIES:    MEDICATIONS  (STANDING):  dextrose 50% Injectable 12.5 Gram(s) IV Push once  dextrose 50% Injectable 25 Gram(s) IV Push once  dextrose 50% Injectable 25 Gram(s) IV Push once  heparin  Injectable 5000 Unit(s) SubCutaneous every 8 hours  insulin lispro (HumaLOG) corrective regimen sliding scale   SubCutaneous Before meals and at bedtime  metoprolol tartrate 25 milliGRAM(s) Oral two times a day  piperacillin/tazobactam IVPB.. 3.375 Gram(s) IV Intermittent every 8 hours    MEDICATIONS  (PRN):  acetaminophen   Tablet .. 650 milliGRAM(s) Oral every 6 hours PRN Mild Pain (1 - 3)  dextrose 40% Gel 15 Gram(s) Oral once PRN Blood Glucose LESS THAN 70 milliGRAM(s)/deciliter  glucagon  Injectable 1 milliGRAM(s) IntraMuscular once PRN Glucose LESS THAN 70 milligrams/deciliter  oxyCODONE    IR 5 milliGRAM(s) Oral every 4 hours PRN Moderate Pain (4 - 6)  oxyCODONE    IR 10 milliGRAM(s) Oral four times a day PRN Severe Pain (7 - 10)

## 2019-08-04 NOTE — DISCHARGE NOTE PROVIDER - HOSPITAL COURSE
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 Patient has also had multiple DVT's, since September in left arm and right leg, and currently in right arm, is on Eliquis. Seen by vascular surgeon who told  patient the clot is resolved.  Underwent video assisted retroperitoneal exploration, appendectomy, possible exploratory laparotomy and ileostomy in Nov. 2018. Presents today (7-12-19) for open ileostomy reversal with possible colon resection laparotomy on 7-26-19        Patient underwent a right hemicolectomy with ileostomy reversal on 7/26. Operative findings were the following: Open reversal of end ileostomy, completion right hemicolectomy performed. Previous midline laparotomy scar re-incised. Adhesiolysis performed to mobilize small bowel and remaining right hemicolon. Ileostomy taken down. Hepatic flexure mobilized. Side-to-side, anti-peristaltic ileocolic anastomosis performed, mesenteric defect closed. There was no evidence of an ongoing intra-abdominal infectious process.        Patients hospital course was complicated by a intestinal ileus and subsequent emesis,  for which he was made NPO and an NGT was placed.         Diet was advanced and NGT was removed once patient demonstrated a return of bowel function. Patient was evaluated by PT who recommended rehab. Patient declined rehab. Stated he wanted to return home and had people at home who could take care of him.         Patient was deemed medical stable for discharge upon demonstrating bowel function, diet tolerance, pain control, and adequate physical activity.         Patient should follow-up with Dr. 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 Patient has also had multiple DVT's, since September in left arm and right leg, and currently in right arm, is on Eliquis. Seen by vascular surgeon who told  patient the clot is resolved.  Underwent video assisted retroperitoneal exploration, appendectomy, possible exploratory laparotomy and ileostomy in Nov. 2018. Presents today (7-12-19) for open ileostomy reversal with possible colon resection laparotomy on 7-26-19        Patient underwent a right hemicolectomy with ileostomy reversal on 7/26. Operative findings were the following: Open reversal of end ileostomy, completion right hemicolectomy performed. Previous midline laparotomy scar re-incised. Adhesiolysis performed to mobilize small bowel and remaining right hemicolon. Ileostomy taken down. Hepatic flexure mobilized. Side-to-side, anti-peristaltic ileocolic anastomosis performed, mesenteric defect closed. There was no evidence of an ongoing intra-abdominal infectious process.        Patients hospital course was complicated by a intestinal ileus and subsequent emesis,  for which he was made NPO and an NGT was placed.         Diet was advanced and NGT was removed once patient demonstrated a return of bowel function. Patient was evaluated by PT who recommended rehab. Patient declined rehab. Stated he wanted to return home and had people at home who could take care of him.         Patient was deemed medical stable for discharge upon demonstrating bowel function, diet tolerance, pain control, and adequate physical activity.         Patient should follow-up with Dr. Pascual on Thursday August 8th, 2019

## 2019-08-04 NOTE — DISCHARGE NOTE NURSING/CASE MANAGEMENT/SOCIAL WORK - NSDCDPATPORTLINK_GEN_ALL_CORE
You can access the [facility] Patient Portal, offered by [facility], by registering with the following website: [email or web address]

## 2019-08-04 NOTE — DISCHARGE NOTE PROVIDER - NSDCCPCAREPLAN_GEN_ALL_CORE_FT
PRINCIPAL DISCHARGE DIAGNOSIS  Diagnosis: Status post reversal of ileostomy  Assessment and Plan of Treatment:       SECONDARY DISCHARGE DIAGNOSES  Diagnosis: Perforated appendicitis  Assessment and Plan of Treatment:

## 2019-08-04 NOTE — PROGRESS NOTE ADULT - ASSESSMENT
59M PMH DM, perforated appendicitis s/p open ileocectomy left in discontinuity (8/2018), end ostomy, washout and wound closure (8/2018), laparoscopy with RP exploration (12/2018), now s/p end ileostomy reversal, completion right hemicolectomy, POC c/b ileus now resolved    - Continue regular diet  - Encourage OOB/IS  - Pain control PRN  - CT abd pelvis on 8/3  Trace ascites in the right lower quadrant,improved since 07/29/2019.   Additionally, there is an ill-defined 1.5 cm hypoattenuating focus at the   level of the right iliopsoas, possibly an early intramuscular collection   versus loculated intraperitoneal ascites anterior to the iliopsoas.  - PT recommending Rehab, however pt would like to be discharged home  - discharge home today

## 2019-08-04 NOTE — DISCHARGE NOTE PROVIDER - NSDCCPTREATMENT_GEN_ALL_CORE_FT
PRINCIPAL PROCEDURE  Procedure: Hemicolectomy, right  Findings and Treatment: WOUND CARE:  Dampen gauze with saline solution. Place on or into wound. Cover with dry gauze and paper tape. Do not removed penrose from incision site.   BATHING: Please do not submerge wound underwater. You may shower and/or sponge bathe.  ACTIVITY: No heavy lifting anything more than 10-15lbs or straining. Otherwise, you may return to your usual level of physical activity. If you are taking narcotic pain medication (such as Percocet), do NOT drive a car, operate machinery or make important decisions.  DIET: regular diet  NOTIFY YOUR SURGEON IF: You have any bleeding that does not stop, any pus draining from your wound, any fever (over 100.4 F) or chills, persistent nausea/vomiting with inability to tolerate food or liquids, persistent diarrhea, or if your pain is not controlled on your discharge pain medications.  FOLLOW-UP:  1. Please call to make a follow-up appointment within one week of discharge   2. Please follow up with your primary care physician in one week regarding your hospitalization.

## 2019-08-04 NOTE — DISCHARGE NOTE NURSING/CASE MANAGEMENT/SOCIAL WORK - NSSCNAMETXT_GEN_ALL_CORE
[facility] . Initial visit will be day after discharge home. A nurse will call prior to home visit

## 2019-08-06 LAB — SURGICAL PATHOLOGY STUDY: SIGNIFICANT CHANGE UP

## 2019-08-07 PROBLEM — I82.409 ACUTE EMBOLISM AND THROMBOSIS OF UNSPECIFIED DEEP VEINS OF UNSPECIFIED LOWER EXTREMITY: Chronic | Status: ACTIVE | Noted: 2018-11-16

## 2019-08-07 PROBLEM — E11.9 TYPE 2 DIABETES MELLITUS WITHOUT COMPLICATIONS: Chronic | Status: ACTIVE | Noted: 2018-08-11

## 2019-08-14 DIAGNOSIS — Z71.89 OTHER SPECIFIED COUNSELING: ICD-10-CM

## 2019-08-20 ENCOUNTER — APPOINTMENT (OUTPATIENT)
Dept: COLORECTAL SURGERY | Facility: CLINIC | Age: 59
End: 2019-08-20
Payer: MEDICAID

## 2019-08-20 VITALS
RESPIRATION RATE: 14 BRPM | OXYGEN SATURATION: 99 % | TEMPERATURE: 98 F | DIASTOLIC BLOOD PRESSURE: 68 MMHG | HEART RATE: 88 BPM | SYSTOLIC BLOOD PRESSURE: 130 MMHG

## 2019-08-20 DIAGNOSIS — Z98.890 OTHER SPECIFIED POSTPROCEDURAL STATES: ICD-10-CM

## 2019-08-20 PROCEDURE — 99024 POSTOP FOLLOW-UP VISIT: CPT

## 2019-08-20 NOTE — HISTORY OF PRESENT ILLNESS
[FreeTextEntry1] : Status post reversal of an ileostomy with incisional hernia repair. Patient progressing well. Tolerating diet. Denies fevers or chills. Improving pain. Normal bowel movements

## 2019-08-29 ENCOUNTER — APPOINTMENT (OUTPATIENT)
Dept: COLORECTAL SURGERY | Facility: CLINIC | Age: 59
End: 2019-08-29
Payer: MEDICAID

## 2019-08-29 PROCEDURE — 99024 POSTOP FOLLOW-UP VISIT: CPT

## 2019-08-29 NOTE — ASSESSMENT
[FreeTextEntry1] : Status post reversal of an ileostomy\par -Patient progressing well\par -High fiber diet\par -Dry dressing to wound\par -Follow up in 4 weeks for wound check

## 2019-08-29 NOTE — HISTORY OF PRESENT ILLNESS
[FreeTextEntry1] : Status post reversal of end ileostomy. Patient progressing well. Tolerating diet. Normal bowel movement. Denies pain

## 2019-10-18 NOTE — DISCHARGE NOTE ADULT - PROVIDER RX CONTACT NUMBER
Pt informed US that shows likely hematoma of the right testicle from the trauma but no other abnormality. However radiology recommends repeat Us to make sure resolution. Repeat US in 6 weeks.  Pt given phone number for scheduling and he will call to set up.   [phone]

## 2019-10-29 ENCOUNTER — APPOINTMENT (OUTPATIENT)
Dept: COLORECTAL SURGERY | Facility: CLINIC | Age: 59
End: 2019-10-29
Payer: MEDICAID

## 2019-10-29 DIAGNOSIS — K35.32 ACUTE APPENDICITIS W/ PERFORATION AND LOCALIZED PERITONITIS, W/O ABSCESS: ICD-10-CM

## 2019-10-29 PROCEDURE — 99212 OFFICE O/P EST SF 10 MIN: CPT

## 2019-10-29 NOTE — ASSESSMENT
[FreeTextEntry1] : Perforated cecum status post end ileostomy reversal\par -Patient progressing well\par -Diet as tolerated\par -Follow up in 3 months for reevaluation\par -All questions answered

## 2019-10-29 NOTE — HISTORY OF PRESENT ILLNESS
[FreeTextEntry1] : Status post reversal of end ileostomy. Patient progressing well. Intermittent abdominal pain self resolving. Denies fevers or chills tolerating diet loose bowel movements 2-3 times per day. No fevers or chills. Some swelling on right side of abdomen no prolapse noted.

## 2019-11-04 NOTE — PROGRESS NOTE ADULT - PROBLEM SELECTOR PROBLEM 4
"   11/04/19 1500   General Information   Type of Visit Initial OP Ortho PT Evaluation   Start of Care Date 11/04/19   Referring Physician BASHIR Willett   Patient/Family Goals Statement back to \"normal\", back to work   Orders Evaluate and Treat   Orders Comment s/p L TKA   Date of Order 10/28/19   Certification Required? No  (PO)   Medical Diagnosis Primary osteoarthritis of left knee   Surgical/Medical history reviewed Yes   Precautions/Limitations no known precautions/limitations   Body Part(s)   Body Part(s) Knee   Presentation and Etiology   Pertinent history of current problem (include personal factors and/or comorbidities that impact the POC) Pt s/p TKA on 9/30/19, had home care PT interim to today, was discharged from home care last week. Noting having good days and bad, utilizing SEC for ambulation. Next follow up with MD is next Thursday.    Impairments A. Pain;C. Swelling;F. Decreased strength and endurance;E. Decreased flexibility   Functional Limitations perform activities of daily living;perform required work activities;perform desired leisure / sports activities   Symptom Location left knee   How/Where did it occur   (post surgical)   Onset date of current episode/exacerbation 09/30/19   Pain rating (0-10 point scale) Worst (/10);Best (/10)   Best (/10) 1   Worst (/10) 9   Pain quality C. Aching;E. Shooting;D. Burning   Frequency of pain/symptoms B. Intermittent   Pain/symptoms are: The same all the time   Pain/symptoms exacerbated by A. Sitting;I. Bending;L. Work tasks   Pain/symptoms eased by B. Walking;H. Cold   Progression of symptoms since onset: Improved   Current Level of Function   Patient role/employment history A. Employed   Employment Comments , standing 10 hours, bending, twisting, crouching   [place] [facility] [place]   Home/community accessibility 5 into house, living areas on main level, laundry in basement   Current equipment-Gait/Locomotion Standard cane   Fall Risk " Screen   Have you fallen 2 or more times in the past year? No   Have you fallen and had an injury in the past year? No   Is patient a fall risk? No   Abuse Screen (yes response referral indicated)   Feels Unsafe at Home or Work/School no   Feels Threatened by Someone no   Does Anyone Try to Keep You From Having Contact with Others or Doing Things Outside Your Home? no   Physical Signs of Abuse Present no   Knee Objective Findings   Side (if bilateral, select both right and left) Left   Observation Step to pattern on stairs, up with R, descending leading with L   Integumentary  incision appears intact, dry, no redness. good mobility   Gait/Locomotion Ambulation decreased L knee ROM, demonstrating lack of terminal knee extension   Balance/Proprioception (Single Leg Stance) 8 s on L, 10 s R   Palpation Sensation- notes numbness/burning inferior to patellar tendon   Accessory Motion/Joint Mobility mild hypomobility patella   Left Knee Extension AROM lacking 5   Left Knee Flexion AROM 91   Left Knee Flexion Strength 4/5, painful   Left Knee Extension Strength 4+/5   Left Hip Abduction Strength 4/5   Left Quad Set Strength good   L VMO Strength good   Planned Therapy Interventions   Planned Therapy Interventions balance training;gait training;joint mobilization;manual therapy;neuromuscular re-education;ROM;strengthening;stretching   Clinical Impression   Criteria for Skilled Therapeutic Interventions Met yes, treatment indicated   PT Diagnosis decreased strength and ROM s/p TKA   Influenced by the following impairments decreased strength and ROM, impaired balance and gait   Functional limitations due to impairments decreased participation squatting, stairs, standing and walking, work duties   Clinical Presentation Stable/Uncomplicated   Clinical Presentation Rationale motivated patient, few comorbidities   Clinical Decision Making (Complexity) Low complexity   Therapy Frequency 2 times/Week   Predicted Duration of Therapy  Intervention (days/wks) 2x/week x 4 weeks, 1 x/week x 4 weeks   Risk & Benefits of therapy have been explained Yes   Patient, Family & other staff in agreement with plan of care Yes   Education Assessment   Preferred Learning Style Pictures/video;Demonstration   Barriers to Learning No barriers   ORTHO GOALS   PT Ortho Eval Goals 1;2;3   Ortho Goal 1   Goal Identifier 1   Goal Description Patient will demonstrate >/= 110* knee flexion for squatting for work   Target Date 12/30/19   Ortho Goal 2   Goal Identifier 2   Goal Description Patient will demonstrate ambulation x 30 minutes wtih no AD for return to work duties   Target Date 12/30/19   Ortho Goal 3   Goal Identifier 3   Goal Description Patient will improve LEFS to > 60/80 for improved tolerance to functional activity   Target Date 12/30/19   Total Evaluation Time   PT Fabien, Low Complexity Minutes (36078) 10      Hyponatremia

## 2020-02-14 ENCOUNTER — APPOINTMENT (OUTPATIENT)
Dept: CT IMAGING | Facility: CLINIC | Age: 60
End: 2020-02-14

## 2020-02-25 NOTE — PHYSICAL THERAPY INITIAL EVALUATION ADULT - PATIENT PROFILE REVIEW, REHAB EVAL
Tinea Capitis   CUIDADO AMBULATORIO:   La tiña de la chino  es kathy infección en el cuero cabelludo causada por un hongo  La tiña de la chino también se conoce jie culebrilla del cuero cabelludo  Es común [employer]  La tiña de la chino es kathy infección en el cuero cabelludo causado por un hongo  La tiña de la chino también se conoce jie culebrilla del cuero cabelludo  Es común [employer]  Los signos y síntomas más comunes incluyen los siguientes:   · Pérdida de sophie    · Piel escamosa, levantada    · Picazón en el cuero cabelludo    · Puntos negros en orr cuero cabelludo debido a sophie partido    · Chichones pequeños y redondos  Pregúntele a orr médico qué vitaminas y minerales son adecuados para usted  · Usted tiene fiebre  · Orr infección continúa propagándose después de 7 días de tratamiento  · Otras áreas de orr cuero cabelludo se enrojecen, están tibias, sensibles, e hinchadas  · Usted tiene preguntas o inquietudes acerca de orr condición o cuidado  Tratamiento:  La tiña de la chino generalmente se trata con un medicamento antifúngico  Se administra en forma de píldora  [place] el medicamento hasta que lo termine aunque orr cuero cabelludo aparente estar mejor  Orr médico también puede recomendar kathy crema antifúngica  Prevenga la propagación de la tiña de la chino:   · Use champú antifúngico jie se le haya indicado  Use kathy toalla limpia cada vez que se lave el sophie  No rasque orr cuero cabelludo  Petrey puede causar que se propague la infección a otras áreas de orr cuero cabelludo  Si orr neville tiene [place], él puede ir a la escuela tan pronto comience a usar el medicamento y el champú regularmente  · No comparta elementos de uso personal   No comparta toallas, cepillos, peidavid o accesorios de sophie  · Lave los objetos en [place]  [address] , vestidos y ropa de cama en [place]  Use detergente   [address] [facility] jessica Khan hebillas y activity order- ambulate as tolerated , OK to perform PT evaluation as per RN Kellen/yes sombreros en [place] enjabonada  · Mantenga todd piel, sophie y uñas limpios y secos  B[place] todos los días  Lávese las mark frecuentemente  · Si tiene alguna mascota infectada, marium taylor por un veterinario  Un parche de pelaje perdida es un signo de infección en [employer]  Use guantes y [employer] a un animal infectado  Siempre lávese las mark después de manipular al animal  Pase la aspiradora en todd casa para retirar el pelaje o las escamas de la piel con infección  [address] y la ropa de cama con las que el animal entra en contacto  Acuda a norma consultas de control con todd médico según le indicaron  Anote norma preguntas para que se acuerde de hacerlas jace norma visitas  © 2017 [address] Information is for End User's use only and may not be sold, redistributed or otherwise used for commercial purposes  All illustrations and images included in CareNotes® are the copyrighted property of A D A M , Inc  or John Church  Esta información es sólo para uso en educación  Todd intención no es darle un consejo médico sobre enfermedades o tratamientos  Colsulte con todd Cristina Bernarda farmacéutico antes de seguir cualquier régimen médico para saber si es seguro y efectivo para usted  Tinea Capitis   AMBULATORY CARE:   Tinea capitis  is a scalp infection caused by a fungus  Tinea capitis is also called ringworm of the scalp or head  It is most common among children  Tinea capitis is a scalp infection caused by a fungus  Tinea capitis is also called ringworm of the scalp or head  It is most common among children  Common signs and symptoms include the following:   · Hair loss    · Raised, scaly skin    · Itchy scalp    · Black dots on your scalp from broken hairs    · Small, round bumps  Contact your healthcare provider if:   · You have a fever  · Your infection continues to spread after 7 days of treatment      · Other areas of your scalp become red, warm, tender, and swollen  · You have questions or concerns about your condition or care  Treatment:  Tinea capitis is usually treated with antifungal medicine  It is given as a pill  Take the medicine until it is gone, even if your scalp looks better sooner  Your healthcare provider may also recommend an antifungal cream    Prevent the spread of tinea capitis:   · Use antifungal shampoo as directed  Use a clean towel each time you wash your hair  Do not scratch your scalp  This may cause the infection to spread to other areas of your scalp  If your child has an infection, he can go to school once he is using medicine and shampoo regularly  · Do not share personal items  Do not share towels, brushes, ramirez, or hair accessories  · Wash items in hot water  Wash all towels, clothes, and bedding in hot water  Use laundry soap  Wash brushes and ramirez, barrettes, and hats in hot, soapy water  · Keep your skin, hair, and nails clean and dry  Bathe every day  Wash your hands often  · Have infected pets treated by a   A patch of missing fur is a sign of infection in a pet  Wear gloves and long sleeves if you handle an infected animal  Always wash your hands after handling the animal  Vacuum your home to remove infected fur or skin flakes  Disinfect surfaces and bedding that your animal comes into contact with  Follow up with your healthcare provider as directed:  Write down your questions so you remember to ask them during your visits  © 2017 [address] Information is for End User's use only and may not be sold, redistributed or otherwise used for commercial purposes  All illustrations and images included in CareNotes® are the copyrighted property of [employer]  or John Church  The above information is an  only  It is not intended as medical advice for individual conditions or treatments   Talk to your doctor, nurse or pharmacist before following any medical regimen to see if it is safe and effective for you

## 2020-03-13 ENCOUNTER — APPOINTMENT (OUTPATIENT)
Dept: CT IMAGING | Facility: CLINIC | Age: 60
End: 2020-03-13

## 2020-11-02 NOTE — ED ADULT NURSE NOTE - CAS DISCH BELONGINGS RETURNED
Weight 344lb. , down 4lbs from 10/28/2020., breath sounds clear , diminished bilat, 1-2=+ pitting edema noted BLE. IV Lasix given after labs drawn.
Not applicable

## 2020-11-14 NOTE — BRIEF OPERATIVE NOTE - PRE-OP
<<-----Click on this checkbox to enter Pre-Op Dx
<<-----Click on this checkbox to enter Pre-Op Dx
Speaking Coherently

## 2021-03-22 ENCOUNTER — TRANSCRIPTION ENCOUNTER (OUTPATIENT)
Age: 61
End: 2021-03-22

## 2021-04-28 NOTE — ED ADULT TRIAGE NOTE - PAIN RATING/NUMBER SCALE (0-10): REST
MEDICATIONS  (STANDING):  atorvastatin 80 milliGRAM(s) Oral at bedtime  calcium acetate 667 milliGRAM(s) Oral three times a day with meals  chlorhexidine 4% Liquid 1 Application(s) Topical <User Schedule>  dextrose 40% Gel 15 Gram(s) Oral once  dextrose 5%. 1000 milliLiter(s) (50 mL/Hr) IV Continuous <Continuous>  dextrose 5%. 1000 milliLiter(s) (100 mL/Hr) IV Continuous <Continuous>  dextrose 50% Injectable 25 Gram(s) IV Push once  dextrose 50% Injectable 12.5 Gram(s) IV Push once  dextrose 50% Injectable 25 Gram(s) IV Push once  DOBUTamine Infusion 2.5 MICROgram(s)/kG/Min (5.7 mL/Hr) IV Continuous <Continuous>  glucagon  Injectable 1 milliGRAM(s) IntraMuscular once  heparin   Injectable 5000 Unit(s) SubCutaneous every 8 hours  insulin lispro (ADMELOG) corrective regimen sliding scale   SubCutaneous three times a day before meals  insulin lispro (ADMELOG) corrective regimen sliding scale   SubCutaneous at bedtime  norepinephrine Infusion 0.05 MICROgram(s)/kG/Min (7.2 mL/Hr) IV Continuous <Continuous> 9

## 2021-07-02 NOTE — ED ADULT NURSE NOTE - CAS TRG GENERAL AIRWAY, MLM
Patent Simponi Counseling:  I discussed with the patient the risks of golimumab including but not limited to myelosuppression, immunosuppression, autoimmune hepatitis, demyelinating diseases, lymphoma, and serious infections.  The patient understands that monitoring is required including a PPD at baseline and must alert us or the primary physician if symptoms of infection or other concerning signs are noted.

## 2022-01-20 NOTE — PROGRESS NOTE ADULT - ASSESSMENT
H&P reviewed  After examining the patient I find no changes in the patients condition since the H&P had been written      Vitals:    01/20/22 0854   BP: 110/80   Pulse: 65   Resp: 18   Temp:    SpO2: 97% 59yo M with PMH of DM, HTN, and ileocecectomy for perforated cecum requiring washout and ileostomy for fecal peritonitis with multiple admissions for intra-abdominal collections s/p IR drainage p/w abd pain, found to have worsening R iliopsoas abscess.    Per discussion w surgery, eventual eval for ?fistula given recurrent abscesses once infection and inflammation improves.    Had IR drain placed 11/17    - Abscess culture- E.coli, Proteus mirabilis- sensitivities reviewed  - d/c vancomycin  - c/w meropenem 1 g iv q8h  - start amikacin 800 mg IV q daily  - pt will need a PICC line and will likely need to c/w abx until definitive surgery occurs which, per surgery team, will be at end of december; pt will need repeat ct a/p prior to surgery  - follow up BCx x 2- neg to date 57yo M with PMH of DM, HTN, and ileocecectomy for perforated cecum requiring washout and ileostomy for fecal peritonitis with multiple admissions for intra-abdominal collections s/p IR drainage p/w abd pain, found to have worsening R iliopsoas abscess.    Per discussion w surgery, eventual eval for ?fistula given recurrent abscesses once infection and inflammation improves.    Had IR drain placed 11/17    - Abscess culture- E.coli, Proteus mirabilis- sensitivities reviewed  - d/c vancomycin  - c/w meropenem 1 g iv q8h  - start amikacin 800 mg IV q daily  - pt will need a PICC line and will likely need to c/w abx until definitive surgery occurs which, per surgery team, will be at end of december; pt will need repeat ct a/p prior to surgery  - will need weekly labs: cbc, cmp, amikacin trough sent to ID office Dr. Cherry- Fax: [phone]  - Needs outpt ID follow up after repeat ct a/p in 3-4 weeks  - follow up BCx x 2- neg to date

## 2022-01-27 NOTE — PLAN
[FreeTextEntry1] : The patient has already been referred from orthopedist to neurologist Dr Tierney Argueta ([phone]) but has not yet scheduled an appointment.\par 
Show Aperture Variable?: Yes
Detail Level: Detailed
Post-Care Instructions: I reviewed with the patient in detail post-care instructions. Patient is to wear sunprotection, and avoid picking at any of the treated lesions. Pt may apply Vaseline to crusted or scabbing areas.
Render Note In Bullet Format When Appropriate: No
Duration Of Freeze Thaw-Cycle (Seconds): 0
Consent: The patient's consent was obtained including but not limited to risks of crusting, scabbing, blistering, scarring, darker or lighter pigmentary change, recurrence, incomplete removal and infection.

## 2022-04-21 NOTE — PATIENT PROFILE ADULT - NSPROHMDIABETBLDGLCTST_GEN_A_NUR
Myopia is a result of long eyes. It is commonly referred to as near-sightedness. Seeing clearly in the distance is the main challenge.    Presbyopia is the diagnosis. Presbyopia is an age-related condition where the eye's crystalline lens doesn't change shape as easily as it once did.      Eyeglass prescription given.    The affects of the dilating drops last for 4- 6 hours.  You will be more sensitive to light and vision will be blurry up close.  Do not drive if you do not feel comfortable.  Mydriatic sunglasses were given if needed.    Recommend annual eye exams.    Lorie Ayoub O.D.  [facility]   [address] [address], [place] [address] [phone]    before meals and at bedtime

## 2022-11-02 NOTE — PATIENT PROFILE ADULT. - AS SC BRADEN FRICTION
(2) potential problem Eucrisa Counseling: Patient may experience a mild burning sensation during topical application. Eucrisa is not approved in children less than 2 years of age.

## 2022-11-16 NOTE — CHART NOTE - NSCHARTNOTESELECT_GEN_ALL_CORE
Called patient no answer LVM to call us back to schedule appointment with Dr. Gill on Friday.    Event Note

## 2023-01-13 NOTE — PROGRESS NOTE ADULT - ASSESSMENT
ASSESSMENT  59y male s/p  open ileostomy reversal and R hemicolectomy.    PLAN  - IVF   - Diet: NPO  - Pain control with PCEA  - Continue home medications  - OOB, ambulate as tolerated  - continue chemical VTE ppx  - Will discuss with attending. ASSESSMENT  59y male s/p  open ileostomy reversal and R hemicolectomy.    PLAN  - IVF   - Diet: CLD  - Pain control with PCEA  - Continue Coles Catheter  - Continue home medications  - OOB, ambulate as tolerated  - continue chemical VTE ppx Strength is at least 3/5 through extremities/grossly assessed due to

## 2023-03-27 NOTE — DISCHARGE NOTE PROVIDER - NSCORESITESY/N_GEN_A_CORE_RD
Return to your regular way of eating.  Resume normal activity as tolerated, but no heavy lifting or strenuous activity for 6 weeks.  No driving for next 2 weeks and/or while on narcotic pain medication.  Complete vaginal rest, no tampons, no douching, no tub bathing, no sexual activities for 6 weeks unless otherwise instructed by your doctor.  Call your doctor with any signs and symptoms of infection such as fever, chills, nausea or vomiting.  Call your doctor with redness or swelling at the incision site, fluid leakage or wound separation.  Call your doctor if you're unable to tolerate food or have difficulty urinating.  Call your doctor if you have pain that is not relieved by your prescribed medications.  Notify your doctor with any other concerns.  Follow up with Dr. Jo on 4/19/23 at 10:00a
No

## 2023-03-30 NOTE — H&P PST ADULT - AIRWAY
normal

## 2023-04-07 NOTE — ED ADULT NURSE NOTE - CAS EDN DISCHARGE INTERVENTIONS
One week after surgery stop the fenofibrate and rosuvastatin  Stay off of them until followup visit with me  Arm band on/IV intact

## 2023-08-03 NOTE — ED ADULT NURSE NOTE - CAS EDN DISCHARGE INTERVENTIONS
Render Risk Assessment In Note?: no Additional Notes: Outside path showed superficial basal from 2 yrs ago. Purpose of rebiopsy is to ensure correct management since lesion is reasonably large. If still superficial, could consider ED&C vs. Imiquimod. Detail Level: Simple IV intact/Arm band on

## 2023-10-16 NOTE — PROGRESS NOTE ADULT - PROBLEM SELECTOR PLAN 1
-s/p drainage by IR  -continue pain control- pt reports relief from tylenol and oxycodone  -diet as tolerated  -I.D. following- on imipenam as of 10/25. cultures with  ESBL ecoli and enterococcus.   -GN julian bacteremia noted, repeat cx ngtd  -follow up ID and case management re: dispo. plans for home infusion.   -length of abx per ID  -imipenam can cause skin rash in 2% of patients. highly doubt current skin findings related to antibiotics but follow up ID
-s/p drainage by IR  -continue pain control- pt reports relief from tylenol and oxycodone  -diet as tolerated  -I.D. following- on meropenam and linezolid. cultures with  ESBL ecoli and enterococcus.   -GN julian bacteremia noted, repeat cx ngtd  -follow up ID and case management re: dispo.
no

## 2024-02-15 NOTE — PHYSICAL THERAPY INITIAL EVALUATION ADULT - ADDITIONAL COMMENTS
add robitussin add robitussin add robitussin as per pt, has been in/out hospital for the past 3 months, prior to July-2018, pt was (I) with all functional mobility, (I) with all ADLs, during the past 3 months, pt has been primarily wheelchair bound, minimal walking, PTA, pt resides in a  with spouse, 2 steps to enter.

## 2024-05-07 NOTE — ASU PATIENT PROFILE, ADULT - PAIN SCALE PREFERRED, PROFILE
Detail Level: Zone
Products Recommended: Broad brimmed hat QD
General Sunscreen Counseling: I recommended a broad spectrum sunscreen with a SPF of 50 or higher. Sunscreens should be applied at least 15 minutes prior to expected sun exposure and then every 2 hours after that as long as sun exposure continues.  Sun protective clothing can be used in lieu of sunscreen but must be worn the entire time you are exposed to the sun's rays.
numerical 0-10

## 2024-07-06 NOTE — PROGRESS NOTE ADULT - ATTENDING COMMENTS
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

## 2024-09-05 NOTE — ASU PATIENT PROFILE, ADULT - FALL HARM RISK CONCLUSION
"Alberto was calm and cooperative during assessment. He endorses mild depression related to AH stating \"it would kill me and my family.\" He denies anxiety, SI, SH, HI, and VH. He was medication compliant and requested PRN nicorette gum. Alberto stated that his goal today was to \"stay positive and out of my head,\" and stated he would use coping skills to accomplish this. He denies new concerns at this time.   " Fall with Harm Risk

## 2024-12-10 NOTE — H&P PST ADULT - BSA (M2)
Hyrimoz Counseling:  I discussed with the patient the risks of adalimumab including but not limited to myelosuppression, immunosuppression, autoimmune hepatitis, demyelinating diseases, lymphoma, and serious infections.  The patient understands that monitoring is required including a PPD at baseline and must alert us or the primary physician if symptoms of infection or other concerning signs are noted. 1.87

## 2025-01-18 NOTE — H&P PST ADULT - CURRENT SWALLOWING
Nora Kwan  [id number] [facility] [address]      2025      : 2007    Dear Ms. Kwan:    We have been trying to reach you without success.  Please call my office at [phone]    Thank you for your timely response.    Sincerely,         Ricky Wells MD   (0) swallows foods and liquids w/o difficulty

## 2025-03-30 NOTE — PATIENT PROFILE ADULT - AD AGENT PHONE NUMBER
Attempted to contact extended care to discuss plan of care. Patient has been calm and cooperative with meds and ADLS. Left voicemail.    [phone]